# Patient Record
Sex: FEMALE | Race: WHITE | NOT HISPANIC OR LATINO | Employment: OTHER | URBAN - METROPOLITAN AREA
[De-identification: names, ages, dates, MRNs, and addresses within clinical notes are randomized per-mention and may not be internally consistent; named-entity substitution may affect disease eponyms.]

---

## 2022-04-13 ENCOUNTER — APPOINTMENT (OUTPATIENT)
Dept: MRI IMAGING | Facility: HOSPITAL | Age: 72
End: 2022-04-13

## 2022-04-13 ENCOUNTER — HOSPITAL ENCOUNTER (EMERGENCY)
Facility: HOSPITAL | Age: 72
Discharge: HOME | End: 2022-04-13
Attending: EMERGENCY MEDICINE | Admitting: EMERGENCY MEDICINE

## 2022-04-13 ENCOUNTER — APPOINTMENT (OUTPATIENT)
Dept: GENERAL RADIOLOGY | Facility: HOSPITAL | Age: 72
End: 2022-04-13

## 2022-04-13 ENCOUNTER — APPOINTMENT (OUTPATIENT)
Dept: CT IMAGING | Facility: HOSPITAL | Age: 72
End: 2022-04-13

## 2022-04-13 VITALS
DIASTOLIC BLOOD PRESSURE: 93 MMHG | HEART RATE: 101 BPM | SYSTOLIC BLOOD PRESSURE: 156 MMHG | TEMPERATURE: 98 F | RESPIRATION RATE: 18 BRPM | OXYGEN SATURATION: 96 %

## 2022-04-13 DIAGNOSIS — M51.9 LUMBAR DISC DISEASE: Primary | ICD-10-CM

## 2022-04-13 DIAGNOSIS — N39.0 ACUTE UTI: ICD-10-CM

## 2022-04-13 LAB
ALBUMIN SERPL-MCNC: 4 G/DL (ref 3.5–5.2)
ALBUMIN/GLOB SERPL: 1.1 G/DL
ALP SERPL-CCNC: 69 U/L (ref 39–117)
ALT SERPL W P-5'-P-CCNC: 16 U/L (ref 1–33)
ANION GAP SERPL CALCULATED.3IONS-SCNC: 13 MMOL/L (ref 5–15)
APTT PPP: 25.3 SECONDS (ref 24.3–38.1)
AST SERPL-CCNC: 11 U/L (ref 1–32)
BACTERIA UR QL AUTO: ABNORMAL /HPF
BASOPHILS # BLD AUTO: 0.03 10*3/MM3 (ref 0–0.2)
BASOPHILS NFR BLD AUTO: 0.4 % (ref 0–1.5)
BILIRUB SERPL-MCNC: 0.4 MG/DL (ref 0–1.2)
BILIRUB UR QL STRIP: NEGATIVE
BUN SERPL-MCNC: 18 MG/DL (ref 8–23)
BUN/CREAT SERPL: 19.1 (ref 7–25)
CALCIUM SPEC-SCNC: 9.4 MG/DL (ref 8.6–10.5)
CHLORIDE SERPL-SCNC: 95 MMOL/L (ref 98–107)
CLARITY UR: ABNORMAL
CO2 SERPL-SCNC: 24 MMOL/L (ref 22–29)
COLOR UR: YELLOW
CREAT SERPL-MCNC: 0.94 MG/DL (ref 0.57–1)
DEPRECATED RDW RBC AUTO: 35.5 FL (ref 37–54)
EGFRCR SERPLBLD CKD-EPI 2021: 64.6 ML/MIN/1.73
EOSINOPHIL # BLD AUTO: 0.14 10*3/MM3 (ref 0–0.4)
EOSINOPHIL NFR BLD AUTO: 1.8 % (ref 0.3–6.2)
ERYTHROCYTE [DISTWIDTH] IN BLOOD BY AUTOMATED COUNT: 11.2 % (ref 12.3–15.4)
GLOBULIN UR ELPH-MCNC: 3.5 GM/DL
GLUCOSE BLDC GLUCOMTR-MCNC: 459 MG/DL (ref 70–130)
GLUCOSE SERPL-MCNC: 473 MG/DL (ref 65–99)
GLUCOSE UR STRIP-MCNC: ABNORMAL MG/DL
HCT VFR BLD AUTO: 43 % (ref 34–46.6)
HGB BLD-MCNC: 15.2 G/DL (ref 12–15.9)
HGB UR QL STRIP.AUTO: ABNORMAL
HOLD SPECIMEN: NORMAL
HOLD SPECIMEN: NORMAL
HYALINE CASTS UR QL AUTO: ABNORMAL /LPF
IMM GRANULOCYTES # BLD AUTO: 0.02 10*3/MM3 (ref 0–0.05)
IMM GRANULOCYTES NFR BLD AUTO: 0.3 % (ref 0–0.5)
INR PPP: 0.93 (ref 0.9–1.1)
KETONES UR QL STRIP: NEGATIVE
LEUKOCYTE ESTERASE UR QL STRIP.AUTO: ABNORMAL
LYMPHOCYTES # BLD AUTO: 2.57 10*3/MM3 (ref 0.7–3.1)
LYMPHOCYTES NFR BLD AUTO: 33.4 % (ref 19.6–45.3)
MCH RBC QN AUTO: 30.8 PG (ref 26.6–33)
MCHC RBC AUTO-ENTMCNC: 35.3 G/DL (ref 31.5–35.7)
MCV RBC AUTO: 87.2 FL (ref 79–97)
MONOCYTES # BLD AUTO: 0.69 10*3/MM3 (ref 0.1–0.9)
MONOCYTES NFR BLD AUTO: 9 % (ref 5–12)
NEUTROPHILS NFR BLD AUTO: 4.25 10*3/MM3 (ref 1.7–7)
NEUTROPHILS NFR BLD AUTO: 55.1 % (ref 42.7–76)
NITRITE UR QL STRIP: POSITIVE
PH UR STRIP.AUTO: 7 [PH] (ref 4.5–8)
PLATELET # BLD AUTO: 312 10*3/MM3 (ref 140–450)
PMV BLD AUTO: 8.9 FL (ref 6–12)
POTASSIUM SERPL-SCNC: 4.2 MMOL/L (ref 3.5–5.2)
PROT SERPL-MCNC: 7.5 G/DL (ref 6–8.5)
PROT UR QL STRIP: NEGATIVE
PROTHROMBIN TIME: 12.7 SECONDS (ref 12.1–15)
QT INTERVAL: 321 MS
RBC # BLD AUTO: 4.93 10*6/MM3 (ref 3.77–5.28)
RBC # UR STRIP: ABNORMAL /HPF
REF LAB TEST METHOD: ABNORMAL
SODIUM SERPL-SCNC: 132 MMOL/L (ref 136–145)
SP GR UR STRIP: 1.01 (ref 1–1.03)
SQUAMOUS #/AREA URNS HPF: ABNORMAL /HPF
TROPONIN T SERPL-MCNC: <0.01 NG/ML (ref 0–0.03)
UROBILINOGEN UR QL STRIP: ABNORMAL
WBC # UR STRIP: ABNORMAL /HPF
WBC NRBC COR # BLD: 7.7 10*3/MM3 (ref 3.4–10.8)
WHOLE BLOOD HOLD SPECIMEN: NORMAL
WHOLE BLOOD HOLD SPECIMEN: NORMAL

## 2022-04-13 PROCEDURE — 87147 CULTURE TYPE IMMUNOLOGIC: CPT | Performed by: EMERGENCY MEDICINE

## 2022-04-13 PROCEDURE — 93005 ELECTROCARDIOGRAM TRACING: CPT | Performed by: EMERGENCY MEDICINE

## 2022-04-13 PROCEDURE — 85025 COMPLETE CBC W/AUTO DIFF WBC: CPT | Performed by: EMERGENCY MEDICINE

## 2022-04-13 PROCEDURE — 82962 GLUCOSE BLOOD TEST: CPT

## 2022-04-13 PROCEDURE — 80053 COMPREHEN METABOLIC PANEL: CPT | Performed by: EMERGENCY MEDICINE

## 2022-04-13 PROCEDURE — 71045 X-RAY EXAM CHEST 1 VIEW: CPT

## 2022-04-13 PROCEDURE — 85610 PROTHROMBIN TIME: CPT | Performed by: EMERGENCY MEDICINE

## 2022-04-13 PROCEDURE — 63710000001 PREDNISONE PER 1 MG: Performed by: EMERGENCY MEDICINE

## 2022-04-13 PROCEDURE — 70450 CT HEAD/BRAIN W/O DYE: CPT

## 2022-04-13 PROCEDURE — 93010 ELECTROCARDIOGRAM REPORT: CPT | Performed by: INTERNAL MEDICINE

## 2022-04-13 PROCEDURE — 72110 X-RAY EXAM L-2 SPINE 4/>VWS: CPT

## 2022-04-13 PROCEDURE — 99283 EMERGENCY DEPT VISIT LOW MDM: CPT | Performed by: EMERGENCY MEDICINE

## 2022-04-13 PROCEDURE — 81001 URINALYSIS AUTO W/SCOPE: CPT | Performed by: EMERGENCY MEDICINE

## 2022-04-13 PROCEDURE — 85730 THROMBOPLASTIN TIME PARTIAL: CPT | Performed by: EMERGENCY MEDICINE

## 2022-04-13 PROCEDURE — 84484 ASSAY OF TROPONIN QUANT: CPT | Performed by: EMERGENCY MEDICINE

## 2022-04-13 PROCEDURE — 87086 URINE CULTURE/COLONY COUNT: CPT | Performed by: EMERGENCY MEDICINE

## 2022-04-13 PROCEDURE — 70551 MRI BRAIN STEM W/O DYE: CPT

## 2022-04-13 PROCEDURE — 99284 EMERGENCY DEPT VISIT MOD MDM: CPT

## 2022-04-13 RX ORDER — METHYLPREDNISOLONE 4 MG/1
TABLET ORAL
Qty: 21 TABLET | Refills: 0 | Status: SHIPPED | OUTPATIENT
Start: 2022-04-13

## 2022-04-13 RX ORDER — CEFUROXIME AXETIL 500 MG/1
500 TABLET ORAL 2 TIMES DAILY
Qty: 10 TABLET | Refills: 0 | Status: SHIPPED | OUTPATIENT
Start: 2022-04-13

## 2022-04-13 RX ORDER — SODIUM CHLORIDE 0.9 % (FLUSH) 0.9 %
10 SYRINGE (ML) INJECTION AS NEEDED
Status: DISCONTINUED | OUTPATIENT
Start: 2022-04-13 | End: 2022-04-13 | Stop reason: HOSPADM

## 2022-04-13 RX ORDER — PREDNISONE 20 MG/1
60 TABLET ORAL ONCE
Status: COMPLETED | OUTPATIENT
Start: 2022-04-13 | End: 2022-04-13

## 2022-04-13 RX ADMIN — PREDNISONE 60 MG: 20 TABLET ORAL at 12:13

## 2022-04-13 NOTE — DISCHARGE INSTRUCTIONS
Call the patient connection line for primary care provider advised her to follow-up with within 1 week.  Take the Ceftin and Medrol Dosepak as prescribed.  Return to the emergency department if there is worsening pain, numbness, tingling, weakness, change in bladder or bowel function, worse in any way at all.

## 2022-04-13 NOTE — ED PROVIDER NOTES
Subjective   History of Present Illness  History of Present Illness    Chief complaint: Difficulty walking and numbness    Location: Lower leg    Quality/Severity: Patient having difficulty walking    Timing/Onset/Duration: Started last night around 8 PM    Modifying Factors: Better with time    Associated Symptoms: No headache.  No fever chills or cough.  No sore throat earache or nasal congestion.  No chest pain or shortness of breath.  No palpitations.  No abdominal pain.  No diarrhea or burning when she urinates.  No loss of control of her bladder or bowel function.    Narrative: This 71-year-old white female was walking down some steps and experienced numbness in her legs.  She complained of pain in her left butt cheek.  This happened at 8 PM last night.    PCP: No listed provider.      Review of Systems   Constitutional: Negative for chills and fever.   HENT: Negative for congestion, ear pain and sore throat.    Respiratory: Negative for shortness of breath.    Cardiovascular: Negative for chest pain and palpitations.   Gastrointestinal: Negative for abdominal pain, diarrhea, nausea and vomiting.   Genitourinary: Negative for difficulty urinating.   Musculoskeletal: Negative for back pain and neck pain.   Skin: Negative for rash.   Neurological: Positive for numbness (Bilateral leg). Negative for headaches.   Psychiatric/Behavioral: Negative for confusion.        Medication List      ASK your doctor about these medications    NON FORMULARY            Past Medical History:   Diagnosis Date   • Diabetes mellitus (HCC)        No Known Allergies    Past Surgical History:   Procedure Laterality Date   • BACK SURGERY     • CHOLECYSTECTOMY         History reviewed. No pertinent family history.    Social History     Tobacco Use   • Smoking status: Current Every Day Smoker     Packs/day: 1.00     Types: Cigarettes   • Smokeless tobacco: Never Used   Substance and Sexual Activity   • Alcohol use: Never            Objective   Physical Exam  Vitals (The temperature is 98 °F, pulse 72, respirations 18, /83, room air pulse ox 99%.) reviewed.   Constitutional:       Appearance: Normal appearance.   HENT:      Head: Normocephalic and atraumatic.      Right Ear: Tympanic membrane normal.      Left Ear: Tympanic membrane normal.      Nose: Nose normal.      Mouth/Throat:      Mouth: Mucous membranes are moist.      Pharynx: No oropharyngeal exudate or posterior oropharyngeal erythema.   Eyes:      Extraocular Movements: Extraocular movements intact.      Pupils: Pupils are equal, round, and reactive to light.   Cardiovascular:      Rate and Rhythm: Normal rate and regular rhythm.      Heart sounds: No murmur heard.    No friction rub. No gallop.   Pulmonary:      Effort: Pulmonary effort is normal.      Breath sounds: Normal breath sounds.   Abdominal:      General: Abdomen is flat. Bowel sounds are normal. There is no distension.      Palpations: Abdomen is soft. There is no mass.      Tenderness: There is no abdominal tenderness. There is no guarding or rebound.      Hernia: No hernia is present.   Musculoskeletal:         General: No swelling, tenderness or signs of injury. Normal range of motion.      Cervical back: Normal range of motion and neck supple. No rigidity.   Skin:     General: Skin is warm and dry.      Capillary Refill: Capillary refill takes less than 2 seconds.   Neurological:      General: No focal deficit present.      Mental Status: She is alert and oriented to person, place, and time.      Cranial Nerves: No cranial nerve deficit.      Sensory: No sensory deficit.      Motor: No weakness.      Coordination: Coordination normal.   Psychiatric:         Mood and Affect: Mood normal.         Procedures           ED Course  ED Course as of 04/13/22 1150   Wed Apr 13, 2022   1142 The urine microscopic shows 3-5 red blood cells, 6-12 white blood cells, 2+ bacteria, 7-12 squamous epithelial cells.  The  leukocytes are small, nitrite positive.  The serum glucose is 473.  The sodium is 132.  The chloride is 95 the laboratory values are otherwise unremarkable [RC]      ED Course User Index  [RC] Jared Fernandez MD      09:47 EDT, 04/13/22:  The CT of the head was normal according to Dr. Danielle.     10:16 EDT, 04/13/22:  The EKG was obtained at 1009 read by me at 1010.  The EKG shows a sinus tachycardia with a rate of 100.  There is a normal axis with no hypertrophy.  The CT is prolonged at 204 ms.  The QRS and QT intervals are unremarkable.  There is no ectopy.  There is no acute ST elevation or depression.  There is poor anterior R wave progression     11:50 EDT, 04/13/22:  Patient was reassessed.  She feels much better.  She only has mild numbness in her legs.  She states she feels much better.  Her vital signs were reviewed and are stable.  Neurological exam: Conscious alert oriented x4 with no focal deficits noted.  The patient ambulated without difficulty.    11:51 EDT, 04/13/22:  The patient's diagnosis of lumbar disc disease and urinary tract infection was discussed with her.  The patient will be placed on Ceftin.  The patient will be given a number to call for a primary care provider to follow-up with within 1 week with possible referral to spine specialist.  Patient will be placed on a Medrol Dosepak.  She should return to the emergency department if there is increased numbness, weakness, change in bladder or bowel function, worse in any way at all.  All the patient's question were answered she will be discharged in good condition.                                 MDM    Final diagnoses:   Lumbar disc disease   Acute UTI       ED Disposition  ED Disposition     None          No follow-up provider specified.       Medication List      No changes were made to your prescriptions during this visit.          Jared Fernandez MD  04/13/22 1149

## 2022-04-13 NOTE — ED NOTES
"Patient states that she did not go to the hospital when she had her \"stroke\" in September.  States that she just assumed that it was a stroke and got better.  States that she does not have a PMD  "

## 2022-04-14 LAB — BACTERIA SPEC AEROBE CULT: ABNORMAL

## 2022-04-26 ENCOUNTER — HOSPITAL ENCOUNTER (EMERGENCY)
Facility: HOSPITAL | Age: 72
Discharge: HOME | End: 2022-04-26
Attending: EMERGENCY MEDICINE | Admitting: EMERGENCY MEDICINE

## 2022-04-26 ENCOUNTER — APPOINTMENT (OUTPATIENT)
Dept: GENERAL RADIOLOGY | Facility: HOSPITAL | Age: 72
End: 2022-04-26

## 2022-04-26 ENCOUNTER — HOSPITAL ENCOUNTER (OUTPATIENT)
Dept: PHYSICAL THERAPY | Facility: HOSPITAL | Age: 72
Setting detail: THERAPIES SERIES
Discharge: HOME | End: 2022-04-26

## 2022-04-26 VITALS
DIASTOLIC BLOOD PRESSURE: 66 MMHG | HEART RATE: 68 BPM | OXYGEN SATURATION: 99 % | SYSTOLIC BLOOD PRESSURE: 100 MMHG | HEIGHT: 64 IN | BODY MASS INDEX: 24.41 KG/M2 | TEMPERATURE: 97.6 F | RESPIRATION RATE: 20 BRPM | WEIGHT: 143 LBS

## 2022-04-26 DIAGNOSIS — S92.415A CLOSED NONDISPLACED FRACTURE OF PROXIMAL PHALANX OF LEFT GREAT TOE, INITIAL ENCOUNTER: Primary | ICD-10-CM

## 2022-04-26 DIAGNOSIS — R26.9 ABNORMAL GAIT DUE TO MUSCLE WEAKNESS: Primary | ICD-10-CM

## 2022-04-26 DIAGNOSIS — M62.81 ABNORMAL GAIT DUE TO MUSCLE WEAKNESS: Primary | ICD-10-CM

## 2022-04-26 PROCEDURE — 73630 X-RAY EXAM OF FOOT: CPT

## 2022-04-26 PROCEDURE — 97162 PT EVAL MOD COMPLEX 30 MIN: CPT

## 2022-04-26 PROCEDURE — 99283 EMERGENCY DEPT VISIT LOW MDM: CPT

## 2022-04-26 PROCEDURE — 99282 EMERGENCY DEPT VISIT SF MDM: CPT

## 2022-04-26 RX ORDER — NAPROXEN 500 MG/1
500 TABLET ORAL 2 TIMES DAILY PRN
Qty: 10 TABLET | Refills: 0 | Status: SHIPPED | OUTPATIENT
Start: 2022-04-26 | End: 2022-05-01

## 2022-04-26 NOTE — ED PROVIDER NOTES
" EMERGENCY DEPARTMENT ENCOUNTER      Room Number: 13/13    History is provided by the patient, no translation services needed    HPI:    Chief complaint: Foot injury    Location: Left foot    Quality/Severity: Patient denies any pain.    Timing/Duration: 11-12 days ago    Modifying Factors: None    Associated Symptoms: Swelling and bruising, which have improved.    Narrative: Pt is a 71 y.o. female who presents complaining of a left foot injury that occurred 11 to 12 days ago.  Patient advises that she was walking with her walker when she got \"tangled up\" in her walker.  Patient advises that she fell into her  and then fell down.  She denies any other injuries from the fall.  Patient denies hitting her head or loss of consciousness during the fall.  She states that she has since seen her PCP for her foot injury.  Patient was sent to physical therapy and today the physical therapist sent her to the ER, prior to performing any manipulation on the foot in order to get an x-ray.  Patient advises that she had previously had a mini stroke and has numbness on her left side.  Patient states that her walking had declined after her mini stroke, likely causing her to fall.  She advises that she is able to walk on her foot at this time.  Patient states that she has also had bruising and swelling in her left foot, which have improved over the past week.      PMD: Lanie Gomez, BRET    REVIEW OF SYSTEMS  Review of Systems   Constitutional: Negative for chills and fever.   HENT: Negative for congestion and rhinorrhea.    Eyes: Negative for pain and visual disturbance.   Respiratory: Negative for cough, chest tightness and shortness of breath.    Cardiovascular: Negative for chest pain, palpitations and leg swelling.   Gastrointestinal: Negative for abdominal pain, constipation, diarrhea, nausea and vomiting.   Musculoskeletal: Positive for joint swelling (To left foot). Negative for arthralgias, back pain and myalgias. "   Skin: Positive for color change (To left foot). Negative for pallor, rash and wound.   Neurological: Negative for dizziness, syncope, weakness, light-headedness and headaches.   Psychiatric/Behavioral: Negative for suicidal ideas. The patient is not nervous/anxious.          PAST MEDICAL HISTORY  Active Ambulatory Problems     Diagnosis Date Noted   • No Active Ambulatory Problems     Resolved Ambulatory Problems     Diagnosis Date Noted   • No Resolved Ambulatory Problems     Past Medical History:   Diagnosis Date   • Diabetes mellitus (HCC)        PAST SURGICAL HISTORY  Past Surgical History:   Procedure Laterality Date   • BACK SURGERY     • CHOLECYSTECTOMY         FAMILY HISTORY  History reviewed. No pertinent family history.    SOCIAL HISTORY  Social History     Socioeconomic History   • Marital status: Single   Tobacco Use   • Smoking status: Current Every Day Smoker     Packs/day: 1.00     Types: Cigarettes   • Smokeless tobacco: Never Used   Substance and Sexual Activity   • Alcohol use: Never       ALLERGIES  Patient has no known allergies.    No current facility-administered medications for this encounter.    Current Outpatient Medications:   •  cefuroxime (CEFTIN) 500 MG tablet, Take 1 tablet by mouth 2 (Two) Times a Day., Disp: 10 tablet, Rfl: 0  •  methylPREDNISolone (Medrol) 4 MG dose pack, follow package directions, Disp: 21 tablet, Rfl: 0  •  naproxen (EC NAPROSYN) 500 MG EC tablet, Take 1 tablet by mouth 2 (Two) Times a Day As Needed for Mild Pain  for up to 5 days., Disp: 10 tablet, Rfl: 0  •  NON FORMULARY, DOES NOT TAKE ANY MEDICATIONS, Disp: , Rfl:     PHYSICAL EXAM  ED Triage Vitals [04/26/22 1529]   Temp Heart Rate Resp BP SpO2   97.6 °F (36.4 °C) 90 20 100/68 99 %      Temp src Heart Rate Source Patient Position BP Location FiO2 (%)   Oral Monitor Sitting Left arm --       Physical Exam  Vitals and nursing note reviewed.   Constitutional:       General: She is not in acute distress.      Appearance: Normal appearance. She is not ill-appearing, toxic-appearing or diaphoretic.   HENT:      Head: Normocephalic and atraumatic.   Eyes:      Conjunctiva/sclera: Conjunctivae normal.   Cardiovascular:      Rate and Rhythm: Normal rate and regular rhythm.      Pulses: Normal pulses.      Heart sounds: Normal heart sounds.   Pulmonary:      Effort: Pulmonary effort is normal. No respiratory distress.      Breath sounds: Normal breath sounds. No wheezing, rhonchi or rales.   Abdominal:      General: Bowel sounds are normal. There is no distension.      Palpations: Abdomen is soft.      Tenderness: There is no abdominal tenderness. There is no guarding or rebound.   Musculoskeletal:         General: Swelling (To left foot) and signs of injury (To left foot) present. No tenderness or deformity. Normal range of motion.      Cervical back: Normal range of motion and neck supple. No tenderness.      Right lower leg: No edema.      Left lower leg: No edema.   Skin:     General: Skin is warm and dry.      Coloration: Skin is not jaundiced or pale.      Findings: No bruising, erythema, lesion or rash.   Neurological:      Mental Status: She is alert and oriented to person, place, and time.   Psychiatric:         Mood and Affect: Mood and affect normal.           LAB RESULTS  Lab Results (last 24 hours)     ** No results found for the last 24 hours. **          RADIOLOGY  XR Foot 3+ View Left    Result Date: 4/26/2022  CR Foot Comp Min 3 Vws LT INDICATION: Pain and swelling of the left foot with numbness. Patient fell 11 days ago. COMPARISON: None available FINDINGS: AP, lateral, and oblique views of the left foot.  There is a fracture deformity of the distal 5th metatarsal which appears to be an oblique subacute to chronic appearing fracture which is mostly healed. This is older than 11 days. There is degenerative change at the 1st MTP joint with an oblique lucency through the proximal aspect of the proximal phalanx  of the great toe extending into the 1st MTP joint concerning for fracture. The hindfoot and Lisfranc joints appear normal. There is irregularity to the cortical margin of the tuft of the distal phalanx of the great toe which may be chronic, however correlation is needed for presence of trauma or skin ulcer in this area. No foreign body.     1. There is fracture deformity of the distal aspect of the 5th metatarsal with callus formation consistent with a subacute to chronic fracture which is older than 11 days. 2. There is oblique lucency through the proximal phalanx of the great toe extending into the 1st MTP joint which is likely an acute nondisplaced fracture. 3. The tuft of the distal phalanx of the great toe is irregular which may be chronic. Correlate with any presence of erosion, skin ulcer or trauma in this area. Signer Name: Cathi Goodwin MD  Signed: 4/26/2022 3:55 PM  Workstation Name: [id number]  [facility]      I ordered the above radiologic testing and reviewed the results    PROCEDURES  Procedures      PROGRESS AND CONSULTS  ED Course as of 04/26/22 1632   Tue Apr 26, 2022   1630 Displaced fx is an old fx according to the radiologist. Other fx is non-displaced. Will place the pt in a walking boot and have her follow up with ortho.  [AH]      ED Course User Index  [AH] Lorie Bazan PA-C           MEDICAL DECISION MAKING    MDM     My diagnosis for lower extremity pain and injury includes but is not limited to hip fracture, femur fracture, hip dislocation, hip contusion, hip sprain, hip strain, pelvic fracture, ischio-tibial band pain, ischio-tibial band bursitis, knee sprain, patella dislocation, knee dislocation, internal derangement of knee, fractures of the femur, tibia, fibula, ankle, foot and digits, ankle sprain, ankle dislocation, Lisfranc fracture, fracture dislocations of the digits, pulmonary embolism, claudication, peripheral vascular disease, gout,  osteoarthritis, rheumatoid arthritis, bursitis, septic joint, poly-rheumatica, polyarthralgia and other inflammatory or infectious disease processes.  DIAGNOSIS  Final diagnoses:   Closed nondisplaced fracture of proximal phalanx of left great toe, initial encounter       Latest Documented Vital Signs:  As of 16:32 EDT  BP- 100/68 HR- 90 Temp- 97.6 °F (36.4 °C) (Oral) O2 sat- 99%    DISPOSITION  Pt discharged    Smoking cessation discussed with patient.    Discussed pertinent findings with the patient/family.  Patient/Family voiced understanding of need to follow-up for recheck and further testing as needed.  Return to the Emergency Department warnings were given.         Medication List      New Prescriptions    naproxen 500 MG EC tablet  Commonly known as: EC NAPROSYN  Take 1 tablet by mouth 2 (Two) Times a Day As Needed for Mild Pain  for up to 5 days.           Where to Get Your Medications      These medications were sent to [facility] - [place], [place] - [address] - [phone]  - [phone] FX  [address] [facility] [place] [facility] [address]    Phone: [phone]   · naproxen 500 MG EC tablet              Follow-up Information     Lanie Gomez, BRET. Call in 1 day.    Specialty: Family Medicine  Why: to schedule follow up  Contact information:  [address]  [facility] [id number] [phone]             Bin Walker MD. Call in 1 day.    Specialties: Orthopedic Surgery, Sports Medicine  Why: to schedule follow up  Contact information:  [address] [facility] [address]  [place] [id number] [phone]             Go to  [facility] Emergency Department.    Specialty: Emergency Medicine  Why: If symptoms worsen  Contact information:  [address] [place] [address] [phone]           Wisam Baldwin DPM. Call in 1 day.    Specialty: Podiatry  Why: to schedule follow up  Contact information:  [address] [facility] [address] [facility] [address]  [facility]  [address]  [phone]                           Dictated utilizing Dragon dictation     Lorie Bazan PA-C  04/26/22 1689

## 2022-04-26 NOTE — DISCHARGE INSTRUCTIONS
You have fractured your foot.  Ice and elevate.  Wear splint or walking boot until seen by orthopedics or podiatry..  Crutches as discussed for ambulation.  Medication as directed.  Follow-up with orthopedics or podiatry as discussed.  Return to ED for medical emergencies.

## 2022-04-27 NOTE — THERAPY EVALUATION
.Outpatient Physical Therapy Neuro Initial Evaluation   [facility]     Patient Name: Arline Landaverde  : 1950  MRN: [id number]  Today's Date: 2022      Visit Date: 2022    There is no problem list on file for this patient.       Past Medical History:   Diagnosis Date   • Diabetes mellitus (HCC)         Past Surgical History:   Procedure Laterality Date   • BACK SURGERY     • CHOLECYSTECTOMY           Visit Dx:     ICD-10-CM ICD-9-CM   1. Abnormal gait due to muscle weakness  M62.81 781.2    R26.9 728.87        Patient History     Row Name 22 1500             History    Chief Complaint Difficulty Walking;Difficulty with daily activities;Falls/history of falls;Muscle weakness;Numbness  -JV      Date Current Problem(s) Began 22  -JV      Brief Description of Current Complaint Pt reports onset of weakness and numbness in the L UE with some drooling out the left side of mouth. Pt went to ER and was able to walk into the hospital per report. During time in ER, pt states that numbness and weakness progressed to involve the L LE. Pt's diagnostic tests were negative for acute stroke or cardiac event. Pt was diagnosed with UTI and started on steroid dose pack and oral anti-biotics. Pt reports she fell in the parking lot after D/C from ER. Pt was seen by PCP on 22 and referred to PT for evaluation of difficulty walking.  -JV      Patient/Caregiver Goals Return to prior level of function;Improve mobility;Improve strength  -JV      Current Tobacco Use yes  -JV      Smoking Status yes  -JV      Patient's Rating of General Health Good  -JV      Hand Dominance right-handed  -JV      What clinical tests have you had for this problem? X-ray;CT scan;MRI;Other 1 (comment)  EKG  -JV      Are you or can you be pregnant No  -JV              Fall Risk Assessment    Any falls in the past year: Yes  -JV      Number of falls reported in the last 12 months 3  -JV      Factors that contributed to the fall:  Lost balance;Didn't use assistive device;Uneven surface  -JV      Does patient have a fear of falling No  -JV              Services    Do you plan to receive Home Health services in the near future No  -JV              Daily Activities    Primary Language English  -JV      Are you able to read Yes  -JV      Are you able to write Yes  -JV      How does patient learn best? Listening  -JV      Teaching needs identified Home Exercise Program;Falls Prevention  -JV      Patient is concerned about/has problems with Bed Mobility;Climbing Stairs;Difficulty with self care (i.e. bathing, dressing, toileting:;Performing home management (household chores, shopping, care of dependents);Standing;Transfers (getting out of a chair, bed);Walking  -JV      Barriers to learning None  -JV      Recommended Referrals Other (comment)  Recommended X-ray of L foot/ankle  -JV      Pt Participated in POC and Goals Yes  -JV              Safety    Are you being hurt, hit, or frightened by anyone at home or in your life? No  -JV      Are you being neglected by a caregiver No  -JV      Have you had any of the following issues with N/A  -JV            User Key  (r) = Recorded By, (t) = Taken By, (c) = Cosigned By    Initials Name Provider Type    Jessica Littlejohn PT Physical Therapist                                Therapy Education  Education Details: Encouraged pt/caregiver to obtain x-ray of L foot/ankle due to concern for orthopedic injury.  Given: Other (comment) (Unable to begin treatment until L foot/ankle cleared.)  Program: New  How Provided: Verbal  Provided to: Patient, Caregiver  Level of Understanding: Teach back education performed, Verbalized         PT Assessment/Plan     Row Name 04/27/22 1455 04/27/22 1441       PT Assessment    Functional Limitations -- Decreased safety during functional activities;Impaired gait;Limitation in home management;Performance in self-care ADL  -JV    Impairments -- Balance;Coordination;Gait;Motor  function;Muscle strength;Sensation  -JV    Assessment Comments -- This pt is a 70 y/o F who began to experience L UE numbness and weakness along with drooling from the L side of mouth. Pt was taken to ER on 4/13/22 and diagnosed with UTI. Testing performed in the ER was negative for acute stroke or cardiac event including MRI, CT scan, EKG, and lab work. An old area of injury was observed in the L cerebellum. The pt reports that she fell in the parking lot leaving the ER. The pt was seen by PCP on 4/18/22 and referred to PT for evaluation and tx. Pt presents to therapy with her  in a travel w/c and reports that she still has numbness and weakness in the L U/LE, numbness in her face and difficulty swallowing. Pt states all symptoms are slowly improving. Pt does not c/o pain in the L foot/ankle, but states she cannot tell where it is in space. Shoes and socks were removed and L foot/ankle was observed to have significant bruising and swelling, pt and  report this occured leaving the ER. Pt is not maintaining trunk in midline in sitting with wt shifted toward the R and increased lateral trunk flexion toward the R. Caregiver reports that pt falls toward the L when not supported for example when sitting on the commode. Upper and LE coordination is significantly decreased in the L U/LE. Pt/caregiver were encouraged to obtain x-ray of the L foot/ankle to rule out orthopedic injury prior to beginning physical therapy for weakness and difficulty walking.  -JV    Please refer to paper survey for additional self-reported information Yes  -JV Yes  -JV    Patient/caregiver participated in establishment of treatment plan and goals Yes  -JV --    Patient would benefit from skilled therapy intervention Yes  -JV --       PT Plan    PT Plan Comments Plan to complete PT evaluation after pt is assessed for L foot/ankle injury.  -JV --          User Key  (r) = Recorded By, (t) = Taken By, (c) = Cosigned By    Initials Name  Provider Type    Jessica Littlejohn, PT Physical Therapist                                   Outcome Measure Options: Lower Extremity Functional Scale (LEFS)  Lower Extremity Functional Index  Any of your usual work, housework or school activities: Extreme difficulty or unable to perform activity  Your usual hobbies, recreational or sporting activities: Extreme difficulty or unable to perform activity  Getting into or out of the bath: Extreme difficulty or unable to perform activity  Walking between rooms: Extreme difficulty or unable to perform activity  Putting on your shoes or socks: Extreme difficulty or unable to perform activity  Squatting: Extreme difficulty or unable to perform activity  Lifting an object, like a bag of groceries from the floor: Extreme difficulty or unable to perform activity  Performing light activities around your home: Extreme difficulty or unable to perform activity  Performing heavy activities around your home: Extreme difficulty or unable to perform activity  Getting into or out of a car: Extreme difficulty or unable to perform activity  Walking 2 blocks: Extreme difficulty or unable to perform activity  Walking a mile: Extreme difficulty or unable to perform activity  Going up or down 10 stairs (about 1 flight of stairs): Extreme difficulty or unable to perform activity  Standing for 1 hour: Extreme difficulty or unable to perform activity  Sitting for 1 hour: Extreme difficulty or unable to perform activity  Running on even ground: Extreme difficulty or unable to perform activity  Running on uneven ground: Extreme difficulty or unable to perform activity  Making sharp turns while running fast: Extreme difficulty or unable to perform activity  Hopping: Extreme difficulty or unable to perform activity  Rolling over in bed: Extreme difficulty or unable to perform activity  Total: 0    Time Calculation:   Start Time: 1400  Stop Time: 1500  Time Calculation (min): 60 min   Therapy Charges  for Today     Code Description Service Date Service Provider Modifiers Qty    28547023806 HC PT EVAL MOD COMPLEXITY 4 4/26/2022 Jessica Farris, PT GP 1          PT G-Codes  Outcome Measure Options: Lower Extremity Functional Scale (LEFS)  Total: 0         Jessica Farris, PT  4/27/2022

## 2023-03-08 ENCOUNTER — APPOINTMENT (OUTPATIENT)
Dept: GENERAL RADIOLOGY | Facility: HOSPITAL | Age: 73
End: 2023-03-08
Payer: MEDICARE

## 2023-03-08 ENCOUNTER — HOSPITAL ENCOUNTER (EMERGENCY)
Facility: HOSPITAL | Age: 73
Discharge: LEFT BEFORE DISCHARGE | End: 2023-03-08
Attending: EMERGENCY MEDICINE | Admitting: EMERGENCY MEDICINE
Payer: MEDICARE

## 2023-03-08 VITALS
RESPIRATION RATE: 16 BRPM | TEMPERATURE: 97.4 F | SYSTOLIC BLOOD PRESSURE: 131 MMHG | OXYGEN SATURATION: 100 % | HEIGHT: 64 IN | HEART RATE: 92 BPM | DIASTOLIC BLOOD PRESSURE: 83 MMHG | WEIGHT: 143 LBS | BODY MASS INDEX: 24.41 KG/M2

## 2023-03-08 DIAGNOSIS — H34.12 CENTRAL RETINAL ARTERY OCCLUSION OF LEFT EYE: Primary | ICD-10-CM

## 2023-03-08 LAB
ABO GROUP BLD: NORMAL
ALBUMIN SERPL-MCNC: 3.6 G/DL (ref 3.5–5.2)
ALBUMIN/GLOB SERPL: 1.5 G/DL
ALP SERPL-CCNC: 42 U/L (ref 39–117)
ALT SERPL W P-5'-P-CCNC: 11 U/L (ref 1–33)
ANION GAP SERPL CALCULATED.3IONS-SCNC: 9.5 MMOL/L (ref 5–15)
AST SERPL-CCNC: 8 U/L (ref 1–32)
BASOPHILS # BLD AUTO: 0.06 10*3/MM3 (ref 0–0.2)
BASOPHILS NFR BLD AUTO: 0.9 % (ref 0–1.5)
BILIRUB SERPL-MCNC: 0.3 MG/DL (ref 0–1.2)
BLD GP AB SCN SERPL QL: NEGATIVE
BUN SERPL-MCNC: 16 MG/DL (ref 8–23)
BUN/CREAT SERPL: 24.6 (ref 7–25)
CALCIUM SPEC-SCNC: 9.3 MG/DL (ref 8.6–10.5)
CHLORIDE SERPL-SCNC: 100 MMOL/L (ref 98–107)
CO2 SERPL-SCNC: 21.5 MMOL/L (ref 22–29)
CREAT SERPL-MCNC: 0.65 MG/DL (ref 0.57–1)
DEPRECATED RDW RBC AUTO: 44.3 FL (ref 37–54)
EGFRCR SERPLBLD CKD-EPI 2021: 93.1 ML/MIN/1.73
EOSINOPHIL # BLD AUTO: 0.38 10*3/MM3 (ref 0–0.4)
EOSINOPHIL NFR BLD AUTO: 5.8 % (ref 0.3–6.2)
ERYTHROCYTE [DISTWIDTH] IN BLOOD BY AUTOMATED COUNT: 13.1 % (ref 12.3–15.4)
ERYTHROCYTE [SEDIMENTATION RATE] IN BLOOD: 6 MM/HR (ref 0–30)
GLOBULIN UR ELPH-MCNC: 2.4 GM/DL
GLUCOSE BLDC GLUCOMTR-MCNC: 107 MG/DL (ref 70–130)
GLUCOSE SERPL-MCNC: 101 MG/DL (ref 65–99)
HCT VFR BLD AUTO: 32.3 % (ref 34–46.6)
HGB BLD-MCNC: 11.3 G/DL (ref 12–15.9)
HOLD SPECIMEN: NORMAL
HOLD SPECIMEN: NORMAL
IMM GRANULOCYTES # BLD AUTO: 0.03 10*3/MM3 (ref 0–0.05)
IMM GRANULOCYTES NFR BLD AUTO: 0.5 % (ref 0–0.5)
INR PPP: 0.95 (ref 0.9–1.1)
LYMPHOCYTES # BLD AUTO: 2.08 10*3/MM3 (ref 0.7–3.1)
LYMPHOCYTES NFR BLD AUTO: 31.9 % (ref 19.6–45.3)
MCH RBC QN AUTO: 32.3 PG (ref 26.6–33)
MCHC RBC AUTO-ENTMCNC: 35 G/DL (ref 31.5–35.7)
MCV RBC AUTO: 92.3 FL (ref 79–97)
MONOCYTES # BLD AUTO: 0.46 10*3/MM3 (ref 0.1–0.9)
MONOCYTES NFR BLD AUTO: 7.1 % (ref 5–12)
NEUTROPHILS NFR BLD AUTO: 3.51 10*3/MM3 (ref 1.7–7)
NEUTROPHILS NFR BLD AUTO: 53.8 % (ref 42.7–76)
NRBC BLD AUTO-RTO: 0 /100 WBC (ref 0–0.2)
PLATELET # BLD AUTO: 325 10*3/MM3 (ref 140–450)
PMV BLD AUTO: 8.4 FL (ref 6–12)
POTASSIUM SERPL-SCNC: 4.4 MMOL/L (ref 3.5–5.2)
PROT SERPL-MCNC: 6 G/DL (ref 6–8.5)
PROTHROMBIN TIME: 12.8 SECONDS (ref 11.7–14.2)
QT INTERVAL: 373 MS
RBC # BLD AUTO: 3.5 10*6/MM3 (ref 3.77–5.28)
RH BLD: POSITIVE
SODIUM SERPL-SCNC: 131 MMOL/L (ref 136–145)
T&S EXPIRATION DATE: NORMAL
WBC NRBC COR # BLD: 6.52 10*3/MM3 (ref 3.4–10.8)
WHOLE BLOOD HOLD COAG: NORMAL
WHOLE BLOOD HOLD SPECIMEN: NORMAL

## 2023-03-08 PROCEDURE — 86850 RBC ANTIBODY SCREEN: CPT | Performed by: PHYSICIAN ASSISTANT

## 2023-03-08 PROCEDURE — 93010 ELECTROCARDIOGRAM REPORT: CPT | Performed by: INTERNAL MEDICINE

## 2023-03-08 PROCEDURE — 85025 COMPLETE CBC W/AUTO DIFF WBC: CPT | Performed by: PHYSICIAN ASSISTANT

## 2023-03-08 PROCEDURE — 80053 COMPREHEN METABOLIC PANEL: CPT | Performed by: PHYSICIAN ASSISTANT

## 2023-03-08 PROCEDURE — 99285 EMERGENCY DEPT VISIT HI MDM: CPT | Performed by: PSYCHIATRY & NEUROLOGY

## 2023-03-08 PROCEDURE — 86900 BLOOD TYPING SEROLOGIC ABO: CPT | Performed by: PHYSICIAN ASSISTANT

## 2023-03-08 PROCEDURE — 82962 GLUCOSE BLOOD TEST: CPT

## 2023-03-08 PROCEDURE — 71045 X-RAY EXAM CHEST 1 VIEW: CPT

## 2023-03-08 PROCEDURE — 85610 PROTHROMBIN TIME: CPT | Performed by: PHYSICIAN ASSISTANT

## 2023-03-08 PROCEDURE — 86901 BLOOD TYPING SEROLOGIC RH(D): CPT | Performed by: PHYSICIAN ASSISTANT

## 2023-03-08 PROCEDURE — 99283 EMERGENCY DEPT VISIT LOW MDM: CPT

## 2023-03-08 PROCEDURE — 93005 ELECTROCARDIOGRAM TRACING: CPT | Performed by: PHYSICIAN ASSISTANT

## 2023-03-08 PROCEDURE — 85652 RBC SED RATE AUTOMATED: CPT | Performed by: PHYSICIAN ASSISTANT

## 2023-03-08 RX ORDER — ATORVASTATIN CALCIUM 20 MG/1
20 TABLET, FILM COATED ORAL DAILY
Qty: 14 TABLET | Refills: 0 | Status: SHIPPED | OUTPATIENT
Start: 2023-03-08

## 2023-03-08 RX ORDER — SODIUM CHLORIDE 0.9 % (FLUSH) 0.9 %
10 SYRINGE (ML) INJECTION AS NEEDED
Status: DISCONTINUED | OUTPATIENT
Start: 2023-03-08 | End: 2023-03-08 | Stop reason: HOSPADM

## 2023-03-08 NOTE — CONSULTS
"Neurology Consult Note    Consult Date: 3/8/2023    Referring MD: Dr. King    Reason for Consult I have been asked to see the patient in neurological consultation to render advice and opinion regarding vision loss    Arline Landaverde is a 72 y.o. female with diabetes, prior stroke who presented to the hospital with vision loss.    She was seen in April of last year at [facility] for left-sided weakness.  MRI was done which showed no evidence of stroke.  She was discharged and reports persistent left hemiparesis and ataxia which have gradually improved since that time.  She does not take aspirin on a daily basis, only intermittently.  She denies a history of hypertension or A-fib.    On Friday she developed abrupt onset of near complete left eye vision loss.  This was painless.  There was no associated unilateral weakness or numbness.  She did not have any premonitory symptoms leading up to this or any headache before during or after.  She saw an ophthalmologist today who diagnosed her with a left central retinal artery occlusion and sent her to the emergency department for further testing.    Past Medical History:   Diagnosis Date   • Diabetes mellitus (HCC)    • Stroke (HCC)        Exam  /82   Pulse 103   Temp 97.4 °F (36.3 °C) (Tympanic)   Resp 16   Ht 162.6 cm (64\")   Wt 64.9 kg (143 lb)   SpO2 99%   BMI 24.55 kg/m²   Gen: NAD, vitals reviewed  MS: oriented x3, recent/remote memory intact, normal attention/concentration, language intact, no neglect.  CN: Right eye visual acuity grossly normal, some retained light perception and left-sided peripheral vision in the left eye, pupils 4 to 5 mm, fixed (had dilating drops with the ophthalmologist earlier), EOMI, no facial droop, no dysarthria  Motor: 5/5 throughout upper and lower extremities, normal tone  Coordination: No dysmetria or overshoot  Sensory: Diminished to cold temperature left upper extremity, otherwise intact to cold temperature " and vibration throughout    DATA:    Lab Results   Component Value Date    GLUCOSE 473 (C) 04/13/2022    CALCIUM 9.4 04/13/2022     (L) 04/13/2022    K 4.2 04/13/2022    CO2 24.0 04/13/2022    CL 95 (L) 04/13/2022    BUN 18 04/13/2022    CREATININE 0.94 04/13/2022    BCR 19.1 04/13/2022    ANIONGAP 13.0 04/13/2022     Lab Results   Component Value Date    WBC 6.52 03/08/2023    HGB 11.3 (L) 03/08/2023    HCT 32.3 (L) 03/08/2023    MCV 92.3 03/08/2023     03/08/2023       Lab review:  Sodium 132, hemoglobin 11.3, ESR pending    Imaging review: I personally reviewed her MRI of the brain performed in April of last year at [facility].  This shows a chronic left inferior cerebellar lacunar stroke.  No acute stroke was identified.  Radiology report reviewed.    CTA head and neck is pending    Diagnoses:  Left central retinal artery occlusion  Chronic stroke  Non-insulin-dependent diabetes without complication    Pre-stroke MRS: 1    Comment: Left CRAO diagnosed by ophthalmology today.  She also has a recent clinical stroke that occurred last year.  MRI at that time only showed evidence of chronic lacunar strokes.  For her CRAO I recommended a complete stroke work-up including MRI, CTA and echo as well as a sed rate.  Patient at this point is refusing further testing and wants to go home    PLAN:  Recommend aspirin, statin, ideally a short course of DAPT  Recommend CTA head and neck, MRI brain, 2D echocardiogram    Discussed with ROBERT Prince

## 2023-03-08 NOTE — ED PROVIDER NOTES
EMERGENCY DEPARTMENT ENCOUNTER    Room Number:  01/01  Date seen:  3/8/2023  PCP: Lanie Gomez APRN  Discussed/ obtained information from independent historians: patient      HPI:  Chief Complaint: vision loss  A complete HPI/ROS/PMH/PSH/SH/FH are unobtainable due to: none  Context: Arline Landaverde is a 72 y.o. female who presents to the ED c/o 5-day history of vision loss in the left eye.  She denies any other symptoms.  She went to [facility] today was evaluated and subsequently found to have a central retinal artery occlusion and was referred to the ER for stroke work-up.  She states she has a history of stroke, has some residual left-sided arm and leg weakness related to that.  She denies any numbness, new weakness, speech difficulty or walking difficulty greater than her baseline.  She is not anticoagulated.  She is a smoker.  She walks independently but due to persistent difficulty since prior stroke uses a wheelchair when outside the house.      External (non-ED) record review: Patient had an MRI of the brain on 4/13/2022 for bilateral leg weakness and numbness and was found to have no acute findings, did have mild cerebral atrophy and minimal might matter microvascular disease as well as remote lacunar infarct in the left cerebellar hemisphere.      PAST MEDICAL HISTORY  Active Ambulatory Problems     Diagnosis Date Noted   • No Active Ambulatory Problems     Resolved Ambulatory Problems     Diagnosis Date Noted   • No Resolved Ambulatory Problems     Past Medical History:   Diagnosis Date   • Diabetes mellitus (HCC)    • Stroke (HCC)          PAST SURGICAL HISTORY  Past Surgical History:   Procedure Laterality Date   • BACK SURGERY     • CHOLECYSTECTOMY           FAMILY HISTORY  History reviewed. No pertinent family history.      SOCIAL HISTORY  Social History     Socioeconomic History   • Marital status: Single   Tobacco Use   • Smoking status: Every Day     Packs/day: 1.00     Types:  Cigarettes   • Smokeless tobacco: Never   Substance and Sexual Activity   • Alcohol use: Never   • Drug use: Never         ALLERGIES  Penicillins        REVIEW OF SYSTEMS  Review of Systems         PHYSICAL EXAM  ED Triage Vitals   Temp Heart Rate Resp BP SpO2   03/08/23 1258 03/08/23 1257 03/08/23 1257 03/08/23 1304 03/08/23 1257   97.4 °F (36.3 °C) 103 16 117/82 99 %      Temp src Heart Rate Source Patient Position BP Location FiO2 (%)   03/08/23 1258 03/08/23 1257 -- -- --   Tympanic Monitor          Physical Exam      GENERAL: no acute distress  HENT: normocephalic, atraumatic  EYES: no scleral icterus  CV: regular rhythm, normal rate  RESPIRATORY: normal effort CTA B  ABDOMEN: nondistended, soft, nontender  MUSCULOSKELETAL: no deformity  NEURO: Neuro: Alert and oriented x3, face symmetric, no facial droop, eyebrows raise equally bilaterally, tongue midline, no dysarthria, no aphasia, extraocular motion intact, no nystagmus, visual acuity grossly normal in the right eye with severe decreased vision in the left eye and loss of vision in the inferior fields on theleft, cranial nerves II through XII intact, moves all extremities well, 5 out of 5 strength in all extremities, sensation intact light touch all extremities, mild ataxia in the left extremities, no tremor.  PSYCH:  calm, cooperative  SKIN: warm, dry    Vital signs and nursing notes reviewed.          LAB RESULTS  Recent Results (from the past 24 hour(s))   Comprehensive Metabolic Panel    Collection Time: 03/08/23  1:58 PM    Specimen: Blood   Result Value Ref Range    Glucose 101 (H) 65 - 99 mg/dL    BUN 16 8 - 23 mg/dL    Creatinine 0.65 0.57 - 1.00 mg/dL    Sodium 131 (L) 136 - 145 mmol/L    Potassium 4.4 3.5 - 5.2 mmol/L    Chloride 100 98 - 107 mmol/L    CO2 21.5 (L) 22.0 - 29.0 mmol/L    Calcium 9.3 8.6 - 10.5 mg/dL    Total Protein 6.0 6.0 - 8.5 g/dL    Albumin 3.6 3.5 - 5.2 g/dL    ALT (SGPT) 11 1 - 33 U/L    AST (SGOT) 8 1 - 32 U/L    Alkaline  Phosphatase 42 39 - 117 U/L    Total Bilirubin 0.3 0.0 - 1.2 mg/dL    Globulin 2.4 gm/dL    A/G Ratio 1.5 g/dL    BUN/Creatinine Ratio 24.6 7.0 - 25.0    Anion Gap 9.5 5.0 - 15.0 mmol/L    eGFR 93.1 >60.0 mL/min/1.73   Protime-INR    Collection Time: 03/08/23  1:58 PM    Specimen: Blood   Result Value Ref Range    Protime 12.8 11.7 - 14.2 Seconds    INR 0.95 0.90 - 1.10   Type & Screen    Collection Time: 03/08/23  1:58 PM    Specimen: Blood   Result Value Ref Range    ABO Type A     RH type Positive     Antibody Screen Negative     T&S Expiration Date 3/11/2023 11:59:59 PM    CBC Auto Differential    Collection Time: 03/08/23  1:58 PM    Specimen: Blood   Result Value Ref Range    WBC 6.52 3.40 - 10.80 10*3/mm3    RBC 3.50 (L) 3.77 - 5.28 10*6/mm3    Hemoglobin 11.3 (L) 12.0 - 15.9 g/dL    Hematocrit 32.3 (L) 34.0 - 46.6 %    MCV 92.3 79.0 - 97.0 fL    MCH 32.3 26.6 - 33.0 pg    MCHC 35.0 31.5 - 35.7 g/dL    RDW 13.1 12.3 - 15.4 %    RDW-SD 44.3 37.0 - 54.0 fl    MPV 8.4 6.0 - 12.0 fL    Platelets 325 140 - 450 10*3/mm3    Neutrophil % 53.8 42.7 - 76.0 %    Lymphocyte % 31.9 19.6 - 45.3 %    Monocyte % 7.1 5.0 - 12.0 %    Eosinophil % 5.8 0.3 - 6.2 %    Basophil % 0.9 0.0 - 1.5 %    Immature Grans % 0.5 0.0 - 0.5 %    Neutrophils, Absolute 3.51 1.70 - 7.00 10*3/mm3    Lymphocytes, Absolute 2.08 0.70 - 3.10 10*3/mm3    Monocytes, Absolute 0.46 0.10 - 0.90 10*3/mm3    Eosinophils, Absolute 0.38 0.00 - 0.40 10*3/mm3    Basophils, Absolute 0.06 0.00 - 0.20 10*3/mm3    Immature Grans, Absolute 0.03 0.00 - 0.05 10*3/mm3    nRBC 0.0 0.0 - 0.2 /100 WBC   Green Top (Gel)    Collection Time: 03/08/23  1:58 PM   Result Value Ref Range    Extra Tube Hold for add-ons.    Lavender Top    Collection Time: 03/08/23  1:58 PM   Result Value Ref Range    Extra Tube hold for add-on    Gold Top - SST    Collection Time: 03/08/23  1:58 PM   Result Value Ref Range    Extra Tube Hold for add-ons.    Light Blue Top    Collection Time:  03/08/23  1:58 PM   Result Value Ref Range    Extra Tube Hold for add-ons.    POC Glucose Once    Collection Time: 03/08/23  1:58 PM    Specimen: Blood   Result Value Ref Range    Glucose 107 70 - 130 mg/dL   ECG 12 Lead ED Triage Standing Order; Stroke (Onset >12 hrs)    Collection Time: 03/08/23  2:03 PM   Result Value Ref Range    QT Interval 373 ms       Ordered the above labs and reviewed the results.        RADIOLOGY  XR Chest 1 View    Result Date: 3/8/2023  XR CHEST 1 VW-  03/08/2023  HISTORY: Stroke protocol.  Heart size is within normal limits. Lungs appear clear. Skinfold overlies the left hemithorax. Bones and soft tissues are otherwise unremarkable.      1. No acute process.  This report was finalized on 3/8/2023 2:25 PM by Dr. Chato Chavez M.D.        Ordered the above noted radiological studies. Reviewed by me in PACS.            PROCEDURES  Procedures              MEDICATIONS GIVEN IN ER  Medications   sodium chloride 0.9 % flush 10 mL (has no administration in time range)                   MEDICAL DECISION MAKING, PROGRESS, and CONSULTS    All labs have been independently reviewed by me.  All radiology studies have been reviewed by me and I have also reviewed the radiology report.   EKG's independently viewed and interpreted by me.  Discussion below represents my analysis of pertinent findings related to patient's condition, differential diagnosis, treatment plan and final disposition.      Additional sources:       - Shared decision making: While in the ER the patient decided she did not want any further work-up for her vision loss today.  She refused CTAs, refused admission for further evaluation and treatment.      Orders placed during this visit:  Orders Placed This Encounter   Procedures   • XR Chest 1 View   • Comprehensive Metabolic Panel   • Protime-INR   • Garden Draw   • CBC Auto Differential   • Sedimentation Rate   • NPO Diet NPO Type: Strict NPO   • Undress and Gown   • Cardiac  Monitoring   • Continuous Pulse Oximetry   • Vital Signs   • Nursing Swallow Assessment   • Oxygen Therapy- Nasal Cannula; 2 LPM; Titrate for SPO2: 92%, Greater Than or Equal To   • POC Glucose Once   • POC Glucose Once   • ECG 12 Lead ED Triage Standing Order; Stroke (Onset >12 hrs)   • Type & Screen   • Insert Peripheral IV   • CBC & Differential   • Green Top (Gel)   • Lavender Top   • Gold Top - SST   • Light Blue Top           Differential diagnosis:  Stroke, carotid occlusion, giant cell arteritis      Independent interpretation of labs, radiology studies, and discussions with consultants:  ED Course as of 03/08/23 1553   Wed Mar 08, 2023   1351 I discussed patient with Dr. Estrada, stroke neurology.  He is agreeable with CTAs of the head neck for initial imaging, if no immediate intervention required based on her imaging she can be admitted to the observation unit for further stroke work-up. [KA]   1441 Dr. Bello has evaluated the patient at the bedside in the emergency department.  She refuses to stay for any further work-up in the ER or in the hospital.  She refuses CTAs or to wait for any further pending test results.  He is counseled her on the risk of leaving AGAINST MEDICAL ADVICE, the risk for another stroke and the need for further evaluation and restratification.  She is of sound mind and judgment, alert and oriented and decisional and chooses to be discharged.  He recommends she take a daily aspirin and be started on Lipitor and follow-up with her PCP [KA]   1450 Dr. King has evaluated the patient at the bedside as well, counseled her on the risk of leaving AGAINST MEDICAL ADVICE prior to completion of her evaluation and treatment including the risk of additional stroke which could cause significant disability or even death and she verbalizes understanding of this and chooses to leave AGAINST MEDICAL ADVICE. [KA]   145 I reassessed the patient, she is agreeable to a prescription for Lipitor.  I  have encouraged her to return to the ER if she changes her mind, otherwise she should call her PCP to schedule follow-up ASAP [KA]   1451 . [KA]   1552 Glucose(!): 101 [KA]   1552 Sodium(!): 131 [KA]   1552 WBC: 6.52 [KA]   1552 Hemoglobin(!): 11.3 [KA]   1553 My independent interpretation of the EKG performed at 1403 sinus rhythm rate 87 normal axis normal intervals no ST elevation.  In comparison with the EKG performed on April 13, 2022, tachycardia resolved, otherwise no significant change [KA]   1553 My independent interpretation of the chest x-ray is no cardiomegaly or acute infiltrate [KA]      ED Course User Index  [KA] Thelma Tubbs PA             Patient was wearing a face mask when I entered the room and they continued to wear a mask throughout their stay in the ED.  I wore PPE, including  gloves, face mask with shield or face mask with goggles whenever I was in the room with patient.     DIAGNOSIS  Final diagnoses:   Central retinal artery occlusion of left eye           Follow Up:  Lanie Gomez P, APRN  [address] [facility] [id number] [phone]    In 1 day        RX:     Medication List      New Prescriptions    atorvastatin 20 MG tablet  Commonly known as: LIPITOR  Take 1 tablet by mouth Daily.           Where to Get Your Medications      These medications were sent to [id number] - [place], [place] [address] [phone]  - [phone]   [address] [facility] [place] [address]    Phone: [phone]   · atorvastatin 20 MG tablet         Latest Documented Vital Signs:  As of 15:53 EST  BP- 131/83 HR- 92 Temp- 97.4 °F (36.3 °C) (Tympanic) O2 sat- 100%              --    Please note that portions of this were completed with a voice recognition program.       Note Disclaimer: At [facility], we believe that sharing information builds trust and better relationships. You are receiving this note because you are receiving care at [facility] or recently visited.  It is possible you will see health information before a provider has talked with you about it. This kind of information can be easy to misunderstand. To help you fully understand what it means for your health, we urge you to discuss this note with your provider.           Thelma Tubbs PA  03/08/23 1551       Thelma Tubbs PA  03/08/23 1552

## 2023-03-08 NOTE — ED PROVIDER NOTES
Pt presents to the ED c/o  cute onset of complete vision loss in the left eye that started Friday.  Was seen by ophthalmologist today and sent to the ER for stroke evaluation.  Diagnosed with a central retinal artery occlusion.  No other symptoms.  Patient denies any history of atrial fibrillation, not on any anticoagulation.     On exam,   General: No acute distress, nontoxic  HEENT: Mucous membranes moist, atraumatic, EOMI  Neck: Full ROM  Pulm: Symmetric chest rise, nonlabored, lungs CTAB  Cardiovascular: Regular rate and rhythm, intact distal pulses  GI: Soft, nontender, nondistended, no rebound, no guarding, bowel sounds present  MSK: Full ROM, no deformity  Skin: Warm, dry  Neuro: Awake, alert, oriented x 4, GCS 15, moving all extremities, no focal deficits  Psych: Calm, cooperative      N95, protective eye goggles, and gloves used during this encounter. Patient in surgical mask.      Plan:   ED Course as of 03/14/23 2322   Wed Mar 08, 2023   1351 I discussed patient with Dr. Estrada, stroke neurology.  He is agreeable with CTAs of the head neck for initial imaging, if no immediate intervention required based on her imaging she can be admitted to the observation unit for further stroke work-up. [KA]   6431 Dr. Bello has evaluated the patient at the bedside in the emergency department.  She refuses to stay for any further work-up in the ER or in the hospital.  She refuses CTAs or to wait for any further pending test results.  He is counseled her on the risk of leaving AGAINST MEDICAL ADVICE, the risk for another stroke and the need for further evaluation and restratification.  She is of sound mind and judgment, alert and oriented and decisional and chooses to be discharged.  He recommends she take a daily aspirin and be started on Lipitor and follow-up with her PCP [KA]   2812 Dr. King has evaluated the patient at the bedside as well, counseled her on the risk of leaving AGAINST MEDICAL ADVICE prior to  completion of her evaluation and treatment including the risk of additional stroke which could cause significant disability or even death and she verbalizes understanding of this and chooses to leave AGAINST MEDICAL ADVICE. [KA]   1451 I reassessed the patient, she is agreeable to a prescription for Lipitor.  I have encouraged her to return to the ER if she changes her mind, otherwise she should call her PCP to schedule follow-up ASAP [KA]   1451 . [KA]   1552 Glucose(!): 101 [KA]   1552 Sodium(!): 131 [KA]   1552 WBC: 6.52 [KA]   1552 Hemoglobin(!): 11.3 [KA]   1553 My independent interpretation of the EKG performed at 1403 sinus rhythm rate 87 normal axis normal intervals no ST elevation.  In comparison with the EKG performed on April 13, 2022, tachycardia resolved, otherwise no significant change [KA]   1553 My independent interpretation of the chest x-ray is no cardiomegaly or acute infiltrate [KA]      ED Course User Index  [KA] Thelma Tubbs PA     Patient at this time does not want to stay in the hospital, wants no further testing.  I discussed with her the risks of not undergoing full stroke work-up with risk moving forward potential recurrent or worsening stroke.  She understands the possibility of further debilitation or even death, is leaving AMA with medical decision making capacity intact.  I recommended that she start taking a daily aspirin and follow-up with her primary care provider, ED return if she changes her mind or symptoms worsen.      Attestation:  The ANGELO and I have discussed this patient's history, physical exam, and treatment plan.  I have reviewed the documentation and personally had a face to face interaction with the patient. I affirm the documentation and agree with the treatment and plan.  The attached note describes my personal findings.          Tramaine King MD  03/08/23 5404       Tramaine King MD  03/08/23 5735       Tramaine King MD  03/14/23 2038

## 2023-03-08 NOTE — ED TRIAGE NOTES
Patient to er from eye doctor with c/o she lost her vision in her left eye starting on Friday. Patient was directed to come to er today related to Central Retinal Artery Occlusion of left eye. Patient reported she has no vision in that eye. Patient has mask on in triage along with staff. Patient reported no blood thinners at this time.

## 2023-03-08 NOTE — DISCHARGE INSTRUCTIONS
You should call your primary doctor today to schedule follow-up for further outpatient evaluation and continued treatment    You can return to the ER for further evaluation if you change your mind

## 2023-03-08 NOTE — ED NOTES
Risks of leaving AMA addressed, risks of stroke addressed. New medications reviewed Patient still wanted to leave. IV line removed. VSS. /83, HR92, O2 98% on RA.

## 2023-04-19 ENCOUNTER — APPOINTMENT (OUTPATIENT)
Dept: GENERAL RADIOLOGY | Facility: HOSPITAL | Age: 73
End: 2023-04-19
Payer: MEDICARE

## 2023-04-19 ENCOUNTER — ANESTHESIA EVENT (OUTPATIENT)
Dept: PERIOP | Facility: HOSPITAL | Age: 73
End: 2023-04-19
Payer: MEDICARE

## 2023-04-19 ENCOUNTER — ANESTHESIA (OUTPATIENT)
Dept: PERIOP | Facility: HOSPITAL | Age: 73
End: 2023-04-19
Payer: MEDICARE

## 2023-04-19 ENCOUNTER — HOSPITAL ENCOUNTER (INPATIENT)
Facility: HOSPITAL | Age: 73
LOS: 1 days | Discharge: HOME HEALTH | End: 2023-04-20
Attending: EMERGENCY MEDICINE | Admitting: STUDENT IN AN ORGANIZED HEALTH CARE EDUCATION/TRAINING PROGRAM
Payer: MEDICARE

## 2023-04-19 DIAGNOSIS — E11.65 TYPE 2 DIABETES MELLITUS WITH HYPERGLYCEMIA, WITHOUT LONG-TERM CURRENT USE OF INSULIN: ICD-10-CM
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
## 2023-04-19 DIAGNOSIS — S72.001A CLOSED DISPLACED FRACTURE OF RIGHT FEMORAL NECK: ICD-10-CM

## 2023-04-19 LAB
ALBUMIN SERPL-MCNC: 3.8 G/DL (ref 3.5–5.2)
ALBUMIN/GLOB SERPL: 1.3 G/DL
ALP SERPL-CCNC: 49 U/L (ref 39–117)
ALT SERPL W P-5'-P-CCNC: 22 U/L (ref 1–33)
ANION GAP SERPL CALCULATED.3IONS-SCNC: 16 MMOL/L (ref 5–15)
ANION GAP SERPL CALCULATED.3IONS-SCNC: 9.1 MMOL/L (ref 5–15)
APTT PPP: 27.8 SECONDS (ref 24.3–38.1)
AST SERPL-CCNC: 20 U/L (ref 1–32)
BASOPHILS # BLD AUTO: 0.05 10*3/MM3 (ref 0–0.2)
BASOPHILS # BLD AUTO: 0.06 10*3/MM3 (ref 0–0.2)
BASOPHILS NFR BLD AUTO: 0.4 % (ref 0–1.5)
BASOPHILS NFR BLD AUTO: 0.5 % (ref 0–1.5)
BILIRUB SERPL-MCNC: 0.5 MG/DL (ref 0–1.2)
BUN SERPL-MCNC: 17 MG/DL (ref 8–23)
BUN SERPL-MCNC: 20 MG/DL (ref 8–23)
BUN/CREAT SERPL: 23.3 (ref 7–25)
BUN/CREAT SERPL: 31.7 (ref 7–25)
CALCIUM SPEC-SCNC: 8.9 MG/DL (ref 8.6–10.5)
CALCIUM SPEC-SCNC: 9.6 MG/DL (ref 8.6–10.5)
CHLORIDE SERPL-SCNC: 94 MMOL/L (ref 98–107)
CHLORIDE SERPL-SCNC: 97 MMOL/L (ref 98–107)
CO2 SERPL-SCNC: 19 MMOL/L (ref 22–29)
CO2 SERPL-SCNC: 21.9 MMOL/L (ref 22–29)
CREAT SERPL-MCNC: 0.63 MG/DL (ref 0.57–1)
CREAT SERPL-MCNC: 0.73 MG/DL (ref 0.57–1)
DEPRECATED RDW RBC AUTO: 40.3 FL (ref 37–54)
DEPRECATED RDW RBC AUTO: 40.7 FL (ref 37–54)
EGFRCR SERPLBLD CKD-EPI 2021: 87 ML/MIN/1.73
EGFRCR SERPLBLD CKD-EPI 2021: 93.8 ML/MIN/1.73
EOSINOPHIL # BLD AUTO: 0.05 10*3/MM3 (ref 0–0.4)
EOSINOPHIL # BLD AUTO: 0.21 10*3/MM3 (ref 0–0.4)
EOSINOPHIL NFR BLD AUTO: 0.4 % (ref 0.3–6.2)
EOSINOPHIL NFR BLD AUTO: 1.8 % (ref 0.3–6.2)
ERYTHROCYTE [DISTWIDTH] IN BLOOD BY AUTOMATED COUNT: 12.1 % (ref 12.3–15.4)
ERYTHROCYTE [DISTWIDTH] IN BLOOD BY AUTOMATED COUNT: 12.2 % (ref 12.3–15.4)
GLOBULIN UR ELPH-MCNC: 2.9 GM/DL
GLUCOSE BLDC GLUCOMTR-MCNC: 100 MG/DL (ref 70–130)
GLUCOSE BLDC GLUCOMTR-MCNC: 199 MG/DL (ref 70–130)
GLUCOSE SERPL-MCNC: 100 MG/DL (ref 65–99)
GLUCOSE SERPL-MCNC: 108 MG/DL (ref 65–99)
HCT VFR BLD AUTO: 29.6 % (ref 34–46.6)
HCT VFR BLD AUTO: 30.4 % (ref 34–46.6)
HCT VFR BLD AUTO: 33.9 % (ref 34–46.6)
HGB BLD-MCNC: 10.6 G/DL (ref 12–15.9)
HGB BLD-MCNC: 10.8 G/DL (ref 12–15.9)
HGB BLD-MCNC: 12.3 G/DL (ref 12–15.9)
IMM GRANULOCYTES # BLD AUTO: 0.03 10*3/MM3 (ref 0–0.05)
IMM GRANULOCYTES # BLD AUTO: 0.05 10*3/MM3 (ref 0–0.05)
IMM GRANULOCYTES NFR BLD AUTO: 0.3 % (ref 0–0.5)
IMM GRANULOCYTES NFR BLD AUTO: 0.4 % (ref 0–0.5)
INR PPP: 1.08 (ref 0.9–1.1)
LYMPHOCYTES # BLD AUTO: 1.12 10*3/MM3 (ref 0.7–3.1)
LYMPHOCYTES # BLD AUTO: 1.83 10*3/MM3 (ref 0.7–3.1)
LYMPHOCYTES NFR BLD AUTO: 15.5 % (ref 19.6–45.3)
LYMPHOCYTES NFR BLD AUTO: 9.4 % (ref 19.6–45.3)
MAGNESIUM SERPL-MCNC: 1.4 MG/DL (ref 1.6–2.4)
MCH RBC QN AUTO: 32.7 PG (ref 26.6–33)
MCH RBC QN AUTO: 33.1 PG (ref 26.6–33)
MCHC RBC AUTO-ENTMCNC: 35.8 G/DL (ref 31.5–35.7)
MCHC RBC AUTO-ENTMCNC: 36.3 G/DL (ref 31.5–35.7)
MCV RBC AUTO: 91.1 FL (ref 79–97)
MCV RBC AUTO: 91.4 FL (ref 79–97)
MONOCYTES # BLD AUTO: 0.47 10*3/MM3 (ref 0.1–0.9)
MONOCYTES # BLD AUTO: 0.7 10*3/MM3 (ref 0.1–0.9)
MONOCYTES NFR BLD AUTO: 3.9 % (ref 5–12)
MONOCYTES NFR BLD AUTO: 5.9 % (ref 5–12)
NEUTROPHILS NFR BLD AUTO: 10.2 10*3/MM3 (ref 1.7–7)
NEUTROPHILS NFR BLD AUTO: 75.9 % (ref 42.7–76)
NEUTROPHILS NFR BLD AUTO: 8.93 10*3/MM3 (ref 1.7–7)
NEUTROPHILS NFR BLD AUTO: 85.6 % (ref 42.7–76)
NRBC BLD AUTO-RTO: 0 /100 WBC (ref 0–0.2)
PHOSPHATE SERPL-MCNC: 4.2 MG/DL (ref 2.5–4.5)
PLATELET # BLD AUTO: 278 10*3/MM3 (ref 140–450)
PLATELET # BLD AUTO: 353 10*3/MM3 (ref 140–450)
PMV BLD AUTO: 8.6 FL (ref 6–12)
PMV BLD AUTO: 9.2 FL (ref 6–12)
POTASSIUM SERPL-SCNC: 4.5 MMOL/L (ref 3.5–5.2)
POTASSIUM SERPL-SCNC: 4.6 MMOL/L (ref 3.5–5.2)
PROT SERPL-MCNC: 6.7 G/DL (ref 6–8.5)
PROTHROMBIN TIME: 14.1 SECONDS (ref 12.1–15)
QT INTERVAL: 348 MS
RBC # BLD AUTO: 3.24 10*6/MM3 (ref 3.77–5.28)
RBC # BLD AUTO: 3.72 10*6/MM3 (ref 3.77–5.28)
SODIUM SERPL-SCNC: 128 MMOL/L (ref 136–145)
SODIUM SERPL-SCNC: 129 MMOL/L (ref 136–145)
WBC NRBC COR # BLD: 11.78 10*3/MM3 (ref 3.4–10.8)
WBC NRBC COR # BLD: 11.92 10*3/MM3 (ref 3.4–10.8)

## 2023-04-19 PROCEDURE — 85018 HEMOGLOBIN: CPT | Performed by: INTERNAL MEDICINE

## 2023-04-19 PROCEDURE — 99233 SBSQ HOSP IP/OBS HIGH 50: CPT | Performed by: INTERNAL MEDICINE

## 2023-04-19 PROCEDURE — 0 CEFAZOLIN SODIUM-DEXTROSE 2-3 GM-%(50ML) RECONSTITUTED SOLUTION: Performed by: INTERNAL MEDICINE

## 2023-04-19 PROCEDURE — 25010000002 VANCOMYCIN 1 G RECONSTITUTED SOLUTION: Performed by: INTERNAL MEDICINE

## 2023-04-19 PROCEDURE — C1713 ANCHOR/SCREW BN/BN,TIS/BN: HCPCS | Performed by: INTERNAL MEDICINE

## 2023-04-19 PROCEDURE — 83735 ASSAY OF MAGNESIUM: CPT | Performed by: STUDENT IN AN ORGANIZED HEALTH CARE EDUCATION/TRAINING PROGRAM

## 2023-04-19 PROCEDURE — 85730 THROMBOPLASTIN TIME PARTIAL: CPT | Performed by: EMERGENCY MEDICINE

## 2023-04-19 PROCEDURE — 25010000002 PROPOFOL 200 MG/20ML EMULSION: Performed by: REGISTERED NURSE

## 2023-04-19 PROCEDURE — 93010 ELECTROCARDIOGRAM REPORT: CPT | Performed by: INTERNAL MEDICINE

## 2023-04-19 PROCEDURE — 25010000002 VANCOMYCIN 750 MG RECONSTITUTED SOLUTION: Performed by: INTERNAL MEDICINE

## 2023-04-19 PROCEDURE — 84100 ASSAY OF PHOSPHORUS: CPT | Performed by: STUDENT IN AN ORGANIZED HEALTH CARE EDUCATION/TRAINING PROGRAM

## 2023-04-19 PROCEDURE — 25010000002 DEXAMETHASONE PER 1 MG: Performed by: NURSE ANESTHETIST, CERTIFIED REGISTERED

## 2023-04-19 PROCEDURE — 0SRR0J9 REPLACEMENT OF RIGHT HIP JOINT, FEMORAL SURFACE WITH SYNTHETIC SUBSTITUTE, CEMENTED, OPEN APPROACH: ICD-10-PCS | Performed by: INTERNAL MEDICINE

## 2023-04-19 PROCEDURE — 25010000002 ONDANSETRON PER 1 MG: Performed by: NURSE ANESTHETIST, CERTIFIED REGISTERED

## 2023-04-19 PROCEDURE — 73502 X-RAY EXAM HIP UNI 2-3 VIEWS: CPT

## 2023-04-19 PROCEDURE — 71046 X-RAY EXAM CHEST 2 VIEWS: CPT

## 2023-04-19 PROCEDURE — 25010000002 FENTANYL CITRATE (PF) 50 MCG/ML SOLUTION: Performed by: REGISTERED NURSE

## 2023-04-19 PROCEDURE — 93005 ELECTROCARDIOGRAM TRACING: CPT | Performed by: EMERGENCY MEDICINE

## 2023-04-19 PROCEDURE — 94799 UNLISTED PULMONARY SVC/PX: CPT

## 2023-04-19 PROCEDURE — 25010000002 MORPHINE PER 10 MG: Performed by: EMERGENCY MEDICINE

## 2023-04-19 PROCEDURE — C1776 JOINT DEVICE (IMPLANTABLE): HCPCS | Performed by: INTERNAL MEDICINE

## 2023-04-19 PROCEDURE — 76000 FLUOROSCOPY <1 HR PHYS/QHP: CPT

## 2023-04-19 PROCEDURE — 25010000002 PHENYLEPHRINE 10 MG/ML SOLUTION: Performed by: REGISTERED NURSE

## 2023-04-19 PROCEDURE — 25010000002 MIDAZOLAM PER 1MG: Performed by: NURSE ANESTHETIST, CERTIFIED REGISTERED

## 2023-04-19 PROCEDURE — 94761 N-INVAS EAR/PLS OXIMETRY MLT: CPT

## 2023-04-19 PROCEDURE — 72170 X-RAY EXAM OF PELVIS: CPT

## 2023-04-19 PROCEDURE — 73552 X-RAY EXAM OF FEMUR 2/>: CPT

## 2023-04-19 PROCEDURE — 80053 COMPREHEN METABOLIC PANEL: CPT | Performed by: EMERGENCY MEDICINE

## 2023-04-19 PROCEDURE — 85025 COMPLETE CBC W/AUTO DIFF WBC: CPT | Performed by: STUDENT IN AN ORGANIZED HEALTH CARE EDUCATION/TRAINING PROGRAM

## 2023-04-19 PROCEDURE — 99284 EMERGENCY DEPT VISIT MOD MDM: CPT

## 2023-04-19 PROCEDURE — 27236 TREAT THIGH FRACTURE: CPT | Performed by: INTERNAL MEDICINE

## 2023-04-19 PROCEDURE — 85610 PROTHROMBIN TIME: CPT | Performed by: EMERGENCY MEDICINE

## 2023-04-19 PROCEDURE — 82962 GLUCOSE BLOOD TEST: CPT

## 2023-04-19 PROCEDURE — 85025 COMPLETE CBC W/AUTO DIFF WBC: CPT | Performed by: EMERGENCY MEDICINE

## 2023-04-19 PROCEDURE — 25010000002 ROPIVACAINE PER 1 MG: Performed by: INTERNAL MEDICINE

## 2023-04-19 PROCEDURE — 85014 HEMATOCRIT: CPT | Performed by: INTERNAL MEDICINE

## 2023-04-19 PROCEDURE — 25010000002 EPINEPHRINE 1 MG/ML SOLUTION 1 ML VIAL: Performed by: INTERNAL MEDICINE

## 2023-04-19 PROCEDURE — 63710000001 INSULIN ASPART PER 5 UNITS: Performed by: INTERNAL MEDICINE

## 2023-04-19 PROCEDURE — 25010000002 KETOROLAC TROMETHAMINE PER 15 MG: Performed by: INTERNAL MEDICINE

## 2023-04-19 DEVICE — ARTICUL/EZE FEMORAL HEAD DIAMETER 28MM +1.5 12/14 TAPER
Type: IMPLANTABLE DEVICE | Site: HIP | Status: FUNCTIONAL
Brand: ARTICUL/EZE

## 2023-04-19 DEVICE — PALACOS® MV IS INTENDED FOR USE IN ARTHROPLASTIC PROCEDURES OF THE HIP, KNEE, AND OTHER JOINTS FOR THE FIXATION OF POLYMER OR METALLIC PROSTHETIC IMPLANTS TO LIVING BONE. PALACOS® MV CONTAINS THE X-RAY CONTRAST MEDIUM ZIRCONIUM DIOXIDE. TO IMPROVE VISIBILITY IN THE SURGICAL FIELD PALACOS® MV HAS BEEN COLOURED WITH CHLOROPHYLL (E141).THE BONE CEMENT IS PREPARED DIRECTLY BEFORE USE BY MIXING A POLYMER POWDER COMPONENT WITH A LIQUID MONOMER COMPONENT. A DUCTILE DOUGH FORMS WHICH CURES WITHIN A FEW MINUTES.
Type: IMPLANTABLE DEVICE | Site: HIP | Status: FUNCTIONAL
Brand: PALACOS®

## 2023-04-19 DEVICE — CEMENTRALIZER STEM CENTRALIZER 9.25MM CEMENTED
Type: IMPLANTABLE DEVICE | Site: HIP | Status: FUNCTIONAL
Brand: CEMENTRALIZER

## 2023-04-19 DEVICE — PRT HIP BIPOL PRIM DEPUY 9525109/9525107: Type: IMPLANTABLE DEVICE | Site: HIP | Status: FUNCTIONAL

## 2023-04-19 DEVICE — DEV WND/CLS CONTRL TISS STRATAFIX SYMM PDS PLS CTX 60CM VIL: Type: IMPLANTABLE DEVICE | Site: HIP | Status: FUNCTIONAL

## 2023-04-19 DEVICE — SUMMIT FEMORAL STEM 12/14 TAPER CEMENTED SIZE 4 STD 114MM
Type: IMPLANTABLE DEVICE | Site: HIP | Status: FUNCTIONAL
Brand: SUMMIT

## 2023-04-19 DEVICE — DEV CONTRL TISS STRATAFIX SPIRAL MNCRYL UD 3/0 PLS 45CM: Type: IMPLANTABLE DEVICE | Site: HIP | Status: FUNCTIONAL

## 2023-04-19 DEVICE — SELF CENTERING BI-POLAR HEAD 28MM ID 45MM OD
Type: IMPLANTABLE DEVICE | Site: HIP | Status: FUNCTIONAL
Brand: SELF CENTERING

## 2023-04-19 RX ORDER — PROPOFOL 10 MG/ML
INJECTION, EMULSION INTRAVENOUS AS NEEDED
Status: DISCONTINUED | OUTPATIENT
Start: 2023-04-19 | End: 2023-04-19 | Stop reason: SURG

## 2023-04-19 RX ORDER — DEXTROSE MONOHYDRATE 25 G/50ML
25 INJECTION, SOLUTION INTRAVENOUS
Status: DISCONTINUED | OUTPATIENT
Start: 2023-04-19 | End: 2023-04-20 | Stop reason: HOSPADM

## 2023-04-19 RX ORDER — FENTANYL CITRATE 50 UG/ML
INJECTION, SOLUTION INTRAMUSCULAR; INTRAVENOUS AS NEEDED
Status: DISCONTINUED | OUTPATIENT
Start: 2023-04-19 | End: 2023-04-19 | Stop reason: SURG

## 2023-04-19 RX ORDER — PHENYLEPHRINE HYDROCHLORIDE 10 MG/ML
INJECTION INTRAVENOUS AS NEEDED
Status: DISCONTINUED | OUTPATIENT
Start: 2023-04-19 | End: 2023-04-19 | Stop reason: SURG

## 2023-04-19 RX ORDER — INSULIN ASPART 100 [IU]/ML
2-7 INJECTION, SOLUTION INTRAVENOUS; SUBCUTANEOUS
Status: DISCONTINUED | OUTPATIENT
Start: 2023-04-19 | End: 2023-04-20 | Stop reason: HOSPADM

## 2023-04-19 RX ORDER — MORPHINE SULFATE 2 MG/ML
2 INJECTION, SOLUTION INTRAMUSCULAR; INTRAVENOUS
Status: DISCONTINUED | OUTPATIENT
Start: 2023-04-19 | End: 2023-04-20 | Stop reason: HOSPADM

## 2023-04-19 RX ORDER — TRANEXAMIC ACID 10 MG/ML
1000 INJECTION, SOLUTION INTRAVENOUS ONCE
Status: COMPLETED | OUTPATIENT
Start: 2023-04-19 | End: 2023-04-19

## 2023-04-19 RX ORDER — SODIUM CHLORIDE 9 MG/ML
40 INJECTION, SOLUTION INTRAVENOUS AS NEEDED
Status: DISCONTINUED | OUTPATIENT
Start: 2023-04-19 | End: 2023-04-19 | Stop reason: HOSPADM

## 2023-04-19 RX ORDER — SODIUM CHLORIDE 0.9 % (FLUSH) 0.9 %
10 SYRINGE (ML) INJECTION AS NEEDED
Status: DISCONTINUED | OUTPATIENT
Start: 2023-04-19 | End: 2023-04-19 | Stop reason: HOSPADM

## 2023-04-19 RX ORDER — SODIUM CHLORIDE 9 MG/ML
INJECTION, SOLUTION INTRAVENOUS AS NEEDED
Status: DISCONTINUED | OUTPATIENT
Start: 2023-04-19 | End: 2023-04-19 | Stop reason: HOSPADM

## 2023-04-19 RX ORDER — MAGNESIUM HYDROXIDE 1200 MG/15ML
LIQUID ORAL AS NEEDED
Status: DISCONTINUED | OUTPATIENT
Start: 2023-04-19 | End: 2023-04-19 | Stop reason: HOSPADM

## 2023-04-19 RX ORDER — BUPIVACAINE HYDROCHLORIDE 5 MG/ML
INJECTION, SOLUTION PERINEURAL
Status: COMPLETED | OUTPATIENT
Start: 2023-04-19 | End: 2023-04-19

## 2023-04-19 RX ORDER — BUPIVACAINE HYDROCHLORIDE 2.5 MG/ML
INJECTION, SOLUTION EPIDURAL; INFILTRATION; INTRACAUDAL
Status: COMPLETED | OUTPATIENT
Start: 2023-04-19 | End: 2023-04-19

## 2023-04-19 RX ORDER — FENTANYL CITRATE 50 UG/ML
25 INJECTION, SOLUTION INTRAMUSCULAR; INTRAVENOUS
Status: DISCONTINUED | OUTPATIENT
Start: 2023-04-19 | End: 2023-04-19 | Stop reason: HOSPADM

## 2023-04-19 RX ORDER — ONDANSETRON 2 MG/ML
4 INJECTION INTRAMUSCULAR; INTRAVENOUS ONCE AS NEEDED
Status: DISCONTINUED | OUTPATIENT
Start: 2023-04-19 | End: 2023-04-19 | Stop reason: HOSPADM

## 2023-04-19 RX ORDER — FAMOTIDINE 10 MG/ML
20 INJECTION, SOLUTION INTRAVENOUS
Status: COMPLETED | OUTPATIENT
Start: 2023-04-19 | End: 2023-04-19

## 2023-04-19 RX ORDER — NICOTINE POLACRILEX 4 MG
15 LOZENGE BUCCAL
Status: DISCONTINUED | OUTPATIENT
Start: 2023-04-19 | End: 2023-04-20 | Stop reason: HOSPADM

## 2023-04-19 RX ORDER — NALOXONE HCL 0.4 MG/ML
0.4 VIAL (ML) INJECTION
Status: DISCONTINUED | OUTPATIENT
Start: 2023-04-19 | End: 2023-04-20 | Stop reason: HOSPADM

## 2023-04-19 RX ORDER — MIDAZOLAM HYDROCHLORIDE 2 MG/2ML
0.5 INJECTION, SOLUTION INTRAMUSCULAR; INTRAVENOUS
Status: DISCONTINUED | OUTPATIENT
Start: 2023-04-19 | End: 2023-04-19 | Stop reason: HOSPADM

## 2023-04-19 RX ORDER — KETAMINE HYDROCHLORIDE 10 MG/ML
INJECTION INTRAMUSCULAR; INTRAVENOUS AS NEEDED
Status: DISCONTINUED | OUTPATIENT
Start: 2023-04-19 | End: 2023-04-19 | Stop reason: SURG

## 2023-04-19 RX ORDER — DEXMEDETOMIDINE HYDROCHLORIDE 100 UG/ML
INJECTION, SOLUTION INTRAVENOUS AS NEEDED
Status: DISCONTINUED | OUTPATIENT
Start: 2023-04-19 | End: 2023-04-19 | Stop reason: SURG

## 2023-04-19 RX ORDER — OXYCODONE HYDROCHLORIDE 5 MG/1
5 TABLET ORAL EVERY 4 HOURS PRN
Status: DISCONTINUED | OUTPATIENT
Start: 2023-04-19 | End: 2023-04-20 | Stop reason: HOSPADM

## 2023-04-19 RX ORDER — ATORVASTATIN CALCIUM 20 MG/1
20 TABLET, FILM COATED ORAL DAILY
Status: DISCONTINUED | OUTPATIENT
Start: 2023-04-19 | End: 2023-04-20 | Stop reason: HOSPADM

## 2023-04-19 RX ORDER — DEXAMETHASONE SODIUM PHOSPHATE 4 MG/ML
8 INJECTION, SOLUTION INTRA-ARTICULAR; INTRALESIONAL; INTRAMUSCULAR; INTRAVENOUS; SOFT TISSUE ONCE AS NEEDED
Status: COMPLETED | OUTPATIENT
Start: 2023-04-19 | End: 2023-04-19

## 2023-04-19 RX ORDER — SODIUM CHLORIDE 0.9 % (FLUSH) 0.9 %
10 SYRINGE (ML) INJECTION EVERY 12 HOURS SCHEDULED
Status: DISCONTINUED | OUTPATIENT
Start: 2023-04-19 | End: 2023-04-19 | Stop reason: HOSPADM

## 2023-04-19 RX ORDER — OXYCODONE AND ACETAMINOPHEN 7.5; 325 MG/1; MG/1
1 TABLET ORAL ONCE AS NEEDED
Status: DISCONTINUED | OUTPATIENT
Start: 2023-04-19 | End: 2023-04-19 | Stop reason: HOSPADM

## 2023-04-19 RX ORDER — ASPIRIN 81 MG/1
81 TABLET ORAL EVERY 12 HOURS SCHEDULED
Status: DISCONTINUED | OUTPATIENT
Start: 2023-04-20 | End: 2023-04-20 | Stop reason: HOSPADM

## 2023-04-19 RX ORDER — ONDANSETRON 2 MG/ML
4 INJECTION INTRAMUSCULAR; INTRAVENOUS ONCE AS NEEDED
Status: COMPLETED | OUTPATIENT
Start: 2023-04-19 | End: 2023-04-19

## 2023-04-19 RX ORDER — SODIUM CHLORIDE, SODIUM LACTATE, POTASSIUM CHLORIDE, CALCIUM CHLORIDE 600; 310; 30; 20 MG/100ML; MG/100ML; MG/100ML; MG/100ML
9 INJECTION, SOLUTION INTRAVENOUS CONTINUOUS PRN
Status: DISCONTINUED | OUTPATIENT
Start: 2023-04-19 | End: 2023-04-20 | Stop reason: HOSPADM

## 2023-04-19 RX ORDER — CEFAZOLIN SODIUM 2 G/50ML
2 SOLUTION INTRAVENOUS EVERY 8 HOURS
Status: COMPLETED | OUTPATIENT
Start: 2023-04-19 | End: 2023-04-20

## 2023-04-19 RX ORDER — SODIUM CHLORIDE, SODIUM LACTATE, POTASSIUM CHLORIDE, CALCIUM CHLORIDE 600; 310; 30; 20 MG/100ML; MG/100ML; MG/100ML; MG/100ML
100 INJECTION, SOLUTION INTRAVENOUS CONTINUOUS
Status: DISCONTINUED | OUTPATIENT
Start: 2023-04-19 | End: 2023-04-20

## 2023-04-19 RX ORDER — CEFAZOLIN SODIUM 2 G/50ML
2 SOLUTION INTRAVENOUS ONCE
Status: COMPLETED | OUTPATIENT
Start: 2023-04-19 | End: 2023-04-19

## 2023-04-19 RX ORDER — LIDOCAINE HYDROCHLORIDE 10 MG/ML
0.5 INJECTION, SOLUTION EPIDURAL; INFILTRATION; INTRACAUDAL; PERINEURAL ONCE AS NEEDED
Status: DISCONTINUED | OUTPATIENT
Start: 2023-04-19 | End: 2023-04-19 | Stop reason: HOSPADM

## 2023-04-19 RX ORDER — ACETAMINOPHEN 325 MG/1
650 TABLET ORAL EVERY 6 HOURS PRN
COMMUNITY

## 2023-04-19 RX ADMIN — SODIUM CHLORIDE 750 MG: 900 INJECTION, SOLUTION INTRAVENOUS at 22:22

## 2023-04-19 RX ADMIN — TRANEXAMIC ACID 1000 MG: 10 INJECTION, SOLUTION INTRAVENOUS at 10:55

## 2023-04-19 RX ADMIN — FAMOTIDINE 20 MG: 10 INJECTION, SOLUTION INTRAVENOUS at 09:26

## 2023-04-19 RX ADMIN — DEXMEDETOMIDINE 20 MCG: 100 INJECTION, SOLUTION, CONCENTRATE INTRAVENOUS at 09:45

## 2023-04-19 RX ADMIN — SODIUM CHLORIDE 1000 MG: 900 INJECTION, SOLUTION INTRAVENOUS at 09:40

## 2023-04-19 RX ADMIN — BUPIVACAINE HYDROCHLORIDE 20 ML: 2.5 INJECTION, SOLUTION EPIDURAL; INFILTRATION; INTRACAUDAL; PERINEURAL at 09:45

## 2023-04-19 RX ADMIN — SODIUM CHLORIDE, POTASSIUM CHLORIDE, SODIUM LACTATE AND CALCIUM CHLORIDE 9 ML/HR: 600; 310; 30; 20 INJECTION, SOLUTION INTRAVENOUS at 09:25

## 2023-04-19 RX ADMIN — PROPOFOL 20 MG: 10 INJECTION, EMULSION INTRAVENOUS at 11:20

## 2023-04-19 RX ADMIN — PROPOFOL 50 MCG/KG/MIN: 10 INJECTION, EMULSION INTRAVENOUS at 10:51

## 2023-04-19 RX ADMIN — PROPOFOL 20 MG: 10 INJECTION, EMULSION INTRAVENOUS at 11:10

## 2023-04-19 RX ADMIN — ONDANSETRON 4 MG: 2 INJECTION INTRAMUSCULAR; INTRAVENOUS at 09:25

## 2023-04-19 RX ADMIN — MIDAZOLAM HYDROCHLORIDE 0.5 MG: 1 INJECTION, SOLUTION INTRAMUSCULAR; INTRAVENOUS at 09:41

## 2023-04-19 RX ADMIN — CEFAZOLIN SODIUM 2 G: 2 SOLUTION INTRAVENOUS at 10:55

## 2023-04-19 RX ADMIN — SODIUM CHLORIDE, POTASSIUM CHLORIDE, SODIUM LACTATE AND CALCIUM CHLORIDE 100 ML/HR: 600; 310; 30; 20 INJECTION, SOLUTION INTRAVENOUS at 14:14

## 2023-04-19 RX ADMIN — INSULIN ASPART 2 UNITS: 100 INJECTION, SOLUTION INTRAVENOUS; SUBCUTANEOUS at 17:21

## 2023-04-19 RX ADMIN — DEXAMETHASONE SODIUM PHOSPHATE 8 MG: 4 INJECTION, SOLUTION INTRAMUSCULAR; INTRAVENOUS at 09:25

## 2023-04-19 RX ADMIN — KETAMINE HYDROCHLORIDE 10 MG: 10 INJECTION INTRAMUSCULAR; INTRAVENOUS at 10:50

## 2023-04-19 RX ADMIN — ATORVASTATIN CALCIUM 20 MG: 20 TABLET, FILM COATED ORAL at 17:21

## 2023-04-19 RX ADMIN — MORPHINE SULFATE 4 MG: 4 INJECTION, SOLUTION INTRAMUSCULAR; INTRAVENOUS at 03:41

## 2023-04-19 RX ADMIN — PROPOFOL 20 MG: 10 INJECTION, EMULSION INTRAVENOUS at 10:55

## 2023-04-19 RX ADMIN — BUPIVACAINE HYDROCHLORIDE 1.6 ML: 5 INJECTION, SOLUTION PERINEURAL at 10:18

## 2023-04-19 RX ADMIN — PROPOFOL 20 MG: 10 INJECTION, EMULSION INTRAVENOUS at 10:50

## 2023-04-19 RX ADMIN — FENTANYL CITRATE 50 MCG: 50 INJECTION, SOLUTION INTRAMUSCULAR; INTRAVENOUS at 12:21

## 2023-04-19 RX ADMIN — PHENYLEPHRINE HYDROCHLORIDE 100 MCG: 10 INJECTION INTRAVENOUS at 12:58

## 2023-04-19 RX ADMIN — KETAMINE HYDROCHLORIDE 10 MG: 10 INJECTION INTRAMUSCULAR; INTRAVENOUS at 11:00

## 2023-04-19 RX ADMIN — CEFAZOLIN SODIUM 2 G: 2 SOLUTION INTRAVENOUS at 20:47

## 2023-04-19 RX ADMIN — OXYCODONE HYDROCHLORIDE 5 MG: 5 TABLET ORAL at 20:47

## 2023-04-19 RX ADMIN — PROPOFOL 20 MG: 10 INJECTION, EMULSION INTRAVENOUS at 11:00

## 2023-04-19 NOTE — SIGNIFICANT NOTE
04/19/23 0904   OTHER   Discipline occupational therapist   Rehab Time/Intention   Session Not Performed other (see comments)  (pt with acute femur fx with sugery scheduled today. will await ortho recommendations post surgery)

## 2023-04-19 NOTE — CONSULTS
Orthopedic Consult      Patient: Arline Landaverde    Date of Admission: 4/19/2023  2:40 AM    YOB: 1950    Medical Record Number: [id number]    Attending Physician: Kurt Kee DO  Consulting Physician:       Chief Complaints: Accidental fall, initial encounter [W19.XXXA]  Closed displaced fracture of right femoral neck [S72.001A]      History of Present Illness: 72 y.o. female admitted to [facility] to services of Kurt Kee DO with Accidental fall, initial encounter [W19.XXXA]  Closed displaced fracture of right femoral neck [S72.001A]. I was consulted for further evaluation and treatment.  Patient states that she was at home yesterday when she tripped falling onto her right side.  She had immediate pain and decreased ability to bear weight.  Her  helped get her up off the floor but as the day went on the pain became more and more severe.  She was never able to ambulate following the fall with her right leg.  She was brought to the emergency room overnight and diagnosed with a right femoral neck fracture.  She was admitted to the hospitalist and I was consulted.  The patient denies head trauma or pain elsewhere.  She states all of her pain is located anteriorly in her groin.  She denies proceeding hip pain prior to this injury.  She denies any numbness or tingling in her right lower extremity.  The patient is here with her daughter and daughter states the patient is incredibly low demand and does not walk much.  The patient has had multiple strokes which resulted in residual left-sided minimal weakness.  The patient has a history of diabetes type 2 that is well controlled.    Allergies   Allergen Reactions   • Penicillins Hives       Medications:   Home Medications:  Medications Prior to Admission   Medication Sig Dispense Refill Last Dose   • atorvastatin (LIPITOR) 20 MG tablet Take 1 tablet by mouth Daily. 14 tablet 0 4/18/2023 at 1800   • metFORMIN (GLUCOPHAGE) 500 MG  tablet Take 1 tablet by mouth 2 (Two) Times a Day With Meals.   4/18/2023 at 1800   • cefuroxime (CEFTIN) 500 MG tablet Take 1 tablet by mouth 2 (Two) Times a Day. 10 tablet 0    • methylPREDNISolone (Medrol) 4 MG dose pack follow package directions 21 tablet 0    • NON FORMULARY DOES NOT TAKE ANY MEDICATIONS          Current Medications:  Scheduled Meds:Insulin Aspart, 2-7 Units, Subcutaneous, TID With Meals      Continuous Infusions:   PRN Meds:.•  dextrose  •  dextrose  •  glucagon (human recombinant)  •  HYDROmorphone  •  oxyCODONE       Past Medical History:   Diagnosis Date   • Diabetes mellitus    • Stroke         Past Surgical History:   Procedure Laterality Date   • BACK SURGERY     • CHOLECYSTECTOMY          Social History     Occupational History   • Not on file   Tobacco Use   • Smoking status: Every Day     Packs/day: 1.00     Types: Cigarettes   • Smokeless tobacco: Never   Vaping Use   • Vaping Use: Never used   Substance and Sexual Activity   • Alcohol use: Never   • Drug use: Never   • Sexual activity: Defer      Social History     Social History Narrative   • Not on file        Family History   Problem Relation Age of Onset   • Cancer Mother          Review of Systems:   HEENT: Patient denies any headaches, vision changes, change in hearing, or tinnitus, Patient denies any rhinorrhea, epistaxis, sinus pain, mouth or dental problems, sore throat or hoarseness, or dysphagia  Pulmonary: Patient denies any cough, congestion, SOA, or wheezing  Cardiovascular: Patient denies any chest pain, dyspnea, palpitations, weakness, intolerance of exercise, varicosities, swelling of extremities, known murmur  Gastrointestinal:  Patient denies nausea, vomiting, diarrhea, constipation, loss of appetite, change in appetite, dysphagia, gas, heartburn, melena, change in bowel habits, use of laxatives or other drugs to alter the function of the gastrointestinal tract.  Genital/Urinary: Patient denies dysuria, change in  "color of urine, change in frequency of urination, pain with urgency, incontinence, retention, or nocturia.  Musculoskeletal: Patient denies increased warmth; redness; or swelling of joints; limitation of function; deformity; crepitation: notes pain in right hip as documented above in HPI.  Denies pain in low back or neck.    Neurological: Patient denies dizziness, tremor, ataxia, difficulty in speaking, change in speech, paresthesia, loss of sensation, seizures, syncope, changes in memory.  Endocrine system: Patient denies tremors, palpitations, intolerance of heat or cold, polyuria, polydipsia, polyphagia, diaphoresis, exophthalmos, or goiter.  Psychological: Patient denies thoughts/plans of harming self or other; depression, insomnia, night terrors, sterling, memory loss, disorientation.  Skin: Patient denies any bruising, rashes, discoloration, pruritus, wounds, ulcers, decubiti, changes in the hair or nails  Hematopoietic: Patient denies history of spontaneous or excessive bleeding, epistaxis, hematuria, melena, fatigue, enlarged or tender lymph nodes, pallor, history of anemia.    Physical Exam: 72 y.o. female  Vitals:    04/19/23 0240 04/19/23 0443   BP: 142/82 156/88   BP Location:  Left arm   Patient Position:  Lying   Pulse: 88 92   Resp: 18 18   Temp: 98.2 °F (36.8 °C) 97.5 °F (36.4 °C)   TempSrc:  Oral   SpO2: 100% 98%   Weight: 64.9 kg (143 lb) 47.3 kg (104 lb 3.2 oz)   Height: 162.6 cm (64\") 162.6 cm (64.02\")     General: No acute distress  Pulmonary: Unlabored breathing  Cardiovascular: regular rate and rhythm  Right lower extremity  2+ DP and PT pulse  Sensation normal in right foot  5 out of 5 ankle plantarflexion dorsiflexion inversion and eversion  Right leg shortened externally rotated  No significant bruising or swelling noted over right hip  No signs of open fracture  Lower extremity compartments are soft and compressible no signs of DVT or compartment syndrome    Diagnostic Tests:  Admission on " 04/19/2023   Component Date Value Ref Range Status   • Glucose 04/19/2023 108 (H)  65 - 99 mg/dL Final   • BUN 04/19/2023 17  8 - 23 mg/dL Final   • Creatinine 04/19/2023 0.73  0.57 - 1.00 mg/dL Final   • Sodium 04/19/2023 129 (L)  136 - 145 mmol/L Final   • Potassium 04/19/2023 4.5  3.5 - 5.2 mmol/L Final   • Chloride 04/19/2023 94 (L)  98 - 107 mmol/L Final   • CO2 04/19/2023 19.0 (L)  22.0 - 29.0 mmol/L Final   • Calcium 04/19/2023 9.6  8.6 - 10.5 mg/dL Final   • Total Protein 04/19/2023 6.7  6.0 - 8.5 g/dL Final   • Albumin 04/19/2023 3.8  3.5 - 5.2 g/dL Final   • ALT (SGPT) 04/19/2023 22  1 - 33 U/L Final   • AST (SGOT) 04/19/2023 20  1 - 32 U/L Final   • Alkaline Phosphatase 04/19/2023 49  39 - 117 U/L Final   • Total Bilirubin 04/19/2023 0.5  0.0 - 1.2 mg/dL Final   • Globulin 04/19/2023 2.9  gm/dL Final   • A/G Ratio 04/19/2023 1.3  g/dL Final   • BUN/Creatinine Ratio 04/19/2023 23.3  7.0 - 25.0 Final   • Anion Gap 04/19/2023 16.0 (H)  5.0 - 15.0 mmol/L Final   • eGFR 04/19/2023 87.0  >60.0 mL/min/1.73 Final   • QT Interval 04/19/2023 348  ms Preliminary   • PTT 04/19/2023 27.8  24.3 - 38.1 seconds Final   • Protime 04/19/2023 14.1  12.1 - 15.0 Seconds Final   • INR 04/19/2023 1.08  0.90 - 1.10 Final   • WBC 04/19/2023 11.78 (H)  3.40 - 10.80 10*3/mm3 Final   • RBC 04/19/2023 3.72 (L)  3.77 - 5.28 10*6/mm3 Final   • Hemoglobin 04/19/2023 12.3  12.0 - 15.9 g/dL Final   • Hematocrit 04/19/2023 33.9 (L)  34.0 - 46.6 % Final   • MCV 04/19/2023 91.1  79.0 - 97.0 fL Final   • MCH 04/19/2023 33.1 (H)  26.6 - 33.0 pg Final   • MCHC 04/19/2023 36.3 (H)  31.5 - 35.7 g/dL Final   • RDW 04/19/2023 12.2 (L)  12.3 - 15.4 % Final   • RDW-SD 04/19/2023 40.3  37.0 - 54.0 fl Final   • MPV 04/19/2023 9.2  6.0 - 12.0 fL Final   • Platelets 04/19/2023 353  140 - 450 10*3/mm3 Final   • Neutrophil % 04/19/2023 75.9  42.7 - 76.0 % Final   • Lymphocyte % 04/19/2023 15.5 (L)  19.6 - 45.3 % Final   • Monocyte % 04/19/2023 5.9  5.0 -  12.0 % Final   • Eosinophil % 04/19/2023 1.8  0.3 - 6.2 % Final   • Basophil % 04/19/2023 0.5  0.0 - 1.5 % Final   • Immature Grans % 04/19/2023 0.4  0.0 - 0.5 % Final   • Neutrophils, Absolute 04/19/2023 8.93 (H)  1.70 - 7.00 10*3/mm3 Final   • Lymphocytes, Absolute 04/19/2023 1.83  0.70 - 3.10 10*3/mm3 Final   • Monocytes, Absolute 04/19/2023 0.70  0.10 - 0.90 10*3/mm3 Final   • Eosinophils, Absolute 04/19/2023 0.21  0.00 - 0.40 10*3/mm3 Final   • Basophils, Absolute 04/19/2023 0.06  0.00 - 0.20 10*3/mm3 Final   • Immature Grans, Absolute 04/19/2023 0.05  0.00 - 0.05 10*3/mm3 Final   • nRBC 04/19/2023 0.0  0.0 - 0.2 /100 WBC Final   • Glucose 04/19/2023 100 (H)  65 - 99 mg/dL Final   • BUN 04/19/2023 20  8 - 23 mg/dL Final   • Creatinine 04/19/2023 0.63  0.57 - 1.00 mg/dL Final   • Sodium 04/19/2023 128 (L)  136 - 145 mmol/L Final   • Potassium 04/19/2023 4.6  3.5 - 5.2 mmol/L Final   • Chloride 04/19/2023 97 (L)  98 - 107 mmol/L Final   • CO2 04/19/2023 21.9 (L)  22.0 - 29.0 mmol/L Final   • Calcium 04/19/2023 8.9  8.6 - 10.5 mg/dL Final   • BUN/Creatinine Ratio 04/19/2023 31.7 (H)  7.0 - 25.0 Final   • Anion Gap 04/19/2023 9.1  5.0 - 15.0 mmol/L Final   • eGFR 04/19/2023 93.8  >60.0 mL/min/1.73 Final   • Magnesium 04/19/2023 1.4 (L)  1.6 - 2.4 mg/dL Final   • Phosphorus 04/19/2023 4.2  2.5 - 4.5 mg/dL Final   • WBC 04/19/2023 11.92 (H)  3.40 - 10.80 10*3/mm3 Final   • RBC 04/19/2023 3.24 (L)  3.77 - 5.28 10*6/mm3 Final   • Hemoglobin 04/19/2023 10.6 (L)  12.0 - 15.9 g/dL Final   • Hematocrit 04/19/2023 29.6 (L)  34.0 - 46.6 % Final   • MCV 04/19/2023 91.4  79.0 - 97.0 fL Final   • MCH 04/19/2023 32.7  26.6 - 33.0 pg Final   • MCHC 04/19/2023 35.8 (H)  31.5 - 35.7 g/dL Final   • RDW 04/19/2023 12.1 (L)  12.3 - 15.4 % Final   • RDW-SD 04/19/2023 40.7  37.0 - 54.0 fl Final   • MPV 04/19/2023 8.6  6.0 - 12.0 fL Final   • Platelets 04/19/2023 278  140 - 450 10*3/mm3 Final   • Neutrophil % 04/19/2023 85.6 (H)  42.7 -  76.0 % Final   • Lymphocyte % 04/19/2023 9.4 (L)  19.6 - 45.3 % Final   • Monocyte % 04/19/2023 3.9 (L)  5.0 - 12.0 % Final   • Eosinophil % 04/19/2023 0.4  0.3 - 6.2 % Final   • Basophil % 04/19/2023 0.4  0.0 - 1.5 % Final   • Immature Grans % 04/19/2023 0.3  0.0 - 0.5 % Final   • Neutrophils, Absolute 04/19/2023 10.20 (H)  1.70 - 7.00 10*3/mm3 Final   • Lymphocytes, Absolute 04/19/2023 1.12  0.70 - 3.10 10*3/mm3 Final   • Monocytes, Absolute 04/19/2023 0.47  0.10 - 0.90 10*3/mm3 Final   • Eosinophils, Absolute 04/19/2023 0.05  0.00 - 0.40 10*3/mm3 Final   • Basophils, Absolute 04/19/2023 0.05  0.00 - 0.20 10*3/mm3 Final   • Immature Grans, Absolute 04/19/2023 0.03  0.00 - 0.05 10*3/mm3 Final     XR Chest 2 View    Result Date: 4/19/2023  Impression: No acute cardiopulmonary findings. Signer Name: Robert Hunt MD  Signed: 4/19/2023 3:29 AM  Workstation Name: [id number]  [facility]    XR Femur 2 View Right    Result Date: 4/19/2023  Impression: Right femoral neck fracture, otherwise negative femur. Signer Name: Robert Hunt MD  Signed: 4/19/2023 4:36 AM  Workstation Name: [id number]  [facility]iology [facility]    XR Hip With or Without Pelvis 2 - 3 View Right    Result Date: 4/19/2023  Impression: Acute right femoral neck fracture. Signer Name: Robert Hunt MD  Signed: 4/19/2023 3:18 AM  Workstation Name: [id number]  [facility]      Assessment:    Accidental fall, initial encounter           Plan:     The patient sustained a displaced right femoral neck fracture.  Had an extensive discussion with the patient and her daughter this morning regarding treatment options.  I discussed nonoperative management versus operative management.  I discussed with nonoperative the patient would be bedbound and wheelchair-bound and for elderly patients this is not a great option as they will likely succumb to early death due to complications related to immobility  such as pneumonia bedsores UTIs and DVTs.  I discussed why operative fixation would require either a partial or total hip replacement and that open reduction and internal fixation or closed reduction and screw placement are not great options due to the disruption of the blood supply to the femoral head from the displaced nature of the fracture.  I discussed with them that based off of her history of multiple strokes and medical comorbidities as well as low demand physical status and no preceding hip pain or arthritis I would recommend a hemiarthroplasty for her right femoral neck fracture.  I discussed with them that this would allow for early mobilization and increased debility due to a larger ball size of the prosthesis.  I discussed that I would make the decision definitively intraoperatively once I am able to evaluate the cartilage surface of the femoral head and the acetabulum.    The goals, indications, risks, and complications of the procedure were discussed with the patient. Specifically, I discussed the risks of the procedure, including dislocation, fracture, implant loosening, venous thromboembolism, limb length discrepancy, infection, nerve palsy, vascular injury, need for more procedures and the risks of anesthesia. I also explained that she would meet with Anesthesiology preoperatively to discuss anesthetic risk.  All questions were answered.     Plan for right anterior hip hemiarthroplasty vs total hip arthroplasty, anterior approach.    Implants: Depuy Carbon Cemented   Anticoagulation: Asprin  Antibiotics: Cefazolin and vanc  Admission Type: Inpatient  TXA: Yes      Kumar Bonilla MD      Dictated utilizing Dragon dictation

## 2023-04-19 NOTE — DISCHARGE INSTRUCTIONS
Total Hip Replacement Discharge Instructions:    I. ACTIVITIES:  1. Exercises:  Complete exercise program as taught by the hospital physical therapist 2 times per day  Exercise program will be advanced by the physical therapist  During the day be up ambulating every 2 hours (while awake) for short distances  Complete the ankle pump exercises at least 10 times per hour (while awake)  Elevate legs when in bed and for at least 30 minutes during the day.Use cold packs 20-30 minutes approximately 5 times per day. This should be done before and after completing your exercises and at any time you are experiencing pain/ stiffness in your operative extremity.      2. Activities of Daily Living:  No tub baths, hot tubs, or swimming pools for 4 weeks  May shower and let water run over the incision on post-operative day #5 if no drainage. Do not scrub or rub the incision. Simply let the water run over the incision and pat dry.    II. Restrictions  Continue hip precautions as taught at the hospital  Your surgeon will discuss with you when you will be able to drive again.  Weight bearing is as tolerated  First week stay inside on even terrain. May go up and down stairs one stair at a time utilizing the hand rail once cleared by physical therapy to do so.  After one week, you may venture outside (if cleared to do so by physical therapist).    III. Precautions:  Everyone that comes near you should wash their hands  No elective dental, genital-urinary, or colon procedures or surgical procedures for 12 weeks after surgery unless absolutely necessary.   If dental work or surgical procedure is deemed absolutely necessary, you will need to contact your surgeon as you will need to take antibiotics 1 hour prior to any dental work (including teeth cleanings).  Please discuss with your surgeon prophylactic antibiotics as the length of time this intervention will be necessary for you varies with each patient’s health history and  situation.  Avoid sick people. If you must be around someone who is ill, they should wear a mask.  Avoid visits to the Emergency Room or Urgent Care unless you are having a life threatening event.   If ordered stockings are to be placed on in the morning and removed at night. Monitor the stockings to ensure that any swelling is not causing the stockings to become too tight. In this case, remove stockings immediately.    IV. INCISION CARE:  The dressing placed during surgery is waterproof and should remain in place for 5 to 7 days.  May shower with waterproof dressing on and allowed water to run over the dressing  Remove the dressing around day 5-7  There is Steri-Strips underneath your dressing which may stay on and will eventually fall off on their own  After dressing removal on day 7 May shower and allow water to run over the incision  No soaking or submersion of the incision in tubs pools hot tubs etc.  No creams or ointments to the incision  Do not touch or pick at the incision  Check incision every day and notify surgeon immediately if any of the following signs or symptoms are noted:  Increase in redness  Increase in swelling around the incision and of the entire extremity  Increase in pain  Drainage oozing from the incision  Pulling apart of the edges of the incision  Increase in overall body temperature (greater than 100.5 degrees)  Your surgeon will instruct you regarding suture or staple removal    V. Medications:   1. Anticoagulants: You will be discharged on an anticoagulant. This is a prophylactic medication that helps prevent blood clots during your post-operative period. The type and length of dosage varies based on your individual needs, procedure performed, and surgeon’s preference.  While taking the anticoagulant, you should avoid taking any additional aspirin, ibuprofen (Advil or Motrin), Aleve (Naprosyn) or other non-steroidal anti-inflammatory medications.   Notify surgeon immediately if any  angel bleeding is noted in the urine, stool, emesis, or from the nose or the incision. Blood in the stool will often appear as black rather than red. Blood in urine may appear as pink. Blood in emesis may appear as brown/black like coffee grounds.  You will need to apply pressure for longer periods of time to any cuts or abrasions to stop bleeding  Avoid alcohol while taking anticoagulants    2. Stool Softeners: You will be at greater risk of constipation after surgery due to being less mobile and the pain medications.   Take stool softeners as instructed by your surgeon while on pain medications. Over the counter Colace 100 mg 1-2 capsules twice daily.   If stools become too loose or too frequent, please decreases the dosage or stop the stool softener.  If constipation occurs despite use of stool softeners, you are to continue the stool softeners and add a laxative (Milk of Magnesia 1 ounce daily as needed)  Drink plenty of fluids, and eat fruits and vegetables during your recovery time    3. Pain Medications utilized after surgery are narcotics and the law requires that the following information be given to all patients that are prescribed narcotics:  CLASSIFICATION: Pain medications are called Opioids and are narcotics  LEGALITIES: It is illegal to share narcotics with others and to drive within 24 hours of taking narcotics  POTENTIAL SIDE EFFECTS: Potential side effects of opioids include: nausea, vomiting, itching, dizziness, drowsiness, dry mouth, constipation, and difficulty urinating.  POTENTIAL ADVERSE EFFECTS:   Opioid tolerance can develop with use of pain medications and this simply means that it requires more and more of the medication to control pain; however, this is seen more in patients that use opioids for longer periods of time.  Opioid dependence can develop with use of Opioids and this simply means that to stop the medication can cause withdrawal symptoms; however, this is seen with patients  that use Opioids for longer periods of time.  Opioid addiction can develop with use of Opioids and the incidence of this is very unlikely in patients who take the medications as ordered and stop the medications as instructed.  Opioid overdose can be dangerous, but is unlikely when the medication is taken as ordered and stopped when ordered. It is important not to mix opioids with alcohol or with and type of sedative such as Benadryl as this can lead to over sedation and respiratory difficulty.  DOSAGE:   Pain medications will need to be taken consistently for the first week to decrease pain and promote adequate pain relief and participation in physical therapy.  After the initial surgical pain begins to resolve, you may begin to decrease the pain medication. By the end of 6 weeks, you should be off of pain medications.  Refills will not be given by the office during evening hours, on weekends, or after 6 weeks post-op.  To seek refills on pain medications during the initial 6 week post-operative period, you must call the office 48 hours in advance to request the refill. The office will then notify you when to  the prescription. DO NOT wait until you are out of the medication to request a refill.    V. FOLLOW-UP VISITS:  You will need to follow up in the office with your surgeon in  2 weeks. Please call this number [phone]  to schedule this appointment.  If you have any concerns or suspected complications prior to your follow up visit, please call your surgeons office. Do not wait until your appointment time if you suspect complications. These will need to be addressed in the office promptly.

## 2023-04-19 NOTE — ED PROVIDER NOTES
Subjective   History of Present Illness  72-year-old female presents via EMS with complaint of hip pain.  Patient had a fall at 530 yesterday morning.  She states that she was just trying to get into her chair and the next thing she knew she was on the ground.  She denies any prodrome or loss of consciousness.  Did not hit head.  Had some mild pain to her hip at that point but was able to get up with some assistance.  She has been able to ambulate with assistance throughout the day but does complain of worsening pain.  She tried to go to sleep tonight but the pain became too intense so she called EMS.  No numbness or tingling.  No blood thinners.  Patient is otherwise been at her baseline today.  No medications prior to arrival.        Review of Systems   All other systems reviewed and are negative.      Past Medical History:   Diagnosis Date   • Diabetes mellitus    • Stroke        Allergies   Allergen Reactions   • Penicillins Hives       Past Surgical History:   Procedure Laterality Date   • BACK SURGERY     • CHOLECYSTECTOMY         History reviewed. No pertinent family history.    Social History     Socioeconomic History   • Marital status: Single   Tobacco Use   • Smoking status: Every Day     Packs/day: 1.00     Types: Cigarettes   • Smokeless tobacco: Never   Substance and Sexual Activity   • Alcohol use: Never   • Drug use: Never           Objective   Physical Exam  Constitutional:       General: She is not in acute distress.     Appearance: She is not toxic-appearing.   HENT:      Head: Normocephalic and atraumatic.      Nose: Nose normal.      Mouth/Throat:      Mouth: Mucous membranes are moist.      Pharynx: Oropharynx is clear.   Eyes:      Extraocular Movements: Extraocular movements intact.      Pupils: Pupils are equal, round, and reactive to light.   Cardiovascular:      Rate and Rhythm: Normal rate and regular rhythm.      Pulses: Normal pulses.      Heart sounds: Normal heart sounds.   Pulmonary:       Effort: Pulmonary effort is normal. No respiratory distress.      Breath sounds: Normal breath sounds.   Abdominal:      General: There is no distension.      Palpations: Abdomen is soft.      Tenderness: There is no abdominal tenderness.   Musculoskeletal:      Cervical back: Normal range of motion and neck supple. No tenderness.      Comments: No midline neck or back pain.  No chest wall pain.  Upper extremities within normal limits.  Right lower extremity shortened, diffuse tenderness to lateral aspect of hip.  No tenderness distally.  Extremities neurovascular intact.  Left lower extremity within normal limits.   Skin:     General: Skin is warm and dry.      Comments: Intact   Neurological:      General: No focal deficit present.      Mental Status: She is alert and oriented to person, place, and time. Mental status is at baseline.      Cranial Nerves: No cranial nerve deficit.      Sensory: No sensory deficit.      Motor: No weakness.   Psychiatric:         Mood and Affect: Mood normal.         Behavior: Behavior normal.         Thought Content: Thought content normal.         Judgment: Judgment normal.         Procedures           ED Course  ED Course as of 04/19/23 0357   Wed Apr 19, 2023   0315 Patient with right femoral neck fracture.  No other injuries identified on exam.  Pain is well controlled as long as patient is not moving around.  Will obtain baseline labs, EKG, chest x-ray, consult orthopedic surgery and plan for admission. [TD]   0326 Discussed with Dr. Bonilla on call for ortho surgery who requests patient be admitted to hospitalist service.  He will consult and plan for the OR later today. [TD]   0356 EKG obtained at 0331 shows sinus rhythm, rate 98, borderline prolonged MN at 206, normal QTc, no ST segment changes. [TD]      ED Course User Index  [TD] Rick Conroy MD                                           [facility]    Final diagnoses:   Accidental fall, initial encounter   Closed  displaced fracture of right femoral neck       ED Disposition  ED Disposition     ED Disposition   Decision to Admit    Condition   --    Comment   Level of Care: Telemetry [5]   Diagnosis: Accidental fall, initial encounter [[id number]]   Admitting Physician: HILARIO THORNTON [[id number]]   Attending Physician: HILARIO THORNTON [[id number]]               No follow-up provider specified.       Medication List      No changes were made to your prescriptions during this visit.          Rick Conroy MD  04/19/23 0357

## 2023-04-19 NOTE — PROGRESS NOTES
"Adult Nutrition  Assessment/PES    Patient Name:  Arline Landaverde  YOB: 1950  MRN: [id number]  Admit Date:  4/19/2023    Assessment Date:  4/19/2023    Comments:  Adv diet as clinically indicated post op  Will cont to follow and further assess nutrition status as able     Reason for Assessment     Row Name 04/19/23 1127          Reason for Assessment    Reason For Assessment identified at risk by screening criteria  BMI     Diagnosis trauma  s/p fall, hip fx hx DM                Nutrition/Diet History     Row Name 04/19/23 1127          Nutrition/Diet History    Typical Intake (Food/Fluid/EN/PN) Pt in OR                Labs/Tests/Procedures/Meds     Row Name 04/19/23 1128          Labs/Procedures/Meds    Lab Results Reviewed reviewed     Lab Results Comments phos 4.2 wnl, na 128 L, Mg 1.4 L        Diagnostic Tests/Procedures    Diagnostic Test/Procedure Reviewed reviewed        Medications    Pertinent Medications Reviewed reviewed     Pertinent Medications Comments novolog                Physical Findings     Row Name 04/19/23 1128          Physical Findings    Overall Physical Appearance unable to assess underweight                Estimated/Assessed Needs - Anthropometrics     Row Name 04/19/23 1128 04/19/23 0443       Anthropometrics    Height -- 162.6 cm (64.02\")    Weight -- 47.3 kg (104 lb 3.2 oz)    Weight for Calculation 47.3 kg (104 lb 3.2 oz) --       Estimated/Assessed Needs    Additional Documentation Estimated Calorie Needs (Group);Fluid Requirements (Group);Protein Requirements (Group) --       Estimated Calorie Needs    Estimated Calorie Requirement (kcal/day) 9705-7904 kcal ( 30-35 kcal/kg) --    Estimated Calorie Need Method [facility] --       Protein Requirements    Est Protein Requirement Amount (gms/kg) 1.2 gm protein  56 gm pro --       Fluid Requirements    Estimated Fluid Requirement Method RDA Method  6286-4853 ml --               Nutrition Prescription Ordered     Row " Name 04/19/23 1129          Nutrition Prescription PO    Current PO Diet NPO                Evaluation of Received Nutrient/Fluid Intake     Row Name 04/19/23 1129          Fluid Intake Evaluation    Oral Fluid (mL) --  insufficient data        PO Evaluation    Number of Days PO Intake Evaluated Insufficient Data                   Problem/Interventions:   Problem 1     Row Name 04/19/23 1129          Nutrition Diagnoses Problem 1    Problem 1 Underweight     Etiology (related to) Medical Diagnosis     Signs/Symptoms (evidenced by) Report/Observation;BMI     BMI 17 - 17.9                      Intervention Goal     Row Name 04/19/23 1130          Intervention Goal    General Meet nutritional needs for age/condition     PO Advance diet  As tolerated post op                Nutrition Intervention     Row Name 04/19/23 1130          Nutrition Intervention    RD/Tech Action Follow Tx progress                  Education/Evaluation     Row Name 04/19/23 1130          Education    Education Education not appropriate at this time        Monitor/Evaluation    Monitor Per protocol;I&O;PO intake;Pertinent labs;Weight;Symptoms                 Electronically signed by:  Anel Mejia RD  04/19/23 11:30 EDT

## 2023-04-19 NOTE — CASE MANAGEMENT/SOCIAL WORK
Continued Stay Note  RADHA Caopne     Patient Name: Arline Landaverde  MRN: [id number]  Today's Date: 4/19/2023    Admit Date: 4/19/2023    Plan: from home with s/o - plan to be determined   Discharge Plan     Row Name 04/19/23 1500       Plan    Plan from home with s/o - plan to be determined    Patient/Family in Agreement with Plan yes    Plan Comments Spoke with patient at bedside, her daughter is present, other family has just left to get something to eat. PT has been to evaluate patient and will follow up in the morning. Face sheet verifed.Street address is [address] [place] [address]. IMM explained, signed and copy declined, Patient lives in a home with her s/o Sachin. She is fairly independent of ADLs but does not drive. Family provides transportation as necessary. She denies use of DME/HH/Rehab services previously. She sees Lanie QUEEN as PCP. She uses [facility] [place] and denies issues obtaining medications. She is unsure if she has a living will and does not want information about creating one. There are no issues r/t housing, food or utilties. CM # placed on white board, will follow to assist with dc planning needs.               Discharge Codes    No documentation.                     John Conner RN

## 2023-04-19 NOTE — NURSING NOTE
Pod#0 r anterior hip arthroplasty, todd drsg c/d/i, pt arrived to unit awake/alert on ra, denies pain/discomfort at this time, iv fluids at 100ml/hr, post op vitals as per protocol, scd's applied, iv antibiotics  Per e-mar.  Family at bedside.

## 2023-04-19 NOTE — ANESTHESIA PROCEDURE NOTES
Spinal Block    Pre-sedation assessment completed: 4/19/2023 10:18 AM    Patient reassessed immediately prior to procedure    Start Time: 4/19/2023 10:18 AM  Stop Time: 4/19/2023 10:27 AM  Indication:at surgeon's request and post-op pain management  Performed By  CRNA/GISELL: Devonte Martin CRNASRNA: Vanda Olguin SRNA  Preanesthetic Checklist  Completed: patient identified, IV checked, site marked, risks and benefits discussed, surgical consent, monitors and equipment checked, pre-op evaluation and timeout performed  Spinal Block Prep:  Patient Position:sitting  Sterile Tech:cap, gloves, mask and sterile barriers  Prep:Chloraprep  Patient Monitoring:blood pressure monitoring, continuous pulse oximetry and EKG    Spinal Block Procedure  Approach:midline  Guidance:landmark technique and palpation technique  Location:L3-L4  Needle Type:Sprotte  Needle Gauge:25 G  Placement of Spinal needle event:cerebrospinal fluid aspirated  Paresthesia: no  Fluid Appearance:clear  Medications: bupivacaine (MARCAINE) injection 0.5% - Injection   1.6 mL - 4/19/2023 10:18:00 AM   Post Assessment  Patient Tolerance:patient tolerated the procedure well with no apparent complications  Complications no

## 2023-04-19 NOTE — SIGNIFICANT NOTE
04/19/23 1447   OTHER   Discipline physical therapist   Rehab Time/Intention   Session Not Performed other (see comments)  (PT evaluation Attempted. Patient with bilateral numbness and lack of movement. Patient able to detect deep pressure to bilateral feet-unable to detect light touch. Noted only minimal toe flexion bilaterally. Due to lack of motor control and lack of sensation, will hold PT evaluation today and check back tomorrow.)

## 2023-04-19 NOTE — ANESTHESIA POSTPROCEDURE EVALUATION
Patient: Arline Landaverde    Procedure Summary     Date: 04/19/23 Room / Location:  [facility] OR 3 /  [facility]    Anesthesia Start: 1043 Anesthesia Stop: 1243    Procedure: HIP HEMIARTHROPLASTY ANTERIOR (Right: Hip) Diagnosis:       Closed displaced fracture of right femoral neck      (Closed displaced fracture of right femoral neck [S72.001A])    Surgeons: Kumar Bonilla MD Provider: Avelino Bejarano CRNA    Anesthesia Type: spinal, regional ASA Status: 3          Anesthesia Type: spinal, regional    Vitals  Vitals Value Taken Time   /51 04/19/23 1340   Temp 97.4 °F (36.3 °C) 04/19/23 1232   Pulse 107 04/19/23 1345   Resp 19 04/19/23 1340   SpO2 99 % 04/19/23 1345   Vitals shown include unvalidated device data.        Post Anesthesia Care and Evaluation    Patient location during evaluation: bedside  Patient participation: complete - patient participated  Level of consciousness: awake and alert  Pain score: 0  Pain management: adequate    Airway patency: patent  Anesthetic complications: No anesthetic complications  PONV Status: none  Cardiovascular status: acceptable  Respiratory status: acceptable  Hydration status: acceptable  Post Neuraxial Block status: No signs or symptoms of PDPH

## 2023-04-19 NOTE — SIGNIFICANT NOTE
04/19/23 1446   OTHER   Discipline occupational therapist   Rehab Time/Intention   Session Not Performed other (see comments)  (pts B LEs with continued numbness, unable to actively move B LES. will attempt evaluation in am, pt and family agreeable. notified RN)

## 2023-04-19 NOTE — SIGNIFICANT NOTE
04/19/23 0903   OTHER   Discipline physical therapist   Rehab Time/Intention   Session Not Performed other (see comments)  (PT: Patient with acute femur fracture, to OR today. Will await ortho recommendations post operatively.)

## 2023-04-19 NOTE — ANESTHESIA PROCEDURE NOTES
Peripheral Block    Pre-sedation assessment completed: 4/19/2023 9:42 AM    Patient reassessed immediately prior to procedure    Patient location during procedure: pre-op  Start time: 4/19/2023 9:45 AM  Stop time: 4/19/2023 10:00 AM  Reason for block: at surgeon's request and post-op pain management  Performed by  CRNA/CAA: Devonte Martin, DIANENA: Vanda Olguin SRNA  Preanesthetic Checklist  Completed: patient identified, IV checked, site marked, risks and benefits discussed, surgical consent, monitors and equipment checked, pre-op evaluation and timeout performed  Prep:  Pt Position: supine  Sterile barriers:cap, gloves, mask and washed/disinfected hands  Prep: ChloraPrep  Patient monitoring: blood pressure monitoring, continuous pulse oximetry and EKG  Procedure    Sedation: yes  Performed under: local infiltration  Guidance:ultrasound guided    ULTRASOUND INTERPRETATION.  Using ultrasound guidance a 21 G gauge needle was placed in close proximity to the nerve, at which point, under ultrasound guidance anesthetic was injected in the area of the nerve and spread of the anesthesia was seen on ultrasound in close proximity thereto.  There were no abnormalities seen on ultrasound; a digital image was taken; and the patient tolerated the procedure with no complications. Images:still images obtained, printed/placed on chart    Laterality:right  Block Type:PENG  Injection Technique:single-shot  Needle Type:echogenic  Needle Gauge:21 G  Resistance on Injection: none    Medications Used: bupivacaine PF (MARCAINE) injection 0.25% - Injection   20 mL - 4/19/2023 9:45:00 AM      Medications  Comment:20 mcg precedex added    Post Assessment  Injection Assessment: negative aspiration for heme, no paresthesia on injection and incremental injection  Patient Tolerance:comfortable throughout block  Complications:no

## 2023-04-19 NOTE — H&P
"[facility] HOSPITALIST     Lanie Gomez, APRN    CHIEF COMPLAINT: R Hip Pain    HISTORY OF PRESENT ILLNESS:      72 y.o F w/ PMH: DM2, Stroke (w/ residual LE weakness, visual deficit); presents with Hip Pain. States that yesterday morning was sitting in at her kitchen table when stoop up tripping over her feet resulting in a fall with R  nonradiating hip pain afterwards. As the day went out her hip pain got worse with ultimately eventually not being able to get out of bed thus caused her to come to the ED. In ED R Hip xray showing acute fracture of femoral neck with ED speaking with orthopedics noting plan for surgery and recommending admission.       Past Medical History:   Diagnosis Date   • Diabetes mellitus    • Stroke      Past Surgical History:   Procedure Laterality Date   • BACK SURGERY     • CHOLECYSTECTOMY       History reviewed. No pertinent family history.  Social History     Tobacco Use   • Smoking status: Every Day     Packs/day: 1.00     Types: Cigarettes   • Smokeless tobacco: Never   Substance Use Topics   • Alcohol use: Never   • Drug use: Never     (Not in a hospital admission)    Allergies:  Penicillins    REVIEW OF SYSTEMS:  Please see the above history of present illness for pertinent positives and negatives.  The remainder of the patient's systems have been reviewed and are negative.     Vital Signs  Temp:  [98.2 °F (36.8 °C)] 98.2 °F (36.8 °C)  Heart Rate:  [88] 88  Resp:  [18] 18  BP: (142)/(82) 142/82  Oxygen Therapy  SpO2: 100 %}  Body mass index is 24.55 kg/m².  Flowsheet Rows    Flowsheet Row First Filed Value   Admission Height 162.6 cm (64\") Documented at 04/19/2023 0240   Admission Weight 64.9 kg (143 lb) Documented at 04/19/2023 0240             Physical Exam:  Physical Exam   Constitutional: Patient appears well developed and well nourished and in no acute distress   HEENT:   Head: Normocephalic and atraumatic.   Eyes:  Pupils are equal, round, and reactive to " light. EOM are intact. Sclerae are anicteric and noninjected.  Mouth and Throat: Patient has moist mucous membranes. Oropharynx is clear of any erythema or exudate.     Neck: Neck supple. No JVD present. No thyromegaly present. No lymphadenopathy present.  Cardiovascular: Regular rate, regular rhythm, S1 normal and S2 normal.  Exam reveals no gallop and no friction rub.  No murmur heard.  Pulmonary/Chest: Lungs are clear to auscultation bilaterally. No respiratory distress. No wheezes. No rhonchi. No rales.   Abdominal: Soft. Bowel sounds are normal. No distension and no mass. There is no hepatosplenomegaly. There is no tenderness.   Musculoskeletal: Normal muscle tone  Extremities: No edema. Pulses are palpable in all 4 extremities.  Neurological: Patient is alert and oriented to person, place, and time. Cranial nerves II-XII are grossly intact with no focal deficits.  Skin: Skin is warm. No rash noted. Nails show no clubbing.  No cyanosis or erythema.       Results Review:    I reviewed the patient's new clinical results.  Lab Results (most recent)     Procedure Component Value Units Date/Time    CBC & Differential [[id number]]  (Abnormal) Collected: 04/19/23 0328    Specimen: Blood Updated: 04/19/23 0339    Narrative:      The following orders were created for panel order CBC & Differential.  Procedure                               Abnormality         Status                     ---------                               -----------         ------                     CBC Auto Differential[[id number]]        Abnormal            Final result                 Please view results for these tests on the individual orders.    CBC Auto Differential [[id number]]  (Abnormal) Collected: 04/19/23 0328    Specimen: Blood Updated: 04/19/23 0339     WBC 11.78 10*3/mm3      RBC 3.72 10*6/mm3      Hemoglobin 12.3 g/dL      Hematocrit 33.9 %      MCV 91.1 fL      MCH 33.1 pg      MCHC 36.3 g/dL      RDW 12.2 %      RDW-SD 40.3 fl       MPV 9.2 fL      Platelets 353 10*3/mm3      Neutrophil % 75.9 %      Lymphocyte % 15.5 %      Monocyte % 5.9 %      Eosinophil % 1.8 %      Basophil % 0.5 %      Immature Grans % 0.4 %      Neutrophils, Absolute 8.93 10*3/mm3      Lymphocytes, Absolute 1.83 10*3/mm3      Monocytes, Absolute 0.70 10*3/mm3      Eosinophils, Absolute 0.21 10*3/mm3      Basophils, Absolute 0.06 10*3/mm3      Immature Grans, Absolute 0.05 10*3/mm3      nRBC 0.0 /100 WBC     aPTT [[id number]] Collected: 04/19/23 0328    Specimen: Blood Updated: 04/19/23 0336    Protime-INR [[id number]] Collected: 04/19/23 0328    Specimen: Blood Updated: 04/19/23 0336    Comprehensive Metabolic Panel [[id number]] Collected: 04/19/23 0328    Specimen: Blood Updated: 04/19/23 0336          Imaging Results (Most Recent)     Procedure Component Value Units Date/Time    XR Chest 2 View [[id number]] Collected: 04/19/23 0329     Updated: 04/19/23 0331    Narrative:      CR Chest 2 Vws    INDICATION:    Preoperative exam for hip fracture.    COMPARISON:    3/8/2023    FINDINGS:   PA and lateral views of the chest.  Heart and mediastinal contours are normal. The lungs are clear. No pneumothorax or pleural effusion.        Impression:      No acute cardiopulmonary findings.    Signer Name: Robert Hunt MD   Signed: 4/19/2023 3:29 AM   Workstation Name: [id number]    [facility]    XR Hip With or Without Pelvis 2 - 3 View Right [[id number]] Collected: 04/19/23 0318     Updated: 04/19/23 0320    Narrative:      CR Hip Uni Comp Min 2 Vws RT    INDICATION:   Hip pain after recent fall.    COMPARISON:   None.    FINDINGS:  AP pelvis with AP and crosstable lateral views of the right hip. There is an acute right femoral neck fracture that is mildly displaced. No hip dislocation on either side. There is joint space narrowing in both hips. No other fractures are seen. There is  no sacroiliac joint diastasis.      Impression:      Acute right femoral  neck fracture.    Signer Name: Robert Hunt MD   Signed: 4/19/2023 3:18 AM   Workstation Name: [id number]    [facility] [place]        reviewed    ECG/EMG Results (most recent)     Procedure Component Value Units Date/Time    ECG 12 Lead Pre-Op / Pre-Procedure [[id number]] Collected: 04/19/23 0331     Updated: 04/19/23 0334     QT Interval 348 ms     Narrative:      HEART RATE= 98  bpm  RR Interval= 612  ms  MO Interval= 206  ms  P Horizontal Axis= 12  deg  P Front Axis= 64  deg  QRSD Interval= 76  ms  QT Interval= 348  ms  QRS Axis= 18  deg  T Wave Axis= 59  deg  - ABNORMAL ECG -  Sinus rhythm  Anterior infarct, old  Electronically Signed By:   Date and Time of Study: 2023-04-19 03:31:24        reviewed    Assessment & Plan     #R Femoral Neck Fracture  -Post mechanical fall resulting in injury  -OA Xray = Noted R fem neck fracture  -OA EKG w/ nsr w/p CHANDA/STD/TWI; normal intervals  PLAN  *Ortho onboard (ED spoke plan for OR Today)  *Starting oxycodone 5mg q4hrs prn (Severe) & IV Dilaudid 0.5 prn (Breakthrough)   *PT/OT Consulted    DM2 Hx  -holding home metformin  -NPO currently; SSI onboard    Stroke Hx  -notes w/ residual LE weakness, visual deficit  -Continue home atorvastatin; not on aspirin thus start post surgery      Kurt Kee DO  04/19/23  03:48 EDT

## 2023-04-19 NOTE — PLAN OF CARE
Goal Outcome Evaluation:  Plan of Care Reviewed With: patient        Progress: no change  Outcome Evaluation: Vs stable, to go to surgery this AM for R hip arthroplasty, NPO   Lab Facility: [id number]

## 2023-04-19 NOTE — OP NOTE
OPERATIVE REPORT    Date of Surgery:   04/19/23    Attending Surgeon:   Kumar Bonilla MD, MSE    ASSISTANTS:   Jason Cabezas was responsible for performing the following activities: Retraction, Suction, Closing and Placing Dressing and their skilled assistance was necessary for the success of this case.     ANESTHESIA:   Spinal    PREOPERATIVE DIAGNOSIS:   Right femoral neck fracture    POSTOPERATIVE DIAGNOSIS:   Same as above     PROCEDURE:   1. Right hip francine-arthroplasty    SURGICAL APPROACH:        IMPLANTS:   1. Depuy summit cemented standard offset, size 4  2. Biolox Delta Femoral Head, Size 28, Neck length 1.5  3.  DePuy bipolar self-centering femoral head size 45 mm OD, 28 mm ID    CLOSURE:  #5 Ethibond  #1 PDS STRATAFIX  2-0 Monocryl  3-0 Monocryl STRATAFIX  Steri Strips  VLADIMIR Dressing    INDICATIONS:   This patient has a history of Right hip femoral neck fracture.  She is therefore indicated for hip francine-arthroplasty.  Risks of surgery, including pain, bleeding, infection, scar, injury to nearby neurovascular structures, fracture, venous thromboembolism, limb length discrepancy, dislocation, need for more procedures, implant failure, implant loosening and implant subsidence were discussed preoperatively.  The risks of anesthesia including heart attack, stroke, blood clots and even death were discussed. The benefits of surgery including pain relief and restoration of function were discussed. Alternatives to surgery, including nonoperative care were also discussed and the patient elects to proceed with GERMANIA.    OPERATIVE FINDINGS:  Right femoral neck fracture    DESCRIPTION OF PROCEDURE:   The patient was brought to the operating room and placed on the operating room table in the supine position.  After anesthesia was established, the patient was prepped and draped in the usual sterile fashion. All bony prominences and peripheral nerves were padded.  A surgical time out was taken to confirm the operative  side and details of the operative procedure.  The patient was given prophylactic antibiotics prior to skin incision.      We utilized an anterior oblique incision and the Moser-Ramirez interval to access the hip. An oblique anterior incision was made starting 2 centimeters lateral and distal to the anterior-superior iliac spine and extending distal and slight lateral in line with the tensor fascia ana.  The position of the incision was confirmed with flouroscopy prior to incision. This incision was carried through subcutaneous tissue to the underlying fascia of the tensor fascia ana muscle.  The fascia was incised.  The tensor fascia muscle belly was reflected superolaterally with a curved cobra retractor over the femoral neck.  The rectus was reflected medially with a curved Cobra retractor, inferior to the femoral neck.  A third retractor was carefully placed under the rectus onto the anterior hip capsule directly on bone.    The anterior hip capsule was exposed.  The reflected head of the rectus tendon was exposed.  The capsulotomy was performed along the axis of the superior femoral neck.  A superior flap was raised off the greater trochanter and off the acetabular rim, and reflected superiorly underneath our curved Cobra retractor.  An inferior flap was created by peeling the capsule off the intertrochanteric ridge inferomedially to the top of the lesser trochanter, and again reflected anteriorly with our curved Cobra retractor.  The capsular flaps were tagged. The femoral neck cut was then made.  This was based off our preoperative plan using anatomic landmarks and confirmed with flouroscopic guidance.  The femoral neck cut was freshened. The femoral head was removed.      We then turned to the proximal femur.  The retractors were removed, and a new set of retractors was placed.  The OmniTract femoral elevator hook was placed around the vastus lateralis and proximal femur between the vastus tubercle and  gluteus patricia tendon. The hip was hyperextended by putting the bed in Trendelenberg and lowering the foot of the bed. The hook was assembled to its mounting frame and a gentle amount of tension was placed on the hook.    The double pronged retractor was placed on the posteromedial proximal femur and the double toothed retractor was placed in the interval between the capsule and gluteus minimus. Femoral releases of the capsule, piriformis and conjoint tendon were sequentially performed as needed until adequate exposure was obtained.  The femur was then extended, adducted, and externally rotated.  The proximal femur was mobilized into the wound to create adequate exposure of the proximal femur.  After exposure was obtained, the femur was prepared with a box osteotome, curved awl, and then broached up to the final broach size. The hook was de-tensioned and the hook and retractors were then removed.    AP and lateral flouroscopy of the femur were obtained to confirm good fit and alignment of the stem, as well as no fractures or cortical perforations were present. The trial neck and head were then placed, and the hip was reduced. Flouroscopy was used to confirm length and offset were correct. Leg lengths were assessed clinically on the table.  The hip was assessed in extension, external rotation to check anterior stability.  It was stable.  Posterior stability was assessed with flexion, adduction, internal rotation. The hip was stable.  We flexed to 90 with internal rotation, stable.  Full flexion stable.  The hip was then dislocated.  The femur was exposed with the hook and retractors. The trial implants were removed.  The femoral canal was then irrigated and a cement restrictor was placed.  Femoral canal was then dried using the cement prep kit suction.  The final stem was then inserted and cemented in place using third-generation cement techniques.  Following cement curing the stem had a good fit and fixation.  The  femoral bipolar head was placed.  The hip was reduced.  With the final implants in position, we trialed the hip again.  Flouroscopic images were again obtained, including AP and lateral views, which demonstrated excellent component position and no intraoperative fractures. The leg lengths, anterior stability, and posterior stability were all excellent. The hip capsular flaps were closed with a #5 Ethibond. The tensor fascia was closed with #1 PDS STRATAFIX.  Subcutaneous tissue closed with 2-0 Monocryl.  The skin was closed with 3-0 Monocryl, STRATAFIX, and Steri-Strips and a VLADIMIR dressing was placed.    POSTOPERATIVE PLAN:   1. Weightbearing as tolerated on operative extremity  2. Anterior hip precautions x 4 weeks  3. DVT prophylaxis    ESTIMATED BLOOD LOSS:   150    INTRAOPERATIVE FLUIDS:   Pre anesthesia record    SPONGE/INSTRUMENT/NEEDLE COUNTS:   Correct x 2    CONDITION ON DISCHARGE:   Stable    COMPLICATIONS:   none    Kumar Bonilla MD, MSE

## 2023-04-19 NOTE — PLAN OF CARE
Goal Outcome Evaluation:  Plan of Care Reviewed With: patient        Progress: improving  Outcome Evaluation: Pod#0 r anterior hip arthroplasty, todd drsg c/d/i, arrived to unit awake/alert, denies pain/discomfort, ivf at 100 ml/hr, regular diet, but pt only wanted apple sauce at this time, VSS, ra, ETCO in place.

## 2023-04-19 NOTE — ANESTHESIA PREPROCEDURE EVALUATION
Anesthesia Evaluation     Patient summary reviewed and Nursing notes reviewed   no history of anesthetic complications:  NPO Solid Status: > 8 hours  NPO Liquid Status: > 8 hours           Airway   Mallampati: II  TM distance: <3 FB  Neck ROM: full  No difficulty expected  Dental    (+) lower dentures and upper dentures    Pulmonary - normal exam   (-) shortness of breath  Cardiovascular - normal exam  Exercise tolerance: good (4-7 METS)    ECG reviewed    (-) angina      Neuro/Psych  (+) CVA (TIAs with some left sided weakness and balance with ambulation ), numbness (Left hand, Left foot),    GI/Hepatic/Renal/Endo    (+)  GERD (infrequent ),  diabetes mellitus type 2,     Musculoskeletal (-) negative ROS    Abdominal  - normal exam   Substance History   (-) alcohol use     OB/GYN negative ob/gyn ROS         Other                        Anesthesia Plan    ASA 3     spinal and regional     intravenous induction     Anesthetic plan, risks, benefits, and alternatives have been provided, discussed and informed consent has been obtained with: patient.    Use of blood products discussed with patient  Consented to blood products.       CODE STATUS:    Code Status (Patient has no pulse and is not breathing): CPR (Attempt to Resuscitate)  Medical Interventions (Patient has pulse or is breathing): Full Support

## 2023-04-20 VITALS
WEIGHT: 104.2 LBS | DIASTOLIC BLOOD PRESSURE: 64 MMHG | RESPIRATION RATE: 18 BRPM | HEART RATE: 108 BPM | HEIGHT: 64 IN | TEMPERATURE: 97.9 F | SYSTOLIC BLOOD PRESSURE: 108 MMHG | OXYGEN SATURATION: 100 % | BODY MASS INDEX: 17.79 KG/M2

## 2023-04-20 PROBLEM — E44.0 MODERATE MALNUTRITION: Status: ACTIVE | Noted: 2023-04-20

## 2023-04-20 LAB
ANION GAP SERPL CALCULATED.3IONS-SCNC: 6.5 MMOL/L (ref 5–15)
BASOPHILS # BLD AUTO: 0.02 10*3/MM3 (ref 0–0.2)
BASOPHILS NFR BLD AUTO: 0.1 % (ref 0–1.5)
BUN SERPL-MCNC: 25 MG/DL (ref 8–23)
BUN/CREAT SERPL: 35.7 (ref 7–25)
CALCIUM SPEC-SCNC: 9 MG/DL (ref 8.6–10.5)
CHLORIDE SERPL-SCNC: 99 MMOL/L (ref 98–107)
CO2 SERPL-SCNC: 23.5 MMOL/L (ref 22–29)
CREAT SERPL-MCNC: 0.7 MG/DL (ref 0.57–1)
DEPRECATED RDW RBC AUTO: 43.3 FL (ref 37–54)
EGFRCR SERPLBLD CKD-EPI 2021: 91.5 ML/MIN/1.73
EOSINOPHIL # BLD AUTO: 0.03 10*3/MM3 (ref 0–0.4)
EOSINOPHIL NFR BLD AUTO: 0.2 % (ref 0.3–6.2)
ERYTHROCYTE [DISTWIDTH] IN BLOOD BY AUTOMATED COUNT: 12.6 % (ref 12.3–15.4)
FERRITIN SERPL-MCNC: 733 NG/ML (ref 13–150)
FOLATE SERPL-MCNC: >20 NG/ML (ref 4.78–24.2)
GLUCOSE BLDC GLUCOMTR-MCNC: 124 MG/DL (ref 70–130)
GLUCOSE BLDC GLUCOMTR-MCNC: 271 MG/DL (ref 70–130)
GLUCOSE SERPL-MCNC: 125 MG/DL (ref 65–99)
HBA1C MFR BLD: 5.2 % (ref 4.8–5.6)
HCT VFR BLD AUTO: 24.7 % (ref 34–46.6)
HCT VFR BLD AUTO: 25.5 % (ref 34–46.6)
HGB BLD-MCNC: 9 G/DL (ref 12–15.9)
HGB BLD-MCNC: 9 G/DL (ref 12–15.9)
IMM GRANULOCYTES # BLD AUTO: 0.03 10*3/MM3 (ref 0–0.05)
IMM GRANULOCYTES NFR BLD AUTO: 0.2 % (ref 0–0.5)
IRON 24H UR-MRATE: 33 MCG/DL (ref 37–145)
IRON SATN MFR SERPL: 16 % (ref 20–50)
LYMPHOCYTES # BLD AUTO: 1.63 10*3/MM3 (ref 0.7–3.1)
LYMPHOCYTES NFR BLD AUTO: 9.7 % (ref 19.6–45.3)
MCH RBC QN AUTO: 33 PG (ref 26.6–33)
MCHC RBC AUTO-ENTMCNC: 35.3 G/DL (ref 31.5–35.7)
MCV RBC AUTO: 93.4 FL (ref 79–97)
MONOCYTES # BLD AUTO: 1.06 10*3/MM3 (ref 0.1–0.9)
MONOCYTES NFR BLD AUTO: 6.3 % (ref 5–12)
NEUTROPHILS NFR BLD AUTO: 14.07 10*3/MM3 (ref 1.7–7)
NEUTROPHILS NFR BLD AUTO: 83.5 % (ref 42.7–76)
PLATELET # BLD AUTO: 304 10*3/MM3 (ref 140–450)
PMV BLD AUTO: 9.3 FL (ref 6–12)
POTASSIUM SERPL-SCNC: 4.4 MMOL/L (ref 3.5–5.2)
RBC # BLD AUTO: 2.73 10*6/MM3 (ref 3.77–5.28)
SODIUM SERPL-SCNC: 129 MMOL/L (ref 136–145)
TIBC SERPL-MCNC: 212 MCG/DL (ref 298–536)
TSH SERPL DL<=0.05 MIU/L-ACNC: 2.15 UIU/ML (ref 0.27–4.2)
UIBC SERPL-MCNC: 179 MCG/DL (ref 112–346)
VIT B12 BLD-MCNC: 774 PG/ML (ref 211–946)
WBC NRBC COR # BLD: 16.84 10*3/MM3 (ref 3.4–10.8)

## 2023-04-20 PROCEDURE — 84443 ASSAY THYROID STIM HORMONE: CPT | Performed by: NURSE PRACTITIONER

## 2023-04-20 PROCEDURE — 85014 HEMATOCRIT: CPT | Performed by: INTERNAL MEDICINE

## 2023-04-20 PROCEDURE — 82607 VITAMIN B-12: CPT | Performed by: NURSE PRACTITIONER

## 2023-04-20 PROCEDURE — 83036 HEMOGLOBIN GLYCOSYLATED A1C: CPT | Performed by: NURSE PRACTITIONER

## 2023-04-20 PROCEDURE — 82746 ASSAY OF FOLIC ACID SERUM: CPT | Performed by: NURSE PRACTITIONER

## 2023-04-20 PROCEDURE — 97161 PT EVAL LOW COMPLEX 20 MIN: CPT

## 2023-04-20 PROCEDURE — 85025 COMPLETE CBC W/AUTO DIFF WBC: CPT | Performed by: NURSE PRACTITIONER

## 2023-04-20 PROCEDURE — 83540 ASSAY OF IRON: CPT | Performed by: NURSE PRACTITIONER

## 2023-04-20 PROCEDURE — 80048 BASIC METABOLIC PNL TOTAL CA: CPT | Performed by: INTERNAL MEDICINE

## 2023-04-20 PROCEDURE — 83550 IRON BINDING TEST: CPT | Performed by: NURSE PRACTITIONER

## 2023-04-20 PROCEDURE — 82728 ASSAY OF FERRITIN: CPT | Performed by: NURSE PRACTITIONER

## 2023-04-20 PROCEDURE — 97165 OT EVAL LOW COMPLEX 30 MIN: CPT

## 2023-04-20 PROCEDURE — 85018 HEMOGLOBIN: CPT | Performed by: INTERNAL MEDICINE

## 2023-04-20 PROCEDURE — 82962 GLUCOSE BLOOD TEST: CPT

## 2023-04-20 PROCEDURE — 0 CEFAZOLIN SODIUM-DEXTROSE 2-3 GM-%(50ML) RECONSTITUTED SOLUTION: Performed by: INTERNAL MEDICINE

## 2023-04-20 PROCEDURE — 63710000001 INSULIN ASPART PER 5 UNITS: Performed by: INTERNAL MEDICINE

## 2023-04-20 RX ORDER — MAGNESIUM SULFATE HEPTAHYDRATE 40 MG/ML
4 INJECTION, SOLUTION INTRAVENOUS AS NEEDED
Status: DISCONTINUED | OUTPATIENT
Start: 2023-04-20 | End: 2023-04-20 | Stop reason: HOSPADM

## 2023-04-20 RX ORDER — DOXYCYCLINE 100 MG/1
100 CAPSULE ORAL EVERY 12 HOURS SCHEDULED
Qty: 28 CAPSULE | Refills: 0 | Status: SHIPPED | OUTPATIENT
Start: 2023-04-20 | End: 2023-04-20 | Stop reason: SDUPTHER

## 2023-04-20 RX ORDER — MAGNESIUM SULFATE 1 G/100ML
1 INJECTION INTRAVENOUS AS NEEDED
Status: DISCONTINUED | OUTPATIENT
Start: 2023-04-20 | End: 2023-04-20 | Stop reason: HOSPADM

## 2023-04-20 RX ORDER — DOXYCYCLINE 100 MG/1
100 CAPSULE ORAL EVERY 12 HOURS SCHEDULED
Qty: 28 CAPSULE | Refills: 0 | Status: SHIPPED | OUTPATIENT
Start: 2023-04-20 | End: 2023-05-04

## 2023-04-20 RX ORDER — POTASSIUM CHLORIDE 20 MEQ/1
40 TABLET, EXTENDED RELEASE ORAL AS NEEDED
Status: DISCONTINUED | OUTPATIENT
Start: 2023-04-20 | End: 2023-04-20 | Stop reason: HOSPADM

## 2023-04-20 RX ORDER — NICOTINE 21 MG/24HR
1 PATCH, TRANSDERMAL 24 HOURS TRANSDERMAL
Status: DISCONTINUED | OUTPATIENT
Start: 2023-04-20 | End: 2023-04-20 | Stop reason: HOSPADM

## 2023-04-20 RX ORDER — MAGNESIUM SULFATE HEPTAHYDRATE 40 MG/ML
2 INJECTION, SOLUTION INTRAVENOUS AS NEEDED
Status: DISCONTINUED | OUTPATIENT
Start: 2023-04-20 | End: 2023-04-20 | Stop reason: HOSPADM

## 2023-04-20 RX ORDER — ASPIRIN 81 MG/1
81 TABLET ORAL EVERY 12 HOURS SCHEDULED
Qty: 60 TABLET | Refills: 1 | Status: SHIPPED | OUTPATIENT
Start: 2023-04-20

## 2023-04-20 RX ORDER — DOXYCYCLINE 100 MG/1
100 CAPSULE ORAL EVERY 12 HOURS SCHEDULED
Status: DISCONTINUED | OUTPATIENT
Start: 2023-04-20 | End: 2023-04-20 | Stop reason: HOSPADM

## 2023-04-20 RX ORDER — POTASSIUM CHLORIDE 1.5 G/1.77G
40 POWDER, FOR SOLUTION ORAL AS NEEDED
Status: DISCONTINUED | OUTPATIENT
Start: 2023-04-20 | End: 2023-04-20 | Stop reason: HOSPADM

## 2023-04-20 RX ORDER — DOXYCYCLINE HYCLATE 50 MG/1
324 CAPSULE, GELATIN COATED ORAL
Qty: 30 TABLET | Refills: 1 | Status: SHIPPED | OUTPATIENT
Start: 2023-04-20

## 2023-04-20 RX ORDER — OXYCODONE HYDROCHLORIDE AND ACETAMINOPHEN 5; 325 MG/1; MG/1
1 TABLET ORAL EVERY 6 HOURS PRN
Qty: 40 TABLET | Refills: 0 | Status: SHIPPED | OUTPATIENT
Start: 2023-04-20

## 2023-04-20 RX ORDER — DOXYCYCLINE HYCLATE 50 MG/1
324 CAPSULE, GELATIN COATED ORAL
Qty: 30 TABLET | Refills: 1 | Status: SHIPPED | OUTPATIENT
Start: 2023-04-20 | End: 2023-04-20 | Stop reason: SDUPTHER

## 2023-04-20 RX ORDER — ASPIRIN 81 MG/1
81 TABLET ORAL EVERY 12 HOURS SCHEDULED
Qty: 60 TABLET | Refills: 0 | Status: SHIPPED | OUTPATIENT
Start: 2023-04-20 | End: 2023-04-20 | Stop reason: SDUPTHER

## 2023-04-20 RX ORDER — NICOTINE 21 MG/24HR
1 PATCH, TRANSDERMAL 24 HOURS TRANSDERMAL
Qty: 21 EACH | Refills: 0 | Status: CANCELLED | OUTPATIENT
Start: 2023-04-20

## 2023-04-20 RX ORDER — NICOTINE 21 MG/24HR
1 PATCH, TRANSDERMAL 24 HOURS TRANSDERMAL
Qty: 21 EACH | Refills: 0 | Status: SHIPPED | OUTPATIENT
Start: 2023-04-20

## 2023-04-20 RX ORDER — NICOTINE 21 MG/24HR
1 PATCH, TRANSDERMAL 24 HOURS TRANSDERMAL
Qty: 21 EACH | Refills: 0 | Status: SHIPPED | OUTPATIENT
Start: 2023-04-20 | End: 2023-04-20 | Stop reason: SDUPTHER

## 2023-04-20 RX ORDER — POTASSIUM CHLORIDE 7.45 MG/ML
10 INJECTION INTRAVENOUS
Status: DISCONTINUED | OUTPATIENT
Start: 2023-04-20 | End: 2023-04-20 | Stop reason: HOSPADM

## 2023-04-20 RX ADMIN — ASPIRIN 81 MG: 81 TABLET, COATED ORAL at 08:07

## 2023-04-20 RX ADMIN — INSULIN ASPART 4 UNITS: 100 INJECTION, SOLUTION INTRAVENOUS; SUBCUTANEOUS at 12:43

## 2023-04-20 RX ADMIN — DOXYCYCLINE 100 MG: 100 CAPSULE ORAL at 09:10

## 2023-04-20 RX ADMIN — OXYCODONE HYDROCHLORIDE 5 MG: 5 TABLET ORAL at 12:50

## 2023-04-20 RX ADMIN — ATORVASTATIN CALCIUM 20 MG: 20 TABLET, FILM COATED ORAL at 08:07

## 2023-04-20 RX ADMIN — OXYCODONE HYDROCHLORIDE 5 MG: 5 TABLET ORAL at 00:17

## 2023-04-20 RX ADMIN — CEFAZOLIN SODIUM 2 G: 2 SOLUTION INTRAVENOUS at 03:55

## 2023-04-20 RX ADMIN — OXYCODONE HYDROCHLORIDE 5 MG: 5 TABLET ORAL at 08:07

## 2023-04-20 NOTE — PROGRESS NOTES
Patient: Arline Landaverde  Procedure(s):  HIP HEMIARTHROPLASTY ANTERIOR  Anesthesia type: spinal, regional    Patient location: [facility] Surgical Floor  Last vitals:   Vitals:    04/20/23 0603   BP: 128/72   Pulse: 99   Resp: 14   Temp: 98.2 °F (36.8 °C)   SpO2: 99%     Level of consciousness: awake, alert and oriented    Post-anesthesia pain: adequate analgesia  Airway patency: patent  Respiratory: unassisted  Cardiovascular: stable and blood pressure at baseline  Hydration: euvolemic    Anesthetic complications: no

## 2023-04-20 NOTE — THERAPY DISCHARGE NOTE
Acute Care - Occupational Therapy Initial Evaluation/Discharge  [facility]     Patient Name: Arline Landaverde  : 1950  MRN: [id number]  Today's Date: 2023  Onset of Illness/Injury or Date of Surgery: 23  Date of Referral to OT: 23  Referring Physician: Dr Bonilla      Admit Date: 2023       ICD-10-CM ICD-9-CM   1. Accidental fall, initial encounter  W19.XXXA E888.9   2. Closed displaced fracture of right femoral neck  S72.001A 820.8     Patient Active Problem List   Diagnosis   • Accidental fall, initial encounter   • Closed displaced fracture of right femoral neck     Past Medical History:   Diagnosis Date   • Diabetes mellitus    • Stroke      Past Surgical History:   Procedure Laterality Date   • BACK SURGERY     • CHOLECYSTECTOMY         OT ASSESSMENT FLOWSHEET (last 12 hours)     OT Evaluation and Treatment     Row Name 23 0910                   OT Time and Intention    Subjective Information complains of;weakness;pain  -JJ        Document Type evaluation  -JJ        Mode of Treatment occupational therapy  -JJ        Patient Effort good  -JJ        Symptoms Noted During/After Treatment fatigue  -JJ           General Information    Patient Profile Reviewed yes  -JJ        Onset of Illness/Injury or Date of Surgery 23  -JJ        Referring Physician Dr Bonilla  -JJ        General Observations of Patient pt supine in bed, significant other in room, agreed to evaluation  -JJ        Prior Level of Function --  see pertinent hx of current problem  -JJ        Equipment Currently Used at Home wheelchair  has RW doesnt use  -JJ        Pertinent History of Current Functional Problem pt admitted to hospital sp R femur fx secondary to fall. sp R [place]. pt with chronic balance deficits 2 previous CVAs. pt state she holds onto signficant other for ambulation and he assists with bed mobility, transfers and adls and meal prep.  -JJ        Existing Precautions/Restrictions fall  -JJ      "   Limitations/Impairments --  pt states \"tingling\" in L LE, chronic  -JJ        Risks Reviewed patient and family:;LOB  -JJ        Benefits Reviewed patient and family:;improve function;increase independence  -J        Barriers to Rehab previous functional deficit;visual deficit  -JJ           Living Environment    Current Living Arrangements home  -JJ        Home Accessibility stairs to enter home  -           Home Main Entrance    Number of Stairs, Main Entrance two  -J        Stair Railings, Main Entrance --  1 HR did not specify side  -JJ           Pain Assessment    Pre/Posttreatment Pain Comment pt co pain in R LE with mobility did not rate  -JJ           Cognition    Orientation Status (Cognition) oriented x 3  -JJ        Follows Commands (Cognition) WFL  -        Personal Safety Interventions gait belt;nonskid shoes/slippers when out of bed  -           Range of Motion Comprehensive    General Range of Motion bilateral upper extremity ROM WFL  -           Strength (Manual Muscle Testing)    Strength (Manual Muscle Testing) bilateral upper extremities;strength is [facility]  -           Activities of Daily Living    BADL Assessment/Intervention lower body dressing  -           Lower Body Dressing Assessment/Training    Comment, (Lower Body Dressing) signficant other assists pt with adls at baseline. he is able to assist pt with all adls at home when discarged from facility  -           Bed Mobility    Bed Mobility supine-sit;sit-supine  -        Supine-Sit [place] (Bed Mobility) standby assist  -        Assistive Device (Bed Mobility) bed rails;head of bed elevated  -           Functional Mobility    Functional Mobility- Ind. Level contact guard assist;verbal cues required  -        Functional Mobility- Device walker, front-wheeled  -        Functional Mobility-Distance (Feet) 110  -        Functional Mobility- Comment required assist to move around obstacles 2 visual deficits  -J  "          Transfer Assessment/Treatment    Transfers sit-stand transfer;stand-sit transfer  -JJ        Comment, (Transfers) required cues for hand placement  -JJ           Sit-Stand Transfer    Sit-Stand [place] (Transfers) standby assist;verbal cues  -JJ        Assistive Device (Sit-Stand Transfers) walker, front-wheeled  -JJ           Stand-Sit Transfer    Stand-Sit [place] (Transfers) standby assist;verbal cues  -JJ        Assistive Device (Stand-Sit Transfers) walker, front-wheeled  -JJ           Balance    Comment, Balance supervision for static sitting balance and CGA for static standing balance with RW  -JJ           Wound 04/19/23 1111 Right anterior hip Incision    Wound - Properties Group Placement Date: 04/19/23  -GP Placement Time: 1111  -GP Side: Right  -GP Orientation: anterior  -GP Location: hip  -GP Primary Wound Type: Incision  -GP Additional Comments: VLADIMIR ruffin dressing  -GP    Retired Wound - Properties Group Placement Date: 04/19/23  -GP Placement Time: 1111  -GP Side: Right  -GP Orientation: anterior  -GP Location: hip  -GP Primary Wound Type: Incision  -GP Additional Comments: VLADIMIR ruffin dressing  -GP    Retired Wound - Properties Group Date first assessed: 04/19/23  -GP Time first assessed: 1111  -GP Side: Right  -GP Location: hip  -GP Primary Wound Type: Incision  -GP Additional Comments: VLADIMIR ruffin dressing  -GP       Plan of Care Review    Plan of Care Reviewed With patient  -JJ        Outcome Evaluation OT evaluation completed. pt required supervison for supine to sit transfer to EOB. pt required CGA for functional transfers from EOB with RW. pt required CGA for functional mobility with RW for 110 feet. pt required assist to avoid obstacles throughout mobility 2 visual deficits. pt with co pain with mobility did not rate, improved at rest. pt receives assist with adls at baseline is available 24 hrs to assist pt as needed. pt states plan is to return home with assist from  significant other. rec home health therapy upon discharge from facility. no furhter inpatient OT recommendations at this time  -JJ           Positioning and Restraints    Pre-Treatment Position in bed  -JJ        Post Treatment Position chair  -JJ        In Chair reclined;call light within reach;encouraged to call for assist;with family/caregiver  -           Therapy Assessment/Plan (OT)    Date of Referral to OT 04/19/23  -        Patient/Family Therapy Goal Statement (OT) pt states plan is to return home with assist from signficant other  -        OT Diagnosis decreased adl sp hip sx  -JJ        Criteria for Skilled Therapeutic Interventions Met (OT) no;no problems identified which require skilled intervention  -        Therapy Frequency (OT) evaluation only  -           Evaluation Complexity (OT)    Overall Complexity of Evaluation (OT) low complexity  -           Therapy Plan Review/Discharge Plan (OT)    Anticipated Discharge Disposition (OT) home with 24/7 care;home with home health  -              User Key  (r) = Recorded By, (t) = Taken By, (c) = Cosigned By    Initials Name Effective Dates    Vanda Cervantes OTR 06/16/21 -     GP Christi Mclalister RN 09/22/22 -                 Occupational Therapy Education     Title: PT OT SLP Therapies (Resolved)     Topic: Occupational Therapy (Resolved)     Point: ADL training (Resolved)     Description:   Instruct learner(s) on proper safety adaptation and remediation techniques during self care or transfers.   Instruct in proper use of assistive devices.              Learning Progress Summary           Patient Acceptance, E,TB, VU by  at 4/20/2023 1112    Comment: pt educated on adls, and safety with functional transfers and mobility                               User Key     Initials Effective Dates Name Provider Type Discipline     06/16/21 -  Vanda Mendez, OTISABELLE Occupational Therapist OT                OT Recommendation and  Plan  Therapy Frequency (OT): evaluation only  Plan of Care Review  Plan of Care Reviewed With: patient  Outcome Evaluation: OT evaluation completed. pt required supervison for supine to sit transfer to EOB. pt required CGA for functional transfers from EOB with RW. pt required CGA for functional mobility with RW for 110 feet. pt required assist to avoid obstacles throughout mobility 2 visual deficits. pt with co pain with mobility did not rate, improved at rest. pt receives assist with adls at baseline is available 24 hrs to assist pt as needed. pt states plan is to return home with assist from significant other. rec home health therapy upon discharge from facility. no furhter inpatient OT recommendations at this time  Plan of Care Reviewed With: patient  Outcome Evaluation: OT evaluation completed. pt required supervison for supine to sit transfer to EOB. pt required CGA for functional transfers from EOB with RW. pt required CGA for functional mobility with RW for 110 feet. pt required assist to avoid obstacles throughout mobility 2 visual deficits. pt with co pain with mobility did not rate, improved at rest. pt receives assist with adls at baseline is available 24 hrs to assist pt as needed. pt states plan is to return home with assist from significant other. rec home health therapy upon discharge from facility. no furhter inpatient OT recommendations at this time          Outcome Measures     Row Name 04/20/23 0915             How much help from another is currently needed...    Putting on and taking off regular lower body clothing? 3  -JJ      Bathing (including washing, rinsing, and drying) 3  -JJ      Toileting (which includes using toilet bed pan or urinal) 3  -JJ      Putting on and taking off regular upper body clothing 4  -JJ      Taking care of personal grooming (such as brushing teeth) 4  -JJ      Eating meals 4  -JJ      AM-PAC 6 Clicks Score (OT) 21  -JJ         Functional Assessment    Outcome  Measure Options AM-PAC 6 Clicks Daily Activity (OT)  -JONNATHAN            User Key  (r) = Recorded By, (t) = Taken By, (c) = Cosigned By    Initials Name Provider Type    Vanda Cervantes OTR Occupational Therapist                Time Calculation:    Time Calculation- OT     Row Name 04/20/23 1113             Time Calculation- OT    OT Start Time 0915  -JONNATHAN            User Key  (r) = Recorded By, (t) = Taken By, (c) = Cosigned By    Initials Name Provider Type    Vanda Cervantes OTR Occupational Therapist                Therapy Charges for Today     Code Description Service Date Service Provider Modifiers Qty    09963206794 HC OT EVAL LOW COMPLEXITY 2 4/20/2023 Vanda Mendez OTR GO 1               OT Discharge Summary  Anticipated Discharge Disposition (OT): home with 24/7 care, home with home health    KATHLEEN Orona  4/20/2023

## 2023-04-20 NOTE — THERAPY EVALUATION
"Patient Name: Arline Landaverde  : 1950    MRN: [id number]                              Today's Date: 2023       Admit Date: 2023    Visit Dx:     ICD-10-CM ICD-9-CM   1. Accidental fall, initial encounter  W19.XXXA E888.9   2. Closed displaced fracture of right femoral neck  S72.001A 820.8     Patient Active Problem List   Diagnosis   • Accidental fall, initial encounter   • Closed displaced fracture of right femoral neck     Past Medical History:   Diagnosis Date   • Diabetes mellitus    • Stroke      Past Surgical History:   Procedure Laterality Date   • BACK SURGERY     • CHOLECYSTECTOMY     • HIP HEMIARTHROPLASTY Right 2023    Procedure: HIP HEMIARTHROPLASTY ANTERIOR;  Surgeon: Kumar Bonilla MD;  Location: [facility];  Service: Orthopedics;  Laterality: Right;      General Information     Row Name 23 0915          Physical Therapy Time and Intention    Document Type evaluation  -     Mode of Treatment physical therapy  -     Row Name 23 0915          General Information    Patient Profile Reviewed yes  pt with right hip fracture, s/p right [place]ER[place].  pt with residual left sided deficits from prior CVA  -     Prior Level of Function --  pt reports independence with mobility, states she \"holds onto\" significant other for balance  -     Existing Precautions/Restrictions fall  -     Barriers to Rehab previous functional deficit;visual deficit  -     Row Name 23 0915          Living Environment    People in Home significant other  sign. other reports he is available 24 hours per day  -     Row Name 23 0915          Home Main Entrance    Number of Stairs, Main Entrance two  -JW     Stair Railings, Main Entrance other (see comments)  1 handrail  -     Row Name 23 0915          Cognition    Orientation Status (Cognition) oriented x 3  -           User Key  (r) = Recorded By, (t) = Taken By, (c) = Cosigned By    Initials Name Provider Type    JW " Janeth Major, PT Physical Therapist               Mobility     Row Name 04/20/23 0915          Bed Mobility    Bed Mobility supine-sit  -     Supine-Sit [place] (Bed Mobility) standby assist  -     Assistive Device (Bed Mobility) bed rails;head of bed elevated  -[facility] Name 04/20/23 0915          Transfers    Comment, (Transfers) requires cues for hand placement  -[facility] Name 04/20/23 0915          Sit-Stand Transfer    Sit-Stand [place] (Transfers) standby assist;verbal cues  -     Assistive Device (Sit-Stand Transfers) walker, front-wheeled  -[facility] Name 04/20/23 0915          Gait/Stairs (Locomotion)    [place] Level (Gait) contact guard;verbal cues  -     Assistive Device (Gait) walker, front-wheeled  -     Distance in Feet (Gait) 110  -     Deviations/Abnormal Patterns (Gait) base of support, wide;ataxic  mild defcits noted from previous CVA  -     Bilateral Gait Deviations forward flexed posture  -     Comment, (Gait/Stairs) pt requires intermittent cues for awareness of external obstacles.  pt with no balance loss noted during mobility  -[facility] Name 04/20/23 0915          Mobility    Extremity Weight-bearing Status right lower extremity  -     Right Lower Extremity (Weight-bearing Status) weight-bearing as tolerated (WBAT)  -           User Key  (r) = Recorded By, (t) = Taken By, (c) = Cosigned By    Initials Name Provider Type    JW Janeth Major, PT Physical Therapist               Obj/Interventions     [facility] Name 04/20/23 0915          Range of Motion Comprehensive    Comment, General Range of Motion left LE WFL, right LE not formally tested  -JW     Row Name 04/20/23 0915          Strength Comprehensive (MMT)    Comment, General Manual Muscle Testing (MMT) Assessment left LE WFL, right LE not formally tested  -[facility] Name 04/20/23 0915          Balance    Comment, Balance supervision for sitting balance, CGA for standing balance with walker  -   "   Row Name 04/20/23 0915          Sensory Assessment (Somatosensory)    Sensory Assessment (Somatosensory) other (see comments)  pt reports \"some tingling\" in LEs-does not rate  -           User Key  (r) = Recorded By, (t) = Taken By, (c) = Cosigned By    Initials Name Provider Type    Janeth Nassar, PT Physical Therapist               Goals/Plan     Row Name 04/20/23 0915          Bed Mobility Goal 1 (PT)    Activity/Assistive Device (Bed Mobility Goal 1, PT) bed mobility activities, all  -JW     [place] Level/Cues Needed (Bed Mobility Goal 1, PT) modified independence  -JW     Time Frame (Bed Mobility Goal 1, PT) 2 days  -JW     Progress/Outcomes (Bed Mobility Goal 1, PT) new goal  -     Row Name 04/20/23 0915          Transfer Goal 1 (PT)    Activity/Assistive Device (Transfer Goal 1, PT) transfers, all  -JW     [place] Level/Cues Needed (Transfer Goal 1, PT) supervision required  -JW     Time Frame (Transfer Goal 1, PT) 2 days  -JW     Progress/Outcome (Transfer Goal 1, PT) new goal  -     Row Name 04/20/23 0915          Gait Training Goal 1 (PT)    Activity/Assistive Device (Gait Training Goal 1, PT) gait (walking locomotion);assistive device use  -JW     [place] Level (Gait Training Goal 1, PT) supervision required  -JW     Distance (Gait Training Goal 1, PT) 150  -JW     Time Frame (Gait Training Goal 1, PT) 2 days  -JW     Progress/Outcome (Gait Training Goal 1, PT) new goal  -     Row Name 04/20/23 0915          Therapy Assessment/Plan (PT)    Planned Therapy Interventions (PT) balance training;bed mobility training;home exercise program;gait training;patient/family education;strengthening;transfer training  -           User Key  (r) = Recorded By, (t) = Taken By, (c) = Cosigned By    Initials Name Provider Type    Janeth Nassar PT Physical Therapist               Clinical Impression     Row Name 04/20/23 0915          Pain    Pre/Posttreatment Pain Comment pt c/o pain " "in right LE with mobility-does not rate  -     Row Name 04/20/23 0915          Plan of Care Review    Plan of Care Reviewed With patient;significant other  -     Outcome Evaluation PT Evaluation complete.  Patient performs supine to sit with supervision and sit to stand with SBA, requiring verbal cues for hand placement.  Patient performs gait with rolling walker x110 feet with CGA.  Patient requires occasional verbal cues for avoidance of obstacles due to previous deficits from CVA.  Patient reports she plans to return home today with significant other assistance/support.  Significant other reports he is present 24 hours per day to assist as needed.  Recommend use of rolling walker for mobility and home health PT at discharge.  -     Row Name 04/20/23 0915          Therapy Assessment/Plan (PT)    Patient/Family Therapy Goals Statement (PT) \"go home today\"  -     Rehab Potential (PT) good, to achieve stated therapy goals  -     Criteria for Skilled Interventions Met (PT) yes;skilled treatment is necessary  -     Therapy Frequency (PT) daily  -     Predicted Duration of Therapy Intervention (PT) 2 days  -     Row Name 04/20/23 0915          Positioning and Restraints    Pre-Treatment Position in bed  -     Post Treatment Position chair  -JW     In Chair reclined;call light within reach;encouraged to call for assist;exit alarm on;with family/caregiver  -           User Key  (r) = Recorded By, (t) = Taken By, (c) = Cosigned By    Initials Name Provider Type    Janeth Nassar, PT Physical Therapist               Outcome Measures     Row Name 04/20/23 0914          How much help from another person do you currently need...    Turning from your back to your side while in flat bed without using bedrails? 4  -JW     Moving from lying on back to sitting on the side of a flat bed without bedrails? 3  -JW     Moving to and from a bed to a chair (including a wheelchair)? 3  -JW     Standing up from a " chair using your arms (e.g., wheelchair, bedside chair)? 3  -JW     Climbing 3-5 steps with a railing? 3  -JW     To walk in hospital room? 3  -JW     AM-PAC 6 Clicks Score (PT) 19  -     Highest level of mobility 6 --> Walked 10 steps or more  -     Row Name 04/20/23 0915 04/20/23 0914       Functional Assessment    Outcome Measure Options AM-PAC 6 Clicks Daily Activity (OT)  -JONNATHAN AM-PAC 6 Clicks Basic Mobility (PT)  -          User Key  (r) = Recorded By, (t) = Taken By, (c) = Cosigned By    Initials Name Provider Type    Vanda Cervantes, OTR Occupational Therapist    Janeth Nassar, PT Physical Therapist                             Physical Therapy Education     Title: PT OT SLP Therapies (In Progress)     Topic: Physical Therapy (In Progress)     Point: Mobility training (Done)     Learning Progress Summary           Patient Acceptance, E,TB, VU by  at 4/20/2023 1129                   Point: Home exercise program (Not Started)     Learner Progress:  Not documented in this visit.                      User Key     Initials Effective Dates Name Provider Type Discipline     06/16/21 -  Janeth Major, PT Physical Therapist PT              PT Recommendation and Plan  Planned Therapy Interventions (PT): balance training, bed mobility training, home exercise program, gait training, patient/family education, strengthening, transfer training  Plan of Care Reviewed With: patient, significant other  Outcome Evaluation: PT Evaluation complete.  Patient performs supine to sit with supervision and sit to stand with SBA, requiring verbal cues for hand placement.  Patient performs gait with rolling walker x110 feet with CGA.  Patient requires occasional verbal cues for avoidance of obstacles due to previous deficits from CVA.  Patient reports she plans to return home today with significant other assistance/support.  Significant other reports he is present 24 hours per day to assist as needed.  Recommend  use of rolling walker for mobility and home health PT at discharge.     Time Calculation:    PT Charges     Row Name 04/20/23 1129             Time Calculation    Start Time 0915  -GRZEGORZ      Stop Time 0931  -GRZEGORZ      Time Calculation (min) 16 min  -GRZEGORZ      PT Received On 04/20/23  -GRZEGORZ      PT - Next Appointment 04/21/23  -GRZEGORZ            User Key  (r) = Recorded By, (t) = Taken By, (c) = Cosigned By    Initials Name Provider Type    Janeth Nassar, PT Physical Therapist              Therapy Charges for Today     Code Description Service Date Service Provider Modifiers Qty    50739887106 HC PT EVAL LOW COMPLEXITY 1 4/20/2023 Janeth Major, PT GP 1          PT G-Codes  Outcome Measure Options: AM-PAC 6 Clicks Daily Activity (OT)  AM-PAC 6 Clicks Score (PT): 19  AM-PAC 6 Clicks Score (OT): 21  PT Discharge Summary  Anticipated Discharge Disposition (PT): home with home health    Janeth Major, EDIS  4/20/2023

## 2023-04-20 NOTE — PLAN OF CARE
Goal Outcome Evaluation:  Plan of Care Reviewed With: patient        Progress: improving  Outcome Evaluation: Pt VSS, am labs pending. Pt s/p R hip surgery 4/19/23, PO day #1, surgical dressing Samuel dsg c/d/i. Pt reports pain controlled with current PO regimen, see emar, flowsheets. Pt denies n/v/d overnight, reports tolerating diet well. Pt up with walker and standby assist in room. Pt reports strong desire to d/c home soon. Pt resting at this time.

## 2023-04-20 NOTE — PLAN OF CARE
Goal Outcome Evaluation:  Plan of Care Reviewed With: patient           Outcome Evaluation: OT evaluation completed. pt required supervison for supine to sit transfer to EOB. pt required CGA for functional transfers from EOB with RW. pt required CGA for functional mobility with RW for 110 feet. pt required assist to avoid obstacles throughout mobility 2 visual deficits. pt with co pain with mobility did not rate, improved at rest. pt receives assist with adls at baseline is available 24 hrs to assist pt as needed. pt states plan is to return home with assist from significant other. rec home health therapy upon discharge from facility. no furhter inpatient OT recommendations at this time

## 2023-04-20 NOTE — DISCHARGE SUMMARY
"Arline Landaverde  1950  [id number]    Hospitalists Discharge Summary    Date of Admission: 4/19/2023  Date of Discharge:  4/20/2023    History of Present Illness from [facility] on admit:   \"72 y.o F w/ PMH: DM2, Stroke (w/ residual LE weakness, visual deficit); presents with Hip Pain. States that yesterday morning was sitting in at her kitchen table when stoop up tripping over her feet resulting in a fall with R  nonradiating hip pain afterwards. As the day went out her hip pain got worse with ultimately eventually not being able to get out of bed thus caused her to come to the ED. In ED R Hip xray showing acute fracture of femoral neck with ED speaking with orthopedics noting plan for surgery and recommending admission.\"     Primary Discharge diagnoses:  Acute right femoral neck fracture secondary to fall, s/p right hip hemiarthroplasty, POD 1  Leukocytosis, suspected reactive  Acute post operative blood loss anemia    Secondary Discharge Diagnoses:    DM2   Moderate chronic malnutrition, underweight, cachexia  Electrolyte imbalance  Chronic hyponatremia   Tobacco abuse  H/O strokes    Hospital Course Summary:   Patient was followed in consultation by orthopedic surgery and underwent right hip hemiarthroplasty on 4/19/2023.  She participated well with PT/OT despite residual deficits from previous strokes and requested to go home with home health which PT/OT felt was warranted. Aspirin twice daily, doxycycline 100 mg twice daily x 14 days per ortho.  -narcotic prescription send separately by Dr. Bonilla  -Leukocytosis was noted with suspicion reactive rather than infection but no urine was collected for UA. Recommend f/u Lanie Gomez P, APRN to determine need for further testing and repeat CBC  -Hemoglobin 11.3 on 3/8/2023, prior to this normal. On admit hgb 12.3, now 9.0.   Anemia panels showed ANAM with iron saturation 16%, started on oral supplement and recommend f/u pcp as B12/folate were not resulted at time of " discharge.   Home today with home health  Follow-up PCP 1 week  Dr. Bonilla 1 week    PCP  Patient Care Team:  Lanie Gomez APRN as PCP - General (Family Medicine)    Consults:   Consults     No orders found for last 30 day(s).        Operations and Procedures Performed:  Procedure(s):  HIP HEMIARTHROPLASTY ANTERIOR     XR Chest 2 View    Result Date: 4/19/2023  Narrative: CR Chest 2 Vws INDICATION:  Preoperative exam for hip fracture. COMPARISON:  3/8/2023 FINDINGS: PA and lateral views of the chest.  Heart and mediastinal contours are normal. The lungs are clear. No pneumothorax or pleural effusion.      Impression: No acute cardiopulmonary findings. Signer Name: Robert Hunt MD  Signed: 4/19/2023 3:29 AM  Workstation Name: [id number]  [facility]    XR Femur 2 View Right    Result Date: 4/19/2023  Narrative: CR Femur 2 Vws RT INDICATION: Right hip pain. Fracture. COMPARISON: Hip series same day FINDINGS: AP and lateral views of the right femur.  Again seen is the known acute right femoral neck fracture. The remainder of the femur is intact with no additional fractures identified.     Impression: Right femoral neck fracture, otherwise negative femur. Signer Name: Robert Hunt MD  Signed: 4/19/2023 4:36 AM  Workstation Name: [id number]  [facility]    XR Pelvis 1 or 2 View    Result Date: 4/19/2023  Narrative: POSTOP PELVIS, 04/19/2023     HISTORY: Right femoral neck fracture, postop arthroplasty.  TECHNIQUE: Single AP radiograph of the lower pelvis and hips was obtained portably following surgery.  FINDINGS: Right femoral endoprosthesis appears well aligned and well seated. No visible periprosthetic fracture or joint dislocation.      Impression: Satisfactory postoperative appearance following right hip arthroplasty.  This report was finalized on 4/19/2023 12:57 PM by Dr. Kali Wong MD.      Peripheral Block    Result Date: 4/19/2023  Narrative:  Vanda Olguin SRNA     4/19/2023 10:06 AM Peripheral Block Pre-sedation assessment completed: 4/19/2023 9:42 AM Patient reassessed immediately prior to procedure Patient location during procedure: pre-op Start time: 4/19/2023 9:45 AM Stop time: 4/19/2023 10:00 AM Reason for block: at surgeon's request and post-op pain management Performed by CRNA/CAA: Devonte Martin, CRNASRNA: Vanda Olguin SRNA Preanesthetic Checklist Completed: patient identified, IV checked, site marked, risks and benefits discussed, surgical consent, monitors and equipment checked, pre-op evaluation and timeout performed Prep: Pt Position: supine Sterile barriers:cap, gloves, mask and washed/disinfected hands Prep: ChloraPrep Patient monitoring: blood pressure monitoring, continuous pulse oximetry and EKG Procedure Sedation: yes Performed under: local infiltration Guidance:ultrasound guided ULTRASOUND INTERPRETATION.  Using ultrasound guidance a 21 G gauge needle was placed in close proximity to the nerve, at which point, under ultrasound guidance anesthetic was injected in the area of the nerve and spread of the anesthesia was seen on ultrasound in close proximity thereto.  There were no abnormalities seen on ultrasound; a digital image was taken; and the patient tolerated the procedure with no complications. Images:still images obtained, printed/placed on chart Laterality:right Block Type:PENG Injection Technique:single-shot Needle Type:echogenic Needle Gauge:21 G Resistance on Injection: none Medications Used: bupivacaine PF (MARCAINE) injection 0.25% - Injection  20 mL - 4/19/2023 9:45:00 AM Medications Comment:20 mcg precedex added Post Assessment Injection Assessment: negative aspiration for heme, no paresthesia on injection and incremental injection Patient Tolerance:comfortable throughout block Complications:no     US Sonosite Portable    Result Date: 4/19/2023  Narrative: This procedure was auto-finalized with no dictation  required.    XR Hip With or Without Pelvis 2 - 3 View Right    Result Date: 4/19/2023  Narrative: FLUOROSCOPY DURING RIGHT HIP SURGERY, 04/19/2023  HISTORY: Fluoroscopy during right hip arthroplasty.  REPORT: C-arm fluoroscopy was provided by radiology personnel in the OR during right hip arthroplasty.  Fluoroscopic images 8. Fluoroscopy time, 18.6 sec.  Cumulative radiation dose, reference air kerma, 1.6 by mGy.  Please see operative note for details.  This report was finalized on 4/19/2023 12:33 PM by Dr. Kali Wong MD.      XR Hip With or Without Pelvis 2 - 3 View Right    Result Date: 4/19/2023  Narrative: CR Hip Uni Comp Min 2 Vws RT INDICATION: Hip pain after recent fall. COMPARISON: None. FINDINGS: AP pelvis with AP and crosstable lateral views of the right hip. There is an acute right femoral neck fracture that is mildly displaced. No hip dislocation on either side. There is joint space narrowing in both hips. No other fractures are seen. There is no sacroiliac joint diastasis.     Impression: Acute right femoral neck fracture. Signer Name: Robert Hunt MD  Signed: 4/19/2023 3:18 AM  Workstation Name: [id number]  [facility]    Allergies:  is allergic to penicillins.    Jona  No medications noted per report, reviewed by me    Discharge Medications:     Discharge Medications      New Medications      Instructions Start Date   aspirin 81 MG EC tablet   81 mg, Oral, Every 12 Hours Scheduled      doxycycline 100 MG capsule  Commonly known as: MONODOX   100 mg, Oral, Every 12 Hours Scheduled      ferrous gluconate 324 MG tablet  Commonly known as: FERGON   324 mg, Oral, Daily With Breakfast      nicotine 21 MG/24HR patch  Commonly known as: NICODERM CQ   1 patch, Transdermal, Every 24 Hours Scheduled      oxyCODONE-acetaminophen 5-325 MG per tablet  Commonly known as: PERCOCET   1 tablet, Oral, Every 6 Hours PRN         Continue These Medications      Instructions Start Date    acetaminophen 325 MG tablet  Commonly known as: TYLENOL   650 mg, Oral, Every 6 Hours PRN      atorvastatin 20 MG tablet  Commonly known as: LIPITOR   20 mg, Oral, Daily      metFORMIN 500 MG tablet  Commonly known as: GLUCOPHAGE   500 mg, Oral, 2 Times Daily With Meals             Last Lab Results:   Lab Results (most recent)     Procedure Component Value Units Date/Time    TSH [[id number]] Collected: 04/20/23 0426    Specimen: Blood Updated: 04/20/23 1007    POC Glucose Once [[id number]]  (Normal) Collected: 04/20/23 0754    Specimen: Blood Updated: 04/20/23 0800     Glucose 124 mg/dL      Comment: Meter: [id number] : [id number] Zenon Renee CNA       Basic Metabolic Panel [[id number]]  (Abnormal) Collected: 04/20/23 0426    Specimen: Blood Updated: 04/20/23 0515     Glucose 125 mg/dL      BUN 25 mg/dL      Creatinine 0.70 mg/dL      Sodium 129 mmol/L      Potassium 4.4 mmol/L      Chloride 99 mmol/L      CO2 23.5 mmol/L      Calcium 9.0 mg/dL      BUN/Creatinine Ratio 35.7     Anion Gap 6.5 mmol/L      eGFR 91.5 mL/min/1.73     Narrative:      GFR Normal >60  Chronic Kidney Disease <60  Kidney Failure <15    The GFR formula is only valid for adults with stable renal function between ages 18 and 70.    Hemoglobin & Hematocrit, Blood [[id number]]  (Abnormal) Collected: 04/20/23 0426    Specimen: Blood Updated: 04/20/23 0440     Hemoglobin 9.0 g/dL      Hematocrit 24.7 %     POC Glucose Once [[id number]]  (Abnormal) Collected: 04/19/23 1638    Specimen: Blood Updated: 04/19/23 1644     Glucose 199 mg/dL      Comment: Meter: [id number] : [id number] Jahaira CARPENTER       Hemoglobin & Hematocrit, Blood [[id number]]  (Abnormal) Collected: 04/19/23 1440    Specimen: Blood Updated: 04/19/23 1447     Hemoglobin 10.8 g/dL      Hematocrit 30.4 %     Basic Metabolic Panel [[id number]]  (Abnormal) Collected: 04/19/23 0608    Specimen: Blood Updated: 04/19/23 0641     Glucose 100 mg/dL      BUN 20 mg/dL      Creatinine  0.63 mg/dL      Sodium 128 mmol/L      Potassium 4.6 mmol/L      Chloride 97 mmol/L      CO2 21.9 mmol/L      Calcium 8.9 mg/dL      BUN/Creatinine Ratio 31.7     Anion Gap 9.1 mmol/L      eGFR 93.8 mL/min/1.73     Narrative:      GFR Normal >60  Chronic Kidney Disease <60  Kidney Failure <15    The GFR formula is only valid for adults with stable renal function between ages 18 and 70.    Magnesium [[id number]]  (Abnormal) Collected: 04/19/23 0608    Specimen: Blood Updated: 04/19/23 0641     Magnesium 1.4 mg/dL     Phosphorus [[id number]]  (Normal) Collected: 04/19/23 0608    Specimen: Blood Updated: 04/19/23 0641     Phosphorus 4.2 mg/dL     CBC & Differential [[id number]]  (Abnormal) Collected: 04/19/23 0617    Specimen: Blood Updated: 04/19/23 0631    Narrative:      The following orders were created for panel order CBC & Differential.  Procedure                               Abnormality         Status                     ---------                               -----------         ------                     CBC Auto Differential[[id number]]        Abnormal            Final result                 Please view results for these tests on the individual orders.    CBC Auto Differential [[id number]]  (Abnormal) Collected: 04/19/23 0617    Specimen: Blood Updated: 04/19/23 0631     WBC 11.92 10*3/mm3      RBC 3.24 10*6/mm3      Hemoglobin 10.6 g/dL      Hematocrit 29.6 %      MCV 91.4 fL      MCH 32.7 pg      MCHC 35.8 g/dL      RDW 12.1 %      RDW-SD 40.7 fl      MPV 8.6 fL      Platelets 278 10*3/mm3      Neutrophil % 85.6 %      Lymphocyte % 9.4 %      Monocyte % 3.9 %      Eosinophil % 0.4 %      Basophil % 0.4 %      Immature Grans % 0.3 %      Neutrophils, Absolute 10.20 10*3/mm3      Lymphocytes, Absolute 1.12 10*3/mm3      Monocytes, Absolute 0.47 10*3/mm3      Eosinophils, Absolute 0.05 10*3/mm3      Basophils, Absolute 0.05 10*3/mm3      Immature Grans, Absolute 0.03 10*3/mm3     Comprehensive Metabolic Panel  [[id number]]  (Abnormal) Collected: 04/19/23 0328    Specimen: Blood Updated: 04/19/23 0400     Glucose 108 mg/dL      BUN 17 mg/dL      Creatinine 0.73 mg/dL      Sodium 129 mmol/L      Potassium 4.5 mmol/L      Chloride 94 mmol/L      CO2 19.0 mmol/L      Calcium 9.6 mg/dL      Total Protein 6.7 g/dL      Albumin 3.8 g/dL      ALT (SGPT) 22 U/L      AST (SGOT) 20 U/L      Alkaline Phosphatase 49 U/L      Total Bilirubin 0.5 mg/dL      Globulin 2.9 gm/dL      A/G Ratio 1.3 g/dL      BUN/Creatinine Ratio 23.3     Anion Gap 16.0 mmol/L      eGFR 87.0 mL/min/1.73     Narrative:      GFR Normal >60  Chronic Kidney Disease <60  Kidney Failure <15    The GFR formula is only valid for adults with stable renal function between ages 18 and 70.    aPTT [[id number]]  (Normal) Collected: 04/19/23 0328    Specimen: Blood Updated: 04/19/23 0355     PTT 27.8 seconds     Narrative:      PTT = The equivalent PTT values for the therapeutic range of heparin levels at 0.1 to 0.7 U/ml are 53 to 110 seconds.      Protime-INR [[id number]]  (Normal) Collected: 04/19/23 0328    Specimen: Blood Updated: 04/19/23 0355     Protime 14.1 Seconds      INR 1.08    Narrative:      Therapeutic Ranges for INR: 2.0-3.0 (PT 20-30)                              2.5-3.5 (PT 25-34)    CBC & Differential [[id number]]  (Abnormal) Collected: 04/19/23 0328    Specimen: Blood Updated: 04/19/23 0339    Narrative:      The following orders were created for panel order CBC & Differential.  Procedure                               Abnormality         Status                     ---------                               -----------         ------                     CBC Auto Differential[[id number]]        Abnormal            Final result                 Please view results for these tests on the individual orders.    CBC Auto Differential [[id number]]  (Abnormal) Collected: 04/19/23 0328    Specimen: Blood Updated: 04/19/23 0339     WBC 11.78 10*3/mm3      RBC 3.72 10*6/mm3       Hemoglobin 12.3 g/dL      Hematocrit 33.9 %      MCV 91.1 fL      MCH 33.1 pg      MCHC 36.3 g/dL      RDW 12.2 %      RDW-SD 40.3 fl      MPV 9.2 fL      Platelets 353 10*3/mm3      Neutrophil % 75.9 %      Lymphocyte % 15.5 %      Monocyte % 5.9 %      Eosinophil % 1.8 %      Basophil % 0.5 %      Immature Grans % 0.4 %      Neutrophils, Absolute 8.93 10*3/mm3      Lymphocytes, Absolute 1.83 10*3/mm3      Monocytes, Absolute 0.70 10*3/mm3      Eosinophils, Absolute 0.21 10*3/mm3      Basophils, Absolute 0.06 10*3/mm3      Immature Grans, Absolute 0.05 10*3/mm3      nRBC 0.0 /100 WBC         Imaging Results (Most Recent)     Procedure Component Value Units Date/Time    FL C Arm During Surgery [[id number]] Resulted: 04/20/23 1246     Updated: 04/20/23 1246    XR Pelvis 1 or 2 View [[id number]] Collected: 04/19/23 1255     Updated: 04/19/23 1259    Narrative:      POSTOP PELVIS, 04/19/2023         HISTORY:  Right femoral neck fracture, postop arthroplasty.     TECHNIQUE:  Single AP radiograph of the lower pelvis and hips was obtained portably  following surgery.     FINDINGS:  Right femoral endoprosthesis appears well aligned and well seated. No  visible periprosthetic fracture or joint dislocation.       Impression:      Satisfactory postoperative appearance following right hip arthroplasty.     This report was finalized on 4/19/2023 12:57 PM by Dr. Kali Wong MD.       XR Hip With or Without Pelvis 2 - 3 View Right [[id number]] Collected: 04/19/23 1231     Updated: 04/19/23 1235    Narrative:      FLUOROSCOPY DURING RIGHT HIP SURGERY, 04/19/2023     HISTORY:  Fluoroscopy during right hip arthroplasty.     REPORT:  C-arm fluoroscopy was provided by radiology personnel in the OR during  right hip arthroplasty.     Fluoroscopic images 8. Fluoroscopy time, 18.6 sec.     Cumulative radiation dose, reference air kerma, 1.6 by mGy.      Please see operative note for details.     This report was finalized on  4/19/2023 12:33 PM by Dr. Kali Wong MD.       US Sonosite Portable [[id number]] Resulted: 04/19/23 0935     Updated: 04/19/23 0935    Narrative:      This procedure was auto-finalized with no dictation required.    XR Femur 2 View Right [[id number]] Collected: 04/19/23 0436     Updated: 04/19/23 0438    Narrative:      CR Femur 2 Vws RT    INDICATION:   Right hip pain. Fracture.    COMPARISON:   Hip series same day    FINDINGS:   AP and lateral views of the right femur.  Again seen is the known acute right femoral neck fracture. The remainder of the femur is intact with no additional fractures identified.       Impression:      Right femoral neck fracture, otherwise negative femur.    Signer Name: Robert Hunt MD   Signed: 4/19/2023 4:36 AM   Workstation Name: [id number]    [facility]    XR Chest 2 View [[id number]] Collected: 04/19/23 0329     Updated: 04/19/23 0331    Narrative:      CR Chest 2 Vws    INDICATION:    Preoperative exam for hip fracture.    COMPARISON:    3/8/2023    FINDINGS:   PA and lateral views of the chest.  Heart and mediastinal contours are normal. The lungs are clear. No pneumothorax or pleural effusion.        Impression:      No acute cardiopulmonary findings.    Signer Name: Robert Hunt MD   Signed: 4/19/2023 3:29 AM   Workstation Name: [id number]    [facility]    XR Hip With or Without Pelvis 2 - 3 View Right [[id number]] Collected: 04/19/23 0318     Updated: 04/19/23 0320    Narrative:      CR Hip Uni Comp Min 2 Vws RT    INDICATION:   Hip pain after recent fall.    COMPARISON:   None.    FINDINGS:  AP pelvis with AP and crosstable lateral views of the right hip. There is an acute right femoral neck fracture that is mildly displaced. No hip dislocation on either side. There is joint space narrowing in both hips. No other fractures are seen. There is  no sacroiliac joint diastasis.      Impression:      Acute right femoral  neck fracture.    Signer Name: Robert Hunt MD   Signed: 4/19/2023 3:18 AM   Workstation Name: [id number]    [facility]        PROCEDURES  Procedure(s):  HIP HEMIARTHROPLASTY ANTERIOR    Condition on Discharge:  stable    Physical Exam at Discharge  Vital Signs  Temp:  [97.9 °F (36.6 °C)-98.7 °F (37.1 °C)] 97.9 °F (36.6 °C)  Heart Rate:  [] 108  Resp:  [10-19] 18  BP: (103-138)/(59-74) 108/64   Body mass index is 17.88 kg/m².    Physical Exam  Vitals reviewed.   Constitutional:       General: She is not in acute distress.     Comments: Cachectic, appears older than stated age   HENT:      Head: Normocephalic and atraumatic.      Mouth/Throat:      Mouth: Mucous membranes are moist.      Comments: Right facial droop noted, edentulous  Eyes:      Extraocular Movements: Extraocular movements intact.      Pupils: Pupils are equal, round, and reactive to light.   Cardiovascular:      Rate and Rhythm: Normal rate and regular rhythm.   Pulmonary:      Effort: Pulmonary effort is normal. No respiratory distress.      Breath sounds: Normal breath sounds. No wheezing or rales.   Abdominal:      General: Abdomen is flat. Bowel sounds are normal. There is no distension.      Palpations: Abdomen is soft.      Tenderness: There is no abdominal tenderness. There is no guarding.   Musculoskeletal:         General: No swelling.   Skin:     General: Skin is warm and dry.      Capillary Refill: Capillary refill takes less than 2 seconds.      Findings: No erythema.      Comments: Right anterior hip dressing clean and dry   Neurological:      General: No focal deficit present.      Mental Status: She is alert and oriented to person, place, and time.      Comments: Right facial droop   Psychiatric:         Mood and Affect: Mood normal.         Behavior: Behavior normal.       Discharge Disposition  Home    Visiting Nurse:    Yes     Home PT/OT:  Yes     Home Safety Evaluation:  Yes     DME  Rolling  "walker, bedside commode ordered    Discharge Diet:      Dietary Orders (From admission, onward)     Start     Ordered    04/19/23 1416  Diet: Regular/House Diet; Texture: Regular Texture (IDDSI 7); Fluid Consistency: Thin (IDDSI 0)  Diet Effective Now        References:    Diet Order Crosswalk   Question Answer Comment   Diets: Regular/House Diet    Texture: Regular Texture (IDDSI 7)    Fluid Consistency: Thin (IDDSI 0)        04/19/23 1415                Activity at Discharge:  WBAT RLE, no driving    Pre-discharge education  Diabetic, Wound Care, smoking, medications, follow-up    Follow-up Appointments  Future Appointments   Date Time Provider Department Center   4/28/2023  8:15 AM Kumar Bonilla MD [facility]     Additional Instructions for the Follow-ups that You Need to Schedule     Discharge Follow-up with PCP   As directed       Currently Documented PCP:    Lanie Gomez APRN    PCP Phone Number:    [phone]     Follow Up Details: 1 week         Discharge Follow-up with PCP   As directed       Currently Documented PCP:    Lanie Gomez APRN    PCP Phone Number:    [phone]     Follow Up Details: 1 week         Discharge Follow-up with Specified Provider: Dr. Bonilla; 1 Week   As directed      To: Dr. Bonilla    Follow Up: 1 Week         Discharge Follow-up with Specified Provider: Dr. Bonilla; 1 Week   As directed      To: Dr. Bonilla    Follow Up: 1 Week               Test Results Pending at Discharge: To be followed up by PCP  Pending Labs     Order Current Status    Folate In process    Hemoglobin A1c In process    Vitamin B12 In process           BRET Mejia  04/20/23  14:07 EDT    Time: Discharge 30 min (if over 30 minutes give explanation as to why it took greater than 30 minutes)    \"Dictated utilizing Dragon dictation\"      "

## 2023-04-20 NOTE — PROGRESS NOTES
Adult Nutrition  Assessment/PES    Patient Name:  Arline Landaverde  YOB: 1950  MRN: [id number]  Admit Date:  4/19/2023    Assessment Date:  4/20/2023    Comments:  Pt meets criteria for moderate chronic malnutrition with poor appetite, wt loss, muscle wasting and fat loss one exam.  Will remain available. Noted plan for home today. Edu as below.      Reason for Assessment     Row Name 04/20/23 1155          Reason for Assessment    Reason For Assessment follow-up protocol  NFPE     Diagnosis trauma  s/p hip repair from fall                Nutrition/Diet History     Row Name 04/20/23 1155          Nutrition/Diet History    Typical Intake (Food/Fluid/EN/PN) Pt with family at bedside, up in chair. Pt & family reports nothing taste good since had mutliple mini strokes. Used to weigh >200#, son thinks weighed 120# 6 months ago. Pt drinkd Roque Equate daily at home. Can eat tuna, eggs for pro.                  Physical Findings     Row Name 04/20/23 1156          Physical Findings    Overall Physical Appearance muscle wasting and fat loss on exam     Exam Agreement consented                  Nutrition Prescription Ordered     Row Name 04/20/23 1156          Nutrition Prescription PO    Current PO Diet Regular                  Malnutrition Severity Assessment     Row Name 04/20/23 1156          Malnutrition Severity Assessment    Malnutrition Type Chronic Disease - Related Malnutrition        Insufficient Energy Intake     Insufficient Energy Intake  <75% of est. energy requirement for > or equal to 3 months        Unintentional Weight Loss     Unintentional Weight Loss  --  son reports 16# wt loss in 6 months        Muscle Loss    Temple Region Moderate - slight depression     Clavicle Bone Region Moderate - some protrusion in females, visible in males     Acromion Bone Region Moderate - acromion may slightly protrude     Patellar Region Moderate - patella more prominent, less muscle definition around patella      Posterior Calf Region Severe - thin with very little definition/firmness        Fat Loss    Orbital Region  Moderate -  somewhat hollowness, slightly dark circles     Upper Arm Region Severe - mostly skin, very little space between folds, fingers touch        Criteria Met (Must meet criteria for severity in at least 2 of these categories: M Wasting, Fat Loss, Fluid, Secondary Signs, Wt. Status, Intake)    Patient meets criteria for  Moderate (non-severe) Malnutrition                 Problem/Interventions:     Problem 2     Row Name 04/20/23 1158          Nutrition Diagnoses Problem 2    Problem 2 Malnutrition     Etiology (related to) Factors Affecting Nutrition     Signs/Symptoms (evidenced by) Report/Observation  MSA                    Intervention Goal     Row Name 04/20/23 1158          Intervention Goal    General Meet nutritional needs for age/condition     PO Increase intake;PO intake (%)     PO Intake % 50 %  or greater                Nutrition Intervention     Row Name 04/20/23 1158          Nutrition Intervention    RD/Tech Action Interview for preference;Encourage intake;Recommend/ordered;Supplement offered/refused;Follow Tx progress     Recommended/Ordered Supplement                  Education/Evaluation     Row Name 04/20/23 1153          Education    Education Provided education regarding;Education topics  Edu pt & family on need to get pro each time pt eats. Edu on cont use of pro shake at home daily help to prevent further loss. Edu easy sources of protein, if pt can tolerate with taste changes. Edu on nutrition exam for muscle & fat loss     Education Topics Protein        Monitor/Evaluation    Monitor Per protocol;I&O;PO intake;Supplement intake;Pertinent labs;Weight;Symptoms     Education Follow-up Other (comment)  pt & family verbalize understanding                 Electronically signed by:  Anel Mejia RD  04/20/23 11:59 EDT

## 2023-04-20 NOTE — CASE MANAGEMENT/SOCIAL WORK
Continued Stay Note  RADHA Capone     Patient Name: Arline Landaverde  MRN: [id number]  Today's Date: 4/20/2023    Admit Date: 4/19/2023    Plan: plan home with [facility]   Discharge Plan     Row Name 04/20/23 1252       Plan    Plan plan home with [facility]    Patient/Family in Agreement with Plan yes    Plan Comments Follow up visit, patient is sitting up in recliner with family present. She is agreeable to returning home with HH. No preference of  agencies as long as they accept her insurance. Spoke with [facility]  - referral given and informed patient to dc home today. RW/BSC delivered to patient room, [facility] paperwork completed. Kay APRTANIA updated, anticipate dc home with family this afternoon. CM will continue to follow.               Discharge Codes    No documentation.                     John Conner RN

## 2023-04-20 NOTE — PLAN OF CARE
Goal Outcome Evaluation:  Plan of Care Reviewed With: patient, significant other           Outcome Evaluation: PT Evaluation complete.  Patient performs supine to sit with supervision and sit to stand with SBA, requiring verbal cues for hand placement.  Patient performs gait with rolling walker x110 feet with CGA.  Patient requires occasional verbal cues for avoidance of obstacles due to previous deficits from CVA.  Patient reports she plans to return home today with significant other assistance/support.  Significant other reports he is present 24 hours per day to assist as needed.  Recommend use of rolling walker for mobility and home health PT at discharge.

## 2023-04-20 NOTE — CASE MANAGEMENT/SOCIAL WORK
Case Management Discharge Note      Final Note: dc home with [facility]         Selected Continued Care - Discharged on 4/20/2023 Admission date: 4/19/2023 - Discharge disposition: Home-Health Care Svc    Destination    No services have been selected for the patient.              Durable Medical Equipment     Service Provider Selected Services Address Phone Fax Patient Preferred    [facility] [address] [place] [place] [address] [phone] --          Dialysis/Infusion    No services have been selected for the patient.              Home Medical Care     Service Provider Selected Services Address Phone Fax Patient Preferred    [facility] [address] [address] [phone] --          Therapy    No services have been selected for the patient.              Community Resources    No services have been selected for the patient.              Community & DME    No services have been selected for the patient.                       Final Discharge Disposition Code: 06 - home with home health care

## 2023-04-20 NOTE — PROGRESS NOTES
"DAILY PROGRESS NOTE  [facility]  1  ORTHOPEDIC SURGERY DAILY PROGRESS NOTE  [facility]  LENGTH OF STAY 1 DAYS      Patient Identification:  Name: Arline Landaverde  Age: 72 y.o.  Sex: female  :  1950  MRN: [id number]         Primary Care Physician: Lanie Gomez APRN      Subjective:  Interval History: No issues overnight, pain well controlled.  Patient has ambulated multiple times to the restroom so far.  States that she has some thigh stiffness and achiness but overall is doing much better    Objective:    Scheduled Meds:aspirin, 81 mg, Oral, Q12H  atorvastatin, 20 mg, Oral, Daily  Insulin Aspart, 2-7 Units, Subcutaneous, TID With Meals      Continuous Infusions:lactated ringers, 9 mL/hr, Last Rate: Stopped (23 1232)        Vital signs in last 24 hours:  Temp:  [97.4 °F (36.3 °C)-98.7 °F (37.1 °C)] 98.2 °F (36.8 °C)  Heart Rate:  [] 99  Resp:  [10-22] 14  BP: ()/(40-74) 128/72    Intake/Output:    Intake/Output Summary (Last 24 hours) at 2023 0809  Last data filed at 2023 0455  Gross per 24 hour   Intake 3446.66 ml   Output 1800 ml   Net 1646.66 ml       Exam:  /72 (BP Location: Right arm, Patient Position: Lying)   Pulse 99   Temp 98.2 °F (36.8 °C) (Oral)   Resp 14   Ht 162.6 cm (64.02\")   Wt 47.3 kg (104 lb 3.2 oz)   SpO2 99%   BMI 17.88 kg/m²   General: No acute distress, Aox3  Pulmonary: Unlabored breathing  Cardiovascular: Regular rate and rhythm   Right lower extremity  2+ DP and PT pulses  Bilateral 5 ankle plantarflexion dorsiflexion inversion and eversion  Sensation normal in left foot  Dressing clean dry and intact todd functioning   Lower leg and thigh compartment soft and compressible no signs of DVT or compartment syndrome              Data Review:  Lab Results   Component Value Date    CALCIUM 9.0 2023    PHOS 4.2 2023     Results from last 7 days   Lab Units 23  0426 23  1440 23  0617 " 04/19/23  0608 04/19/23  0328   AST (SGOT) U/L  --   --   --   --  20   MAGNESIUM mg/dL  --   --   --  1.4*  --    SODIUM mmol/L 129*  --   --  128* 129*   POTASSIUM mmol/L 4.4  --   --  4.6 4.5   CHLORIDE mmol/L 99  --   --  97* 94*   CO2 mmol/L 23.5  --   --  21.9* 19.0*   BUN mg/dL 25*  --   --  20 17   CREATININE mg/dL 0.70  --   --  0.63 0.73   GLUCOSE mg/dL 125*  --   --  100* 108*   CALCIUM mg/dL 9.0  --   --  8.9 9.6   WBC 10*3/mm3  --   --  11.92*  --  11.78*   HEMOGLOBIN g/dL 9.0* 10.8* 10.6*  --  12.3   PLATELETS 10*3/mm3  --   --  278  --  353   ALT (SGPT) U/L  --   --   --   --  22     Lab Results   Component Value Date    TROPONINT <0.010 04/13/2022     Estimated Creatinine Clearance: 53.4 mL/min (by C-G formula based on SCr of 0.7 mg/dL).  WEIGHTS:     Wt Readings from Last 1 Encounters:   04/19/23 0443 47.3 kg (104 lb 3.2 oz)   04/19/23 0240 64.9 kg (143 lb)         Assessment:    Accidental fall, initial encounter    Closed displaced fracture of right femoral neck      Plan:  72-year-old female postop day 1 right hip hemiarthroplasty for femoral neck fracture  Pain control as needed  DVT prophylaxis recommend aspirin 81 mg twice daily  Weightbearing as tolerated right lower extremity  PT/OT/out of bed  Recommend doxycycline 100 mg twice daily for 7 to 14 days for infection prophylaxis due to poor nutritional status and DM  We will continue to follow    Plan for disposition:Where: inpatient      Kumar Bonilla MD  4/20/2023  08:09 EDT

## 2023-04-21 ENCOUNTER — READMISSION MANAGEMENT (OUTPATIENT)
Dept: CALL CENTER | Facility: HOSPITAL | Age: 73
End: 2023-04-21
Payer: MEDICARE

## 2023-04-21 NOTE — OUTREACH NOTE
Prep Survey    Flowsheet Row Responses   [facility] facility patient discharged from? [place]   Is LACE score < 7 ? Yes   Eligibility Readm Mgmt   Discharge diagnosis  Right hip francine-arthroplasty   Does the patient have one of the following disease processes/diagnoses(primary or secondary)? Total Joint Replacement   Does the patient have Home health ordered? Yes   What is the Home health agency?  [facility]    Is there a DME ordered? Yes   What DME was ordered? [facility] - RW/BSC    Prep survey completed? Yes          Melissa SWEENEY - Registered Nurse

## 2023-04-25 ENCOUNTER — TELEPHONE (OUTPATIENT)
Dept: ORTHOPEDIC SURGERY | Facility: CLINIC | Age: 73
End: 2023-04-25
Payer: MEDICARE

## 2023-04-25 NOTE — TELEPHONE ENCOUNTER
Blanche with  [phone] calling Vladimir dressing is still flashing green and working great- she will be out to see the patient at day 8 and wanted to the ok to remove the VLADIMIR then.     Per Dr. Bonilla that is ok.    Thanks.

## 2023-04-28 ENCOUNTER — OFFICE VISIT (OUTPATIENT)
Dept: ORTHOPEDIC SURGERY | Facility: CLINIC | Age: 73
End: 2023-04-28
Payer: MEDICARE

## 2023-04-28 VITALS — WEIGHT: 104 LBS | BODY MASS INDEX: 17.75 KG/M2 | HEIGHT: 64 IN

## 2023-04-28 DIAGNOSIS — S72.001A CLOSED DISPLACED FRACTURE OF RIGHT FEMORAL NECK: Primary | ICD-10-CM

## 2023-04-28 DIAGNOSIS — Z96.649 S/P HIP HEMIARTHROPLASTY: ICD-10-CM

## 2023-04-28 NOTE — PROGRESS NOTES
"Subjective:     Patient ID: Arline Landaverde is a 72 y.o. female.    Chief Complaint:    History of Present Illness  Arline Landaverde returns to clinic today for evaluation of right hip femoral neck fracture status post hip hemiarthroplasty 9 days ago.  The patient is now home and has been undergoing home health physical therapy.  She is doing quite well and overall is very happy with the result.  She states she has been walking quite a bit and has been doing well with physical therapy and ambulating with a walker.  She states she feels like she is building her strength back up but she is not quite there yet.  She is still somewhat unsteady on her feet.  She denies any numbness or tingling in her lower extremities and states that her legs feel even when she walks.     Social History     Occupational History   • Not on file   Tobacco Use   • Smoking status: Every Day     Packs/day: 1.00     Types: Cigarettes   • Smokeless tobacco: Never   Vaping Use   • Vaping Use: Never used   Substance and Sexual Activity   • Alcohol use: Never   • Drug use: Never   • Sexual activity: Defer      Past Medical History:   Diagnosis Date   • Diabetes mellitus    • Stroke      Past Surgical History:   Procedure Laterality Date   • BACK SURGERY     • CHOLECYSTECTOMY     • HIP HEMIARTHROPLASTY Right 4/19/2023    Procedure: HIP HEMIARTHROPLASTY ANTERIOR;  Surgeon: Kumar Bonilla MD;  Location: [facility];  Service: Orthopedics;  Laterality: Right;       Family History   Problem Relation Age of Onset   • Cancer Mother                  Objective:  Vitals:    04/28/23 0829   Weight: 47.2 kg (104 lb)   Height: 162.6 cm (64.02\")         04/28/23 0829   Weight: 47.2 kg (104 lb)     Body mass index is 17.84 kg/m².        Right Hip Exam     Tenderness   The patient is experiencing no tenderness.     Range of Motion   Flexion: normal   External rotation: normal   Internal rotation: normal     Muscle Strength   Flexion: 5/5     Tests   SALAZAR: " negative  Fadir:  Negative FADIR test    Other   Erythema: absent  Scars: present  Sensation: normal  Pulse: present    Comments:  Incision healing with no signs of infection.  No erythema drainage fluctuance or induration.  Leg lengths clinically equal               Imaging: Standing AP pelvis was ordered and reviewed by myself in the office today  Indication: Right hip Kalpesh  Findings: X-rays demonstrate a right hip hemiarthroplasty with implants in expected position.  No signs of subsidence fracture dislocation or loosening.  Leg lengths and offset appear clinically similar based off radiographic landmarks.  No new acute fracture  Comparative studies: Immediate postoperative films    Assessment:        1. Closed displaced fracture of right femoral neck    2. S/P hip hemiarthroplasty           Plan:          1. Discussed treatment options at length with patient at today's visit.  I discussed with the patient that she is doing quite well.  She is working well with physical therapy home health.  I encouraged her to continue to try to ambulate is much as possible but to be careful and use a walker until she feels more steady on her feet.  The patient should continue to take the aspirin for at least a few more weeks.  She does not need a refill on her narcotic pain medication.  I will plan to see the patient back for 6-week checkup  2. Follow up: 4 weeks without x-rays      Arline Landaverde was in agreement with plan and had all questions answered.     Medications:  No orders of the defined types were placed in this encounter.      Followup:  No follow-ups on file.    Diagnoses and all orders for this visit:    1. Closed displaced fracture of right femoral neck (Primary)  -     XR Pelvis 1 or 2 View    2. S/P hip hemiarthroplasty          Dictated utilizing Dragon dictation

## 2023-05-08 ENCOUNTER — TELEPHONE (OUTPATIENT)
Dept: ORTHOPEDIC SURGERY | Facility: CLINIC | Age: 73
End: 2023-05-08
Payer: MEDICARE

## 2023-05-08 NOTE — TELEPHONE ENCOUNTER
Blanche with  [phone] calling to verify patients post op antibiotics- per the chart Dr. Bonilla   Dose: 100 mg Route: Oral Frequency: Every 12 Hours Scheduled   Dispense Quantity: 28 capsule Refills: 0    Indications of Use: Surgical Prophylaxis         Sig: Take 1 capsule by mouth Every 12 (Twelve) Hours for 28 doses. Indications: Preventative Medication Therapy Used Around Surgery         Start Date: 04/20/23 End Date: (ordered for 28 doses)

## 2023-05-26 ENCOUNTER — OFFICE VISIT (OUTPATIENT)
Dept: ORTHOPEDIC SURGERY | Facility: CLINIC | Age: 73
End: 2023-05-26
Payer: MEDICARE

## 2023-05-26 VITALS — HEIGHT: 64 IN | BODY MASS INDEX: 17.75 KG/M2 | WEIGHT: 104 LBS

## 2023-05-26 DIAGNOSIS — Z96.649 S/P HIP HEMIARTHROPLASTY: ICD-10-CM

## 2023-05-26 DIAGNOSIS — S72.001A CLOSED DISPLACED FRACTURE OF RIGHT FEMORAL NECK: Primary | ICD-10-CM

## 2023-05-26 NOTE — PROGRESS NOTES
"Subjective:     Patient ID: Arline Landaverde is a 72 y.o. female.    Chief Complaint:    History of Present Illness  Arline Landaverde returns to clinic today for evaluation of right hip femoral neck fracture status post hemiarthroplasty 5 weeks ago on 4/19/2023.  The patient is doing quite well and is ambulatory today with her  a walker and a physical therapy belt.  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  She denies any pain numbness weakness or any issues whatsoever with her right side.  She is very afraid of falling again and reinjuring her right or her left side.  She states that she has been using a walker in the PT belt when she is out in public just as a precaution not because she feels like she actually needs it.  She does not use a walker or PT belt when at home.     Social History     Occupational History   • Not on file   Tobacco Use   • Smoking status: Every Day     Packs/day: 1.00     Types: Cigarettes   • Smokeless tobacco: Never   Vaping Use   • Vaping Use: Never used   Substance and Sexual Activity   • Alcohol use: Never   • Drug use: Never   • Sexual activity: Defer      Past Medical History:   Diagnosis Date   • Diabetes mellitus    • Stroke      Past Surgical History:   Procedure Laterality Date   • BACK SURGERY     • CHOLECYSTECTOMY     • HIP HEMIARTHROPLASTY Right 4/19/2023    Procedure: HIP HEMIARTHROPLASTY ANTERIOR;  Surgeon: Kumar Bonilla MD;  Location: [facility];  Service: Orthopedics;  Laterality: Right;       Family History   Problem Relation Age of Onset   • Cancer Mother                  Objective:  Vitals:    05/26/23 0831   Weight: 47.2 kg (104 lb)   Height: 162.6 cm (64\")         05/26/23 0831   Weight: 47.2 kg (104 lb)     Body mass index is 17.85 kg/m².        Right Hip Exam     Tenderness   The patient is experiencing " no tenderness.     Range of Motion   Flexion: 110   External rotation: 40   Internal rotation: 10     Muscle Strength   Flexion: 5/5     Tests   SALAZAR: negative  Fadir:  Negative FADIR test    Other   Erythema: absent  Scars: present  Sensation: normal  Pulse: present    Comments:  Incision healed with no signs of infection no erythema induration fluctuance or drainage               Imaging: none    Assessment:        1. Closed displaced fracture of right femoral neck    2. S/P hip hemiarthroplasty           Plan:          1. Discussed treatment options at length with patient at today's visit.  I discussed with the patient that I am happy with her progress and that her incision looks great.  I am okay with her progressing slowly with discontinuing the walker.  Physical therapy belt.  I discussed with her that it is okay from my standpoint for her to go slow if it means that it prevents another fall.  The patient's recovery has been delayed due to her residual left-sided (contralateral) weakness from a stroke.  I discussed with her that she should continue to walk is much as possible and should continue home health physical therapy to work on strengthening mobility and gait training.  I will plan to see the patient back in 6 more weeks for a 3-month checkup.  2. Follow up: 6 weeks with a standing AP pelvis x-ray      Arline Landaverde was in agreement with plan and had all questions answered.     Medications:  No orders of the defined types were placed in this encounter.      Followup:  No follow-ups on file.    Diagnoses and all orders for this visit:    1. Closed displaced fracture of right femoral neck (Primary)    2. S/P hip hemiarthroplasty          Dictated utilizing Dragon dictation

## 2023-07-18 ENCOUNTER — APPOINTMENT (OUTPATIENT)
Dept: GENERAL RADIOLOGY | Facility: HOSPITAL | Age: 73
DRG: 102 | End: 2023-07-18
Payer: MEDICARE

## 2023-07-18 ENCOUNTER — APPOINTMENT (OUTPATIENT)
Dept: CT IMAGING | Facility: HOSPITAL | Age: 73
DRG: 102 | End: 2023-07-18
Payer: MEDICARE

## 2023-07-18 ENCOUNTER — HOSPITAL ENCOUNTER (INPATIENT)
Facility: HOSPITAL | Age: 73
LOS: 3 days | Discharge: HOME HEALTH | DRG: 102 | End: 2023-07-21
Attending: EMERGENCY MEDICINE | Admitting: INTERNAL MEDICINE
Payer: MEDICARE

## 2023-07-18 DIAGNOSIS — R13.10 DYSPHAGIA, UNSPECIFIED TYPE: ICD-10-CM

## 2023-07-18 DIAGNOSIS — R13.12 OROPHARYNGEAL DYSPHAGIA: ICD-10-CM

## 2023-07-18 DIAGNOSIS — R53.1 WEAKNESS: ICD-10-CM

## 2023-07-18 DIAGNOSIS — R41.82 ALTERED MENTAL STATUS, UNSPECIFIED ALTERED MENTAL STATUS TYPE: Primary | ICD-10-CM
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
## 2023-07-18 LAB
ALBUMIN SERPL-MCNC: 3.9 G/DL (ref 3.5–5.2)
ALBUMIN/GLOB SERPL: 1.4 G/DL
ALP SERPL-CCNC: 55 U/L (ref 39–117)
ALT SERPL W P-5'-P-CCNC: 15 U/L (ref 1–33)
ANION GAP SERPL CALCULATED.3IONS-SCNC: 9.2 MMOL/L (ref 5–15)
APTT PPP: 26.6 SECONDS (ref 24.3–38.1)
AST SERPL-CCNC: 17 U/L (ref 1–32)
BACTERIA UR QL AUTO: ABNORMAL /HPF
BASOPHILS # BLD AUTO: 0.05 10*3/MM3 (ref 0–0.2)
BASOPHILS NFR BLD AUTO: 0.8 % (ref 0–1.5)
BILIRUB SERPL-MCNC: 0.3 MG/DL (ref 0–1.2)
BILIRUB UR QL STRIP: NEGATIVE
BUN SERPL-MCNC: 15 MG/DL (ref 8–23)
BUN/CREAT SERPL: 23.4 (ref 7–25)
CALCIUM SPEC-SCNC: 9.4 MG/DL (ref 8.6–10.5)
CHLORIDE SERPL-SCNC: 95 MMOL/L (ref 98–107)
CLARITY UR: CLEAR
CO2 SERPL-SCNC: 23.8 MMOL/L (ref 22–29)
COLOR UR: YELLOW
CREAT SERPL-MCNC: 0.64 MG/DL (ref 0.57–1)
DEPRECATED RDW RBC AUTO: 37.2 FL (ref 37–54)
EGFRCR SERPLBLD CKD-EPI 2021: 93.4 ML/MIN/1.73
EOSINOPHIL # BLD AUTO: 0.23 10*3/MM3 (ref 0–0.4)
EOSINOPHIL NFR BLD AUTO: 3.7 % (ref 0.3–6.2)
ERYTHROCYTE [DISTWIDTH] IN BLOOD BY AUTOMATED COUNT: 11.6 % (ref 12.3–15.4)
GLOBULIN UR ELPH-MCNC: 2.8 GM/DL
GLUCOSE BLDC GLUCOMTR-MCNC: 120 MG/DL (ref 70–130)
GLUCOSE BLDC GLUCOMTR-MCNC: 141 MG/DL (ref 70–130)
GLUCOSE SERPL-MCNC: 110 MG/DL (ref 65–99)
GLUCOSE UR STRIP-MCNC: NEGATIVE MG/DL
HCT VFR BLD AUTO: 30.2 % (ref 34–46.6)
HGB BLD-MCNC: 11.1 G/DL (ref 12–15.9)
HGB UR QL STRIP.AUTO: ABNORMAL
HYALINE CASTS UR QL AUTO: ABNORMAL /LPF
IMM GRANULOCYTES # BLD AUTO: 0.02 10*3/MM3 (ref 0–0.05)
IMM GRANULOCYTES NFR BLD AUTO: 0.3 % (ref 0–0.5)
INR PPP: 0.96 (ref 0.9–1.1)
KETONES UR QL STRIP: NEGATIVE
LEUKOCYTE ESTERASE UR QL STRIP.AUTO: NEGATIVE
LYMPHOCYTES # BLD AUTO: 1.79 10*3/MM3 (ref 0.7–3.1)
LYMPHOCYTES NFR BLD AUTO: 28.9 % (ref 19.6–45.3)
MCH RBC QN AUTO: 32.6 PG (ref 26.6–33)
MCHC RBC AUTO-ENTMCNC: 36.8 G/DL (ref 31.5–35.7)
MCV RBC AUTO: 88.8 FL (ref 79–97)
MONOCYTES # BLD AUTO: 0.68 10*3/MM3 (ref 0.1–0.9)
MONOCYTES NFR BLD AUTO: 11 % (ref 5–12)
NEUTROPHILS NFR BLD AUTO: 3.42 10*3/MM3 (ref 1.7–7)
NEUTROPHILS NFR BLD AUTO: 55.3 % (ref 42.7–76)
NITRITE UR QL STRIP: NEGATIVE
NRBC BLD AUTO-RTO: 0 /100 WBC (ref 0–0.2)
PH UR STRIP.AUTO: 7.5 [PH] (ref 4.5–8)
PLATELET # BLD AUTO: 270 10*3/MM3 (ref 140–450)
PMV BLD AUTO: 8.7 FL (ref 6–12)
POTASSIUM SERPL-SCNC: 4.6 MMOL/L (ref 3.5–5.2)
PROT SERPL-MCNC: 6.7 G/DL (ref 6–8.5)
PROT UR QL STRIP: ABNORMAL
PROTHROMBIN TIME: 12.8 SECONDS (ref 12.1–15)
RBC # BLD AUTO: 3.4 10*6/MM3 (ref 3.77–5.28)
RBC # UR STRIP: ABNORMAL /HPF
REF LAB TEST METHOD: ABNORMAL
SODIUM SERPL-SCNC: 128 MMOL/L (ref 136–145)
SP GR UR STRIP: 1.01 (ref 1–1.03)
SQUAMOUS #/AREA URNS HPF: ABNORMAL /HPF
TROPONIN T SERPL HS-MCNC: 26 NG/L
UROBILINOGEN UR QL STRIP: ABNORMAL
WBC # UR STRIP: ABNORMAL /HPF
WBC NRBC COR # BLD: 6.19 10*3/MM3 (ref 3.4–10.8)

## 2023-07-18 PROCEDURE — 63710000001 INSULIN DETEMIR PER 5 UNITS: Performed by: INTERNAL MEDICINE

## 2023-07-18 PROCEDURE — 70450 CT HEAD/BRAIN W/O DYE: CPT

## 2023-07-18 PROCEDURE — 0042T HC CT CEREBRAL PERFUSION W/WO CONTRAST: CPT

## 2023-07-18 PROCEDURE — 84484 ASSAY OF TROPONIN QUANT: CPT | Performed by: EMERGENCY MEDICINE

## 2023-07-18 PROCEDURE — G0378 HOSPITAL OBSERVATION PER HR: HCPCS

## 2023-07-18 PROCEDURE — 25510000001 IOPAMIDOL PER 1 ML: Performed by: EMERGENCY MEDICINE

## 2023-07-18 PROCEDURE — 99222 1ST HOSP IP/OBS MODERATE 55: CPT | Performed by: PSYCHIATRY & NEUROLOGY

## 2023-07-18 PROCEDURE — 93005 ELECTROCARDIOGRAM TRACING: CPT | Performed by: EMERGENCY MEDICINE

## 2023-07-18 PROCEDURE — 70498 CT ANGIOGRAPHY NECK: CPT

## 2023-07-18 PROCEDURE — 85730 THROMBOPLASTIN TIME PARTIAL: CPT | Performed by: EMERGENCY MEDICINE

## 2023-07-18 PROCEDURE — 99285 EMERGENCY DEPT VISIT HI MDM: CPT

## 2023-07-18 PROCEDURE — 85610 PROTHROMBIN TIME: CPT | Performed by: EMERGENCY MEDICINE

## 2023-07-18 PROCEDURE — 70496 CT ANGIOGRAPHY HEAD: CPT

## 2023-07-18 PROCEDURE — 81001 URINALYSIS AUTO W/SCOPE: CPT | Performed by: INTERNAL MEDICINE

## 2023-07-18 PROCEDURE — 25010000002 HYDRALAZINE PER 20 MG: Performed by: EMERGENCY MEDICINE

## 2023-07-18 PROCEDURE — 25510000001 IOPAMIDOL PER 1 ML: Performed by: INTERNAL MEDICINE

## 2023-07-18 PROCEDURE — 82948 REAGENT STRIP/BLOOD GLUCOSE: CPT

## 2023-07-18 PROCEDURE — 80053 COMPREHEN METABOLIC PANEL: CPT | Performed by: EMERGENCY MEDICINE

## 2023-07-18 PROCEDURE — 85025 COMPLETE CBC W/AUTO DIFF WBC: CPT | Performed by: EMERGENCY MEDICINE

## 2023-07-18 PROCEDURE — 71045 X-RAY EXAM CHEST 1 VIEW: CPT

## 2023-07-18 PROCEDURE — 36415 COLL VENOUS BLD VENIPUNCTURE: CPT

## 2023-07-18 PROCEDURE — 99223 1ST HOSP IP/OBS HIGH 75: CPT | Performed by: INTERNAL MEDICINE

## 2023-07-18 PROCEDURE — 93010 ELECTROCARDIOGRAM REPORT: CPT | Performed by: INTERNAL MEDICINE

## 2023-07-18 RX ORDER — HYDRALAZINE HYDROCHLORIDE 20 MG/ML
10 INJECTION INTRAMUSCULAR; INTRAVENOUS ONCE
Status: COMPLETED | OUTPATIENT
Start: 2023-07-18 | End: 2023-07-18

## 2023-07-18 RX ORDER — NICOTINE POLACRILEX 4 MG
15 LOZENGE BUCCAL
Status: DISCONTINUED | OUTPATIENT
Start: 2023-07-18 | End: 2023-07-19

## 2023-07-18 RX ORDER — INSULIN ASPART 100 [IU]/ML
1-200 INJECTION, SOLUTION INTRAVENOUS; SUBCUTANEOUS AS NEEDED
Status: DISCONTINUED | OUTPATIENT
Start: 2023-07-18 | End: 2023-07-19

## 2023-07-18 RX ORDER — ASPIRIN 300 MG/1
300 SUPPOSITORY RECTAL ONCE
Status: COMPLETED | OUTPATIENT
Start: 2023-07-19 | End: 2023-07-19

## 2023-07-18 RX ORDER — HYDRALAZINE HYDROCHLORIDE 20 MG/ML
10 INJECTION INTRAMUSCULAR; INTRAVENOUS ONCE
Status: DISCONTINUED | OUTPATIENT
Start: 2023-07-18 | End: 2023-07-18

## 2023-07-18 RX ORDER — SODIUM CHLORIDE 0.9 % (FLUSH) 0.9 %
10 SYRINGE (ML) INJECTION AS NEEDED
Status: DISCONTINUED | OUTPATIENT
Start: 2023-07-18 | End: 2023-07-21 | Stop reason: HOSPADM

## 2023-07-18 RX ORDER — ASPIRIN 81 MG/1
81 TABLET ORAL EVERY 12 HOURS SCHEDULED
Status: DISCONTINUED | OUTPATIENT
Start: 2023-07-18 | End: 2023-07-18

## 2023-07-18 RX ORDER — FERROUS GLUCONATE 324(37.5)
324 TABLET ORAL
Status: DISCONTINUED | OUTPATIENT
Start: 2023-07-19 | End: 2023-07-21 | Stop reason: HOSPADM

## 2023-07-18 RX ORDER — SODIUM CHLORIDE 0.9 % (FLUSH) 0.9 %
10 SYRINGE (ML) INJECTION EVERY 12 HOURS SCHEDULED
Status: DISCONTINUED | OUTPATIENT
Start: 2023-07-18 | End: 2023-07-21 | Stop reason: HOSPADM

## 2023-07-18 RX ORDER — DEXTROSE MONOHYDRATE 25 G/50ML
10-50 INJECTION, SOLUTION INTRAVENOUS
Status: DISCONTINUED | OUTPATIENT
Start: 2023-07-18 | End: 2023-07-21 | Stop reason: HOSPADM

## 2023-07-18 RX ORDER — INSULIN ASPART 100 [IU]/ML
1-200 INJECTION, SOLUTION INTRAVENOUS; SUBCUTANEOUS EVERY 6 HOURS SCHEDULED
Status: DISCONTINUED | OUTPATIENT
Start: 2023-07-19 | End: 2023-07-19

## 2023-07-18 RX ORDER — ATORVASTATIN CALCIUM 40 MG/1
80 TABLET, FILM COATED ORAL NIGHTLY
Status: DISCONTINUED | OUTPATIENT
Start: 2023-07-18 | End: 2023-07-21 | Stop reason: HOSPADM

## 2023-07-18 RX ORDER — INSULIN ASPART 100 [IU]/ML
1-200 INJECTION, SOLUTION INTRAVENOUS; SUBCUTANEOUS
Status: DISCONTINUED | OUTPATIENT
Start: 2023-07-18 | End: 2023-07-18

## 2023-07-18 RX ORDER — SODIUM CHLORIDE 9 MG/ML
40 INJECTION, SOLUTION INTRAVENOUS AS NEEDED
Status: DISCONTINUED | OUTPATIENT
Start: 2023-07-18 | End: 2023-07-21 | Stop reason: HOSPADM

## 2023-07-18 RX ADMIN — HYDRALAZINE HYDROCHLORIDE 10 MG: 20 INJECTION INTRAMUSCULAR; INTRAVENOUS at 18:52

## 2023-07-18 RX ADMIN — INSULIN DETEMIR 7 UNITS: 100 INJECTION, SOLUTION SUBCUTANEOUS at 22:41

## 2023-07-18 RX ADMIN — Medication 10 ML: at 22:18

## 2023-07-18 RX ADMIN — IOPAMIDOL 100 ML: 755 INJECTION, SOLUTION INTRAVENOUS at 23:21

## 2023-07-18 RX ADMIN — METOPROLOL TARTRATE 5 MG: 5 INJECTION INTRAVENOUS at 22:16

## 2023-07-18 RX ADMIN — IOPAMIDOL 150 ML: 755 INJECTION, SOLUTION INTRAVENOUS at 18:37

## 2023-07-18 RX ADMIN — IOPAMIDOL 100 ML: 755 INJECTION, SOLUTION INTRAVENOUS at 23:19

## 2023-07-18 NOTE — ED PROVIDER NOTES
Subjective   History of Present Illness  72-year-old female past medical history significant for diabetes and stroke presents to the emergency room via son who drove her here in private vehicle for concern for possible stroke.  Son states that yesterday around 11 AM patient was in [employer] and had difficulty ambulating and speaking that continued on for about 3 hours before resolving.  Today was normal except for at 5 PM while at home son states patient had difficulty getting out of a chair and then could not ambulate to the bathroom.  She was also very confused he said not been able to answer questions that he would propose to her.  He states he brought her straight to the emergency room.  Patient has had a prior stroke greater than a year ago with left-sided deficit and walks with a roller walker normally.  Son states that she is much worse with ambulation over the last hour.  He also states she was unable to answer basic questions to him.  Patient and son deny headache visual changes shortness of breath chest pain anorexia nausea and vomiting or diarrhea.  Patient was able to eat this afternoon for lunch per son.  Patient is able to answer some basic questions but other times is confused and unable to answer appropriately.    Review of Systems   Unable to perform ROS: Mental status change     Past Medical History:   Diagnosis Date    Diabetes mellitus     Stroke        Allergies   Allergen Reactions    Penicillins Hives       Past Surgical History:   Procedure Laterality Date    BACK SURGERY      CHOLECYSTECTOMY      HIP HEMIARTHROPLASTY Right 4/19/2023    Procedure: HIP HEMIARTHROPLASTY ANTERIOR;  Surgeon: Kumar Bonilla MD;  Location: [facility];  Service: Orthopedics;  Laterality: Right;       Family History   Problem Relation Age of Onset    Cancer Mother        Social History     Socioeconomic History    Marital status: Single   Tobacco Use    Smoking status: Former     Packs/day: 1.00     Types:  Cigarettes    Smokeless tobacco: Never   Vaping Use    Vaping Use: Never used   Substance and Sexual Activity    Alcohol use: Never    Drug use: Never    Sexual activity: Defer           Objective   Physical Exam  Vitals and nursing note reviewed.   Constitutional:       General: She is not in acute distress.     Appearance: Normal appearance. She is not ill-appearing, toxic-appearing or diaphoretic.      Comments: 72-year-old pleasant female in no apparent distress   HENT:      Head: Normocephalic and atraumatic.      Nose: Nose normal.      Mouth/Throat:      Mouth: Mucous membranes are moist.   Eyes:      Conjunctiva/sclera: Conjunctivae normal.   Cardiovascular:      Rate and Rhythm: Normal rate and regular rhythm.      Pulses: Normal pulses.   Pulmonary:      Effort: Pulmonary effort is normal.      Breath sounds: Normal breath sounds.   Abdominal:      General: Abdomen is flat. There is no distension.      Tenderness: There is no abdominal tenderness.   Musculoskeletal:         General: No swelling.      Cervical back: Normal range of motion and neck supple.      Comments: Patient has left-sided upper and lower extremity deficit.  She is able to move her upper and lower extremities but fine motor skills are limited and she is significantly weaker on that side than the right.  Per son this is baseline.   Skin:     General: Skin is warm and dry.   Neurological:      General: No focal deficit present.      Mental Status: She is alert and oriented to person, place, and time.      GCS: GCS eye subscore is 4. GCS verbal subscore is 4. GCS motor subscore is 6.      Cranial Nerves: Cranial nerve deficit present.      Comments: Cranial nerve exam shows no obvious facial asymmetry however tongue deviation to the right is limited when compared to left side.  Remainder of cranial nerves appear intact.    Her motor function appears to be intact and at baseline per son as she has some left-sided deficit but right side  appears normal and has good  strength and is able to lift her right lower extremities out difficulty.    Patient does know where she is but does not know the date of the date.  She is also somewhat slow with answering questions.   Psychiatric:         Mood and Affect: Mood normal.       Procedures           ED Course  ED Course as of 07/18/23 2053 Tue Jul 18, 2023 1814 Patient's blood pressure is elevated I have ordered 2 mg of hydralazine []   1933 Spoke with Dr. Cook stroke neurologist who will see patient to VS robot and will also review radiologic films []   1948 Both telemedicine cameras are down at the present time.  Dr. Cook called back stated he has reviewed the films he does not see anything abnormal at this time however he would asked that the patient be admitted to the hospitalist service for PT and MRIs in the morning. []   1955 Spoke with Dr. Kennedy hospitalist who agreed to admission. []   2053 EKG time 1841  Sinus tachycardia  Heart rate 100  Axis normal  Intervals normal  No acute ST abnormalities []      ED Course User Index  [] Iraj Fontanez MD                                           Medical Decision Making  My differential diagnosis for stroke includes but is not limited to hemorrhagic stroke, embolic stroke, ischemic stroke, toxins, neurologic disorders, intracerebral infections, Bell's palsy and seizure disorder     Problems Addressed:  Altered mental status, unspecified altered mental status type: complicated acute illness or injury  Weakness: complicated acute illness or injury    Amount and/or Complexity of Data Reviewed  Labs: ordered. Decision-making details documented in ED Course.  Radiology: ordered. Decision-making details documented in ED Course.  ECG/medicine tests: ordered. Decision-making details documented in ED Course.    Risk  Prescription drug management.  Decision regarding hospitalization.        Final diagnoses:   Altered mental status,  unspecified altered mental status type   Weakness       ED Disposition  ED Disposition       ED Disposition   Decision to Admit    Condition   --    Comment   Level of Care: Telemetry [5]   Diagnosis: Altered mental status [780.97.ICD-9-CM]   Admitting Physician: NATALIE CAMPOS [[id number]]   Attending Physician: [place] FONTANEZ [[id number]]                 No follow-up provider specified.       Medication List      No changes were made to your prescriptions during this visit.            [place] Fontanez MD  07/18/23 1957       [place] Fontanez MD  07/18/23 2053

## 2023-07-18 NOTE — CONSULTS
Teleneurology Consultation Note - Phone    Date of Consultation: 07/18/2023 19:30 EDT  Reason for consult: Altered mental status, Unsteady gait  Location: ED  Last known well Date/Time: 07/18/2023 17:00 EDT  ED arrival Date/Time: 07/18/2023 17:52 EDT  Date/Time Telestroke doctor on phone: 07/18/2023 19:30 EDT  Reason video not used: Tech not working  Date/time telestroke doctor assessment: 07/18/2023 19:45 EDT  Diagnosis: R/o stroke    Patient Demographics:  Age: 72  Gender: Female    Assessment:  Gait unsteady, confusion, second episode  Not able to get on camera due to malfunction in boith cameras  Symptoms likely not stroke   CTA negative for occlusions  tPA Given: No  tPA exclusion reason: Not a stroke    Recommendation:  Imaging recommendations: MRI brain w/o contrast    Follow-up recommendations: In this patient with acute neurologic deficits, we would recommend that the inpatient neurology team be consulted for further ongoing recommendations. Please be sure to place the consult through EMR. Please call us at [facility] # [phone] and press 1 for emergent and press 2 for non-emergent if you have any questions.    Updated ED provider with recommendations at: 07/18/2023 19:46 EDT      History of Presenting Illness:  71 yo female prior stroke with residual left sided weakness yesterday had episode of confusion, worsening gait resolved in 3 hours. Today had same symptoms at 5 pm. I am not able to get on camera due to both cameras not working.   Allergies: Unknown    Pertinent medication prior to arrival:   Antiplatelet prior to arrival? None  Anticoagulation prior to arrival? None    Personal review of CNS Imaging:  CTH performed? Yes  CT Interpretation date and time: 07/18/2023 19:44 EDT  Hemorrhage vs non- hemorrhage? No acute intracranial hemorrhage  Type of No acute intracranial hemorrhage:  Does not apply  ASPECT Score does not apply here because: Not a stroke  CTA head and neck performed? Yes  CTA review  date and time: 07/18/2023 19:45 EDT  Results of CTA: No intracranial or extracranial large vessel occlusion or significant stenosis    Large Vessel Occlusion:  LVO present? No  LVO exclusion reason: No LVO present      Teleneurology patient verification & consent    I, Nino Cook MD, was consulted about this patient on 07/18/2023, 19:30 EDT for discussion over the phone regarding management of the patient's care via a provider to provider discussion for 20 minutes which includes reviewing medical records, reviewing laboratory data, radiology, personal review of CNS images and other diagnostic tests as well as clinical documentation. My location was [place]. The recommendations are based on information I received from the consulting provider.

## 2023-07-18 NOTE — ED NOTES
Per pts  while in [facility] yesterday at 11AM the pt experienced a 3 hour episode of confusion, worsening gait problems and trouble speaking. Per  the pt has a hx of a stroke over 1 year ago and multiple mini strokes. The pts symptoms resolved and then he reported that around 1700 today she started to experience the same symptoms. Pt was able to say her name and birthday and where she was at, pt disoriented to time. Pt stated that it was Monday. Pt has L sided weakness residual from a previous stroke in her LUE and LLE. Pt typically walks with a rolator because of gait trouble.

## 2023-07-19 ENCOUNTER — APPOINTMENT (OUTPATIENT)
Dept: CARDIOLOGY | Facility: HOSPITAL | Age: 73
DRG: 102 | End: 2023-07-19
Payer: MEDICARE

## 2023-07-19 ENCOUNTER — APPOINTMENT (OUTPATIENT)
Dept: MRI IMAGING | Facility: HOSPITAL | Age: 73
DRG: 102 | End: 2023-07-19
Payer: MEDICARE

## 2023-07-19 LAB
AMPHET+METHAMPHET UR QL: NEGATIVE
AMPHETAMINES UR QL: NEGATIVE
B PARAPERT DNA SPEC QL NAA+PROBE: NOT DETECTED
B PERT DNA SPEC QL NAA+PROBE: NOT DETECTED
BARBITURATES UR QL SCN: NEGATIVE
BENZODIAZ UR QL SCN: POSITIVE
BUPRENORPHINE SERPL-MCNC: NEGATIVE NG/ML
C PNEUM DNA NPH QL NAA+NON-PROBE: NOT DETECTED
CANNABINOIDS SERPL QL: NEGATIVE
CHOLEST SERPL-MCNC: 144 MG/DL (ref 0–200)
COCAINE UR QL: NEGATIVE
FERRITIN SERPL-MCNC: 367 NG/ML (ref 13–150)
FLUAV SUBTYP SPEC NAA+PROBE: NOT DETECTED
FLUBV RNA ISLT QL NAA+PROBE: NOT DETECTED
FOLATE SERPL-MCNC: >20 NG/ML (ref 4.78–24.2)
GLUCOSE BLDC GLUCOMTR-MCNC: 83 MG/DL (ref 70–130)
GLUCOSE BLDC GLUCOMTR-MCNC: 91 MG/DL (ref 70–130)
HADV DNA SPEC NAA+PROBE: NOT DETECTED
HBA1C MFR BLD: 5.3 % (ref 4.8–5.6)
HCOV 229E RNA SPEC QL NAA+PROBE: NOT DETECTED
HCOV HKU1 RNA SPEC QL NAA+PROBE: NOT DETECTED
HCOV NL63 RNA SPEC QL NAA+PROBE: NOT DETECTED
HCOV OC43 RNA SPEC QL NAA+PROBE: NOT DETECTED
HDLC SERPL-MCNC: 66 MG/DL (ref 40–60)
HMPV RNA NPH QL NAA+NON-PROBE: NOT DETECTED
HPIV1 RNA ISLT QL NAA+PROBE: NOT DETECTED
HPIV2 RNA SPEC QL NAA+PROBE: NOT DETECTED
HPIV3 RNA NPH QL NAA+PROBE: NOT DETECTED
HPIV4 P GENE NPH QL NAA+PROBE: NOT DETECTED
IRON 24H UR-MRATE: 87 MCG/DL (ref 37–145)
IRON SATN MFR SERPL: 33 % (ref 20–50)
LDLC SERPL CALC-MCNC: 66 MG/DL (ref 0–100)
LDLC/HDLC SERPL: 1.01 {RATIO}
M PNEUMO IGG SER IA-ACNC: NOT DETECTED
METHADONE UR QL SCN: NEGATIVE
OPIATES UR QL: NEGATIVE
OXYCODONE UR QL SCN: NEGATIVE
PCP UR QL SCN: NEGATIVE
PROPOXYPH UR QL: NEGATIVE
QT INTERVAL: 364 MS
RHINOVIRUS RNA SPEC NAA+PROBE: NOT DETECTED
RSV RNA NPH QL NAA+NON-PROBE: NOT DETECTED
SARS-COV-2 RNA NPH QL NAA+NON-PROBE: NOT DETECTED
TIBC SERPL-MCNC: 265 MCG/DL (ref 298–536)
TRICYCLICS UR QL SCN: NEGATIVE
TRIGL SERPL-MCNC: 58 MG/DL (ref 0–150)
UIBC SERPL-MCNC: 178 MCG/DL (ref 112–346)
VIT B12 BLD-MCNC: 656 PG/ML (ref 211–946)
VLDLC SERPL-MCNC: 12 MG/DL (ref 5–40)

## 2023-07-19 PROCEDURE — 92610 EVALUATE SWALLOWING FUNCTION: CPT

## 2023-07-19 PROCEDURE — 97166 OT EVAL MOD COMPLEX 45 MIN: CPT

## 2023-07-19 PROCEDURE — 80306 DRUG TEST PRSMV INSTRMNT: CPT | Performed by: NURSE PRACTITIONER

## 2023-07-19 PROCEDURE — 80061 LIPID PANEL: CPT | Performed by: INTERNAL MEDICINE

## 2023-07-19 PROCEDURE — 83540 ASSAY OF IRON: CPT | Performed by: NURSE PRACTITIONER

## 2023-07-19 PROCEDURE — 97161 PT EVAL LOW COMPLEX 20 MIN: CPT

## 2023-07-19 PROCEDURE — 82746 ASSAY OF FOLIC ACID SERUM: CPT | Performed by: NURSE PRACTITIONER

## 2023-07-19 PROCEDURE — G0378 HOSPITAL OBSERVATION PER HR: HCPCS

## 2023-07-19 PROCEDURE — 82948 REAGENT STRIP/BLOOD GLUCOSE: CPT

## 2023-07-19 PROCEDURE — 25010000002 LORAZEPAM PER 2 MG: Performed by: INTERNAL MEDICINE

## 2023-07-19 PROCEDURE — 82728 ASSAY OF FERRITIN: CPT | Performed by: NURSE PRACTITIONER

## 2023-07-19 PROCEDURE — 0202U NFCT DS 22 TRGT SARS-COV-2: CPT | Performed by: NURSE PRACTITIONER

## 2023-07-19 PROCEDURE — 70551 MRI BRAIN STEM W/O DYE: CPT

## 2023-07-19 PROCEDURE — 99232 SBSQ HOSP IP/OBS MODERATE 35: CPT | Performed by: NURSE PRACTITIONER

## 2023-07-19 PROCEDURE — 94799 UNLISTED PULMONARY SVC/PX: CPT

## 2023-07-19 PROCEDURE — 83036 HEMOGLOBIN GLYCOSYLATED A1C: CPT | Performed by: INTERNAL MEDICINE

## 2023-07-19 PROCEDURE — 82607 VITAMIN B-12: CPT | Performed by: NURSE PRACTITIONER

## 2023-07-19 PROCEDURE — 83550 IRON BINDING TEST: CPT | Performed by: NURSE PRACTITIONER

## 2023-07-19 PROCEDURE — 99232 SBSQ HOSP IP/OBS MODERATE 35: CPT | Performed by: PSYCHIATRY & NEUROLOGY

## 2023-07-19 PROCEDURE — 94761 N-INVAS EAR/PLS OXIMETRY MLT: CPT

## 2023-07-19 RX ORDER — LORAZEPAM 2 MG/ML
0.25 INJECTION INTRAMUSCULAR
Status: COMPLETED | OUTPATIENT
Start: 2023-07-19 | End: 2023-07-19

## 2023-07-19 RX ORDER — SODIUM CHLORIDE 9 MG/ML
75 INJECTION, SOLUTION INTRAVENOUS CONTINUOUS
Status: DISCONTINUED | OUTPATIENT
Start: 2023-07-19 | End: 2023-07-21 | Stop reason: HOSPADM

## 2023-07-19 RX ADMIN — ASPIRIN 300 MG: 300 SUPPOSITORY RECTAL at 00:41

## 2023-07-19 RX ADMIN — SODIUM CHLORIDE 75 ML/HR: 9 INJECTION, SOLUTION INTRAVENOUS at 19:47

## 2023-07-19 RX ADMIN — LORAZEPAM 0.25 MG: 2 INJECTION INTRAMUSCULAR; INTRAVENOUS at 07:50

## 2023-07-19 RX ADMIN — Medication 10 ML: at 19:47

## 2023-07-19 RX ADMIN — METOPROLOL TARTRATE 5 MG: 5 INJECTION INTRAVENOUS at 00:41

## 2023-07-19 RX ADMIN — LORAZEPAM 0.25 MG: 2 INJECTION INTRAMUSCULAR; INTRAVENOUS at 07:19

## 2023-07-19 NOTE — H&P
[facility] HOSPITALIST     PCP: Lanie Gomez APRN    CODE status: Full    CHIEF COMPLAINT: Difficulty speaking and walking    HISTORY OF PRESENT ILLNESS:    Patient is a 72-year-old female with past medical history significant for previous stroke, diabetes mellitus type 2 non-insulin-requiring.  Patient is already taking aspirin and Lipitor due to previous stroke.  She was brought into the ED today at [place] via private vehicle.  Her son who accompanied her to the ED stated that yesterday around 11 AM patient was having difficulty walking in [facility], also at this time had trouble speaking, this lasted 3 hours, during which no attempt was made to have her evaluated, her symptoms subsequently resolved.  Then again today around 5 PM she was home with her son and had sudden onset of difficulty getting out of her chair, could not reach the bathroom.  She was profoundly confused from her baseline and unable to answer questions so he decided at this point it was time to go to the ED. patient has persistent deficits on the left side from her previous CVA, but she had obvious trouble with ambulation that is atypical for her even with this residual weakness.     Work-up in the ED revealed normal CT head without contrast other than remote lacunar infarct, CTA head and neck were reviewed by stroke neurologist, CT cerebral perfusion was read as normal by radiology.  Stroke neurologist did not perform telehealth evaluation as camera was malfunctioning, but did recommend admission to hospitalist group with MRI brain.       Past Medical History:   Diagnosis Date    Diabetes mellitus     Stroke      Past Surgical History:   Procedure Laterality Date    BACK SURGERY      CHOLECYSTECTOMY      HIP HEMIARTHROPLASTY Right 4/19/2023    Procedure: HIP HEMIARTHROPLASTY ANTERIOR;  Surgeon: Kumar Bonilla MD;  Location: [facility];  Service: Orthopedics;  Laterality: Right;     Family History   Problem Relation Age  "of Onset    Cancer Mother      Social History     Tobacco Use    Smoking status: Former     Packs/day: 1.00     Types: Cigarettes    Smokeless tobacco: Never   Vaping Use    Vaping Use: Never used   Substance Use Topics    Alcohol use: Never    Drug use: Never     (Not in a hospital admission)    Allergies:  Penicillins    Immunization History   Administered Date(s) Administered    COVID-19 (MODERNA) Monovalent Original Booster 12/29/2021    COVID-19 (PFIZER) Purple Cap Monovalent 06/07/2021, 06/28/2021       REVIEW OF SYSTEMS:  Please see the above history of present illness for pertinent positives and negatives.  The remainder of the patient's systems have been reviewed and are negative.     Vital Signs  Temp:  [97.9 °F (36.6 °C)] 97.9 °F (36.6 °C)  Heart Rate:  [] 120  Resp:  [18] 18  BP: (135-195)/() 135/93  Flowsheet Rows      Flowsheet Row First Filed Value   Admission Height 162.6 cm (64\") Documented at 07/18/2023 1800   Admission Weight 55.6 kg (122 lb 9.6 oz) Documented at 07/18/2023 1800               Physical Exam:  General: Patient is awake and alert, notably aphasic.  Elderly female. No acute distress noted.   HENT: Head is atraumatic, normocephalic. Hearing is grossly intact. Nose is without obvious congestion and appears patent. Neck is supple and trachea is midline.   Eyes: Vision is grossly intact. Pupils appear equal and round.   Cardiovascular: Heart has regular rate and rhythm with no murmurs, rubs or gallops noted.   Respiratory: Lungs are clear to ausculation without wheezes, rhonchi or rales.   Abdominal/GI: Soft, nontender, bowel sounds present. No rebound or guarding present.   Extremities: No peripheral edema noted.   Musculoskeletal: Spontaneous movement of bilateral upper and lower extremities against gravity noted. No signs of injury or deformity noted.   Skin: Warm and dry.   Psych: Mood and affect are appropriate. Cooperative with exam.   Neuro: Very difficult assessment as " patient is semi-cooperative, confused at present, and has had previous stroke with left sided deficits. Left arm appears contracted and patient does not move when asked to do so. She can move her left leg  but cannot lift off the bed. She cannot tell me if she can feel light touch sensation to her legs bilaterally. She is able to lift her right arm above her head (3+/5 strength) and lift her right left off the bed with 3+/5 strength. She has left francine-neglect. Speech is clear but she is not oriented to place or time.     Emotional Behavior: Difficult to assess due to aphasia, no gross abnormalities   Judgment and Insight   Mental Status   Memory   Mood and Affect: neither of the following found acutely        Depression                 Anxiety     Debilities: Difficult to fully assess these as patient is having possible acute stroke, with aphasia as per above   Physical Weakness -nothing obvious acutely   Handicaps  -as above   Disabilities  -as above   Agitation  -none seen       Results Review:    I reviewed the patient's new clinical results.  Lab Results (most recent)       Procedure Component Value Units Date/Time    Single High Sensitivity Troponin T [[id number]]  (Abnormal) Collected: 07/18/23 1807    Specimen: Blood Updated: 07/18/23 1832     HS Troponin T 26 ng/L     Narrative:      High Sensitive Troponin T Reference Range:  <10.0 ng/L- Negative Female for AMI  <15.0 ng/L- Negative Male for AMI  >=10 - Abnormal Female indicating possible myocardial injury.  >=15 - Abnormal Male indicating possible myocardial injury.   Clinicians would have to utilize clinical acumen, EKG, Troponin, and serial changes to determine if it is an Acute Myocardial Infarction or myocardial injury due to an underlying chronic condition.         Comprehensive Metabolic Panel [[id number]]  (Abnormal) Collected: 07/18/23 1807    Specimen: Blood Updated: 07/18/23 1830     Glucose 110 mg/dL      BUN 15 mg/dL      Creatinine 0.64 mg/dL       Sodium 128 mmol/L      Potassium 4.6 mmol/L      Chloride 95 mmol/L      CO2 23.8 mmol/L      Calcium 9.4 mg/dL      Total Protein 6.7 g/dL      Albumin 3.9 g/dL      ALT (SGPT) 15 U/L      AST (SGOT) 17 U/L      Alkaline Phosphatase 55 U/L      Total Bilirubin 0.3 mg/dL      Globulin 2.8 gm/dL      A/G Ratio 1.4 g/dL      BUN/Creatinine Ratio 23.4     Anion Gap 9.2 mmol/L      eGFR 93.4 mL/min/1.73     Narrative:      GFR Normal >60  Chronic Kidney Disease <60  Kidney Failure <15    The GFR formula is only valid for adults with stable renal function between ages 18 and 70.    Protime-INR [[id number]]  (Normal) Collected: 07/18/23 1807    Specimen: Blood Updated: 07/18/23 1821     Protime 12.8 Seconds      INR 0.96    Narrative:      Therapeutic Ranges for INR: 2.0-3.0 (PT 20-30)                              2.5-3.5 (PT 25-34)    aPTT [[id number]]  (Normal) Collected: 07/18/23 1807    Specimen: Blood Updated: 07/18/23 1821     PTT 26.6 seconds     Narrative:      PTT = The equivalent PTT values for the therapeutic range of heparin levels at 0.1 to 0.7 U/ml are 53 to 110 seconds.      CBC & Differential [[id number]]  (Abnormal) Collected: 07/18/23 1807    Specimen: Blood Updated: 07/18/23 1810    Narrative:      The following orders were created for panel order CBC & Differential.  Procedure                               Abnormality         Status                     ---------                               -----------         ------                     CBC Auto Differential[[id number]]        Abnormal            Final result                 Please view results for these tests on the individual orders.    CBC Auto Differential [[id number]]  (Abnormal) Collected: 07/18/23 1807    Specimen: Blood Updated: 07/18/23 1810     WBC 6.19 10*3/mm3      RBC 3.40 10*6/mm3      Hemoglobin 11.1 g/dL      Hematocrit 30.2 %      MCV 88.8 fL      MCH 32.6 pg      MCHC 36.8 g/dL      RDW 11.6 %      RDW-SD 37.2 fl      MPV 8.7 fL       Platelets 270 10*3/mm3      Neutrophil % 55.3 %      Lymphocyte % 28.9 %      Monocyte % 11.0 %      Eosinophil % 3.7 %      Basophil % 0.8 %      Immature Grans % 0.3 %      Neutrophils, Absolute 3.42 10*3/mm3      Lymphocytes, Absolute 1.79 10*3/mm3      Monocytes, Absolute 0.68 10*3/mm3      Eosinophils, Absolute 0.23 10*3/mm3      Basophils, Absolute 0.05 10*3/mm3      Immature Grans, Absolute 0.02 10*3/mm3      nRBC 0.0 /100 WBC     POC Glucose Once [[id number]]  (Normal) Collected: 07/18/23 1803    Specimen: Blood Updated: 07/18/23 1809     Glucose 120 mg/dL      Comment: Meter: [id number] : [id number] Brian Womack [facility]               Imaging Results (Most Recent)       Procedure Component Value Units Date/Time    CT CEREBRAL PERFUSION WITH & WITHOUT CONTRAST [[id number]] Collected: 07/18/23 1804     Updated: 07/18/23 1910    Narrative:      CT CEREBRAL PERFUSION WITH AND WITHOUT CONTRAST     HISTORY:  Abnormal gait and unable to speak     COMPARISON: No pertinent prior study     TECHNIQUE:  CT cerebral perfusion with and without contrast. Radiation dose  reduction techniques included automated exposure control or exposure  modulation based on body size. Radiation audit for CT and nuclear  cardiology exams in the last 12 months: 0.     FINDINGS:  CBF greater than 30%: 0 mL     Tmax greater than 6.0 seconds: 0 mL     Mismatch volume: 0     Mismatch ratio: 0     Hypoperfusion index: Cannot calculate  CBV index: Cannot calculate       Impression:      Normal CT cerebral perfusion study.        This report was finalized on 7/18/2023 7:08 PM by Dr. Bernardo Zaldivar MD.       CT Angiogram Neck [[id number]] Resulted: 07/18/23 1900     Updated: 07/18/23 1900    CT Angiogram Head w AI Analysis of LVO [[id number]] Resulted: 07/18/23 1857     Updated: 07/18/23 1857    XR Chest 1 View [[id number]] Collected: 07/18/23 1742     Updated: 07/18/23 1845    Narrative:      CHEST X-RAY, 1 VIEW     HISTORY:   Gait problem and  unable to speak     Comparison:  4/19/2023     FINDINGS:  The heart size is normal. Mediastinal structures are midline. Pulmonary  vascularity is normal.     No acute infiltrates. No pleural fluid. No pneumothorax.       Impression:      No clearly acute cardiopulmonary pathology demonstrated.        This report was finalized on 7/18/2023 6:43 PM by Dr. Bernardo Zaldivar MD.       CT Head Without Contrast Stroke Protocol [[id number]] Collected: 07/18/23 1725     Updated: 07/18/23 1831    Narrative:      CT HEAD WITHOUT     HISTORY:  Abnormal gait and unable to speak.     COMPARISON: 4/13/2022     TECHNIQUE:  CT head without. Radiation dose reduction techniques included automated  exposure control or exposure modulation based on body size. Radiation  audit for CT and nuclear cardiology exams in the last 12 months: 0.     FINDINGS:  The ventricles are generous in size. Cortical sulci are correspondingly  prominent. No extra-axial fluid collections. No significant shift of  midline structures. There are areas of low-attenuation in the  periventricular white matter likely representing white matter  microvascular disease.     No parenchymal or subarachnoid hemorrhage. Focal lacunar infarct in the  right basal ganglia. Focal lacunar infarct in the left thalamus. There  is a focal lacunar infarct in the left cerebellar hemisphere.     Mastoid air cells are clear. Visualized paranasal sinuses are clear.       Impression:         1. No clearly acute intracranial findings.     2. Remote lacunar infarcts in the right basal ganglia, the left thalamus  and the left cerebellar hemisphere.     3. Generalized cerebral atrophy and white matter microvascular disease.        This report was finalized on 7/18/2023 6:29 PM by Dr. Bernardo Zaldivar MD.             Reviewed    ECG/EMG Results (most recent)       Procedure Component Value Units Date/Time    ECG 12 Lead Stroke Evaluation [[id number]] Collected: 07/18/23 1841     Updated: 07/18/23 1842      QT Interval 364 ms     Narrative:      HEART RATE= 100  bpm  RR Interval= 596  ms  CO Interval= 175  ms  P Horizontal Axis=   deg  P Front Axis= 95  deg  QRSD Interval= 85  ms  QT Interval= 364  ms  QRS Axis= 3  deg  T Wave Axis= 95  deg  - ABNORMAL ECG -  Sinus tachycardia  Nonspecific repol abnormality, lateral leads  Electronically Signed By:   Date and Time of Study: 2023-07-18 18:41:09          Reviewed    Assessment & Plan     Aphasia and gait instability, rule out acute CVA/TIA in patient with previous CVA and TIAs  -Unfortunately although family noticed concerning symptoms they did not bring patient to hospital until symptoms recurred again today, counseled family on need to bring to ED immediately for all neurological changes in patient with known stroke history.   -CT head and CT cerebral perfusion unremarkable, Stroke neuro has reviewed CTA head and neck as well. They could not conduct a physical exam as camera malfunctioned. Did recommend MRI brain with admission to our group. MRI ordered.   -monitor tele, neuro checks, check A1c and lipid panel. Replace home atorvastatin with 80mg dose tonight, continue her home aspirin.   -no home BP medications, perhaps hypertensive urgency contributing to symptoms, given hydralazine x 2 doses in ED, I'll defer to neuro stroke on this, as we may need to allow permissive HTN at this time, but blood pressure now improved nonetheless.   -PT/OT assessment in AM     NSTEMI type II  -likely due to severe hypertension  -repeat trop pending, EKG shows no acute ischemic changes.       Chronic stable conditions to be managed with home medications include the following conditions listed below. Also please note: Every effort was made to accurately obtain patient's home medications. This was done utilizing extensive chart review, ED documentation, recent pharmacy records, and where possible thorough discussion with the patient if medications were known by the patient. I have  reviewed and edited the patient's home medications as per my efforts above and current med list can be found within home medications portion of this electronic medical record and is accurate as possible as of 07/18/23     Diabetes mellitus type 2 non-insulin-requiring  -Hold home metformin due to CTs obtained with contrast  -We will place on sliding scale insulin via Glucomander with Accu-Cheks      DVT PPX: SCDs        I discussed the patient's findings and my recommendations with patient     Avelino WALTON DO [place]  07/18/23  20:03 EDT    Time: 38 mins     At [facility], we believe that sharing information builds trust and better relationships. You are receiving this note because you recently visited [facility]. It is possible you will see health information before a provider has talked with you about it. This kind of information can be easy to misunderstand. To help you fully understand what it means for your health, we urge you to discuss this note with your provider.

## 2023-07-19 NOTE — PLAN OF CARE
Goal Outcome Evaluation:  Plan of Care Reviewed With: patient, significant other           Outcome Evaluation: PT Evaluation Complete: patient performs supine to sit transfer with mind A x 2, sit to supine transfer with mod A x 2, sit to/from stand transfers with mod A x 2, and stand pivot transfer with mod A x 2. Patient performed transfers with HHA and with device. Patient yasmin to take 3 steps forward with FWW with min/mod x 2 with use of FWW. Per significant other, patient is ambulatory at baseline without assist with use of rollator. Patient is oriented x 2 and lethargic throughout. Sitting balance varies from CGA to mod A. Patient would benefit from physical therapy to further assess mobility as lethargy improves as well as to maximize functional independence. Plan is for patient to return home with significant other with continued 24/7 care.      Anticipated Discharge Disposition (PT): home with 24/7 care

## 2023-07-19 NOTE — PLAN OF CARE
Goal Outcome Evaluation:  Plan of Care Reviewed With: patient, significant other        Progress: no change  Outcome Evaluation: Pt's mental status changed throughout the shift. She would be alert and able to carry on a conversation and other times be asleep and did not want to be disturbed. EEG attempted; could not complete due to movement and not tolerating the leads.

## 2023-07-19 NOTE — CONSULTS
Teleneurology Consultation Note - Video    Date of Consultation: 07/18/2023 23:30 EDT  Reason for consult: Left sided weakness  Location: FLOOR  Last known well Date/Time: 07/18/2023 00:00 EDT  Date/Time Telestroke doctor on phone: 07/18/2023 23:22 EDT  Date/Time Telestroke doctor on video: 07/18/2023 23:30 EDT  Date/time telestroke doctor assessment: 07/18/2023 23:36 EDT  Diagnosis: Recrudescence of old stroke symptoms    Patient Demographics:  Age: 72  Gender: Female    Assessment:  MRI r/o new strokes  Toxic metabolic workup  tPA Given: No  tPA exclusion reason: Out of 4.5 hr window for     Recommendation:  Imaging recommendations: MRI brain w/o contrast    Follow-up recommendations: In this patient with acute neurologic deficits, we would recommend that the inpatient neurology team be consulted for further ongoing recommendations. Please be sure to place the consult through EMR. Please call us at [employer] # [phone] and press 1 for emergent and press 2 for non-emergent if you have any questions.    Updated ED provider with recommendations at: 07/18/2023 23:37 EDT      History of Presenting Illness:  71 yo female was here today, code stroke in ED. then code stroke called on arrival to floor, as nurse noticed left side is contracted and weaker. This was documented in ED from prior stroke, but code was called for change in NIHSS documentation    Review of system: The patient's pertinent positive and negatives for the problem focused assessment is documented in HPI.    I have reviewed the chart for pertinent medical, surgical and social history.  Allergies: Reviewed in EMR    Pertinent medication prior to arrival:   Antiplatelet prior to arrival? None  Anticoagulation prior to arrival? None    NIHSS:  NIHSS time: 07/18/2023 23:36 EDT  1a Level of consciousness: 1  1b LOC questions: 1  1c LOC commands: 1  2 Best Gaze: 0  3 Visual: 0  4 Facial Palsy: 0  5a Motor left arm: 2  5b Motor right arm: 0  6a Motor left leg:  2  6b Motor right le  7 Limb ataxia: 0  8 Sensory: 0  9 Best language: 1  10 Dysarthria: 1  11 Extinction and inattention: 0  Total: 9      Pre-stroke mRS: 3    Personal review of CNS Imaging:  CTH performed? Yes  CT Interpretation date and time: 2023 23:36 EDT  Hemorrhage vs non- hemorrhage? No acute intracranial hemorrhage  Type of No acute intracranial hemorrhage:  Does not apply  ASPECT Score does not apply here because: Not a stroke  CTA head and neck performed? Yes  CTA review date and time: 2023 23:36 EDT  Results of CTA: No intracranial or extracranial large vessel occlusion or significant stenosis      Teleneurology patient verification & consent    I have proceeded with this evaluation at the direct request of the referring practitioner as this is felt to be an emergency setting and no appropriate specialist is available at bedside to perform these evaluations. The [facility] practitioner has reported to me this is the correct patient and has obtained verbal consent from the patient/surrogate to perform this voluntary telemedicine evaluation (including obtaining history, performing examination and reviewing data provided by the patient and/or originating [facility] care provider). I attest that I introduced myself to the patient, provided my credentials, disclosed my location, and determined that, based on review of the patient's chart and discussion with the patient's primary team, telemedicine via a HIPAA-compliant, real-time, face-to-face, two-way, interactive audio and video platform is an appropriate and effective means of providing this service. The patient/surrogate has the right to refuse this evaluation as they have been explained risks (including potential loss of confidentiality), benefits, alternatives, and the potential need for subsequent face to face care. Patient/surrogate understands that there is a risk of medical inaccuracies given that our  recommendations will be made based on reported data (and we must therefore assume this information is accurate). Knowing that there is a risk that this information is not reported accurately, and that the telemedicine video, audio, or data feed may be incomplete, the patient agrees to proceed with evaluation and holds us harmless knowing these risks. In this evaluation, we will be providing recommendations only. The ultimate decision to follow, or not follow, these recommendations will be left to the bedside treating/requesting practitioner. The patient/surrogate has been notified that other healthcare professionals (including technical personnel) may be involved in this audio-video evaluation. All laws concerning confidentiality and patient access to medical records and copies of medical records apply to telemedicine. The patient/surrogate has received the originating site's Health Notice of Privacy Practices.    Medical Data Reviewed:  Data reviewed include clinical labs, radiology, medical test;  Obtaining/reviewing old medical records;  Obtaining case history from another source;  Independent review of CNS images    Nino GAVIN MD, saw patient on 07/18/2023, 23:30 EDT for an initial in-patient or emergency room telemedicine face-to-face consult using interactive technology and have spent  greater than 20 minutes of time on the care of the patient today including time spent reviewing medical records, reviewing laboratory data, radiology, and other diagnostic tests, coordinating care with ancillary staff in addition to examining the patient and formulating a plan of care, discussing and counseling the patient and family. Greater than 50 percent of this time was spent in face to face and coordination of care. The location of the patient was at [facility]. My location was [place]. I was assisted with the exam by Nurse

## 2023-07-19 NOTE — SIGNIFICANT NOTE
07/19/23 0830   Rehab Evaluation   Document Type other (see comments)  (Missed visit)   Patient Observations lethargic;unable to respond  (limited movement with tactile to feet and legs; does not respond to verbal at this time and only limited response to tactile as above)   Patient/Family/Caregiver Comments/Observations Family reports pt is not very alert at this time and will most likely not be able to participate. They report no issues swallowing following previous strokes and mini-strokes. Spouse reports she ate some toast and drank liquids yesterday morning w/o any issue or concern.   Session Not Performed unable to evaluate, medical status change   Evaluation Not Performed, Comment Pt just returned from MRI and had been given 2 doses of IV Ativan for the test. Pt not able to arouse at this time and safely participate in clinical swallow eval at this time. Will check back later.   General Information   Patient Profile Reviewed yes   Pertinent History Of Current Problem Pt is a 71 y/o female admitted for possible stroke, NSTEMI type II and chronic diabetes mellitus. Noted on 7/17 to have a 3 hour period of difficuty walking and talking that resolved and did not seek medical care at that time. Symptoms returned on 7/18/23 around 5 pm with increasing confusion and family brought pt to the ER for treatment. Pt with hx of prior stroke about 14-15 months ago w/residual left sided weakness. They also report mini strokes and CT of head indicated no acute process, remote lacunar infarcts in right basal ganglia, left cerebellar hemisphere and generalized cerebral atrophy and white matter vascular disease.   Current Method of Nutrition NPO   Precautions/Limitations, Vision difficult to assess   Precautions/Limitations, Hearing difficult to assess   Prior Level of Function-Communication unknown;other (see comments)  (family reports no issues or residual from stroke)   Prior Level of Function-Swallowing no diet  consistency restrictions;safe, efficient swallowing in all situations;regular textures;thin liquids  (per family)   Plans/Goals Discussed with family;agreed upon  (Agree with deferring eval at this time and attempted when fully awake.)   Patient's Goals for Discharge patient could not state   Family Goals for Discharge patient able to eat/drink without coughing/choking;patient able to return to regular diet   Pain   Additional Documentation   (no current pain indicated)

## 2023-07-19 NOTE — THERAPY EVALUATION
Acute Care - Speech Language Pathology   Swallow Initial Evaluation  [place]     Patient Name: Arline Landaverde  : 1950  MRN: [id number]  Today's Date: 2023               Admit Date: 2023    Visit Dx:     ICD-10-CM ICD-9-CM   1. Altered mental status, unspecified altered mental status type  R41.82 780.97   2. Weakness  R53.1 780.79   3. Dysphagia, unspecified type [R13.10 (ICD-10-CM)]  R13.10 787.20     Patient Active Problem List   Diagnosis    Accidental fall, initial encounter    Closed displaced fracture of right femoral neck    Moderate malnutrition    Altered mental status     Past Medical History:   Diagnosis Date    Diabetes mellitus     Stroke      Past Surgical History:   Procedure Laterality Date    BACK SURGERY      CHOLECYSTECTOMY      HIP HEMIARTHROPLASTY Right 2023    Procedure: HIP HEMIARTHROPLASTY ANTERIOR;  Surgeon: Kumar Bonilla MD;  Location: [facility];  Service: Orthopedics;  Laterality: Right;       SLP Recommendation and Plan  SLP Swallowing Diagnosis: suspect acute-on-chronic, suspected pharyngeal dysphagia (23)  SLP Diet Recommendation: NPO, other (see comments) (only necessary oral meds whole in applesauce; provide medications via alternate route if able to provide in IV form.) (23)  Recommended Precautions and Strategies: general aspiration precautions (23)  SLP Rec. for Method of Medication Administration: meds via alternate route (only necessary oral meds whole in applesauce; provide medications via alternate route if able to provide in IV form.) (23)     Monitor for Signs of Aspiration: yes, cough, throat clearing (23)  Recommended Diagnostics: reassess via clinical swallow evaluation, VFSS (MBS) (23)  Swallow Criteria for Skilled Therapeutic Interventions Met: demonstrates skilled criteria (23)  Anticipated Discharge Disposition (SLP): unknown (23)  Rehab  Potential/Prognosis, Swallowing: adequate, monitor progress closely (07/19/23 1615)  Therapy Frequency (Swallow): 3 days per week, 5 days per week (07/19/23 1615)  Predicted Duration Therapy Intervention (Days): 5 days (07/19/23 1615)  Oral Care Recommendations: Oral Care BID/PRN, Toothbrush (07/19/23 1615)                  Patient/Family Concerns, Anticipated Discharge Disposition (SLP): No concerns reported per pt or s/o (07/19/23 1615)                   Oral Care Recommendations: Oral Care BID/PRN, Toothbrush (07/19/23 1615)    Plan of Care Reviewed With: patient, significant other  Outcome Evaluation: SLP: Pt demonstrates a suspected pharyngeal dysphagia with consistent coughing/strangling on thins from pt controlled straw drinks on 2 separate consecutive drink trials. Pt with inconsistent throat clearing on single drinks when SLP removed straw after one draw. No issues noted with trial of applesauce, but pt only allowed 2 trials. No difficulty noted with mastication of solid, onesimo cracker, with only one trial due to further refusal by this pt. Cough/strangle noted after drinking was characterized by a weak cough/respiratory effort. No further trials or po given at this time. Impression: Suspected pharyngeal dysphagia. Recommendation: NPO w/only necessary meds whole in puree/pudding consistency, and meds via alternate route if able to give in IV form; Reassess in am via clinical swallow eval and may warrant further instrumental via MBS.      SWALLOW EVALUATION (last 72 hours)       SLP Adult Swallow Evaluation       Row Name 07/19/23 1615 07/19/23 1058 07/19/23 0830             Rehab Evaluation    Document Type evaluation  -AD -- other (see comments)  Missed visit  -AD      Subjective Information no complaints  -AD -- --      Patient Observations alert;cooperative  -AD -- lethargic;unable to respond  limited movement with tactile to feet and legs; does not respond to verbal at this time and only limited  response to tactile as above  -AD      Patient/Family/Caregiver Comments/Observations Pt seen upright in bed, RT present and prepping for EEG as well. Pt generally cooperative but not wanting to try much po for exam. Caregiver/significant other present.  -AD -- Family reports pt is not very alert at this time and will most likely not be able to participate. They report no issues swallowing following previous strokes and mini-strokes. Spouse reports she ate some toast and drank liquids yesterday morning w/o any issue or concern.  -AD      Session Not Performed -- unable to evaluate, medical status change  -AD unable to evaluate, medical status change  -AD      Evaluation Not Performed, Comment -- Pt continues with decreased alertness and inability to safely participate in an evaluation. Pt will move to tactile stimulation but does not fully arouse, open eyes or respond. Will f/u.  -AD Pt just returned from MRI and had been given 2 doses of IV Ativan for the test. Pt not able to arouse at this time and safely participate in clinical swallow eval at this time. Will check back later.  -AD      Patient Effort other (see comments)  -AD -- --      Comment some limitations in responses.  -AD -- --      Symptoms Noted During/After Treatment other (see comments)  -AD -- --      Symptoms Noted, Comment coughing with liquids  -AD -- --         General Information    Patient Profile Reviewed yes  -AD -- yes  -AD      Pertinent History Of Current Problem No changes in history from earlier today. MRI limited due to motion and no large vessel occlusion noted. See report. S/O also reports a congested cough in the last 3 weeks. Not reported earlier.  -AD -- Pt is a 73 y/o female admitted for possible stroke, NSTEMI type II and chronic diabetes mellitus. Noted on 7/17 to have a 3 hour period of difficuty walking and talking that resolved and did not seek medical care at that time. Symptoms returned on 7/18/23 around 5 pm with  increasing confusion and family brought pt to the ER for treatment. Pt with hx of prior stroke about 14-15 months ago w/residual left sided weakness. They also report mini strokes and CT of head indicated no acute process, remote lacunar infarcts in right basal ganglia, left cerebellar hemisphere and generalized cerebral atrophy and white matter vascular disease.  -AD      Current Method of Nutrition NPO  -AD -- NPO  -AD      Precautions/Limitations, Vision WFL;for purposes of eval  -AD -- difficult to assess  -AD      Precautions/Limitations, Hearing WFL;for purposes of eval  -AD -- difficult to assess  -AD      Prior Level of Function-Communication other (see comments)  difficult to assess as pt would not directly answer questions for this therapist  -AD -- unknown;other (see comments)  family reports no issues or residual from stroke  -AD      Prior Level of Function-Swallowing no diet consistency restrictions;safe, efficient swallowing in all situations;regular textures;thin liquids  -AD -- no diet consistency restrictions;safe, efficient swallowing in all situations;regular textures;thin liquids  per family  -AD      Plans/Goals Discussed with patient;spouse/S.O.;agreed upon  s/o agrees  -AD -- family;agreed upon  Agree with deferring eval at this time and attempted when fully awake.  -AD      Barriers to Rehab medically complex  -AD -- --      Patient's Goals for Discharge patient did not state  -AD -- patient could not state  -AD      Family Goals for Discharge family did not state  -AD -- patient able to eat/drink without coughing/choking;patient able to return to regular diet  -AD         Pain    Additional Documentation --  no current pain reported  -AD -- --  no current pain indicated  -AD         Oral Motor Structure and Function    Oral Lesions or Structural Abnormalities and/or variants no gross deficits; exam limited by pt cooperation  -AD -- --      Secretion Management other (see comments)  no  anterior loss  -AD -- --      Mucosal Quality moist, healthy  -AD -- --      Volitional Swallow unable to elicit  -AD -- --      Volitional Cough unable to elicit  -AD -- --         Oral Musculature and Cranial Nerve Assessment    Oral Motor General Assessment unable to assess  -AD -- --      Oral Motor, Comment No gross deficits of asymmetry noted. Suspected generalized weakness, but not participating in exam.  -AD -- --         General Eating/Swallowing Observations    Respiratory Support Currently in Use room air  -AD -- --      Eating/Swallowing Skills fed by SLP;self-fed;other (see comments)  Pt controlled drinks of thins by straw.  -AD -- --      Positioning During Eating upright 90 degree;upright in chair;other (see comments)  legs drawn up to torso; arms kept crossed in front of her chest/neck  -AD -- --      Utensils Used spoon;straw  -AD -- --      Consistencies Trialed regular textures;pureed;thin liquids  -AD -- --         Respiratory    Respiratory Status WFL;room air;other (see comments)  coughing with thins  -AD -- --         Clinical Swallow Eval    Oral Prep Phase WFL  -AD -- --      Oral Transit impaired  -AD -- --      Oral Residue --  Unable to fully assess as pt did not allow SLP to look in mouth following po trials. No noted residue when pt talking afterwards.  -AD -- --      Pharyngeal Phase suspected pharyngeal impairment  -AD -- --      Esophageal Phase unremarkable  -AD -- --      Clinical Swallow Evaluation Summary Pt demonstrates a suspected pharyngeal dysphagia with consistent coughing/strangling on thins from pt controlled straw drinks on 2 separate consecutive drink trials. Pt with inconsistent throat clearing on single drinks when SLP removed straw after one draw. No issues noted with trial of applesauce, but pt only allowed 2 trials. No difficulty noted with mastication of solid, onesimo cracker, with only one trial due to further refusal by this pt. Cough/strangle noted after  drinking was characterized by a weak cough/respiratory effort. No further trials or po given at this time.  -AD -- --         Oral Transit Concerns    Oral Transit Concerns other (see comments)  -AD -- --      Oral Transit Concerns, Comment suspected decreased back of tongue control with pooling and possible early spill into the pharynx.  -AD -- --         Pharyngeal Phase Concerns    Pharyngeal Phase Concerns cough;throat clear;other (see comments)  strangling  -AD -- --      Cough thin  consecutive straw drinks  -AD -- --      Throat Clear thin;other (see comments)  single straw drinks  -AD -- --      Pharyngeal Phase Concerns, Comment Coughing, strangling on consecutive straw drinks. Throat clearing on single straw drinks. Pt with weak, ineffective cough and throat clear.  -AD -- --         SLP Evaluation Clinical Impression    SLP Swallowing Diagnosis suspect acute-on-chronic;suspected pharyngeal dysphagia  -AD -- --      Functional Impact risk of aspiration/pneumonia;risk of malnutrition  -AD -- --      Rehab Potential/Prognosis, Swallowing adequate, monitor progress closely  -AD -- --      Swallow Criteria for Skilled Therapeutic Interventions Met demonstrates skilled criteria  -AD -- --         Recommendations    Therapy Frequency (Swallow) 3 days per week;5 days per week  -AD -- --      Predicted Duration Therapy Intervention (Days) 5 days  -AD -- --      SLP Diet Recommendation NPO;other (see comments)  only necessary oral meds whole in applesauce; provide medications via alternate route if able to provide in IV form.  -AD -- --      Recommended Diagnostics reassess via clinical swallow evaluation;VFSS (MBS)  -AD -- --      Recommended Precautions and Strategies general aspiration precautions  -AD -- --      Oral Care Recommendations Oral Care BID/PRN;Toothbrush  -AD -- --      SLP Rec. for Method of Medication Administration meds via alternate route  only necessary oral meds whole in applesauce; provide  medications via alternate route if able to provide in IV form.  -AD -- --      Monitor for Signs of Aspiration yes;cough;throat clearing  -AD -- --      Anticipated Discharge Disposition (SLP) unknown  -AD -- --      Patient/Family Concerns, Anticipated Discharge Disposition (SLP) No concerns reported per pt or s/o  -AD -- --         Swallow Goals (SLP)    Swallow LTGs Patient will demonstrate progress toward functional swallow for;Swallow Long Term Goal (free text)  -AD -- --         (LTG) Patient will demonstrate progress toward functional swallow for    Diet Texture (Demonstrate progress toward functional swallow) soft to chew (whole) textures  -AD -- --      Liquid viscosity (Demonstrate progress toward functional swallow) nectar/ mildly thick liquids  -AD -- --      [place] (Demonstrate progress towards functional swallow) with minimal cues (75-90% accuracy)  -AD -- --      Time Frame (Demonstrate progress toward functional swallow) 1 week  -AD -- --      Barriers (Demonstrate progress toward functional swallow) medically complex; suspected acute on chronic deficit  -AD -- --      Progress/Outcomes (Demonstrate progress toward functional swallow) new goal  -AD -- --         (LTG) Swallow    (LTG) Swallow Pt will participate in further evaluation of swallowing, both clinically and via instrumental assessment, in 2 days to determine least restrictive diet tolerance.  -AD -- --                User Key  (r) = Recorded By, (t) = Taken By, (c) = Cosigned By      Initials Name Effective Dates    AD Lorie Garces, MS CCC-SLP 06/16/21 -                     EDUCATION  The patient has been educated in the following areas:   Dysphagia (Swallowing Impairment) NPO rationale.  Pt and s/o verbalize understanding. Pt requires reinforcement.       SLP GOALS       Row Name 07/19/23 9792             (LTG) Patient will demonstrate progress toward functional swallow for    Diet Texture (Demonstrate progress toward  functional swallow) soft to chew (whole) textures  -AD      Liquid viscosity (Demonstrate progress toward functional swallow) nectar/ mildly thick liquids  -AD      [place] (Demonstrate progress towards functional swallow) with minimal cues (75-90% accuracy)  -AD      Time Frame (Demonstrate progress toward functional swallow) 1 week  -AD      Barriers (Demonstrate progress toward functional swallow) medically complex; suspected acute on chronic deficit  -AD      Progress/Outcomes (Demonstrate progress toward functional swallow) new goal  -AD         (LTG) Swallow    (LTG) Swallow Pt will participate in further evaluation of swallowing, both clinically and via instrumental assessment, in 2 days to determine least restrictive diet tolerance.  -AD                User Key  (r) = Recorded By, (t) = Taken By, (c) = Cosigned By      Initials Name Provider Type    Lorie Abbasi MS CCC-SLP Speech and Language Pathologist                       Time Calculation:    Time Calculation- SLP       Row Name 07/19/23 1743             Time Calculation- SLP    SLP Start Time 1615  -AD      SLP Stop Time 1645  -AD      SLP Time Calculation (min) 30 min  -AD      Total Timed Code Minutes- SLP 0 minute(s)  -AD      SLP Non-Billable Time (min) 0 min  -AD      SLP Received On 07/19/23  -AD      SLP - Next Appointment 07/20/23  -AD         Untimed Charges    SLP Eval/Re-eval  ST Eval Oral Pharyng Swallow - 65041  -AD      [id number] Eval Oral Pharyng Swallow Minutes 30  -AD         Total Minutes    Untimed Charges Total Minutes 30  -AD       Total Minutes 30  -AD                User Key  (r) = Recorded By, (t) = Taken By, (c) = Cosigned By      Initials Name Provider Type    Lorie Abbasi MS CCC-SLP Speech and Language Pathologist                    Therapy Charges for Today       Code Description Service Date Service Provider Modifiers Qty    [id number]  ST EVAL ORAL PHARYNG SWALLOW 2 7/19/2023 Lorie Garces,  MS CCC-SLP GN 1                 Lorie Garces, MS CCC-SLP  7/19/2023

## 2023-07-19 NOTE — SIGNIFICANT NOTE
07/19/23 1137   OTHER   Discipline physical therapist   Rehab Time/Intention   Session Not Performed unable to evaluate, medical status change  (PT: Patient sleeping, unable to awaken. Patient does not open eyes or respond. Too lethargic for PT evaluation at this time. Will check back.)

## 2023-07-19 NOTE — PLAN OF CARE
Goal Outcome Evaluation:  Plan of Care Reviewed With: daughter        Progress: no change  Outcome Evaluation: VS stable, NIH 13, repeat CT  head done as well and repeat CTA head and neck this shift. UA done. Fall precautions. Bed alarm. Speech eval today, failed bedside dysphagia screen. MRI today.

## 2023-07-19 NOTE — CONSULTS
Patient Identification:  NAME:  Arline Landaverde  Age:  72 y.o.   Sex:  female   :  1950   MRN:  [id number]       Chief complaint: She does not have one, reason for consult change in mental status, difficulty speaking and ambulating    History of present illness:        Past medical history:  Past Medical History:   Diagnosis Date    Diabetes mellitus     Stroke        Past surgical history:  Past Surgical History:   Procedure Laterality Date    BACK SURGERY      CHOLECYSTECTOMY      HIP HEMIARTHROPLASTY Right 2023    Procedure: HIP HEMIARTHROPLASTY ANTERIOR;  Surgeon: Kumar Bonilla MD;  Location: [facility];  Service: Orthopedics;  Laterality: Right;       Allergies:  Penicillins    Home medications:  Medications Prior to Admission   Medication Sig Dispense Refill Last Dose    aspirin 81 MG EC tablet Take 1 tablet by mouth Every 12 (Twelve) Hours. Indications: VTE Prophylaxis 60 tablet 1 2023    atorvastatin (LIPITOR) 20 MG tablet Take 1 tablet by mouth Daily. (Patient taking differently: Take 1 tablet by mouth 2 (Two) Times a Day.) 14 tablet 0 Patient Taking Differently    ferrous gluconate (FERGON) 324 MG tablet Take 1 tablet by mouth Daily With Breakfast. (Patient taking differently: Take 1 tablet by mouth 2 (Two) Times a Day.) 30 tablet 1 Patient Taking Differently    metFORMIN (GLUCOPHAGE) 500 MG tablet Take 1 tablet by mouth 2 (Two) Times a Day With Meals.   2023    acetaminophen (TYLENOL) 325 MG tablet Take 2 tablets by mouth Every 6 (Six) Hours As Needed for Mild Pain.   Unknown    nicotine (NICODERM CQ) 21 MG/24HR patch Place 1 patch on the skin as directed by provider Daily. (Patient not taking: Reported on 2023) 21 each 0     oxyCODONE-acetaminophen (PERCOCET) 5-325 MG per tablet Take 1 tablet by mouth Every 6 (Six) Hours As Needed for Severe Pain. (Patient not taking: Reported on 2023) 40 tablet 0         Hospital medications:  atorvastatin, 80 mg, Oral,  "Nightly  ferrous gluconate, 324 mg, Oral, Daily With Breakfast  sodium chloride, 10 mL, Intravenous, Q12H      sodium chloride, 75 mL/hr        dextrose    metoprolol tartrate    sodium chloride    sodium chloride    sodium chloride    Family history:  Family History   Problem Relation Age of Onset    Cancer Mother        Social history:  Social History     Tobacco Use    Smoking status: Former     Packs/day: 1.00     Types: Cigarettes    Smokeless tobacco: Never   Vaping Use    Vaping Use: Never used   Substance Use Topics    Alcohol use: Never    Drug use: Never       Review of systems:    She denies just about everything but does appear to be confused really cannot give an accurate review of systems no family is present I reviewed the chart fully for the review of system    Objective:  Vitals Ranges:   Temp:  [97.1 °F (36.2 °C)-99.1 °F (37.3 °C)] 97.8 °F (36.6 °C)  Heart Rate:  [] 94  Resp:  [18-22] 18  BP: (135-204)/() 142/74      Physical Exam:  Patient is initially asleep but awakens is oriented times the hospital in [place] but said it was \"72\".  So really oriented x2 fund of knowledge poor attention span concentration fair recent remote memory fair language function dysarthria without aphasia.  Pupils 3 constricting slightly bilaterally.  Decreased central vision in the left.  I do not think she has a field cut but just the left.  Decreased eye vision nasolabial folds palate tongue symmetrical normal facial sensation head turning shoulder shrug and palate elevation.  Motor, I think there is some spasticity in the left arm but she is able to  on both sides lift both arms both legs move well.  She is a bit apraxic.  There is spasticity in the left upper extremity.  No atrophy.  Reflexes trace throughout.  Toes downgoing bilaterally sensation normal light touch face arms legs coordination within normal limits right greater than left and accuracy Station and gait not tested I was afraid this " confused encephalopathic patient would fall or pass out heart is regular without murmur neck supple without bruits extremities no clubbing cyanosis or edema visual acuity normal at 3 feet in the right eye    Results review:   I reviewed the patient's new clinical results.    Data review:  Lab Results (last 24 hours)       Procedure Component Value Units Date/Time    Iron Profile [[id number]]  (Abnormal) Collected: 07/19/23 0446    Specimen: Blood Updated: 07/19/23 1342     Iron 87 mcg/dL      Iron Saturation (TSAT) 33 %      TIBC 265 mcg/dL      UIBC 178 mcg/dL     Ferritin [[id number]]  (Abnormal) Collected: 07/19/23 0446    Specimen: Blood Updated: 07/19/23 1342     Ferritin 367.00 ng/mL     Narrative:      Results may be falsely decreased if patient taking Biotin.      Vitamin B12 [[id number]] Collected: 07/19/23 0446    Specimen: Blood Updated: 07/19/23 1335    Folate [[id number]] Collected: 07/19/23 0446    Specimen: Blood Updated: 07/19/23 1335    POC Glucose Once [[id number]]  (Normal) Collected: 07/19/23 0848    Specimen: Blood Updated: 07/19/23 0856     Glucose 91 mg/dL      Comment: Meter: [id number] : [id number] Milton CARPENTER       POC Glucose Once [[id number]]  (Normal) Collected: 07/19/23 0638    Specimen: Blood Updated: 07/19/23 0644     Glucose 83 mg/dL      Comment: Meter: [id number] : [id number] Zohreh Rajan RN       Lipid Panel [[id number]]  (Abnormal) Collected: 07/19/23 0446    Specimen: Blood Updated: 07/19/23 0536     Total Cholesterol 144 mg/dL      Triglycerides 58 mg/dL      HDL Cholesterol 66 mg/dL      LDL Cholesterol  66 mg/dL      VLDL Cholesterol 12 mg/dL      LDL/HDL Ratio 1.01    Narrative:      Cholesterol Reference Ranges  (U.S. Department of Health and Human Services ATP III Classifications)    Desirable          <200 mg/dL  Borderline High    200-239 mg/dL  High Risk          >240 mg/dL      Triglyceride Reference Ranges  (U.S. Department of Health and Human Services ATP  III Classifications)    Normal           <150 mg/dL  Borderline High  150-199 mg/dL  High             200-499 mg/dL  Very High        >500 mg/dL    HDL Reference Ranges  (U.S. Department of Health and Human Services ATP III Classifications)    Low     <40 mg/dl (major risk factor for CHD)  High    >60 mg/dl ('negative' risk factor for CHD)        LDL Reference Ranges  (U.S. Department of Health and Human Services ATP III Classifications)    Optimal          <100 mg/dL  Near Optimal     100-129 mg/dL  Borderline High  130-159 mg/dL  High             160-189 mg/dL  Very High        >189 mg/dL    Hemoglobin A1c [[id number]]  (Normal) Collected: 07/19/23 0446    Specimen: Blood Updated: 07/19/23 0535     Hemoglobin A1C 5.30 %     Narrative:      Hemoglobin A1C Ranges:    Increased Risk for Diabetes  5.7% to 6.4%  Diabetes                     >= 6.5%  Diabetic Goal                < 7.0%    POC Glucose Once [[id number]]  (Abnormal) Collected: 07/18/23 2219    Specimen: Blood Updated: 07/18/23 2225     Glucose 141 mg/dL      Comment: Meter: [id number] : [id number] Cushing Elizabeth NA       Urinalysis, Microscopic Only - Urine, Clean Catch [[id number]]  (Abnormal) Collected: 07/18/23 2145    Specimen: Urine, Clean Catch Updated: 07/18/23 2211     RBC, UA 0-2 /HPF      WBC, UA None Seen /HPF      Bacteria, UA None Seen /HPF      Squamous Epithelial Cells, UA 3-6 /HPF      Hyaline Casts, UA None Seen /LPF      Methodology Manual Light Microscopy    Urinalysis With Microscopic If Indicated (No Culture) - Urine, Clean Catch [[id number]]  (Abnormal) Collected: 07/18/23 2145    Specimen: Urine, Clean Catch Updated: 07/18/23 2154     Color, UA Yellow     Appearance, UA Clear     pH, UA 7.5     Specific Gravity, UA 1.015     Glucose, UA Negative     Ketones, UA Negative     Bilirubin, UA Negative     Blood, UA Trace     Protein, UA 30 mg/dL (1+)     Leuk Esterase, UA Negative     Nitrite, UA Negative     Urobilinogen, UA 0.2  E.U./dL    Single High Sensitivity Troponin T [[id number]]  (Abnormal) Collected: 07/18/23 1807    Specimen: Blood Updated: 07/18/23 1832     HS Troponin T 26 ng/L     Narrative:      High Sensitive Troponin T Reference Range:  <10.0 ng/L- Negative Female for AMI  <15.0 ng/L- Negative Male for AMI  >=10 - Abnormal Female indicating possible myocardial injury.  >=15 - Abnormal Male indicating possible myocardial injury.   Clinicians would have to utilize clinical acumen, EKG, Troponin, and serial changes to determine if it is an Acute Myocardial Infarction or myocardial injury due to an underlying chronic condition.         Comprehensive Metabolic Panel [[id number]]  (Abnormal) Collected: 07/18/23 1807    Specimen: Blood Updated: 07/18/23 1830     Glucose 110 mg/dL      BUN 15 mg/dL      Creatinine 0.64 mg/dL      Sodium 128 mmol/L      Potassium 4.6 mmol/L      Chloride 95 mmol/L      CO2 23.8 mmol/L      Calcium 9.4 mg/dL      Total Protein 6.7 g/dL      Albumin 3.9 g/dL      ALT (SGPT) 15 U/L      AST (SGOT) 17 U/L      Alkaline Phosphatase 55 U/L      Total Bilirubin 0.3 mg/dL      Globulin 2.8 gm/dL      A/G Ratio 1.4 g/dL      BUN/Creatinine Ratio 23.4     Anion Gap 9.2 mmol/L      eGFR 93.4 mL/min/1.73     Narrative:      GFR Normal >60  Chronic Kidney Disease <60  Kidney Failure <15    The GFR formula is only valid for adults with stable renal function between ages 18 and 70.    Protime-INR [[id number]]  (Normal) Collected: 07/18/23 1807    Specimen: Blood Updated: 07/18/23 1821     Protime 12.8 Seconds      INR 0.96    Narrative:      Therapeutic Ranges for INR: 2.0-3.0 (PT 20-30)                              2.5-3.5 (PT 25-34)    aPTT [[id number]]  (Normal) Collected: 07/18/23 1807    Specimen: Blood Updated: 07/18/23 1821     PTT 26.6 seconds     Narrative:      PTT = The equivalent PTT values for the therapeutic range of heparin levels at 0.1 to 0.7 U/ml are 53 to 110 seconds.      CBC & Differential  [[id number]]  (Abnormal) Collected: 07/18/23 1807    Specimen: Blood Updated: 07/18/23 1810    Narrative:      The following orders were created for panel order CBC & Differential.  Procedure                               Abnormality         Status                     ---------                               -----------         ------                     CBC Auto Differential[[id number]]        Abnormal            Final result                 Please view results for these tests on the individual orders.    CBC Auto Differential [[id number]]  (Abnormal) Collected: 07/18/23 1807    Specimen: Blood Updated: 07/18/23 1810     WBC 6.19 10*3/mm3      RBC 3.40 10*6/mm3      Hemoglobin 11.1 g/dL      Hematocrit 30.2 %      MCV 88.8 fL      MCH 32.6 pg      MCHC 36.8 g/dL      RDW 11.6 %      RDW-SD 37.2 fl      MPV 8.7 fL      Platelets 270 10*3/mm3      Neutrophil % 55.3 %      Lymphocyte % 28.9 %      Monocyte % 11.0 %      Eosinophil % 3.7 %      Basophil % 0.8 %      Immature Grans % 0.3 %      Neutrophils, Absolute 3.42 10*3/mm3      Lymphocytes, Absolute 1.79 10*3/mm3      Monocytes, Absolute 0.68 10*3/mm3      Eosinophils, Absolute 0.23 10*3/mm3      Basophils, Absolute 0.05 10*3/mm3      Immature Grans, Absolute 0.02 10*3/mm3      nRBC 0.0 /100 WBC     POC Glucose Once [[id number]]  (Normal) Collected: 07/18/23 1803    Specimen: Blood Updated: 07/18/23 1809     Glucose 120 mg/dL      Comment: Meter: [id number] : [id number] [facility]                Imaging:  Imaging Results (Last 24 Hours)       Procedure Component Value Units Date/Time    MRI Brain Without Contrast [[id number]] Collected: 07/19/23 0915     Updated: 07/19/23 1023    Narrative:      MRI BRAIN, NONCONTRAST, 7/19/2023         HISTORY:  72-year-old female admitted to the hospital on 7/18/2023 for acute  stroke evaluation. Past history of stroke. Speech difficulty and  abnormal gait. At the time of admission, CT head, CTA head and neck and  CT  perfusion studies revealed no acute insult.      TECHNIQUE:  MR imaging of the brain without IV contrast.     COMPARISON:  *  MRI brain, 4/13/2022.     FINDINGS:  Today, the examination is largely nondiagnostic. The patient was  agitated and uncooperative during the examination and moved throughout  the study despite premedication.     There is no gross evidence of a large acute intracranial abnormality.  Only an ischemic lesion involving an entire cerebral artery territory  could be confidently excluded on this examination. No visible large  intracerebral mass or large territory cerebral edema is visible. On the  FLAIR sequence, old lacunar infarcts are visible in the left cerebellar  hemisphere.       Impression:      1.  Nondiagnostic examination due to continuous patient motion  throughout the exam.  2.  There is no gross evidence of acute ischemic insult involving a  large cerebral artery territory. Smaller restricted diffusion lesions  would not be detectable on this study.     This report was finalized on 7/19/2023 10:21 AM by Dr. Kali Wong MD.       CT Angiogram Head [[id number]] Collected: 07/19/23 0207     Updated: 07/19/23 0315    Narrative:      CTA HEAD AND NECK   7/18/2023     HISTORY:  Follow-up stroke, unable to speak, abnormal gait     COMPARISON:  9 hours earlier     TECHNIQUE:    Spiral imaging was performed through the head and neck with angiographic  reconstructions. IV contrast was utilized. Sagittal: Reconstructions  generated. Radiation dose reduction techniques included automated  exposure control or exposure modulation based on body size. Radiation  audit for CT and nuclear cardiology exams in the last 12 months: 3.      FINDINGS:    The aortic arch is normal in size. The great vessels are all patent  without stenosis. The vertebral arteries both arise from the subclavian  arteries. The right 1 is dominant. These vessels unite to form the  basilar artery. Both common carotid  arteries are widely patent. There  are small calcified plaque formations of each bifurcation region without  significant stenosis. The internal carotid arteries are patent up to the  skull base.     The basilar artery and posterior cerebral arteries are normal in  appearance. The distal internal carotid arteries, middle cerebral  arteries and anterior cerebral arteries are normal in appearance. No  aneurysm is visible.       Impression:      No change from 9 hours ago. No significant stenosis. No  arterial occlusion is visible     This report was finalized on 7/19/2023 3:12 AM by Dr. Chidi Johnson MD.       CT Angiogram Neck [[id number]] Collected: 07/19/23 0207     Updated: 07/19/23 0315    Narrative:      CTA HEAD AND NECK   7/18/2023     HISTORY:  Follow-up stroke, unable to speak, abnormal gait     COMPARISON:  9 hours earlier     TECHNIQUE:    Spiral imaging was performed through the head and neck with angiographic  reconstructions. IV contrast was utilized. Sagittal: Reconstructions  generated. Radiation dose reduction techniques included automated  exposure control or exposure modulation based on body size. Radiation  audit for CT and nuclear cardiology exams in the last 12 months: 3.      FINDINGS:    The aortic arch is normal in size. The great vessels are all patent  without stenosis. The vertebral arteries both arise from the subclavian  arteries. The right 1 is dominant. These vessels unite to form the  basilar artery. Both common carotid arteries are widely patent. There  are small calcified plaque formations of each bifurcation region without  significant stenosis. The internal carotid arteries are patent up to the  skull base.     The basilar artery and posterior cerebral arteries are normal in  appearance. The distal internal carotid arteries, middle cerebral  arteries and anterior cerebral arteries are normal in appearance. No  aneurysm is visible.       Impression:      No change from 9 hours ago. No  significant stenosis. No  arterial occlusion is visible     This report was finalized on 7/19/2023 3:12 AM by Dr. Chidi Johnson MD.       CT Head Without Contrast [[id number]] Collected: 07/18/23 2152     Updated: 07/18/23 2257    Narrative:      CT HEAD, NONCONTRAST, 7/18/2023     HISTORY:  Follow-up stroke, unable to speak, abnormal gait     COMPARISON:  Earlier today        TECHNIQUE:  CT examination of the head was performed without IV contrast. Radiation  dose reduction techniques included automated exposure control or  exposure modulation based on body size. Radiation audit for CT and  nuclear cardiology exams in the last12 months: 3.           FINDINGS:  The ventricles and subarachnoid spaces are normal. There are no masses  or extra-axial fluid collections or hemorrhage. There is a prominent CSF  space in the left thalamus. Skull is normal.       Impression:      No change. No acute findings     This report was finalized on 7/18/2023 10:55 PM by Dr. Chidi Johnson MD.       CT Angiogram Head w AI Analysis of LVO [[id number]] Collected: 07/18/23 1942     Updated: 07/18/23 2047    Narrative:      CT ANGIOGRAM HEAD W AI ANALYSIS OF LVO-     INDICATION:   Unable to talk     TECHNIQUE:   CTA of the head with contrast. Coronal and sagittal reconstructions were  obtained.  Radiation dose reduction techniques included automated  exposure control or exposure modulation based on body size. Radiation  audit for number of CT and nuclear cardiology exams performed in the  last year: 0.       COMPARISON:   None available.     FINDINGS:  The intracranial portions of the internal carotid arteries demonstrate  no significant stenosis. No aneurysm is seen. The anterior and middle  cerebral arteries are of normal course and caliber. There is symmetric  bilateral runoff.     The posterior circulation shows the basilar artery to be of normal  course and caliber. The posterior cerebral arteries are of normal course  and caliber. No  aneurysm is seen. No flow-limiting stenosis. There is  symmetric distal runoff of the posterior circulation.       Impression:      CTA of the head demonstrating no significant abnormalities.              This report was finalized on 7/18/2023 8:45 PM by Dr. Bernardo Zaldivar MD.       CT Angiogram Neck [[id number]] Collected: 07/18/23 1937     Updated: 07/18/23 2043    Narrative:      CT ANGIOGRAM OF THE NECK WITH CONTRAST     HISTORY:  Unable to speak     COMPARISON: No pertinent prior study     TECHNIQUE:  CTA of the neck with contrast. Radiation dose reduction techniques  included automated exposure control or exposure modulation based on body  size. Radiation audit for CT and nuclear cardiology exams in the last 12  months: 0.     FINDINGS:  The origin of the great vessels shows no significant stenosis. The right  carotid bifurcation shows mild atherosclerotic plaque. No focal  stenosis.     The left carotid bifurcation demonstrates mild atherosclerotic plaque.  The degree of stenosis is approximately 30% diameter stenosis. This is  not hemodynamically or clinically significant.     The vertebral arteries are of normal course and caliber. The patient  demonstrates a dominant right vertebral. Both vertebral arteries  contribute to the basilar artery.       Impression:      Mild plaque formation at the left carotid bifurcation with a focal  stenosis of the left internal carotid artery of approximately 30%  diameter stenosis.        This report was finalized on 7/18/2023 8:40 PM by Dr. Bernardo Zaldivar MD.       CT CEREBRAL PERFUSION WITH & WITHOUT CONTRAST [[id number]] Collected: 07/18/23 1804     Updated: 07/18/23 1910    Narrative:      CT CEREBRAL PERFUSION WITH AND WITHOUT CONTRAST     HISTORY:  Abnormal gait and unable to speak     COMPARISON: No pertinent prior study     TECHNIQUE:  CT cerebral perfusion with and without contrast. Radiation dose  reduction techniques included automated exposure control or  exposure  modulation based on body size. Radiation audit for CT and nuclear  cardiology exams in the last 12 months: 0.     FINDINGS:  CBF greater than 30%: 0 mL     Tmax greater than 6.0 seconds: 0 mL     Mismatch volume: 0     Mismatch ratio: 0     Hypoperfusion index: Cannot calculate  CBV index: Cannot calculate       Impression:      Normal CT cerebral perfusion study.        This report was finalized on 7/18/2023 7:08 PM by Dr. Bernardo Zaldivar MD.       XR Chest 1 View [[id number]] Collected: 07/18/23 1742     Updated: 07/18/23 1845    Narrative:      CHEST X-RAY, 1 VIEW     HISTORY:   Gait problem and unable to speak     Comparison:  4/19/2023     FINDINGS:  The heart size is normal. Mediastinal structures are midline. Pulmonary  vascularity is normal.     No acute infiltrates. No pleural fluid. No pneumothorax.       Impression:      No clearly acute cardiopulmonary pathology demonstrated.        This report was finalized on 7/18/2023 6:43 PM by Dr. Bernardo Zaldivar MD.       CT Head Without Contrast Stroke Protocol [[id number]] Collected: 07/18/23 1725     Updated: 07/18/23 1831    Narrative:      CT HEAD WITHOUT     HISTORY:  Abnormal gait and unable to speak.     COMPARISON: 4/13/2022     TECHNIQUE:  CT head without. Radiation dose reduction techniques included automated  exposure control or exposure modulation based on body size. Radiation  audit for CT and nuclear cardiology exams in the last 12 months: 0.     FINDINGS:  The ventricles are generous in size. Cortical sulci are correspondingly  prominent. No extra-axial fluid collections. No significant shift of  midline structures. There are areas of low-attenuation in the  periventricular white matter likely representing white matter  microvascular disease.     No parenchymal or subarachnoid hemorrhage. Focal lacunar infarct in the  right basal ganglia. Focal lacunar infarct in the left thalamus. There  is a focal lacunar infarct in the left cerebellar  hemisphere.     Mastoid air cells are clear. Visualized paranasal sinuses are clear.       Impression:         1. No clearly acute intracranial findings.     2. Remote lacunar infarcts in the right basal ganglia, the left thalamus  and the left cerebellar hemisphere.     3. Generalized cerebral atrophy and white matter microvascular disease.        This report was finalized on 7/18/2023 6:29 PM by Dr. Bernardo Zaldivar MD.                  Assessment and Plan:     This patient presented with some change in her mental status that I believe is going to be metabolic encephalopathy related to hyponatremia.  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  Her MRI scan by my independent eyeball review is a blurry mess but I do not think it shows any acute stroke or diffusion-weighted abnormality.  I do not think we need to repeat it.  At this point I would recommend an EEG just to make sure that she has not been having some type of partial complex seizure but I think what we are seeing has been a combination of dementia and low natremia with change in mental status.  The fact that the first episode seem to come on while she was walking around [facility] could also indicate some orthostatic hypotension.  However it is noted this patient overnight did have a pressure as elevated as 204/104, however her blood pressures have run on the high side at times.  A diagnosis of posterior reversible encephalopathic syndrome should be in the differential diagnosis as well (but again the MRI scan is poor quality but does not seem to show any acute abnormality)         James Moise MD  07/19/23  14:52 EDT

## 2023-07-19 NOTE — SIGNIFICANT NOTE
07/19/23 1058   Rehab Evaluation   Session Not Performed unable to evaluate, medical status change   Evaluation Not Performed, Comment Pt continues with decreased alertness and inability to safely participate in an evaluation. Pt will move to tactile stimulation but does not fully arouse, open eyes or respond. Will f/u.

## 2023-07-19 NOTE — PROGRESS NOTES
Attempt to perform EEG , pt was unable to keep her head still during application of leads.    Pt was rubbing her head back in forth on her pillow.     EEG was unable to be completed .    Dr Moise notified

## 2023-07-19 NOTE — THERAPY EVALUATION
Patient Name: Arline Landaverde  : 1950    MRN: [id number]                              Today's Date: 2023       Admit Date: 2023    Visit Dx:     ICD-10-CM ICD-9-CM   1. Altered mental status, unspecified altered mental status type  R41.82 780.97   2. Weakness  R53.1 780.79     Patient Active Problem List   Diagnosis    Accidental fall, initial encounter    Closed displaced fracture of right femoral neck    Moderate malnutrition    Altered mental status     Past Medical History:   Diagnosis Date    Diabetes mellitus     Stroke      Past Surgical History:   Procedure Laterality Date    BACK SURGERY      CHOLECYSTECTOMY      HIP HEMIARTHROPLASTY Right 2023    Procedure: HIP HEMIARTHROPLASTY ANTERIOR;  Surgeon: Kumar Bonilla MD;  Location: [facility];  Service: Orthopedics;  Laterality: Right;      General Information       Row Name 23 1449          OT Time and Intention    Document Type evaluation  -EN     Mode of Treatment occupational therapy  -EN       Row Name 23 1449          General Information    Patient Profile Reviewed yes  Presented to ED after difficulty walking and slurred speech. Stroke protocol initiated.  -EN     Prior Level of Function independent:;all household mobility;ADL's  -EN     Existing Precautions/Restrictions fall  -EN     Barriers to Rehab previous functional deficit  -EN       Row Name 23 1449          Living Environment    People in Home significant other  significant other reports he's with pt   -EN       Row Name 23 1449          Home Main Entrance    Number of Stairs, Main Entrance two  -EN     Stair Railings, Main Entrance railings on both sides of stairs  -EN       Row Name 23 1449          Cognition    Orientation Status (Cognition) oriented to;person;place  stated year as   -EN       Row Name 23 1449          Safety Issues, Functional Mobility    Safety Issues Affecting Function (Mobility) other (see  comments);awareness of need for assistance;impulsivity;insight into deficits/self-awareness;problem-solving;safety precaution awareness  required cues to open eyes, cues to follow one step commands  -EN     Impairments Affecting Function (Mobility) cognition  -EN               User Key  (r) = Recorded By, (t) = Taken By, (c) = Cosigned By      Initials Name Provider Type    Marielena Field OTR Occupational Therapist                     Mobility/ADL's       Row Name 07/19/23 1454          Bed Mobility    Bed Mobility supine-sit;sit-supine  -EN     Supine-Sit [place] (Bed Mobility) minimum assist (75% patient effort);2 person assist;verbal cues  -EN     Sit-Supine [place] (Bed Mobility) maximum assist (25% patient effort);2 person assist;verbal cues  -EN     Bed Mobility, Safety Issues cognitive deficits limit understanding  -EN     Assistive Device (Bed Mobility) bed rails;draw sheet;head of bed elevated  -EN       Row Name 07/19/23 1459          Transfers    Transfers sit-stand transfer;stand-sit transfer;toilet transfer  -EN       Row Name 07/19/23 1454          Sit-Stand Transfer    Sit-Stand [place] (Transfers) moderate assist (50% patient effort);2 person assist;verbal cues  -EN     Assistive Device (Sit-Stand Transfers) walker, front-wheeled  -EN       Row Name 07/19/23 6724          Stand-Sit Transfer    Stand-Sit [place] (Transfers) moderate assist (50% patient effort);2 person assist  -EN     Assistive Device (Stand-Sit Transfers) walker, front-wheeled  -EN       Row Name 07/19/23 1458          Toilet Transfer    Type (Toilet Transfer) stand pivot/stand step  -EN     [place] Level (Toilet Transfer) moderate assist (50% patient effort);2 person assist  initially performed SPS with HHA x 2, improved with use of walker.  -EN     Assistive Device (Toilet Transfer) commode, 3-in-1;walker, front-wheeled  -EN       Row Name 07/19/23 0812          Functional Mobility    Functional  Mobility- Ind. Level minimum assist (75% patient effort);moderate assist (50% patient effort);2 person assist required  -EN     Functional Mobility- Device walker, front-wheeled  -EN     Functional Mobility-Distance (Feet) --  3-4 steps  -EN       Row Name 07/19/23 1450          Activities of Daily Living    BADL Assessment/Intervention bathing;upper body dressing;lower body dressing  -EN       Row Name 07/19/23 1454          Bathing Assessment/Intervention    Comment, (Bathing) anticipate max assist for bathing and dressing due to current cognitive status and assist required for static sitting and standing  -EN               User Key  (r) = Recorded By, (t) = Taken By, (c) = Cosigned By      Initials Name Provider Type    Marielena Field OTR Occupational Therapist                   Obj/Interventions       Row Name 07/19/23 1456          Sensory Assessment (Somatosensory)    Sensory Assessment (Somatosensory) other (see comments)  reports no UE numbness or tingling  -EN       Row Name 07/19/23 1457          Range of Motion Comprehensive    General Range of Motion bilateral upper extremity ROM WFL  -EN       Row Name 07/19/23 1452          Strength Comprehensive (MMT)    Comment, General Manual Muscle Testing (MMT) Assessment Not formally assessed due to cognition. Patient was able to squeeze therapist hands, L  slightly less (previous CVA with L Kalpesh). R  4/5, L  4-/5  -EN       Row Name 07/19/23 1357          Balance    Comment, Balance Patient sat EOB with CGA to mod assist (forward lean). Patient required min/mod A X 2 for static standing with FWW  -EN               User Key  (r) = Recorded By, (t) = Taken By, (c) = Cosigned By      Initials Name Provider Type    Marielena Field OTR Occupational Therapist                   Goals/Plan       Row Name 07/19/23 0997          Transfer Goal 1 (OT)    Activity/Assistive Device (Transfer Goal 1, OT) sit-to-stand/stand-to-sit;toilet;commode,  "3-in-1;walker, rolling  -EN     [place] Level/Cues Needed (Transfer Goal 1, OT) minimum assist (75% or more patient effort)  -EN     Time Frame (Transfer Goal 1, OT) 5 days  -EN     Progress/Outcome (Transfer Goal 1, OT) new goal  -EN       Row Name 07/19/23 1507          Dressing Goal 1 (OT)    Activity/Device (Dressing Goal 1, OT) lower body dressing  -EN     [place]/Cues Needed (Dressing Goal 1, OT) minimum assist (75% or more patient effort)  -EN     Time Frame (Dressing Goal 1, OT) 5 days  -EN     Progress/Outcome (Dressing Goal 1, OT) new goal  -EN       Row Name 07/19/23 1507          Problem Specific Goal 1 (OT)    Problem Specific Goal 1 (OT) Patient will perform dynamic sitting balance activity EOB with CGA for 3-4 minutes for improved safety during ADL routine.  -EN     Time Frame (Problem Specific Goal 1, OT) 1 week  -EN     Progress/Outcome (Problem Specific Goal 1, OT) new goal  -EN       Row Name 07/19/23 1507          Therapy Assessment/Plan (OT)    Planned Therapy Interventions (OT) adaptive equipment training;BADL retraining;functional balance retraining;patient/caregiver education/training;ROM/therapeutic exercise;strengthening exercise;transfer/mobility retraining  -EN               User Key  (r) = Recorded By, (t) = Taken By, (c) = Cosigned By      Initials Name Provider Type    Marielena Field OTR Occupational Therapist                   Clinical Impression       Row Name 07/19/23 1500          Pain Assessment    Pretreatment Pain Rating 0/10 - no pain  -EN     Posttreatment Pain Rating 0/10 - no pain  -EN       Row Name 07/19/23 1500          Plan of Care Review    Plan of Care Reviewed With significant other  -EN     Outcome Evaluation OT evaluation completed. Patient oriented to name and place, stated year as \"73\". Pt lethargic and required cues to keep eyes open during assessment. Patient performed supine to sit with min assist X 2 and sat EOB with CGA to mod assist to " maintain static sitting balance. Patient stood from EOB with mod assist X 2 and performed SPS transfer to BSC with mod assist X 2. Patient was able to take 3-4 steps back to bed with FWW and min/mod assist X 2. B UE AROM was WFL. OT will follow while in the hospital and continue to assess  discharge needs. Significant other in room and reports he's with pt 24/7 and able to assist as needed.  -EN       Row Name 07/19/23 1500          Therapy Assessment/Plan (OT)    Rehab Potential (OT) good, to achieve stated therapy goals  -EN     Criteria for Skilled Therapeutic Interventions Met (OT) yes;skilled treatment is necessary  -EN     Therapy Frequency (OT) 5 times/wk  -EN     Predicted Duration of Therapy Intervention (OT) one week  -EN       Row Name 07/19/23 1500          Therapy Plan Review/Discharge Plan (OT)    Equipment Needs Upon Discharge (OT) --  Pt has a BSC, shower chair, w/c, rollator, and FWW  -EN     Anticipated Discharge Disposition (OT) other (see comments)  will continue to assess  -EN       Row Name 07/19/23 1500          Positioning and Restraints    Pre-Treatment Position in bed  -EN     Post Treatment Position bed  -EN     In Bed supine;call light within reach;encouraged to call for assist;exit alarm on;with family/caregiver  -EN               User Key  (r) = Recorded By, (t) = Taken By, (c) = Cosigned By      Initials Name Provider Type    EN Marielena Francis, OTR Occupational Therapist                   Outcome Measures       Row Name 07/19/23 1500          How much help from another is currently needed...    Putting on and taking off regular lower body clothing? 2  -EN     Bathing (including washing, rinsing, and drying) 2  -EN     Toileting (which includes using toilet bed pan or urinal) 2  -EN     Putting on and taking off regular upper body clothing 2  -EN     Taking care of personal grooming (such as brushing teeth) 3  -EN     Eating meals 3  -EN     AM-PAC 6 Clicks Score (OT) 14  -EN        Row Name 07/19/23 0800          How much help from another person do you currently need...    Turning from your back to your side while in flat bed without using bedrails? 2  -AJ     Moving from lying on back to sitting on the side of a flat bed without bedrails? 2  -AJ     Moving to and from a bed to a chair (including a wheelchair)? 2  -AJ     Standing up from a chair using your arms (e.g., wheelchair, bedside chair)? 2  -AJ     Climbing 3-5 steps with a railing? 1  -AJ     To walk in hospital room? 1  -AJ     AM-PAC 6 Clicks Score (PT) 10  -AJ     Highest level of mobility 4 --> Transferred to chair/commode  -AJ       Row Name 07/19/23 1509          Modified [place] Scale    Pre-Stroke Modified [place] Scale 2 - Slight disability.  Unable to carry out all previous activities but able to look after own affairs without assistance.  -EN     Modified [place] Scale 4 - Moderately severe disability.  Unable to walk without assistance, and unable to attend to own bodily needs without assistance.  -EN       Row Name 07/19/23 1509          Functional Assessment    Outcome Measure Options AM-PAC 6 Clicks Daily Activity (OT);Modified [place]  -EN               User Key  (r) = Recorded By, (t) = Taken By, (c) = Cosigned By      Initials Name Provider Type    Marielena Field OTR Occupational Therapist    Cinda Reagan, RN Registered Nurse                    Occupational Therapy Education       Title: PT OT SLP Therapies (In Progress)       Topic: Occupational Therapy (In Progress)       Point: ADL training (Done)       Description:   Instruct learner(s) on proper safety adaptation and remediation techniques during self care or transfers.   Instruct in proper use of assistive devices.                  Learning Progress Summary             Patient Acceptance, E, VU by AMADOR at 7/19/2023 1510    Comment: Patient educated on safety during functional transfers.   Significant Other Acceptance, E, VU by EN at 7/19/2023  "1510    Comment: Patient educated on safety during functional transfers.                         Point: Home exercise program (Not Started)       Description:   Instruct learner(s) on appropriate technique for monitoring, assisting and/or progressing therapeutic exercises/activities.                  Learner Progress:  Not documented in this visit.                              User Key       Initials Effective Dates Name Provider Type Discipline    EN 06/16/21 -  Marielena Francis OTR Occupational Therapist OT                  OT Recommendation and Plan  Planned Therapy Interventions (OT): adaptive equipment training, BADL retraining, functional balance retraining, patient/caregiver education/training, ROM/therapeutic exercise, strengthening exercise, transfer/mobility retraining  Therapy Frequency (OT): 5 times/wk  Plan of Care Review  Plan of Care Reviewed With: significant other  Outcome Evaluation: OT evaluation completed. Patient oriented to name and place, stated year as \"73\". Pt lethargic and required cues to keep eyes open during assessment. Patient performed supine to sit with min assist X 2 and sat EOB with CGA to mod assist to maintain static sitting balance. Patient stood from EOB with mod assist X 2 and performed SPS transfer to [facility] with mod assist X 2. Patient was able to take 3-4 steps back to bed with FWW and min/mod assist X 2. B UE AROM was WFL. OT will follow while in the hospital and continue to assess  discharge needs. Significant other in room and reports he's with pt 24/7 and able to assist as needed.     Time Calculation:   Evaluation Complexity (OT)  Review Occupational Profile/Medical/Therapy History Complexity: expanded/moderate complexity  Assessment, Occupational Performance/Identification of Deficit Complexity: 3-5 performance deficits  Clinical Decision Making Complexity (OT): detailed assessment/moderate complexity  Overall Complexity of Evaluation (OT): moderate complexity     " Time Calculation- OT       Row Name 07/19/23 1512             Time Calculation- OT    OT Start Time 1400  -EN      OT Stop Time 1424  -EN      OT Time Calculation (min) 24 min  -EN                User Key  (r) = Recorded By, (t) = Taken By, (c) = Cosigned By      Initials Name Provider Type    Marielena Field OTR Occupational Therapist                  Therapy Charges for Today       Code Description Service Date Service Provider Modifiers Qty    [id number] HC OT EVAL MOD COMPLEXITY 2 7/19/2023 Marielena Francis OTR GO 1                 KATHLEEN Monteiro  7/19/2023

## 2023-07-19 NOTE — PROGRESS NOTES
"SERVICE: [facility] HOSPITALIST    CONSULTANTS: neuro stroke, neuro    CHIEF COMPLAINT: Follow-up metabolic encephalopathy    SUBJECTIVE: At the time of my exam, patient has just awakened after being given Ativan to do MRI this morning.  Significant other is at the bedside and feels that her movement is at her baseline however she remains confused.  She is asking for a \"johnathon dog\" but notes that she does not feel hungry.  She notes no headache or other pain concerns however significant other notes she did have a headache yesterday.  He also notes that she had a cough for the past 2 or 3 weeks but no other sick symptoms.  She and significant other note no other new concerns.  Patient continues to yawn and try to stay awake.    OBJECTIVE:    /74 (BP Location: Right arm, Patient Position: Lying)   Pulse 94   Temp 97.8 °F (36.6 °C) (Axillary)   Resp 18   Ht 162.6 cm (64\")   Wt 55.6 kg (122 lb 9.6 oz)   SpO2 98%   BMI 21.04 kg/m²     MEDS/LABS REVIEWED AND ORDERED    atorvastatin, 80 mg, Oral, Nightly  ferrous gluconate, 324 mg, Oral, Daily With Breakfast  sodium chloride, 10 mL, Intravenous, Q12H      Physical Exam  Vitals reviewed.   Constitutional:       General: She is not in acute distress.     Comments: Appears older than stated age, sleepy but awake   HENT:      Head: Normocephalic and atraumatic.      Mouth/Throat:      Mouth: Mucous membranes are moist.      Comments: Edentulous  Eyes:      Extraocular Movements: Extraocular movements intact.      Pupils: Pupils are equal, round, and reactive to light.   Cardiovascular:      Rate and Rhythm: Normal rate and regular rhythm.   Pulmonary:      Effort: Pulmonary effort is normal. No respiratory distress.      Breath sounds: Normal breath sounds. No wheezing or rales.   Abdominal:      General: Abdomen is flat. Bowel sounds are normal. There is no distension.      Palpations: Abdomen is soft.      Tenderness: There is no abdominal " tenderness. There is no guarding.   Musculoskeletal:         General: No swelling.   Skin:     General: Skin is warm and dry.      Capillary Refill: Capillary refill takes less than 2 seconds.      Findings: No erythema.   Neurological:      Comments: Chronic left-sided facial droop   Psychiatric:      Comments: Calm     LAB/DIAGNOSTICS:    Lab Results (last 24 hours)       Procedure Component Value Units Date/Time    POC Glucose Once [[id number]]  (Normal) Collected: 07/19/23 0848    Specimen: Blood Updated: 07/19/23 0856     Glucose 91 mg/dL      Comment: Meter: [id number] : [id number] Milton CARPENTER       POC Glucose Once [[id number]]  (Normal) Collected: 07/19/23 0638    Specimen: Blood Updated: 07/19/23 0644     Glucose 83 mg/dL      Comment: Meter: [id number] : [id number] Zohreh Rajan RN       Lipid Panel [[id number]]  (Abnormal) Collected: 07/19/23 0446    Specimen: Blood Updated: 07/19/23 0536     Total Cholesterol 144 mg/dL      Triglycerides 58 mg/dL      HDL Cholesterol 66 mg/dL      LDL Cholesterol  66 mg/dL      VLDL Cholesterol 12 mg/dL      LDL/HDL Ratio 1.01    Narrative:      Cholesterol Reference Ranges  (U.S. Department of Health and Human Services ATP III Classifications)    Desirable          <200 mg/dL  Borderline High    200-239 mg/dL  High Risk          >240 mg/dL      Triglyceride Reference Ranges  (U.S. Department of Health and Human Services ATP III Classifications)    Normal           <150 mg/dL  Borderline High  150-199 mg/dL  High             200-499 mg/dL  Very High        >500 mg/dL    HDL Reference Ranges  (U.S. Department of Health and Human Services ATP III Classifications)    Low     <40 mg/dl (major risk factor for CHD)  High    >60 mg/dl ('negative' risk factor for CHD)        LDL Reference Ranges  (U.S. Department of Health and Human Services ATP III Classifications)    Optimal          <100 mg/dL  Near Optimal     100-129 mg/dL  Borderline High  130-159 mg/dL  High              160-189 mg/dL  Very High        >189 mg/dL    Hemoglobin A1c [[id number]]  (Normal) Collected: 07/19/23 0446    Specimen: Blood Updated: 07/19/23 0535     Hemoglobin A1C 5.30 %     Narrative:      Hemoglobin A1C Ranges:    Increased Risk for Diabetes  5.7% to 6.4%  Diabetes                     >= 6.5%  Diabetic Goal                < 7.0%    POC Glucose Once [[id number]]  (Abnormal) Collected: 07/18/23 2219    Specimen: Blood Updated: 07/18/23 2225     Glucose 141 mg/dL      Comment: Meter: [id number] : [id number] [place] Elizabeth JAYDEN       Urinalysis, Microscopic Only - Urine, Clean Catch [[id number]]  (Abnormal) Collected: 07/18/23 2145    Specimen: Urine, Clean Catch Updated: 07/18/23 2211     RBC, UA 0-2 /HPF      WBC, UA None Seen /HPF      Bacteria, UA None Seen /HPF      Squamous Epithelial Cells, UA 3-6 /HPF      Hyaline Casts, UA None Seen /LPF      Methodology Manual Light Microscopy    Urinalysis With Microscopic If Indicated (No Culture) - Urine, Clean Catch [[id number]]  (Abnormal) Collected: 07/18/23 2145    Specimen: Urine, Clean Catch Updated: 07/18/23 2154     Color, UA Yellow     Appearance, UA Clear     pH, UA 7.5     Specific Gravity, UA 1.015     Glucose, UA Negative     Ketones, UA Negative     Bilirubin, UA Negative     Blood, UA Trace     Protein, UA 30 mg/dL (1+)     Leuk Esterase, UA Negative     Nitrite, UA Negative     Urobilinogen, UA 0.2 E.U./dL    Single High Sensitivity Troponin T [[id number]]  (Abnormal) Collected: 07/18/23 1807    Specimen: Blood Updated: 07/18/23 1832     HS Troponin T 26 ng/L     Narrative:      High Sensitive Troponin T Reference Range:  <10.0 ng/L- Negative Female for AMI  <15.0 ng/L- Negative Male for AMI  >=10 - Abnormal Female indicating possible myocardial injury.  >=15 - Abnormal Male indicating possible myocardial injury.   Clinicians would have to utilize clinical acumen, EKG, Troponin, and serial changes to determine if it is an Acute Myocardial  Infarction or myocardial injury due to an underlying chronic condition.         Comprehensive Metabolic Panel [[id number]]  (Abnormal) Collected: 07/18/23 1807    Specimen: Blood Updated: 07/18/23 1830     Glucose 110 mg/dL      BUN 15 mg/dL      Creatinine 0.64 mg/dL      Sodium 128 mmol/L      Potassium 4.6 mmol/L      Chloride 95 mmol/L      CO2 23.8 mmol/L      Calcium 9.4 mg/dL      Total Protein 6.7 g/dL      Albumin 3.9 g/dL      ALT (SGPT) 15 U/L      AST (SGOT) 17 U/L      Alkaline Phosphatase 55 U/L      Total Bilirubin 0.3 mg/dL      Globulin 2.8 gm/dL      A/G Ratio 1.4 g/dL      BUN/Creatinine Ratio 23.4     Anion Gap 9.2 mmol/L      eGFR 93.4 mL/min/1.73     Narrative:      GFR Normal >60  Chronic Kidney Disease <60  Kidney Failure <15    The GFR formula is only valid for adults with stable renal function between ages 18 and 70.    Protime-INR [[id number]]  (Normal) Collected: 07/18/23 1807    Specimen: Blood Updated: 07/18/23 1821     Protime 12.8 Seconds      INR 0.96    Narrative:      Therapeutic Ranges for INR: 2.0-3.0 (PT 20-30)                              2.5-3.5 (PT 25-34)    aPTT [[id number]]  (Normal) Collected: 07/18/23 1807    Specimen: Blood Updated: 07/18/23 1821     PTT 26.6 seconds     Narrative:      PTT = The equivalent PTT values for the therapeutic range of heparin levels at 0.1 to 0.7 U/ml are 53 to 110 seconds.      CBC & Differential [[id number]]  (Abnormal) Collected: 07/18/23 1807    Specimen: Blood Updated: 07/18/23 1810    Narrative:      The following orders were created for panel order CBC & Differential.  Procedure                               Abnormality         Status                     ---------                               -----------         ------                     CBC Auto Differential[[id number]]        Abnormal            Final result                 Please view results for these tests on the individual orders.    CBC Auto Differential [[id number]]  (Abnormal)  Collected: 07/18/23 1807    Specimen: Blood Updated: 07/18/23 1810     WBC 6.19 10*3/mm3      RBC 3.40 10*6/mm3      Hemoglobin 11.1 g/dL      Hematocrit 30.2 %      MCV 88.8 fL      MCH 32.6 pg      MCHC 36.8 g/dL      RDW 11.6 %      RDW-SD 37.2 fl      MPV 8.7 fL      Platelets 270 10*3/mm3      Neutrophil % 55.3 %      Lymphocyte % 28.9 %      Monocyte % 11.0 %      Eosinophil % 3.7 %      Basophil % 0.8 %      Immature Grans % 0.3 %      Neutrophils, Absolute 3.42 10*3/mm3      Lymphocytes, Absolute 1.79 10*3/mm3      Monocytes, Absolute 0.68 10*3/mm3      Eosinophils, Absolute 0.23 10*3/mm3      Basophils, Absolute 0.05 10*3/mm3      Immature Grans, Absolute 0.02 10*3/mm3      nRBC 0.0 /100 WBC     POC Glucose Once [[id number]]  (Normal) Collected: 07/18/23 1803    Specimen: Blood Updated: 07/18/23 1809     Glucose 120 mg/dL      Comment: Meter: [id number] : [id number] [facility]             ECG 12 Lead Stroke Evaluation   Final Result   HEART RATE= 100  bpm   RR Interval= 596  ms   WA Interval= 175  ms   P Horizontal Axis=   deg   P Front Axis= 95  deg   QRSD Interval= 85  ms   QT Interval= 364  ms   QRS Axis= 3  deg   T Wave Axis= 95  deg   - ABNORMAL ECG -   Sinus tachycardia   Nonspecific repol abnormality, lateral leads   No change from prior tracing   Electronically Signed By: Dmitry Ruiz ([facility]) 19-Jul-2023 08:56:53   Date and Time of Study: 2023-07-18 18:41:09          CT Head Without Contrast    Result Date: 7/18/2023  No change. No acute findings  This report was finalized on 7/18/2023 10:55 PM by Dr. Chidi Johnson MD.      CT Angiogram Neck    Result Date: 7/19/2023  No change from 9 hours ago. No significant stenosis. No arterial occlusion is visible  This report was finalized on 7/19/2023 3:12 AM by Dr. Chidi Johnson MD.      CT Angiogram Neck    Result Date: 7/18/2023  Mild plaque formation at the left carotid bifurcation with a focal stenosis of the left internal carotid artery of  approximately 30% diameter stenosis.   This report was finalized on 7/18/2023 8:40 PM by Dr. Bernardo Zaldivar MD.      MRI Brain Without Contrast    Result Date: 7/19/2023  1.  Nondiagnostic examination due to continuous patient motion throughout the exam. 2.  There is no gross evidence of acute ischemic insult involving a large cerebral artery territory. Smaller restricted diffusion lesions would not be detectable on this study.  This report was finalized on 7/19/2023 10:21 AM by Dr. Kali Wong MD.      XR Chest 1 View    Result Date: 7/18/2023  No clearly acute cardiopulmonary pathology demonstrated.   This report was finalized on 7/18/2023 6:43 PM by Dr. Bernardo Zaldivar MD.      CT Angiogram Head    Result Date: 7/19/2023  No change from 9 hours ago. No significant stenosis. No arterial occlusion is visible  This report was finalized on 7/19/2023 3:12 AM by Dr. Chidi Johnson MD.      CT Head Without Contrast Stroke Protocol    Result Date: 7/18/2023   1. No clearly acute intracranial findings.  2. Remote lacunar infarcts in the right basal ganglia, the left thalamus and the left cerebellar hemisphere.  3. Generalized cerebral atrophy and white matter microvascular disease.   This report was finalized on 7/18/2023 6:29 PM by Dr. Bernardo Zaldivar MD.      CT Angiogram Head w AI Analysis of LVO    Result Date: 7/18/2023  CTA of the head demonstrating no significant abnormalities.     This report was finalized on 7/18/2023 8:45 PM by Dr. Bernardo Zaldivar MD.      CT CEREBRAL PERFUSION WITH & WITHOUT CONTRAST    Result Date: 7/18/2023  Normal CT cerebral perfusion study.   This report was finalized on 7/18/2023 7:08 PM by Dr. Bernardo Zaldivar MD.       ASSESSMENT/PLAN:  Acute metabolic encephalopathy/Apraxia/ataxia, ruled out new stroke:  H/O strokes: neuro stroke followed, neuro consulted  Still NPO and in bed pending PT/OT/SLP evaluations  Await further neurology input  Check urine drug screen  Check RVP, echo  Monitor    Chronic  "hyponatremia: Mild at 128, no acute issues    Chronic normocytic anemia:  No active blood loss noted, hemoglobin stable at 11.1  Check anemia panels    Chronic moderate malnutrition, cachexia  Check TSH  Body mass index is 21.04 kg/m².  Allow regular diet when able    History of DM2: No longer an issue A1c is 5.3%, allow regular diet when able    PLAN FOR DISPOSITION: home when able    BRET Pettit  Hospitalist, [facility]  07/19/23  11:25 EDT    Note Disclaimer: At [facility], we believe that sharing information builds trust and better relationships. You are receiving this note because you recently visited [facility]. It is possible you will see health information before a provider has talked with you about it. This kind of information can be easy to misunderstand. To help you fully understand what it means for your health, we urge you to discuss this note with your provider.     \"Dictated utilizing Dragon dictation\"      "

## 2023-07-19 NOTE — PLAN OF CARE
"Goal Outcome Evaluation:  Plan of Care Reviewed With: significant other           Outcome Evaluation: OT evaluation completed. Patient oriented to name and place, stated year as \"[birth year]\". Pt lethargic and required cues to keep eyes open during assessment. Patient performed supine to sit with min assist X 2 and sat EOB with CGA to mod assist to maintain static sitting balance. Patient stood from EOB with mod assist X 2 and performed SPS transfer to BSC with mod assist X 2. Patient was able to take 3-4 steps back to bed with FWW and min/mod assist X 2. B UE AROM was WFL. OT will follow while in the hospital and continue to assess  discharge needs. Significant other in room and reports he's with pt 24/7 and able to assist as needed.      Anticipated Discharge Disposition (OT): other (see comments) (will continue to assess)  "

## 2023-07-19 NOTE — THERAPY EVALUATION
Patient Name: Arline Landaverde  : 1950    MRN: [id number]                              Today's Date: 2023       Admit Date: 2023    Visit Dx:     ICD-10-CM ICD-9-CM   1. Altered mental status, unspecified altered mental status type  R41.82 780.97   2. Weakness  R53.1 780.79     Patient Active Problem List   Diagnosis    Accidental fall, initial encounter    Closed displaced fracture of right femoral neck    Moderate malnutrition    Altered mental status     Past Medical History:   Diagnosis Date    Diabetes mellitus     Stroke      Past Surgical History:   Procedure Laterality Date    BACK SURGERY      CHOLECYSTECTOMY      HIP HEMIARTHROPLASTY Right 2023    Procedure: HIP HEMIARTHROPLASTY ANTERIOR;  Surgeon: Kumar Bonilla MD;  Location: [facility];  Service: Orthopedics;  Laterality: Right;      General Information       Row Name 23 1500          Physical Therapy Time and Intention    Document Type evaluation  -BP     Mode of Treatment physical therapy  -BP       Row Name 23 1500          General Information    Patient Profile Reviewed yes  Pt presents to ED after onset of difficulty ambualating and slurred speech on the  that resolved and another episode of gait instability and confusion on . Pt admitted under CVA protocol. Pt not being treated for acute metabolic encepthalotopy  -BP     Prior Level of Function --  Pt is a poor historian. Significant other, Sachin, in room and reports she was independent with bed mobility, transfers, and a gait with use of a rollator at baseline. Pt with chronic L sided weakness secondary to a CVA x approximately one year ago.  -BP     Existing Precautions/Restrictions fall  -BP     Barriers to Rehab previous functional deficit;cognitive status  -BP       Row Name 23 1500          Living Environment    People in Home significant other  significant other reports she provides 24/7 care at baseline.  -BP       Row Name 23 1500     "      Home Main Entrance    Number of Stairs, Main Entrance two  -BP     Stair Railings, Main Entrance railings on both sides of stairs  -BP       Row Name 07/19/23 1500          Stairs Within Home, Primary    Stairs, Within Home, Primary one story home  -BP       Row Name 07/19/23 1500          Cognition    Orientation Status (Cognition) oriented to;person;place  reports year as \"1973\"  -BP       Row Name 07/19/23 1500          Safety Issues, Functional Mobility    Safety Issues Affecting Function (Mobility) awareness of need for assistance;at risk behavior observed;impulsivity;insight into deficits/self-awareness;safety precaution awareness  -BP     Impairments Affecting Function (Mobility) cognition  -BP     Cognitive Impairments, Mobility Safety/Performance insight into deficits/self-awareness  -BP     Comment, Safety Issues/Impairments (Mobility) patient impulsively attempts to stand while sitting at EOB despite repeatedly being asked to wait for safety measures to be provided first i.e. BSC, gait belt and walker.  -BP               User Key  (r) = Recorded By, (t) = Taken By, (c) = Cosigned By      Initials Name Provider Type    BP Jelena Noland, PT Physical Therapist                   Mobility       Row Name 07/19/23 1505          Bed Mobility    Bed Mobility supine-sit;sit-supine  -BP     Supine-Sit [place] (Bed Mobility) minimum assist (75% patient effort);2 person assist;verbal cues  -BP     Sit-Supine [place] (Bed Mobility) maximum assist (25% patient effort);2 person assist;verbal cues  -BP     Assistive Device (Bed Mobility) bed rails;draw sheet;head of bed elevated  -BP     Comment, (Bed Mobility) extended time to complete supine to sit transfer. Patient continues to be lethargic but much more alert  -BP       Row Name 07/19/23 1505          Bed-Chair Transfer    Bed-Chair [place] (Transfers) moderate assist (50% patient effort);2 person assist  -BP     Comment, (Bed-Chair Transfer) " bed to/from BSC transfer. HHA from bed to BSC and mod A x 2 with walker BSC to bed.  -BP       Row Name 07/19/23 1505          Sit-Stand Transfer    Sit-Stand [place] (Transfers) moderate assist (50% patient effort);2 person assist;verbal cues  -BP     Assistive Device (Sit-Stand Transfers) walker, front-wheeled  -BP       Row Name 07/19/23 1505          Gait/Stairs (Locomotion)    Comment, (Gait/Stairs) Following using the BSC, patient able to take 3 steps forward with FWW with min/mod A x 2  -BP               User Key  (r) = Recorded By, (t) = Taken By, (c) = Cosigned By      Initials Name Provider Type    Jelena Elias, PT Physical Therapist                   Obj/Interventions       Row Name 07/19/23 1508          Range of Motion Comprehensive    Comment, General Range of Motion B LE AROM WFL.  -BP       Row Name 07/19/23 1508          Strength Comprehensive (MMT)    Comment, General Manual Muscle Testing (MMT) Assessment R LE gross MMT 4/5, L LE gross MMT 4-/5.  -BP       Row Name 07/19/23 1508          Balance    Comment, Balance sitting balance varies from CGA to mod A with forward lean and intermittent lateral lean. Patient requiers min/mod A x 2 for static standing with device  -BP       Row Name 07/19/23 1508          Sensory Assessment (Somatosensory)    Sensory Assessment (Somatosensory) --  Unable to accurately assessed due to cognitive status  -BP               User Key  (r) = Recorded By, (t) = Taken By, (c) = Cosigned By      Initials Name Provider Type    Jelena Elias, PT Physical Therapist                   Goals/Plan       Row Name 07/19/23 1513          Bed Mobility Goal 1 (PT)    Activity/Assistive Device (Bed Mobility Goal 1, PT) bed mobility activities, all  -BP     [place] Level/Cues Needed (Bed Mobility Goal 1, PT) standby assist  -BP     Time Frame (Bed Mobility Goal 1, PT) 5 days  -BP     Progress/Outcomes (Bed Mobility Goal 1, PT) new goal  -BP       Row Name  07/19/23 1513          Transfer Goal 1 (PT)    Activity/Assistive Device (Transfer Goal 1, PT) sit-to-stand/stand-to-sit;bed-to-chair/chair-to-bed;walker, rolling  -BP     [place] Level/Cues Needed (Transfer Goal 1, PT) standby assist  -BP     Time Frame (Transfer Goal 1, PT) 5 days  -BP     Progress/Outcome (Transfer Goal 1, PT) new goal  -BP       Row Name 07/19/23 1513          Gait Training Goal 1 (PT)    Activity/Assistive Device (Gait Training Goal 1, PT) gait (walking locomotion);walker, rolling  -BP     [place] Level (Gait Training Goal 1, PT) standby assist  -BP     Distance (Gait Training Goal 1, PT) 25  -BP     Time Frame (Gait Training Goal 1, PT) 5 days  -BP     Progress/Outcome (Gait Training Goal 1, PT) new goal  -BP       Row Name 07/19/23 1513          Therapy Assessment/Plan (PT)    Planned Therapy Interventions (PT) balance training;bed mobility training;gait training;home exercise program;patient/family education;transfer training;strengthening  -BP               User Key  (r) = Recorded By, (t) = Taken By, (c) = Cosigned By      Initials Name Provider Type    BP Jelena Noland, PT Physical Therapist                   Clinical Impression       Row Name 07/19/23 1507          Pain    Pretreatment Pain Rating 0/10 - no pain  -BP     Posttreatment Pain Rating 0/10 - no pain  -BP     Additional Documentation Pain Scale: Numbers Pre/Post-Treatment (Group)  -BP       Row Name 07/19/23 1505          Plan of Care Review    Plan of Care Reviewed With patient;significant other  -BP     Outcome Evaluation PT Evaluation Complete: patient performs supine to sit transfer with mind A x 2, sit to supine transfer with mod A x 2, sit to/from stand transfers with mod A x 2, and stand pivot transfer with mod A x 2. Patient performed transfers with HHA and with device. Patient yasmin to take 3 steps forward with FWW with min/mod x 2 with use of FWW. Per significant other, patient is ambulatory at baseline  without assist with use of rollator. Patient is oriented x 2 and lethargic throughout. Sitting balance varies from CGA to mod A. Patient would benefit from physical therapy to further assess mobility as lethargy improves as well as to maximize functional independence. Plan is for patient to return home with significant other with continued 24/7 care.  -BP       Row Name 07/19/23 1509          Therapy Assessment/Plan (PT)    Rehab Potential (PT) good, to achieve stated therapy goals  -BP     Criteria for Skilled Interventions Met (PT) yes;meets criteria  -BP     Therapy Frequency (PT) 6 times/wk  -BP     Predicted Duration of Therapy Intervention (PT) 5 days  -BP       Row Name 07/19/23 1509          Positioning and Restraints    Pre-Treatment Position in bed  -BP     Post Treatment Position bed  -BP     In Bed supine;notified nsg;call light within reach;encouraged to call for assist;with family/caregiver;exit alarm on  -BP               User Key  (r) = Recorded By, (t) = Taken By, (c) = Cosigned By      Initials Name Provider Type    BP Jelena Noland, PT Physical Therapist                   Outcome Measures       Row Name 07/19/23 1514 07/19/23 0800       How much help from another person do you currently need...    Turning from your back to your side while in flat bed without using bedrails? 2  -BP 2  -AJ    Moving from lying on back to sitting on the side of a flat bed without bedrails? 2  -BP 2  -AJ    Moving to and from a bed to a chair (including a wheelchair)? 2  -BP 2  -AJ    Standing up from a chair using your arms (e.g., wheelchair, bedside chair)? 2  -BP 2  -AJ    Climbing 3-5 steps with a railing? 1  -BP 1  -AJ    To walk in hospital room? 1  -BP 1  -AJ    AM-PAC 6 Clicks Score (PT) 10  -BP 10  -AJ    Highest level of mobility 4 --> Transferred to chair/commode  -BP 4 --> Transferred to chair/commode  -AJ      Row Name 07/19/23 1514 07/19/23 1509       Modified Bells Scale    Pre-Stroke Modified  [place] Scale 2 - Slight disability.  Unable to carry out all previous activities but able to look after own affairs without assistance.  -BP 2 - Slight disability.  Unable to carry out all previous activities but able to look after own affairs without assistance.  -EN    Modified Smita Scale 4 - Moderately severe disability.  Unable to walk without assistance, and unable to attend to own bodily needs without assistance.  -BP 4 - Moderately severe disability.  Unable to walk without assistance, and unable to attend to own bodily needs without assistance.  -EN      Row Name 07/19/23 1514 07/19/23 1509       Functional Assessment    Outcome Measure Options AM-PAC 6 Clicks Basic Mobility (PT);Modified [place]  -BP AM-PAC 6 Clicks Daily Activity (OT);Modified [place]  -EN              User Key  (r) = Recorded By, (t) = Taken By, (c) = Cosigned By      Initials Name Provider Type    EN Marielena Francis, OTR Occupational Therapist    Jelena Elias, PT Physical Therapist    Cinda Reagan, RN Registered Nurse                                 Physical Therapy Education       Title: PT OT SLP Therapies (In Progress)       Topic: Physical Therapy (In Progress)       Point: Mobility training (Done)       Learning Progress Summary             Patient Acceptance, E,TB, VU by  at 7/19/2023 1514                         Point: Home exercise program (Not Started)       Learner Progress:  Not documented in this visit.                              User Key       Initials Effective Dates Name Provider Type Discipline     06/16/21 -  Jelena Noland, PT Physical Therapist PT                  PT Recommendation and Plan  Planned Therapy Interventions (PT): balance training, bed mobility training, gait training, home exercise program, patient/family education, transfer training, strengthening  Plan of Care Reviewed With: patient, significant other  Outcome Evaluation: PT Evaluation Complete: patient performs supine to sit  transfer with mind A x 2, sit to supine transfer with mod A x 2, sit to/from stand transfers with mod A x 2, and stand pivot transfer with mod A x 2. Patient performed transfers with HHA and with device. Patient yasmin to take 3 steps forward with FWW with min/mod x 2 with use of FWW. Per significant other, patient is ambulatory at baseline without assist with use of rollator. Patient is oriented x 2 and lethargic throughout. Sitting balance varies from CGA to mod A. Patient would benefit from physical therapy to further assess mobility as lethargy improves as well as to maximize functional independence. Plan is for patient to return home with significant other with continued 24/7 care.     Time Calculation:   PT Evaluation Complexity  History, PT Evaluation Complexity: 3 or more personal factors and/or comorbidities  Examination of Body Systems (PT Eval Complexity): total of 3 or more elements  Clinical Presentation (PT Evaluation Complexity): evolving  Clinical Decision Making (PT Evaluation Complexity): moderate complexity  Overall Complexity (PT Evaluation Complexity): moderate complexity     PT Charges       Row Name 07/19/23 1517             Time Calculation    Start Time 1400  -BP      Stop Time 1424  -BP      Time Calculation (min) 24 min  -BP      PT Received On 07/19/23  -BP      PT - Next Appointment 07/20/23  -BP                User Key  (r) = Recorded By, (t) = Taken By, (c) = Cosigned By      Initials Name Provider Type    BP Jelena Noland, PT Physical Therapist                  Therapy Charges for Today       Code Description Service Date Service Provider Modifiers Qty    13244540992 HC PT EVAL LOW COMPLEXITY 2 7/19/2023 Jelena Noland, PT GP 1            PT G-Codes  Outcome Measure Options: AM-PAC 6 Clicks Basic Mobility (PT), Modified [place]  AM-PAC 6 Clicks Score (PT): 10  AM-PAC 6 Clicks Score (OT): 14  Modified Smita Scale: 4 - Moderately severe disability.  Unable to walk without  assistance, and unable to attend to own bodily needs without assistance.  PT Discharge Summary  Anticipated Discharge Disposition (PT): home with 24/7 care    Jelena Noland, PT  7/19/2023

## 2023-07-19 NOTE — PLAN OF CARE
Goal Outcome Evaluation:  Plan of Care Reviewed With: patient, significant other           Outcome Evaluation: SLP: Pt demonstrates a suspected pharyngeal dysphagia with consistent coughing/strangling on thins from pt controlled straw drinks on 2 separate consecutive drink trials. Pt with inconsistent throat clearing on single drinks when SLP removed straw after one draw. No issues noted with trial of applesauce, but pt only allowed 2 trials. No difficulty noted with mastication of solid, onesimo cracker, with only one trial due to further refusal by this pt. Cough/strangle noted after drinking was characterized by a weak cough/respiratory effort. No further trials or po given at this time. Impression: Suspected pharyngeal dysphagia. Recommendation: NPO w/only necessary meds whole in puree/pudding consistency, and meds via alternate route if able to give in IV form; Reassess in am via clinical swallow eval and may warrant further instrumental via MBS.

## 2023-07-19 NOTE — CASE MANAGEMENT/SOCIAL WORK
Continued Stay Note  RADHA Capone     Patient Name: Arline Landaverde  MRN: [id number]  Today's Date: 7/19/2023    Admit Date: 7/18/2023    Plan: from home with s/o   Discharge Plan from home with s/o   Patient medicated recently and sleeping soundly in bed. Spoke with s/o, Sachin Hernández, at bedside. Face sheet verified. Patient street address is [address] [place], [place] [id number]. MEYERS explained, signed and copy declined. Patient lives with s/o in a house and is fairly independent of ADLs. She uses a rollator and no other DME. She does not drive, Sachin provides transportation and assists with ADLs as needed. She has used HH previously, he believes it was [facility]. She has not been to inpatient rehab. She sees Lanie QUEEN as PCP. She uses [facility] [place] and there are no issues obtaining medications. CM # placed on white board, will follow to assist with dc needs.                   Discharge Codes    No documentation.                       John Conner RN

## 2023-07-19 NOTE — SIGNIFICANT NOTE
07/19/23 1142   OTHER   Discipline occupational therapist   Rehab Time/Intention   Session Not Performed unable to evaluate, medical status change  (Patient sleeping and does not open eyes or respond to verbasl and tactile stimui. Too lethargic for OT evaluation at this time. Will check back later this afternoon to assess pt's status and ability to participate in evaluation.)

## 2023-07-20 ENCOUNTER — APPOINTMENT (OUTPATIENT)
Dept: GENERAL RADIOLOGY | Facility: HOSPITAL | Age: 73
DRG: 102 | End: 2023-07-20
Payer: MEDICARE

## 2023-07-20 ENCOUNTER — APPOINTMENT (OUTPATIENT)
Dept: CARDIOLOGY | Facility: HOSPITAL | Age: 73
DRG: 102 | End: 2023-07-20
Payer: MEDICARE

## 2023-07-20 PROBLEM — I63.9 ACUTE CVA (CEREBROVASCULAR ACCIDENT): Status: ACTIVE | Noted: 2023-07-20

## 2023-07-20 PROBLEM — G43.109 CLASSICAL MIGRAINE WITHOUT INTRACTABLE MIGRAINE: Status: ACTIVE | Noted: 2023-07-20

## 2023-07-20 PROBLEM — R47.01 EXPRESSIVE APHASIA: Status: ACTIVE | Noted: 2023-07-20

## 2023-07-20 LAB
ANION GAP SERPL CALCULATED.3IONS-SCNC: 12.2 MMOL/L (ref 5–15)
AORTIC DIMENSIONLESS INDEX: 0.7 (DI)
BH CV ECHO MEAS - AO MAX PG: 9.1 MMHG
BH CV ECHO MEAS - AO MEAN PG: 5 MMHG
BH CV ECHO MEAS - AO ROOT DIAM: 3.3 CM
BH CV ECHO MEAS - AO V2 MAX: 151 CM/SEC
BH CV ECHO MEAS - AO V2 VTI: 32.3 CM
BH CV ECHO MEAS - AVA(I,D): 2.12 CM2
BH CV ECHO MEAS - EDV(CUBED): 64 ML
BH CV ECHO MEAS - EDV(MOD-SP2): 58 ML
BH CV ECHO MEAS - EDV(MOD-SP4): 63 ML
BH CV ECHO MEAS - EF(MOD-BP): 63.5 %
BH CV ECHO MEAS - EF(MOD-SP2): 67.2 %
BH CV ECHO MEAS - EF(MOD-SP4): 63.5 %
BH CV ECHO MEAS - ESV(CUBED): 22.4 ML
BH CV ECHO MEAS - ESV(MOD-SP2): 19 ML
BH CV ECHO MEAS - ESV(MOD-SP4): 23 ML
BH CV ECHO MEAS - FS: 29.5 %
BH CV ECHO MEAS - IVS/LVPW: 1 CM
BH CV ECHO MEAS - IVSD: 0.9 CM
BH CV ECHO MEAS - LAT PEAK E' VEL: 6.9 CM/SEC
BH CV ECHO MEAS - LV DIASTOLIC VOL/BSA (35-75): 39.7 CM2
BH CV ECHO MEAS - LV MASS(C)D: 109.7 GRAMS
BH CV ECHO MEAS - LV MAX PG: 4.8 MMHG
BH CV ECHO MEAS - LV MEAN PG: 2 MMHG
BH CV ECHO MEAS - LV SYSTOLIC VOL/BSA (12-30): 14.5 CM2
BH CV ECHO MEAS - LV V1 MAX: 110 CM/SEC
BH CV ECHO MEAS - LV V1 VTI: 24 CM
BH CV ECHO MEAS - LVIDD: 4 CM
BH CV ECHO MEAS - LVIDS: 2.8 CM
BH CV ECHO MEAS - LVOT AREA: 2.9 CM2
BH CV ECHO MEAS - LVOT DIAM: 1.91 CM
BH CV ECHO MEAS - LVPWD: 0.9 CM
BH CV ECHO MEAS - MED PEAK E' VEL: 5.7 CM/SEC
BH CV ECHO MEAS - MR MAX PG: 48.6 MMHG
BH CV ECHO MEAS - MR MAX VEL: 348.6 CM/SEC
BH CV ECHO MEAS - MV A DUR: 0.08 SEC
BH CV ECHO MEAS - MV A MAX VEL: 146.7 CM/SEC
BH CV ECHO MEAS - MV DEC SLOPE: 997.2 CM/SEC2
BH CV ECHO MEAS - MV DEC TIME: 0.11 MSEC
BH CV ECHO MEAS - MV E MAX VEL: 91.5 CM/SEC
BH CV ECHO MEAS - MV E/A: 0.62
BH CV ECHO MEAS - MV MAX PG: 10.6 MMHG
BH CV ECHO MEAS - MV MEAN PG: 4 MMHG
BH CV ECHO MEAS - MV P1/2T: 30.3 MSEC
BH CV ECHO MEAS - MV V2 VTI: 21.1 CM
BH CV ECHO MEAS - MVA(P1/2T): 7.3 CM2
BH CV ECHO MEAS - MVA(VTI): 3.2 CM2
BH CV ECHO MEAS - PA ACC TIME: 0.12 SEC
BH CV ECHO MEAS - PA V2 MAX: 120.8 CM/SEC
BH CV ECHO MEAS - RAP SYSTOLE: 3 MMHG
BH CV ECHO MEAS - RV MAX PG: 3.7 MMHG
BH CV ECHO MEAS - RV V1 MAX: 96.8 CM/SEC
BH CV ECHO MEAS - RV V1 VTI: 22.3 CM
BH CV ECHO MEAS - RVSP: 35.5 MMHG
BH CV ECHO MEAS - SI(MOD-SP2): 24.6 ML/M2
BH CV ECHO MEAS - SI(MOD-SP4): 25.2 ML/M2
BH CV ECHO MEAS - SV(LVOT): 68.5 ML
BH CV ECHO MEAS - SV(MOD-SP2): 39 ML
BH CV ECHO MEAS - SV(MOD-SP4): 40 ML
BH CV ECHO MEAS - TAPSE (>1.6): 2.26 CM
BH CV ECHO MEAS - TR MAX PG: 32.5 MMHG
BH CV ECHO MEAS - TR MAX VEL: 285.1 CM/SEC
BH CV ECHO MEASUREMENTS AVERAGE E/E' RATIO: 14.52
BH CV XLRA - RV BASE: 2.7 CM
BH CV XLRA - TDI S': 12.4 CM/SEC
BUN SERPL-MCNC: 14 MG/DL (ref 8–23)
BUN/CREAT SERPL: 21.9 (ref 7–25)
CALCIUM SPEC-SCNC: 8.7 MG/DL (ref 8.6–10.5)
CHLORIDE SERPL-SCNC: 100 MMOL/L (ref 98–107)
CO2 SERPL-SCNC: 22.8 MMOL/L (ref 22–29)
CREAT SERPL-MCNC: 0.64 MG/DL (ref 0.57–1)
EGFRCR SERPLBLD CKD-EPI 2021: 93.4 ML/MIN/1.73
GLUCOSE BLDC GLUCOMTR-MCNC: 103 MG/DL (ref 70–130)
GLUCOSE BLDC GLUCOMTR-MCNC: 145 MG/DL (ref 70–130)
GLUCOSE SERPL-MCNC: 114 MG/DL (ref 65–99)
LEFT ATRIUM VOLUME INDEX: 19.7 ML/M2
POTASSIUM SERPL-SCNC: 4.1 MMOL/L (ref 3.5–5.2)
SODIUM SERPL-SCNC: 135 MMOL/L (ref 136–145)
TSH SERPL DL<=0.05 MIU/L-ACNC: 1.44 UIU/ML (ref 0.27–4.2)

## 2023-07-20 PROCEDURE — 82948 REAGENT STRIP/BLOOD GLUCOSE: CPT

## 2023-07-20 PROCEDURE — 92610 EVALUATE SWALLOWING FUNCTION: CPT

## 2023-07-20 PROCEDURE — 97110 THERAPEUTIC EXERCISES: CPT

## 2023-07-20 PROCEDURE — 74230 X-RAY XM SWLNG FUNCJ C+: CPT

## 2023-07-20 PROCEDURE — 92611 MOTION FLUOROSCOPY/SWALLOW: CPT

## 2023-07-20 PROCEDURE — 97530 THERAPEUTIC ACTIVITIES: CPT

## 2023-07-20 PROCEDURE — 99231 SBSQ HOSP IP/OBS SF/LOW 25: CPT | Performed by: PSYCHIATRY & NEUROLOGY

## 2023-07-20 PROCEDURE — G0378 HOSPITAL OBSERVATION PER HR: HCPCS

## 2023-07-20 PROCEDURE — 93306 TTE W/DOPPLER COMPLETE: CPT

## 2023-07-20 PROCEDURE — 84443 ASSAY THYROID STIM HORMONE: CPT | Performed by: NURSE PRACTITIONER

## 2023-07-20 PROCEDURE — 80048 BASIC METABOLIC PNL TOTAL CA: CPT | Performed by: NURSE PRACTITIONER

## 2023-07-20 PROCEDURE — 99232 SBSQ HOSP IP/OBS MODERATE 35: CPT | Performed by: NURSE PRACTITIONER

## 2023-07-20 PROCEDURE — 93306 TTE W/DOPPLER COMPLETE: CPT | Performed by: INTERNAL MEDICINE

## 2023-07-20 RX ORDER — AMITRIPTYLINE HYDROCHLORIDE 10 MG/1
10 TABLET, FILM COATED ORAL NIGHTLY
Status: DISCONTINUED | OUTPATIENT
Start: 2023-07-20 | End: 2023-07-21

## 2023-07-20 RX ADMIN — AMITRIPTYLINE HYDROCHLORIDE 10 MG: 10 TABLET, FILM COATED ORAL at 20:59

## 2023-07-20 RX ADMIN — SODIUM CHLORIDE 75 ML/HR: 9 INJECTION, SOLUTION INTRAVENOUS at 20:59

## 2023-07-20 RX ADMIN — ATORVASTATIN CALCIUM 80 MG: 40 TABLET, FILM COATED ORAL at 20:59

## 2023-07-20 RX ADMIN — Medication 10 ML: at 20:59

## 2023-07-20 NOTE — THERAPY TREATMENT NOTE
Acute Care - Occupational Therapy Treatment Note   Veronica Clements     Patient Name: Arline Landaverde  : 1950  MRN: [id number]  Today's Date: 2023             Admit Date: 2023       ICD-10-CM ICD-9-CM   1. Altered mental status, unspecified altered mental status type  R41.82 780.97   2. Weakness  R53.1 780.79   3. Dysphagia, unspecified type [R13.10 (ICD-10-CM)]  R13.10 787.20     Patient Active Problem List   Diagnosis    Accidental fall, initial encounter    Closed displaced fracture of right femoral neck    Moderate malnutrition    Altered mental status    Classical migraine without intractable migraine    Expressive aphasia    Acute CVA (cerebrovascular accident)     Past Medical History:   Diagnosis Date    Diabetes mellitus     Stroke      Past Surgical History:   Procedure Laterality Date    BACK SURGERY      CHOLECYSTECTOMY      HIP HEMIARTHROPLASTY Right 2023    Procedure: HIP HEMIARTHROPLASTY ANTERIOR;  Surgeon: Kumar Bonilla MD;  Location: [facility];  Service: Orthopedics;  Laterality: Right;         OT ASSESSMENT FLOWSHEET (last 12 hours)       OT Evaluation and Treatment       Row Name 23 1030                   OT Time and Intention    Subjective Information no complaints  pt with minimal verbal responses today  -JJ        Document Type therapy note (daily note)  -JJ        Mode of Treatment occupational therapy  -JJ        Patient Effort adequate  -JJ        Comment pt required encouragement to participate in therapy today with maximal effort  -JJ           General Information    Patient/Family/Caregiver Comments/Observations pt supine in bed, family in room, agreeable with encouragement  -JJ        Existing Precautions/Restrictions fall  -JJ        Barriers to Rehab previous functional deficit;cognitive status  -JJ           Pain Assessment    Pre/Posttreatment Pain Comment no co pain  -JJ           Cognition    Personal Safety Interventions gait belt;nonskid shoes/slippers  when out of bed  -JJ           Bed Mobility    Bed Mobility supine-sit  -JJ        Supine-Sit [place] (Bed Mobility) contact guard;verbal cues  -JJ        Assistive Device (Bed Mobility) bed rails;head of bed elevated  -JJ        Comment, (Bed Mobility) pt required encouragement to complete with maximal independence  -JJ           Functional Mobility    Functional Mobility- Ind. Level contact guard assist;verbal cues required  -JJ        Functional Mobility- Device walker, front-wheeled  -JJ        Functional Mobility-Distance (Feet) 8  -JJ           Transfer Assessment/Treatment    Transfers sit-stand transfer;stand-sit transfer  -JJ           Sit-Stand Transfer    Sit-Stand [place] (Transfers) contact guard;verbal cues  -JJ        Assistive Device (Sit-Stand Transfers) walker, front-wheeled  -JJ           Stand-Sit Transfer    Stand-Sit [place] (Transfers) contact guard;verbal cues  -JJ        Assistive Device (Stand-Sit Transfers) walker, front-wheeled  -JJ        Comment, (Stand-Sit Transfer) cues for hand placement  -JJ           Safety Issues, Functional Mobility    Safety Issues Affecting Function (Mobility) awareness of need for assistance;insight into deficits/self-awareness;safety precaution awareness;positioning of assistive device  -JJ           Plan of Care Review    Plan of Care Reviewed With patient  -JJ        Outcome Evaluation OT: pt required encouragement for participation with maximal effort. pt required CGA and extended time to complete supine to sit transfer to EOB. pt required CGA for functional transfers from EOB with RW and CGA for functional mobility x 8 feet with RW and verbal cues. pt with no co pain. plan to return home with continued assist from caregiver  -JJ           Positioning and Restraints    Pre-Treatment Position in bed  -JJ        Post Treatment Position chair  -JJ        In Chair reclined;call light within reach;encouraged to call for assist;exit alarm on;with  family/caregiver  -           Progress Summary (OT)    Progress Toward Functional Goals (OT) progress toward functional goals as expected  -        Daily Progress Summary (OT) cont poc  -           Therapy Plan Review/Discharge Plan (OT)    Anticipated Discharge Disposition (OT) home with 24/7 care  -                  User Key  (r) = Recorded By, (t) = Taken By, (c) = Cosigned By      Initials Name Effective Dates    Vanda Cervantes, OTR 06/16/21 -                      Occupational Therapy Education       Title: PT OT SLP Therapies (In Progress)       Topic: Occupational Therapy (In Progress)       Point: ADL training (Done)       Description:   Instruct learner(s) on proper safety adaptation and remediation techniques during self care or transfers.   Instruct in proper use of assistive devices.                  Learning Progress Summary             Patient Acceptance, E,TB, VU by JONNATHAN at 7/20/2023 1442    Comment: pt educated on bed mobility, functional transfers and benefits of activity    Acceptance, E, VU by AMADOR at 7/19/2023 1510    Comment: Patient educated on safety during functional transfers.   Significant Other Acceptance, E, VU by AMADOR at 7/19/2023 1510    Comment: Patient educated on safety during functional transfers.                         Point: Home exercise program (Not Started)       Description:   Instruct learner(s) on appropriate technique for monitoring, assisting and/or progressing therapeutic exercises/activities.                  Learner Progress:  Not documented in this visit.                              User Key       Initials Effective Dates Name Provider Type Discipline    EN 06/16/21 -  Marielena Francis OTR Occupational Therapist OT    JONNATHAN 06/16/21 -  Vanda Mendez, OTR Occupational Therapist OT                      OT Recommendation and Plan     Progress Toward Functional Goals (OT): progress toward functional goals as expected  Plan of Care Review  Plan of  Care Reviewed With: patient  Outcome Evaluation: OT: pt required encouragement for participation with maximal effort. pt required CGA and extended time to complete supine to sit transfer to EOB. pt required CGA for functional transfers from EOB with RW and CGA for functional mobility x 8 feet with RW and verbal cues. pt with no co pain. plan to return home with continued assist from caregiver  Plan of Care Reviewed With: patient  Outcome Evaluation: OT: pt required encouragement for participation with maximal effort. pt required CGA and extended time to complete supine to sit transfer to EOB. pt required CGA for functional transfers from EOB with RW and CGA for functional mobility x 8 feet with RW and verbal cues. pt with no co pain. plan to return home with continued assist from caregiver     Outcome Measures       Row Name 07/20/23 1030 07/20/23 1029          How much help from another person do you currently need...    Turning from your back to your side while in flat bed without using bedrails? -- 3  -JW     Moving from lying on back to sitting on the side of a flat bed without bedrails? -- 3  -JW     Moving to and from a bed to a chair (including a wheelchair)? -- 3  -JW     Standing up from a chair using your arms (e.g., wheelchair, bedside chair)? -- 3  -JW     Climbing 3-5 steps with a railing? -- 1  -JW     To walk in hospital room? -- 2  -JW     AM-PAC 6 Clicks Score (PT) -- 15  -JW        How much help from another is currently needed...    Putting on and taking off regular lower body clothing? 2  -JJ --     Bathing (including washing, rinsing, and drying) 2  -JJ --     Toileting (which includes using toilet bed pan or urinal) 2  -JJ --     Putting on and taking off regular upper body clothing 2  -JJ --     Taking care of personal grooming (such as brushing teeth) 3  -JJ --     Eating meals 3  -JJ --     AM-PAC 6 Clicks Score (OT) 14  -JJ --        Modified [place] Scale    Pre-Stroke Modified [place] Scale  2 - Slight disability.  Unable to carry out all previous activities but able to look after own affairs without assistance.  -JJ 2 - Slight disability.  Unable to carry out all previous activities but able to look after own affairs without assistance.  -     Modified [place] Scale 4 - Moderately severe disability.  Unable to walk without assistance, and unable to attend to own bodily needs without assistance.  -JJ 4 - Moderately severe disability.  Unable to walk without assistance, and unable to attend to own bodily needs without assistance.  -        Functional Assessment    Outcome Measure Options -- AM-PAC 6 Clicks Basic Mobility (PT)  -               User Key  (r) = Recorded By, (t) = Taken By, (c) = Cosigned By      Initials Name Provider Type    Vanda Cervantes OTR Occupational Therapist    Janeth Nassar PT Physical Therapist                    Time Calculation:    Time Calculation- OT       Row Name 07/20/23 1444             Time Calculation- OT    OT Start Time 1029  -JJ      OT Stop Time 1046  -J      OT Time Calculation (min) 17 min  -                User Key  (r) = Recorded By, (t) = Taken By, (c) = Cosigned By      Initials Name Provider Type    Vanda Cervantes OTR Occupational Therapist                  Therapy Charges for Today       Code Description Service Date Service Provider Modifiers Qty    [id number]  OT THERAPEUTIC ACT EA 15 MIN 7/20/2023 Vanda Mendez OTR GO 1                 KATHLEEN Orona  7/20/2023

## 2023-07-20 NOTE — PLAN OF CARE
Goal Outcome Evaluation:  Plan of Care Reviewed With: patient, significant other           Outcome Evaluation: MBS: Impressions: Mild oropharyngeal dysphagia. Recommendations: soft whole diet with nectar thick liquids. Meds whole w/thick liquids or in puree. May have small sips of water 30 minutes after meals post oral care in order to maintain hydration. No other thin liquids. Aspiration precautions and fatigue precautions. Further therapy for use of prepping for increased timing of swallow on thins. Education regarding aspiration risks and precautions with both pt and family.

## 2023-07-20 NOTE — PROGRESS NOTES
"  Patient Identification:  NAME:  Arline Landaverde  Age:  72 y.o.   Sex:  female   :  1950   MRN:  [id number]       Chief complaint: Classic migraine with aphasia    History of present illness: She looks great today and is feeling good.  No neurologic complaints.  Her significant other is here and recalls that she had trouble talking and understanding what was being said but it was in association with a terrible throbbing headache.  He thinks she is back to normal now.  He knows the MRI showed no acute stroke and that it definitely had something to do with that terrible headache    She now has noted she never sleeps good and sometimes awakens with a \"body shock\".  Past medical history:  Past Medical History:   Diagnosis Date    Diabetes mellitus     Stroke        Allergies:  Penicillins    Home medications:  Medications Prior to Admission   Medication Sig Dispense Refill Last Dose    aspirin 81 MG EC tablet Take 1 tablet by mouth Every 12 (Twelve) Hours. Indications: VTE Prophylaxis 60 tablet 1 2023    atorvastatin (LIPITOR) 20 MG tablet Take 1 tablet by mouth Daily. (Patient taking differently: Take 1 tablet by mouth 2 (Two) Times a Day.) 14 tablet 0 Patient Taking Differently    ferrous gluconate (FERGON) 324 MG tablet Take 1 tablet by mouth Daily With Breakfast. (Patient taking differently: Take 1 tablet by mouth 2 (Two) Times a Day.) 30 tablet 1 Patient Taking Differently    metFORMIN (GLUCOPHAGE) 500 MG tablet Take 1 tablet by mouth 2 (Two) Times a Day With Meals.   2023    acetaminophen (TYLENOL) 325 MG tablet Take 2 tablets by mouth Every 6 (Six) Hours As Needed for Mild Pain.   Unknown    nicotine (NICODERM CQ) 21 MG/24HR patch Place 1 patch on the skin as directed by provider Daily. (Patient not taking: Reported on 2023) 21 each 0     oxyCODONE-acetaminophen (PERCOCET) 5-325 MG per tablet Take 1 tablet by mouth Every 6 (Six) Hours As Needed for Severe Pain. (Patient not taking: Reported " on 5/26/2023) 40 tablet 0         Hospital medications:  atorvastatin, 80 mg, Oral, Nightly  ferrous gluconate, 324 mg, Oral, Daily With Breakfast  sodium chloride, 10 mL, Intravenous, Q12H      sodium chloride, 75 mL/hr, Last Rate: 75 mL/hr (07/20/23 0630)        dextrose    metoprolol tartrate    sodium chloride    sodium chloride    sodium chloride      Objective:  Vitals Ranges:   Temp:  [97.6 °F (36.4 °C)-98.2 °F (36.8 °C)] 97.6 °F (36.4 °C)  Heart Rate:  [] 97  Resp:  [18] 18  BP: (115-180)/(64-90) 160/78      Physical Exam:  Patient is awake alert interacts with me today.  Remembers me seeing her yesterday.  Normal cranial nerves II through VII tongue is midline.  Symmetrical smile.  Good  strength toe wiggle reflexes trace toes downgoing.  Speech is normal    Results review:   I reviewed the patient's new clinical results.    Data review:  Lab Results (last 24 hours)       Procedure Component Value Units Date/Time    Basic Metabolic Panel [[id number]]  (Abnormal) Collected: 07/20/23 1120    Specimen: Blood Updated: 07/20/23 1150     Glucose 114 mg/dL      BUN 14 mg/dL      Creatinine 0.64 mg/dL      Sodium 135 mmol/L      Potassium 4.1 mmol/L      Chloride 100 mmol/L      CO2 22.8 mmol/L      Calcium 8.7 mg/dL      BUN/Creatinine Ratio 21.9     Anion Gap 12.2 mmol/L      eGFR 93.4 mL/min/1.73     Narrative:      GFR Normal >60  Chronic Kidney Disease <60  Kidney Failure <15    The GFR formula is only valid for adults with stable renal function between ages 18 and 70.    POC Glucose Once [[id number]]  (Normal) Collected: 07/20/23 0048    Specimen: Blood Updated: 07/20/23 0055     Glucose 103 mg/dL      Comment: Meter: [id number] : [id number] Ehsan Salazar PCA       Respiratory Panel PCR w/COVID-19(SARS-CoV-2) CALVIN/CLARICE/NURIS/PAD/COR/MAD/CAMILO In-House, NP Swab in UTM/VTM, 3-4 HR TAT - Swab, Nasopharynx [[id number]]  (Normal) Collected: 07/19/23 1544    Specimen: Swab from Nasopharynx Updated: 07/19/23 2012      ADENOVIRUS, PCR Not Detected     Coronavirus 229E Not Detected     Coronavirus HKU1 Not Detected     Coronavirus NL63 Not Detected     Coronavirus OC43 Not Detected     COVID19 Not Detected     Human Metapneumovirus Not Detected     Human Rhinovirus/Enterovirus Not Detected     Influenza A PCR Not Detected     Influenza B PCR Not Detected     Parainfluenza Virus 1 Not Detected     Parainfluenza Virus 2 Not Detected     Parainfluenza Virus 3 Not Detected     Parainfluenza Virus 4 Not Detected     RSV, PCR Not Detected     Bordetella pertussis pcr Not Detected     Bordetella parapertussis PCR Not Detected     Chlamydophila pneumoniae PCR Not Detected     Mycoplasma pneumo by PCR Not Detected    Narrative:      In the setting of a positive respiratory panel with a viral infection PLUS a negative procalcitonin without other underlying concern for bacterial infection, consider observing off antibiotics or discontinuation of antibiotics and continue supportive care. If the respiratory panel is positive for atypical bacterial infection (Bordetella pertussis, Chlamydophila pneumoniae, or Mycoplasma pneumoniae), consider antibiotic de-escalation to target atypical bacterial infection.    Urine Drug Screen - Urine, Clean Catch [[id number]]  (Abnormal) Collected: 07/19/23 1754    Specimen: Urine, Clean Catch Updated: 07/19/23 1805     THC, Screen, Urine Negative     Phencyclidine (PCP), Urine Negative     Cocaine Screen, Urine Negative     Methamphetamine, Ur Negative     Opiate Screen Negative     Amphetamine Screen, Urine Negative     Benzodiazepine Screen, Urine Positive     Tricyclic Antidepressants Screen Negative     Methadone Screen, Urine Negative     Barbiturates Screen, Urine Negative     Oxycodone Screen, Urine Negative     Propoxyphene Screen Negative     Buprenorphine, Screen, Urine Negative    Narrative:      Urine drug screen results are to be used for medical purposes only.  They are not to be used for legal  purposes such as employment testing.  Negative results do not necessarily mean the complete absence of a subtance, but rather that the result is less than the cutoff for that substance.  Positive results are unconfirmed and considered Preliminary Positive.  [facility] does not automatically confirm Postitive Unconfirmed results.  The physician may request (order) an Unconfirmed Positive result to be sent out for confirmation.      Negative Thresholds for Drugs Screened:    THC screen, urine                          50 ng/ml  Phenycyclidine (PCP), urine                25 ng/ml  Cocaine screen, urine                     150 ng/ml  Methamphetamine, urine                    500 ng/ml  Opiate screen, urine                      100 ng/ml  Amphetamine screen, urine                 500 ng/ml  Benzodiazepine screen, urine              150 ng/ml  Tricyclic Antidepressants screen, urine   300 ng/ml  Methadone screen, urine                   200 ng/ml  Barbiturates screen, urine                200 ng/ml  Oxycodone screen, urine                   100 ng/ml  Propoxyphene screen, urine                300 ng/ml  Buprenorphine screen, urine                10 ng/ml    Folate [[id number]]  (Normal) Collected: 07/19/23 0446    Specimen: Blood Updated: 07/19/23 1639     Folate >20.00 ng/mL     Narrative:      Results may be falsely increased if patient taking Biotin.      Vitamin B12 [[id number]]  (Normal) Collected: 07/19/23 0446    Specimen: Blood Updated: 07/19/23 1639     Vitamin B-12 656 pg/mL     Narrative:      Results may be falsely increased if patient taking Biotin.      Iron Profile [[id number]]  (Abnormal) Collected: 07/19/23 0446    Specimen: Blood Updated: 07/19/23 1342     Iron 87 mcg/dL      Iron Saturation (TSAT) 33 %      TIBC 265 mcg/dL      UIBC 178 mcg/dL     Ferritin [[id number]]  (Abnormal) Collected: 07/19/23 0446    Specimen: Blood Updated: 07/19/23 1342     Ferritin 367.00 ng/mL     Narrative:       Results may be falsely decreased if patient taking Biotin.               Imaging:  Imaging Results (Last 24 Hours)       Procedure Component Value Units Date/Time    FL Video Swallow With Speech Single Contrast [[id number]] Resulted: 07/20/23 1217     Updated: 07/20/23 1217             She was not able to tolerate the EEG and kept pulling off the EEG wires so this had to be canceled  Assessment and Plan:     Given the history of her significant other, I am certain that this patient has suffered a classic migraine associated with brief aphasia and a throbbing headache.  This is not a stroke TIA type syndrome given this significant headache complaint.  MRI shows no acute stroke.  There was no evidence of an acute stroke by my exam.  Her B12 and folate are normal and at this point I would not recommend further neurologic work-up.  She has noted to the other physicians that she has a shocklike sensation that she has at night that wakes her up.  This does not sound like seizure at all but much more like myoclonus.  She is having difficulty sleeping however and complains of this  Therefore, given this complaint of insomnia along with his recent episode of classic migraine with aphasia.  I am going to go ahead and start her on the ultra low-dose Elavil 10 mg p.o. nightly this is a very small dose but might be beneficial in helping her sleep as well is preventing any recurrent headache phenomena.  Thanks      James Moise MD  07/20/23  12:19 EDT

## 2023-07-20 NOTE — CASE MANAGEMENT/SOCIAL WORK
Continued Stay Note  RADHA Capone     Patient Name: Arline Landaverde  MRN: [id number]  Today's Date: 7/20/2023    Admit Date: 7/18/2023    Plan: plan home with s/o   Discharge Plan       Row Name 07/20/23 1321       Plan    Plan plan home with s/o    Patient/Family in Agreement with Plan yes    Plan Comments Follow up visit, patient is sitting up in recliner and is alert and answering questions. IMM explained, signed and copy declined. Spoke with s/o Sachin at bedside and he does not think any HH services will be needed at dc. CM # remains on white board. Will continue to follow for dc needs.                   Discharge Codes    No documentation.                       John Conner RN

## 2023-07-20 NOTE — PLAN OF CARE
Goal Outcome Evaluation:  Plan of Care Reviewed With: patient, significant other           Outcome Evaluation: SLP: Re-evaluation at bedside. Pt continues to demonstrate s/s of aspiration on thins from single cup drink. Intermittent throat clear/wet vocal quality noted on nectar thick liquids and pudding as well. Pt declined solids at this time. Impressions: Mild to moderate oral dysphagia and suspected pharyngeal dysphagia w/signs and symptoms of penetration/aspiration. Mild to moderate dysarthria noted with imprecise articulation that waxes and wanes during conversation. Recommendations: Further evaluation via MBS to assess least restrictive diet recommendations.

## 2023-07-20 NOTE — PLAN OF CARE
Goal Outcome Evaluation:  Plan of Care Reviewed With: patient           Outcome Evaluation: OT: pt required encouragement for participation with maximal effort. pt required CGA and extended time to complete supine to sit transfer to EOB. pt required CGA for functional transfers from EOB with RW and CGA for functional mobility x 8 feet with RW and verbal cues. pt with no co pain. plan to return home with continued assist from caregiver      Anticipated Discharge Disposition (OT): home with 24/7 care

## 2023-07-20 NOTE — PLAN OF CARE
Goal Outcome Evaluation:              Outcome Evaluation: PT: Patient requires encouragement for participation.  Paitent performs supine to sit with CGA, requiring extended time to complete.  Patient performs sit to stand from elevated bed surface, able to performs gait x8 feet with rolling walker, CGA.  Patient requires encouragement for proper distance from device and equal step length.  Patient with improved mobility/assistance levels during session today compared to yesterday, will continue to follow.

## 2023-07-20 NOTE — PROGRESS NOTES
"SERVICE: [facility] HOSPITALIST    CONSULTANTS: Neurology    CHIEF COMPLAINT: Follow-up metabolic encephalopathy, rule out seizures    SUBJECTIVE: Patient is back to her baseline mentation this morning, conversant and notes that she does remember feeling confused yesterday she also notes frequent \"shocks\" through the night at her baseline of poor sleeping.  Staff notes patient was unable to complete EEG ordered yesterday due to motion.  She has yet to be out of bed working with PT and OT and SLP is planning to reevaluate today for swallow.  No other new concerns overnight    OBJECTIVE:    /90 (BP Location: Right arm, Patient Position: Lying)   Pulse 92   Temp 98.2 °F (36.8 °C) (Oral)   Resp 18   Ht 162.6 cm (64\")   Wt 55.3 kg (122 lb)   SpO2 98%   BMI 20.94 kg/m²     MEDS/LABS REVIEWED AND ORDERED    atorvastatin, 80 mg, Oral, Nightly  ferrous gluconate, 324 mg, Oral, Daily With Breakfast  sodium chloride, 10 mL, Intravenous, Q12H      Physical Exam  Vitals reviewed.   Constitutional:       General: She is not in acute distress.     Appearance: She is not ill-appearing.      Comments: Thin, chronically ill appearance, appears older than stated age   HENT:      Head: Normocephalic and atraumatic.      Mouth/Throat:      Mouth: Mucous membranes are moist.      Comments: Edentulous  Eyes:      Extraocular Movements: Extraocular movements intact.      Pupils: Pupils are equal, round, and reactive to light.   Cardiovascular:      Rate and Rhythm: Normal rate and regular rhythm.   Pulmonary:      Effort: Pulmonary effort is normal. No respiratory distress.      Breath sounds: Normal breath sounds. No wheezing or rales.   Abdominal:      General: Abdomen is flat. Bowel sounds are normal. There is no distension.      Palpations: Abdomen is soft.      Tenderness: There is no abdominal tenderness. There is no guarding.   Musculoskeletal:         General: No swelling.   Skin:     General: Skin is " warm and dry.      Capillary Refill: Capillary refill takes less than 2 seconds.      Findings: No erythema.   Neurological:      General: No focal deficit present.      Mental Status: She is alert and oriented to person, place, and time.   Psychiatric:         Mood and Affect: Mood normal.         Behavior: Behavior normal.     LAB/DIAGNOSTICS:    Lab Results (last 24 hours)       Procedure Component Value Units Date/Time    POC Glucose Once [[id number]]  (Normal) Collected: 07/20/23 0048    Specimen: Blood Updated: 07/20/23 0055     Glucose 103 mg/dL      Comment: Meter: [id number] : [id number] Ehsan Bangurai [facility]       Respiratory Panel PCR w/COVID-19(SARS-CoV-2) CALVIN/CLARICE/NURIS/PAD/COR/MAD/CAMILO In-House, NP Swab in UTM/VTM, 3-4 HR TAT - Swab, Nasopharynx [[id number]]  (Normal) Collected: 07/19/23 1544    Specimen: Swab from Nasopharynx Updated: 07/19/23 2012     ADENOVIRUS, PCR Not Detected     Coronavirus 229E Not Detected     Coronavirus HKU1 Not Detected     Coronavirus NL63 Not Detected     Coronavirus OC43 Not Detected     COVID19 Not Detected     Human Metapneumovirus Not Detected     Human Rhinovirus/Enterovirus Not Detected     Influenza A PCR Not Detected     Influenza B PCR Not Detected     Parainfluenza Virus 1 Not Detected     Parainfluenza Virus 2 Not Detected     Parainfluenza Virus 3 Not Detected     Parainfluenza Virus 4 Not Detected     RSV, PCR Not Detected     Bordetella pertussis pcr Not Detected     Bordetella parapertussis PCR Not Detected     Chlamydophila pneumoniae PCR Not Detected     Mycoplasma pneumo by PCR Not Detected    Narrative:      In the setting of a positive respiratory panel with a viral infection PLUS a negative procalcitonin without other underlying concern for bacterial infection, consider observing off antibiotics or discontinuation of antibiotics and continue supportive care. If the respiratory panel is positive for atypical bacterial infection (Bordetella pertussis,  Chlamydophila pneumoniae, or Mycoplasma pneumoniae), consider antibiotic de-escalation to target atypical bacterial infection.    Urine Drug Screen - Urine, Clean Catch [[id number]]  (Abnormal) Collected: 07/19/23 1754    Specimen: Urine, Clean Catch Updated: 07/19/23 1805     THC, Screen, Urine Negative     Phencyclidine (PCP), Urine Negative     Cocaine Screen, Urine Negative     Methamphetamine, Ur Negative     Opiate Screen Negative     Amphetamine Screen, Urine Negative     Benzodiazepine Screen, Urine Positive     Tricyclic Antidepressants Screen Negative     Methadone Screen, Urine Negative     Barbiturates Screen, Urine Negative     Oxycodone Screen, Urine Negative     Propoxyphene Screen Negative     Buprenorphine, Screen, Urine Negative    Narrative:      Urine drug screen results are to be used for medical purposes only.  They are not to be used for legal purposes such as employment testing.  Negative results do not necessarily mean the complete absence of a subtance, but rather that the result is less than the cutoff for that substance.  Positive results are unconfirmed and considered Preliminary Positive.  [facility] does not automatically confirm Postitive Unconfirmed results.  The physician may request (order) an Unconfirmed Positive result to be sent out for confirmation.      Negative Thresholds for Drugs Screened:    THC screen, urine                          50 ng/ml  Phenycyclidine (PCP), urine                25 ng/ml  Cocaine screen, urine                     150 ng/ml  Methamphetamine, urine                    500 ng/ml  Opiate screen, urine                      100 ng/ml  Amphetamine screen, urine                 500 ng/ml  Benzodiazepine screen, urine              150 ng/ml  Tricyclic Antidepressants screen, urine   300 ng/ml  Methadone screen, urine                   200 ng/ml  Barbiturates screen, urine                200 ng/ml  Oxycodone screen, urine                   100  ng/ml  Propoxyphene screen, urine                300 ng/ml  Buprenorphine screen, urine                10 ng/ml    Folate [[id number]]  (Normal) Collected: 07/19/23 0446    Specimen: Blood Updated: 07/19/23 1639     Folate >20.00 ng/mL     Narrative:      Results may be falsely increased if patient taking Biotin.      Vitamin B12 [[id number]]  (Normal) Collected: 07/19/23 0446    Specimen: Blood Updated: 07/19/23 1639     Vitamin B-12 656 pg/mL     Narrative:      Results may be falsely increased if patient taking Biotin.      Iron Profile [[id number]]  (Abnormal) Collected: 07/19/23 0446    Specimen: Blood Updated: 07/19/23 1342     Iron 87 mcg/dL      Iron Saturation (TSAT) 33 %      TIBC 265 mcg/dL      UIBC 178 mcg/dL     Ferritin [[id number]]  (Abnormal) Collected: 07/19/23 0446    Specimen: Blood Updated: 07/19/23 1342     Ferritin 367.00 ng/mL     Narrative:      Results may be falsely decreased if patient taking Biotin.            ECG 12 Lead Stroke Evaluation   Final Result   HEART RATE= 100  bpm   RR Interval= 596  ms   MT Interval= 175  ms   P Horizontal Axis=   deg   P Front Axis= 95  deg   QRSD Interval= 85  ms   QT Interval= 364  ms   QRS Axis= 3  deg   T Wave Axis= 95  deg   - ABNORMAL ECG -   Sinus tachycardia   Nonspecific repol abnormality, lateral leads   No change from prior tracing   Electronically Signed By: Dmitry Ruiz ([facility]) 19-Jul-2023 08:56:53   Date and Time of Study: 2023-07-18 18:41:09          CT Head Without Contrast    Result Date: 7/18/2023  No change. No acute findings  This report was finalized on 7/18/2023 10:55 PM by Dr. Chidi Johnson MD.      CT Angiogram Neck    Result Date: 7/19/2023  No change from 9 hours ago. No significant stenosis. No arterial occlusion is visible  This report was finalized on 7/19/2023 3:12 AM by Dr. Chidi Johnson MD.      CT Angiogram Neck    Result Date: 7/18/2023  Mild plaque formation at the left carotid bifurcation with a focal stenosis of the left internal  carotid artery of approximately 30% diameter stenosis.   This report was finalized on 7/18/2023 8:40 PM by Dr. Bernardo Zaldivar MD.      MRI Brain Without Contrast    Result Date: 7/19/2023  1.  Nondiagnostic examination due to continuous patient motion throughout the exam. 2.  There is no gross evidence of acute ischemic insult involving a large cerebral artery territory. Smaller restricted diffusion lesions would not be detectable on this study.  This report was finalized on 7/19/2023 10:21 AM by Dr. Kali Wong MD.      XR Chest 1 View    Result Date: 7/18/2023  No clearly acute cardiopulmonary pathology demonstrated.   This report was finalized on 7/18/2023 6:43 PM by Dr. Bernardo Zaldivar MD.      CT Angiogram Head    Result Date: 7/19/2023  No change from 9 hours ago. No significant stenosis. No arterial occlusion is visible  This report was finalized on 7/19/2023 3:12 AM by Dr. Chidi Johnson MD.      CT Head Without Contrast Stroke Protocol    Result Date: 7/18/2023   1. No clearly acute intracranial findings.  2. Remote lacunar infarcts in the right basal ganglia, the left thalamus and the left cerebellar hemisphere.  3. Generalized cerebral atrophy and white matter microvascular disease.   This report was finalized on 7/18/2023 6:29 PM by Dr. Bernardo Zaldivar MD.      CT Angiogram Head w AI Analysis of LVO    Result Date: 7/18/2023  CTA of the head demonstrating no significant abnormalities.     This report was finalized on 7/18/2023 8:45 PM by Dr. Bernardo Zaldivar MD.      CT CEREBRAL PERFUSION WITH & WITHOUT CONTRAST    Result Date: 7/18/2023  Normal CT cerebral perfusion study.   This report was finalized on 7/18/2023 7:08 PM by Dr. Bernardo Zaldivar MD.       ASSESSMENT/PLAN:  Acute metabolic encephalopathy/Apraxia/ataxia, ruled out new stroke, rule out seizures:  H/O strokes: neuro stroke followed, neuro following  Remains NPO pending further SLP eval as below  UDS appropriately +benzos  Echo pending reading  Awaiting PT/OT  "evaluations    Dysphagia: SLP evaluation ongoing     Chronic hyponatremia: initially 128, repeat pending now     Chronic normocytic anemia:  No active blood loss noted, hemoglobin stable at 11.1 on admit  Anemia panels generally unrevealing     Chronic moderate malnutrition, cachexia  TSH pending result  Awaiting SLP evaluation to determine ability to feed  Body mass index is 20.94 kg/m².    History of DM2: No longer an issue A1c is 5.3%, allow regular diet when able    PLAN FOR DISPOSITION: home when able     BRET Pettit  Hospitalist, [facility]  07/20/23  08:02 EDT    Note Disclaimer: At [facility], we believe that sharing information builds trust and better relationships. You are receiving this note because you recently visited [facility]. It is possible you will see health information before a provider has talked with you about it. This kind of information can be easy to misunderstand. To help you fully understand what it means for your health, we urge you to discuss this note with your provider.     \"Dictated utilizing Dragon dictation\"  '  "

## 2023-07-20 NOTE — THERAPY RE-EVALUATION
Acute Care - Speech Language Pathology   Swallow Re-Evaluation  Veronica Clements     Patient Name: Arline Landaverde  : 1950  MRN: [id number]  Today's Date: 2023               Admit Date: 2023    Visit Dx:     ICD-10-CM ICD-9-CM   1. Altered mental status, unspecified altered mental status type  R41.82 780.97   2. Weakness  R53.1 780.79   3. Dysphagia, unspecified type [R13.10 (ICD-10-CM)]  R13.10 787.20     Patient Active Problem List   Diagnosis    Accidental fall, initial encounter    Closed displaced fracture of right femoral neck    Moderate malnutrition    Altered mental status    Classical migraine without intractable migraine    Expressive aphasia    Acute CVA (cerebrovascular accident)     Past Medical History:   Diagnosis Date    Diabetes mellitus     Stroke      Past Surgical History:   Procedure Laterality Date    BACK SURGERY      CHOLECYSTECTOMY      HIP HEMIARTHROPLASTY Right 2023    Procedure: HIP HEMIARTHROPLASTY ANTERIOR;  Surgeon: Kumar Bonilla MD;  Location: [facility];  Service: Orthopedics;  Laterality: Right;       SLP Recommendation and Plan  SLP Swallowing Diagnosis: suspect acute-on-chronic, suspected pharyngeal dysphagia (23)  SLP Diet Recommendation: other (see comments) (further assessment before making recommendations) (23)     SLP Rec. for Method of Medication Administration: meds via alternate route (23)     Monitor for Signs of Aspiration: yes, cough, throat clearing (23)  Recommended Diagnostics: VFSS (MBS) (23)  Swallow Criteria for Skilled Therapeutic Interventions Met: demonstrates skilled criteria (23)  Anticipated Discharge Disposition (SLP): home with home health (23)  Rehab Potential/Prognosis, Swallowing: adequate, monitor progress closely (23)  Therapy Frequency (Swallow): 3 days per week, 5 days per week (23)  Predicted Duration Therapy Intervention (Days):  5 days (07/20/23 0930)  Oral Care Recommendations: Oral Care BID/PRN, Toothbrush (07/20/23 0930)                  Patient/Family Concerns, Anticipated Discharge Disposition (SLP): No current concerns reported per pt or s/o. (07/20/23 0930)                   Oral Care Recommendations: Oral Care BID/PRN, Toothbrush (07/20/23 0930)    Plan of Care Reviewed With: patient, significant other  Outcome Evaluation: SLP: Re-evaluation at bedside. Pt continues to demonstrate s/s of aspiration on thins from single cup drink. Intermittent throat clear/wet vocal quality noted on nectar thick liquids and pudding as well. Pt declined solids at this time. Impressions: Mild to moderate oral dysphagia and suspected pharyngeal dysphagia w/signs and symptoms of penetration/aspiration. Mild to moderate dysarthria noted with imprecise articulation that waxes and wanes during conversation. Recommendations: Further evaluation via MBS to assess least restrictive diet recommendations.      SWALLOW EVALUATION (last 72 hours)       SLP Adult Swallow Evaluation       Row Name 07/20/23 0930 07/19/23 1615 07/19/23 1058 07/19/23 0830          Rehab Evaluation    Document Type re-evaluation  -AD evaluation  -AD -- other (see comments)  Missed visit  -AD     Subjective Information no complaints  -AD no complaints  -AD -- --     Patient Observations alert  generally cooperative; refuses solids  -AD alert;cooperative  -AD -- lethargic;unable to respond  limited movement with tactile to feet and legs; does not respond to verbal at this time and only limited response to tactile as above  -AD     Patient/Family/Caregiver Comments/Observations Pt upright in bed after repositioning. Requires verbal prompts to maintain positioning.  -AD Pt seen upright in bed, RT present and prepping for EEG as well. Pt generally cooperative but not wanting to try much po for exam. Caregiver/significant other present.  -AD -- Family reports pt is not very alert at this time  and will most likely not be able to participate. They report no issues swallowing following previous strokes and mini-strokes. Spouse reports she ate some toast and drank liquids yesterday morning w/o any issue or concern.  -AD     Session Not Performed -- -- unable to evaluate, medical status change  -AD unable to evaluate, medical status change  -AD     Evaluation Not Performed, Comment -- -- Pt continues with decreased alertness and inability to safely participate in an evaluation. Pt will move to tactile stimulation but does not fully arouse, open eyes or respond. Will f/u.  -AD Pt just returned from MRI and had been given 2 doses of IV Ativan for the test. Pt not able to arouse at this time and safely participate in clinical swallow eval at this time. Will check back later.  -AD     Patient Effort fair  -AD other (see comments)  -AD -- --     Comment -- some limitations in responses.  -AD -- --     Symptoms Noted During/After Treatment none  -AD other (see comments)  -AD -- --     Symptoms Noted, Comment -- coughing with liquids  -AD -- --        General Information    Patient Profile Reviewed yes  -AD yes  -AD -- yes  -AD     Pertinent History Of Current Problem No current changes in history.  -AD No changes in history from earlier today. MRI limited due to motion and no large vessel occlusion noted. See report. S/O also reports a congested cough in the last 3 weeks. Not reported earlier.  -AD -- Pt is a 73 y/o female admitted for possible stroke, NSTEMI type II and chronic diabetes mellitus. Noted on 7/17 to have a 3 hour period of difficuty walking and talking that resolved and did not seek medical care at that time. Symptoms returned on 7/18/23 around 5 pm with increasing confusion and family brought pt to the ER for treatment. Pt with hx of prior stroke about 14-15 months ago w/residual left sided weakness. They also report mini strokes and CT of head indicated no acute process, remote lacunar infarcts in  right basal ganglia, left cerebellar hemisphere and generalized cerebral atrophy and white matter vascular disease.  -AD     Current Method of Nutrition NPO  -AD NPO  -AD -- NPO  -AD     Precautions/Limitations, Vision WFL;for purposes of eval  -AD WFL;for purposes of eval  -AD -- difficult to assess  -AD     Precautions/Limitations, Hearing WFL;for purposes of eval  -AD WFL;for purposes of eval  -AD -- difficult to assess  -AD     Prior Level of Function-Communication other (see comments)  s/o reports near baseline  -AD other (see comments)  difficult to assess as pt would not directly answer questions for this therapist  -AD -- unknown;other (see comments)  family reports no issues or residual from stroke  -AD     Prior Level of Function-Swallowing no diet consistency restrictions;safe, efficient swallowing in all situations;regular textures;thin liquids  -AD no diet consistency restrictions;safe, efficient swallowing in all situations;regular textures;thin liquids  -AD -- no diet consistency restrictions;safe, efficient swallowing in all situations;regular textures;thin liquids  per family  -AD     Plans/Goals Discussed with patient;spouse/S.O.;agreed upon  -AD patient;spouse/S.O.;agreed upon  s/o agrees  -AD -- family;agreed upon  Agree with deferring eval at this time and attempted when fully awake.  -AD     Barriers to Rehab medically complex  -AD medically complex  -AD -- --     Patient's Goals for Discharge patient did not state  -AD patient did not state  -AD -- patient could not state  -AD     Family Goals for Discharge family did not state  -AD family did not state  -AD -- patient able to eat/drink without coughing/choking;patient able to return to regular diet  -AD        Pain    Additional Documentation --  intermittent pain in left foot when moving in bed; no level given  -AD --  no current pain reported  -AD -- --  no current pain indicated  -AD        Oral Motor Structure and Function    Oral Lesions  or Structural Abnormalities and/or variants none  -AD no gross deficits; exam limited by pt cooperation  -AD -- --     Dentition Assessment edentulous, dentures not available  -AD -- -- --     Secretion Management WNL/WFL  -AD other (see comments)  no anterior loss  -AD -- --     Mucosal Quality moist, healthy  -AD moist, healthy  -AD -- --     Volitional Swallow unable to elicit  -AD unable to elicit  -AD -- --     Volitional Cough unable to elicit  -AD unable to elicit  -AD -- --        Oral Musculature and Cranial Nerve Assessment    Oral Motor General Assessment unable to assess  -AD unable to assess  -AD -- --     Oral Motor, Comment Pt does not participate in assessment of oral motor function.  -AD No gross deficits of asymmetry noted. Suspected generalized weakness, but not participating in exam.  -AD -- --        General Eating/Swallowing Observations    Respiratory Support Currently in Use room air  -AD room air  -AD -- --     Eating/Swallowing Skills self-fed;needed assist  -AD fed by SLP;self-fed;other (see comments)  Pt controlled drinks of thins by straw.  -AD -- --     Positioning During Eating upright in bed;other (see comments)  rolls to left side  -AD upright 90 degree;upright in chair;other (see comments)  legs drawn up to torso; arms kept crossed in front of her chest/neck  -AD -- --     Utensils Used spoon;cup  -AD spoon;straw  -AD -- --     Consistencies Trialed pureed;thin liquids;nectar/syrup-thick liquids  -AD regular textures;pureed;thin liquids  -AD -- --        Respiratory    Respiratory Status WFL;room air;during swallowing/eating  -AD WFL;room air;other (see comments)  coughing with thins  -AD -- --        Clinical Swallow Eval    Oral Prep Phase WFL  -AD WFL  -AD -- --     Oral Transit WFL  -AD impaired  -AD -- --     Oral Residue WFL  -AD --  Unable to fully assess as pt did not allow SLP to look in mouth following po trials. No noted residue when pt talking afterwards.  -AD -- --      Pharyngeal Phase suspected pharyngeal impairment  -AD suspected pharyngeal impairment  -AD -- --     Esophageal Phase unremarkable  -AD unremarkable  -AD -- --     Clinical Swallow Evaluation Summary Pt continues to demonstrate s/s of aspiration with coughing on thins from cup. Intermittent wet vocal quality after multiple trials of pudding. Appears to tolerate nectar thick from the cup, but limited trials overall (3 small sips total) as pt states it tastes terrible.  -AD Pt demonstrates a suspected pharyngeal dysphagia with consistent coughing/strangling on thins from pt controlled straw drinks on 2 separate consecutive drink trials. Pt with inconsistent throat clearing on single drinks when SLP removed straw after one draw. No issues noted with trial of applesauce, but pt only allowed 2 trials. No difficulty noted with mastication of solid, onesimo cracker, with only one trial due to further refusal by this pt. Cough/strangle noted after drinking was characterized by a weak cough/respiratory effort. No further trials or po given at this time.  -AD -- --        Oral Transit Concerns    Oral Transit Concerns other (see comments)  -AD other (see comments)  -AD -- --     Oral Transit Concerns, Comment suspected decreased back of tongue control with pooling and possible early spill into the pharynx  -AD suspected decreased back of tongue control with pooling and possible early spill into the pharynx.  -AD -- --        Pharyngeal Phase Concerns    Pharyngeal Phase Concerns cough;wet vocal quality  -AD cough;throat clear;other (see comments)  strangling  -AD -- --     Wet Vocal Quality pudding;nectar  -AD -- -- --     Cough thin  from cup  -AD thin  consecutive straw drinks  -AD -- --     Throat Clear -- thin;other (see comments)  single straw drinks  -AD -- --     Pharyngeal Phase Concerns, Comment Continued coughing on thins from cup in small sips, immediate.  -AD Coughing, strangling on consecutive straw drinks. Throat  clearing on single straw drinks. Pt with weak, ineffective cough and throat clear.  -AD -- --        SLP Evaluation Clinical Impression    SLP Swallowing Diagnosis suspect acute-on-chronic;suspected pharyngeal dysphagia  -AD suspect acute-on-chronic;suspected pharyngeal dysphagia  -AD -- --     Functional Impact risk of aspiration/pneumonia;risk of malnutrition  -AD risk of aspiration/pneumonia;risk of malnutrition  -AD -- --     Rehab Potential/Prognosis, Swallowing adequate, monitor progress closely  -AD adequate, monitor progress closely  -AD -- --     Swallow Criteria for Skilled Therapeutic Interventions Met demonstrates skilled criteria  -AD demonstrates skilled criteria  -AD -- --        Recommendations    Therapy Frequency (Swallow) 3 days per week;5 days per week  -AD 3 days per week;5 days per week  -AD -- --     Predicted Duration Therapy Intervention (Days) 5 days  -AD 5 days  -AD -- --     SLP Diet Recommendation other (see comments)  further assessment before making recommendations  -AD NPO;other (see comments)  only necessary oral meds whole in applesauce; provide medications via alternate route if able to provide in IV form.  -AD -- --     Recommended Diagnostics VFSS (MBS)  -AD reassess via clinical swallow evaluation;VFSS (MBS)  -AD -- --     Recommended Precautions and Strategies -- general aspiration precautions  -AD -- --     Oral Care Recommendations Oral Care BID/PRN;Toothbrush  -AD Oral Care BID/PRN;Toothbrush  -AD -- --     SLP Rec. for Method of Medication Administration meds via alternate route  -AD meds via alternate route  only necessary oral meds whole in applesauce; provide medications via alternate route if able to provide in IV form.  -AD -- --     Monitor for Signs of Aspiration yes;cough;throat clearing  -AD yes;cough;throat clearing  -AD -- --     Anticipated Discharge Disposition (SLP) home with home health  -AD unknown  -AD -- --     Patient/Family Concerns, Anticipated  Discharge Disposition (SLP) No current concerns reported per pt or s/o.  -AD No concerns reported per pt or s/o  -AD -- --        Swallow Goals (SLP)    Swallow LTGs -- Patient will demonstrate progress toward functional swallow for;Swallow Long Term Goal (free text)  -AD -- --        (LTG) Patient will demonstrate progress toward functional swallow for    Diet Texture (Demonstrate progress toward functional swallow) -- soft to chew (whole) textures  -AD -- --     Liquid viscosity (Demonstrate progress toward functional swallow) -- nectar/ mildly thick liquids  -AD -- --     [place] (Demonstrate progress towards functional swallow) -- with minimal cues (75-90% accuracy)  -AD -- --     Time Frame (Demonstrate progress toward functional swallow) -- 1 week  -AD -- --     Barriers (Demonstrate progress toward functional swallow) -- medically complex; suspected acute on chronic deficit  -AD -- --     Progress/Outcomes (Demonstrate progress toward functional swallow) -- new goal  -AD -- --        (LTG) Swallow    (LTG) Swallow -- Pt will participate in further evaluation of swallowing, both clinically and via instrumental assessment, in 2 days to determine least restrictive diet tolerance.  -AD -- --               User Key  (r) = Recorded By, (t) = Taken By, (c) = Cosigned By      Initials Name Effective Dates    Lorie Abbsai MS CCC-SLP 06/16/21 -                     EDUCATION  The patient has been educated in the following areas:   Dysphagia (Swallowing Impairment). S/o verbalizes understanding and agreement with further eval via MBS.        SLP GOALS       Row Name 07/19/23 0495             (LTG) Patient will demonstrate progress toward functional swallow for    Diet Texture (Demonstrate progress toward functional swallow) soft to chew (whole) textures  -AD      Liquid viscosity (Demonstrate progress toward functional swallow) nectar/ mildly thick liquids  -AD      [place] (Demonstrate progress  towards functional swallow) with minimal cues (75-90% accuracy)  -AD      Time Frame (Demonstrate progress toward functional swallow) 1 week  -AD      Barriers (Demonstrate progress toward functional swallow) medically complex; suspected acute on chronic deficit  -AD      Progress/Outcomes (Demonstrate progress toward functional swallow) new goal  -AD         (LTG) Swallow    (LTG) Swallow Pt will participate in further evaluation of swallowing, both clinically and via instrumental assessment, in 2 days to determine least restrictive diet tolerance.  -AD                User Key  (r) = Recorded By, (t) = Taken By, (c) = Cosigned By      Initials Name Provider Type    Lorie Abbasi MS CCC-SLP Speech and Language Pathologist                       Time Calculation:    Time Calculation- SLP       Row Name 07/20/23 1713             Time Calculation- SLP    SLP Start Time 0930  -AD      SLP Stop Time 1000  -AD      SLP Time Calculation (min) 30 min  -AD      Total Timed Code Minutes- SLP 0 minute(s)  -AD      SLP Non-Billable Time (min) 0 min  -AD      SLP Received On 07/20/23  -AD      SLP - Next Appointment 07/20/23  -AD         Untimed Charges    SLP Eval/Re-eval  ST Eval Oral Pharyng Swallow - 02163  -AD      43109-FD Eval Oral Pharyng Swallow Minutes 30  -AD         Total Minutes    Untimed Charges Total Minutes 30  -AD       Total Minutes 30  -AD                User Key  (r) = Recorded By, (t) = Taken By, (c) = Cosigned By      Initials Name Provider Type    Lorie Abbasi MS CCC-SLP Speech and Language Pathologist                    Therapy Charges for Today       Code Description Service Date Service Provider Modifiers Qty    68704592882 HC ST EVAL ORAL PHARYNG SWALLOW 2 7/19/2023 Lorie Garces MS CCC-SLP GN 1    [id number] HC ST EVAL ORAL PHARYNG SWALLOW 2 7/20/2023 Lorie Garces MS CCC-SLP GN 1                 Lorie Garces MS CCC-FRENCH  7/20/2023

## 2023-07-20 NOTE — PLAN OF CARE
Goal Outcome Evaluation:  Plan of Care Reviewed With: patient, significant other        Progress: no change  Outcome Evaluation: Pt's communication has improved since yesterday. She is more alert and orientated. Ambulating to bedside commode x1 assist. Able to eat dinner and excited to eat dinner. NSR on tele staying in the 80's to 90's.

## 2023-07-20 NOTE — PROGRESS NOTES
"Adult Nutrition  Assessment/PES    Patient Name:  Arline Landaverde  YOB: 1950  MRN: [id number]  Admit Date:  7/18/2023    Assessment Date:  7/20/2023    Comments:  Adv diet as clinically indicated and safe per SLP     Pt meets criteria for:   Moderate chronic disease releated malnutrition related to inadequate energy and protein intake as evidenced by muscle wasting and fat loss on exam, less than 75% of energy requirements for >= one month.     Will cont to follow and monitor plan for nutrition.     Reason for Assessment       Row Name 07/20/23 1143          Reason for Assessment    Reason For Assessment identified at risk by screening criteria     Diagnosis metabolic state;neurologic conditions  Acute Met Enceph, Hyponaremia hx malnutrition, CVA                    Nutrition/Diet History       Row Name 07/20/23 1144          Nutrition/Diet History    Typical Intake (Food/Fluid/EN/PN) Pt entdenulous on visit. SO at bedside for 30 years reported. NKFA. Denies issue chew/swallowing. Eats twice per day and takes 1 Ensure about 1 time per day. Eat at home. Not big on dairy products.                    Labs/Tests/Procedures/Meds       Row Name 07/20/23 1145          Labs/Procedures/Meds    Lab Results Reviewed reviewed     Lab Results Comments Na 128 L        Diagnostic Tests/Procedures    Diagnostic Test/Procedure Reviewed reviewed     Diagnostic Test/Procedures Comments SLP=BSW , awaiting MBSS        Medications    Pertinent Medications Reviewed reviewed                    Physical Findings       Row Name 07/20/23 1145          Physical Findings    Overall Physical Appearance muscle wasting and fat loss on exam                    Estimated/Assessed Needs - Anthropometrics       Row Name 07/20/23 1145 07/20/23 0853       Anthropometrics    Height -- 162.6 cm (64\")    Weight -- 55.3 kg (122 lb)    Weight for Calculation 55.3 kg (122 lb) --       Estimated/Assessed Needs    Additional Documentation Estimated " Calorie Needs (Group);Fluid Requirements (Group);Protein Requirements (Group) --       Estimated Calorie Needs    Estimated Calorie Requirement (kcal/day) 5284-0062 kcal ( 30-35 kcal/kg ) --    Estimated Calorie Need Method kcal/kg --       Protein Requirements    Est Protein Requirement Amount (gms/kg) 1.5 gm protein  83 gm pro --       Fluid Requirements    Estimated Fluid Requirement Method RDA Method  6315-7643 ml --                   Nutrition Prescription Ordered       Row Name 07/20/23 1147          Nutrition Prescription PO    Current PO Diet NPO                    Evaluation of Received Nutrient/Fluid Intake       Row Name 07/20/23 1147          Fluid Intake Evaluation    Other Fluid (mL) 804  IVF insufficient data        PO Evaluation    Number of Days PO Intake Evaluated Insufficient Data                    Malnutrition Severity Assessment       Row Name 07/20/23 1147          Malnutrition Severity Assessment    Malnutrition Type Chronic Disease - Related Malnutrition        Insufficient Energy Intake     Insufficient Energy Intake  <75% of est. energy requirement for > or equal to 3 months        Unintentional Weight Loss     Unintentional Weight Loss  --  difficult to assess state vs BSW varying        Muscle Loss    Temple Region Moderate - slight depression     Clavicle Bone Region Severe - protruding prominent bone     Acromion Bone Region Moderate - acromion may slightly protrude     Patellar Region Moderate - patella more prominent, less muscle definition around patella     Anterior Thigh Region Moderate - mild depression on inner thigh     Posterior Calf Region Moderate - some roundness, slight firmness        Fat Loss    Orbital Region  Moderate -  somewhat hollowness, slightly dark circles     Upper Arm Region Severe - mostly skin, very little space between folds, fingers touch        Criteria Met (Must meet criteria for severity in at least 2 of these categories: M Wasting, Fat Loss, Fluid,  Secondary Signs, Wt. Status, Intake)    Patient meets criteria for  Moderate (non-severe) Malnutrition                     Problem/Interventions:   Problem 1       Row Name 07/20/23 1149          Nutrition Diagnoses Problem 1    Problem 1 Other (comment)  Moderate chronic disease releated malnutrition related to inadequate energy and protein intake as evidenced by muscle wasting and fat loss on exam, less than 75% of energy requirements for >= one month.                          Intervention Goal       Row Name 07/20/23 1154          Intervention Goal    General Meet nutritional needs for age/condition     PO Establish PO;PO intake (%)     PO Intake % 50 %  or greater                    Nutrition Intervention       Row Name 07/20/23 1154          Nutrition Intervention    RD/Tech Action Follow Tx progress                      Education/Evaluation       Row Name 07/20/23 1154          Education    Education Other (comment)  edu on nutrition exam for muscle/fat loss. edu need adequate protein and energy intake and need to cont oral supplement as safe/indicated at home        Monitor/Evaluation    Monitor Per protocol;I&O;PO intake;Supplement intake;Pertinent labs;Weight;Symptoms     Education Follow-up Other (comment)  pt & SO seem frustrated by influx of assessments today                     Electronically signed by:  Anel Mejia RD  07/20/23 11:55 EDT

## 2023-07-20 NOTE — PLAN OF CARE
Goal Outcome Evaluation:  Plan of Care Reviewed With: daughter        Progress: improving  Outcome Evaluation: VS stable, on RA. NPO, continuous IV fluids. Pt confused, alert to self, more alert today and less restless. Pt rested well throughout night. Daughter at bedsdie.

## 2023-07-20 NOTE — MBS/VFSS/FEES
Acute Care - Speech Language Pathology   Swallow Initial Evaluation  Veronica Clements  Modified Barium Swallow Study     Patient Name: Arline Landaverde  : 1950  MRN: [id number]  Today's Date: 2023               Admit Date: 2023    Visit Dx:     ICD-10-CM ICD-9-CM   1. Altered mental status, unspecified altered mental status type  R41.82 780.97   2. Weakness  R53.1 780.79   3. Dysphagia, unspecified type [R13.10 (ICD-10-CM)]  R13.10 787.20   4. Oropharyngeal dysphagia [R13.12 (ICD-10-CM)]  R13.12 787.22     Patient Active Problem List   Diagnosis    Accidental fall, initial encounter    Closed displaced fracture of right femoral neck    Moderate malnutrition    Altered mental status    Classical migraine without intractable migraine    Expressive aphasia    Acute CVA (cerebrovascular accident)     Past Medical History:   Diagnosis Date    Diabetes mellitus     Stroke      Past Surgical History:   Procedure Laterality Date    BACK SURGERY      CHOLECYSTECTOMY      HIP HEMIARTHROPLASTY Right 2023    Procedure: HIP HEMIARTHROPLASTY ANTERIOR;  Surgeon: Kumar Bonilla MD;  Location: [facility];  Service: Orthopedics;  Laterality: Right;       SLP Recommendation and Plan  SLP Swallowing Diagnosis: suspect acute-on-chronic, mild, oral dysphagia, pharyngeal dysphagia (23)  SLP Diet Recommendation: soft to chew textures, whole, nectar thick liquids, water between meals after oral care, with supervision (23)  Recommended Precautions and Strategies: upright posture during/after eating, small bites of food and sips of liquid, 3 second prep, general aspiration precautions, fatigue precautions (23)  SLP Rec. for Method of Medication Administration: meds whole, with thick liquids, with puree, as tolerated (23)     Monitor for Signs of Aspiration: yes, cough, throat clearing (23)  Recommended Diagnostics: reassess via VFSS (MBS) (before upgrading liquids)  (07/20/23 1200)  Swallow Criteria for Skilled Therapeutic Interventions Met: demonstrates skilled criteria (07/20/23 1200)  Anticipated Discharge Disposition (SLP): home with home health (07/20/23 1200)  Rehab Potential/Prognosis, Swallowing: adequate, monitor progress closely (07/20/23 1200)  Therapy Frequency (Swallow): 3 days per week, 5 days per week (07/20/23 1200)  Predicted Duration Therapy Intervention (Days): 5 days (07/20/23 1200)  Oral Care Recommendations: Oral Care before breakfast, after meals and PRN, Toothbrush (07/20/23 1200)                  Patient/Family Concerns, Anticipated Discharge Disposition (SLP): No current concerns reported per significant otherBryson (07/20/23 1200)                   Oral Care Recommendations: Oral Care before breakfast, after meals and PRN, Toothbrush (07/20/23 1200)    Plan of Care Reviewed With: patient, significant other  Outcome Evaluation: MBS: Impressions: Mild oropharyngeal dysphagia. Recommendations: soft whole diet with nectar thick liquids. Meds whole w/thick liquids or in puree. May have small sips of water 30 minutes after meals post oral care in order to maintain hydration. No other thin liquids. Aspiration precautions and fatigue precautions. Further therapy for use of prepping for increased timing of swallow on thins. Education regarding aspiration risks and precautions with both pt and family.      SWALLOW EVALUATION (last 72 hours)       SLP Adult Swallow Evaluation       Row Name 07/20/23 1200 07/20/23 0930 07/19/23 1615 07/19/23 1058 07/19/23 0830       Rehab Evaluation    Document Type evaluation  MBS  -AD re-evaluation  -AD evaluation  -AD -- other (see comments)  Missed visit  -AD    Subjective Information complains of  disliking the taste of the contrast  -AD no complaints  -AD no complaints  -AD -- --    Patient Observations alert;cooperative  w/encouragement  -AD alert  generally cooperative; refuses solids  -AD alert;cooperative  -AD --  lethargic;unable to respond  limited movement with tactile to feet and legs; does not respond to verbal at this time and only limited response to tactile as above  -AD    Patient/Family/Caregiver Comments/Observations Pt seen upright in video imaging chair.  -AD Pt upright in bed after repositioning. Requires verbal prompts to maintain positioning.  -AD Pt seen upright in bed, RT present and prepping for EEG as well. Pt generally cooperative but not wanting to try much po for exam. Caregiver/significant other present.  -AD -- Family reports pt is not very alert at this time and will most likely not be able to participate. They report no issues swallowing following previous strokes and mini-strokes. Spouse reports she ate some toast and drank liquids yesterday morning w/o any issue or concern.  -AD    Session Not Performed -- -- -- unable to evaluate, medical status change  -AD unable to evaluate, medical status change  -AD    Evaluation Not Performed, Comment -- -- -- Pt continues with decreased alertness and inability to safely participate in an evaluation. Pt will move to tactile stimulation but does not fully arouse, open eyes or respond. Will f/u.  -AD Pt just returned from MRI and had been given 2 doses of IV Ativan for the test. Pt not able to arouse at this time and safely participate in clinical swallow eval at this time. Will check back later.  -AD    Patient Effort adequate  -AD fair  -AD other (see comments)  -AD -- --    Comment Consistent encouragement to participate and complete the evaluation to determine safe diet level.  -AD -- some limitations in responses.  -AD -- --    Symptoms Noted During/After Treatment none  -AD none  -AD other (see comments)  -AD -- --    Symptoms Noted, Comment -- -- coughing with liquids  -AD -- --       General Information    Patient Profile Reviewed yes  -AD yes  -AD yes  -AD -- yes  -AD    Pertinent History Of Current Problem No changes in current hx. Stroke ruled out  at this time.  -AD No current changes in history.  -AD No changes in history from earlier today. MRI limited due to motion and no large vessel occlusion noted. See report. S/O also reports a congested cough in the last 3 weeks. Not reported earlier.  -AD -- Pt is a 71 y/o female admitted for possible stroke, NSTEMI type II and chronic diabetes mellitus. Noted on 7/17 to have a 3 hour period of difficuty walking and talking that resolved and did not seek medical care at that time. Symptoms returned on 7/18/23 around 5 pm with increasing confusion and family brought pt to the ER for treatment. Pt with hx of prior stroke about 14-15 months ago w/residual left sided weakness. They also report mini strokes and CT of head indicated no acute process, remote lacunar infarcts in right basal ganglia, left cerebellar hemisphere and generalized cerebral atrophy and white matter vascular disease.  -AD    Current Method of Nutrition NPO  -AD NPO  -AD NPO  -AD -- NPO  -AD    Precautions/Limitations, Vision WFL;for purposes of eval  -AD WFL;for purposes of eval  -AD WFL;for purposes of eval  -AD -- difficult to assess  -AD    Precautions/Limitations, Hearing WFL;for purposes of eval  -AD WFL;for purposes of eval  -AD WFL;for purposes of eval  -AD -- difficult to assess  -AD    Prior Level of Function-Communication other (see comments)  near baseline  -AD other (see comments)  s/o reports near baseline  -AD other (see comments)  difficult to assess as pt would not directly answer questions for this therapist  -AD -- unknown;other (see comments)  family reports no issues or residual from stroke  -AD    Prior Level of Function-Swallowing no diet consistency restrictions;safe, efficient swallowing in all situations;regular textures;thin liquids  -AD no diet consistency restrictions;safe, efficient swallowing in all situations;regular textures;thin liquids  -AD no diet consistency restrictions;safe, efficient swallowing in all  situations;regular textures;thin liquids  -AD -- no diet consistency restrictions;safe, efficient swallowing in all situations;regular textures;thin liquids  per family  -AD    Plans/Goals Discussed with patient;spouse/S.O.;agreed upon  -AD patient;spouse/S.O.;agreed upon  -AD patient;spouse/S.O.;agreed upon  s/o agrees  -AD -- family;agreed upon  Agree with deferring eval at this time and attempted when fully awake.  -AD    Barriers to Rehab cognitive status;resistant to information  -AD medically complex  -AD medically complex  -AD -- --    Patient's Goals for Discharge return to PO diet;return to regular diet;return home  -AD patient did not state  -AD patient did not state  -AD -- patient could not state  -AD    Family Goals for Discharge patient able to return to PO diet;patient able to return to regular diet  -AD family did not state  -AD family did not state  -AD -- patient able to eat/drink without coughing/choking;patient able to return to regular diet  -AD       Pain    Additional Documentation --  no current pain reported or exhibited  -AD --  intermittent pain in left foot when moving in bed; no level given  -AD --  no current pain reported  -AD -- --  no current pain indicated  -AD       Oral Motor Structure and Function    Oral Lesions or Structural Abnormalities and/or variants -- none  -AD no gross deficits; exam limited by pt cooperation  -AD -- --    Dentition Assessment -- edentulous, dentures not available  -AD -- -- --    Secretion Management -- WNL/WFL  -AD other (see comments)  no anterior loss  -AD -- --    Mucosal Quality -- moist, healthy  -AD moist, healthy  -AD -- --    Volitional Swallow -- unable to elicit  -AD unable to elicit  -AD -- --    Volitional Cough -- unable to elicit  -AD unable to elicit  -AD -- --       Oral Musculature and Cranial Nerve Assessment    Oral Motor General Assessment -- unable to assess  -AD unable to assess  -AD -- --    Oral Motor, Comment -- Pt does not  participate in assessment of oral motor function.  -AD No gross deficits of asymmetry noted. Suspected generalized weakness, but not participating in exam.  -AD -- --       General Eating/Swallowing Observations    Respiratory Support Currently in Use room air  -AD room air  -AD room air  -AD -- --    Eating/Swallowing Skills fed by SLP;other (see comments)  Pt controlled cup and straw drinks  -AD self-fed;needed assist  -AD fed by SLP;self-fed;other (see comments)  Pt controlled drinks of thins by straw.  -AD -- --    Positioning During Eating upright 90 degree;upright in chair  -AD upright in bed;other (see comments)  rolls to left side  -AD upright 90 degree;upright in chair;other (see comments)  legs drawn up to torso; arms kept crossed in front of her chest/neck  -AD -- --    Utensils Used -- spoon;cup  -AD spoon;straw  -AD -- --    Consistencies Trialed -- pureed;thin liquids;nectar/syrup-thick liquids  -AD regular textures;pureed;thin liquids  -AD -- --       Respiratory    Respiratory Status WFL;room air;during swallowing/eating  -AD WFL;room air;during swallowing/eating  -AD WFL;room air;other (see comments)  coughing with thins  -AD -- --       Clinical Swallow Eval    Oral Prep Phase -- WFL  -AD WFL  -AD -- --    Oral Transit -- WFL  -AD impaired  -AD -- --    Oral Residue -- WFL  -AD --  Unable to fully assess as pt did not allow SLP to look in mouth following po trials. No noted residue when pt talking afterwards.  -AD -- --    Pharyngeal Phase -- suspected pharyngeal impairment  -AD suspected pharyngeal impairment  -AD -- --    Esophageal Phase -- unremarkable  -AD unremarkable  -AD -- --    Clinical Swallow Evaluation Summary -- Pt continues to demonstrate s/s of aspiration with coughing on thins from cup. Intermittent wet vocal quality after multiple trials of pudding. Appears to tolerate nectar thick from the cup, but limited trials overall (3 small sips total) as pt states it tastes terrible.  -AD  Pt demonstrates a suspected pharyngeal dysphagia with consistent coughing/strangling on thins from pt controlled straw drinks on 2 separate consecutive drink trials. Pt with inconsistent throat clearing on single drinks when SLP removed straw after one draw. No issues noted with trial of applesauce, but pt only allowed 2 trials. No difficulty noted with mastication of solid, onesimo cracker, with only one trial due to further refusal by this pt. Cough/strangle noted after drinking was characterized by a weak cough/respiratory effort. No further trials or po given at this time.  -AD -- --       Oral Transit Concerns    Oral Transit Concerns -- other (see comments)  -AD other (see comments)  -AD -- --    Oral Transit Concerns, Comment -- suspected decreased back of tongue control with pooling and possible early spill into the pharynx  -AD suspected decreased back of tongue control with pooling and possible early spill into the pharynx.  -AD -- --       Pharyngeal Phase Concerns    Pharyngeal Phase Concerns -- cough;wet vocal quality  -AD cough;throat clear;other (see comments)  strangling  -AD -- --    Wet Vocal Quality -- pudding;nectar  -AD -- -- --    Cough -- thin  from cup  -AD thin  consecutive straw drinks  -AD -- --    Throat Clear -- -- thin;other (see comments)  single straw drinks  -AD -- --    Pharyngeal Phase Concerns, Comment -- Continued coughing on thins from cup in small sips, immediate.  -AD Coughing, strangling on consecutive straw drinks. Throat clearing on single straw drinks. Pt with weak, ineffective cough and throat clear.  -AD -- --       MBS/VFSS    Utensils Used spoon;cup;straw  -AD -- -- -- --    Consistencies Trialed regular textures;pureed;thin liquids;nectar/syrup-thick liquids;honey-thick liquids  -AD -- -- -- --       MBS/VFSS Interpretation    Oral Prep Phase WFL  decreased mastication due to dentures not being present at hospital  -AD -- -- -- --    Oral Transit Phase impaired  -AD --  -- -- --    Oral Residue WFL  -AD -- -- -- --    VFSS Summary Pt presents with a mild oropharyngeal dysphagia w/noted difficulty with mastication of solids due to lack of dentition and does not currently have her dentures present. Oral phase is otherwise unremarkable with the exception of one episode of early spill on thins from cup. Good bolus hold and transit noted across trials and consistencies otherwise. She demonstrates one episode of aspiration during the swallow on cup trial of thins due to mistiming and early spill in to the pharynx. She does not sense this aspiration resulting in silent aspiration. Pt w/straw drink and spoon size trials of thins w/o aspiration or penetration. Timely swallow on all other trials and consistencies including nectar thick from spoon and cup, honey thick from spoon, pudding and regular solid. Mistiming felt to be cause of aspiration resulting in decreased vestibular closure when majority of bolus in the pharynx.  -AD -- -- -- --    Oral Phase, Comment Functional overall with one episode of early spill of thins by cup only. Limited trials overall due to pt needed strong encouragement to consume the barium.  -AD -- -- -- --       Oral Transit Phase    Impaired Oral Transit Phase premature spillage of liquids into pharynx  -AD -- -- -- --    Premature Spillage of Liquids into Pharynx thin liquids;secondary to reduced lingual control;other (see comments)  mistiming  -AD -- -- -- --    Oral Transit Phase, Comment Pt with one episode of early spill resulting in thins reaching the vestibule and pyriforms at the onset of the swallow.  -AD -- -- -- --       Initiation of Pharyngeal Swallow    Initiation of Pharyngeal Swallow WFL;other (see comments)  except one episode of spill to the vestibule/pyriforms at the initiation of the swallow.  -AD -- -- -- --    Pharyngeal Phase impaired pharyngeal phase of swallowing  -AD -- -- -- --    Aspiration During the Swallow thin liquids;secondary to  delayed swallow initiation or mistiming;secondary to reduced vestibular closure;other (see comments)  on 1 trial from cup; no aspiration on spoon size trials (2 x) and straw drink (1x).  -AD -- -- -- --    Response to Aspiration No  -AD -- -- -- --    No spontaneous response to aspiration and non-effective subglottic clearance with cue (see comments);other (see comments)  Cued cough not attempted as material was well below the field of view in the trachea.  -AD -- -- -- --    Rosenbek's Scale thin:;8--->level 8;nectar:;honey:;pudding/puree:;regular textures:;1--->level 1  -AD -- -- -- --    Pharyngeal Residue no significant pharyngeal residue noted  -AD -- -- -- --    Attempted Compensatory Maneuvers other (see comments)  Unable to attempt due to decreased cooperation and willingness to attempt techniques and intermittent nature of aspiration making it difficult to assess as well.  -AD -- -- -- --    Pharyngeal Phase, Comment Mild pharyngeal dysphagia characterized by mistiming/delay of swallow on 1/4 trials of thins. Aspiration occurs during the swallow due to mistiming resulting in decreased vestibular closure during the swallow. This results in pooled material entering the vestibule at the initiation of the swallow. This was silent aspiration in nature and immediately fell below the vocal folds and deep into the trachea past the field of view.  -AD -- -- -- --       SLP Evaluation Clinical Impression    SLP Swallowing Diagnosis suspect acute-on-chronic;mild;oral dysphagia;pharyngeal dysphagia  -AD suspect acute-on-chronic;suspected pharyngeal dysphagia  -AD suspect acute-on-chronic;suspected pharyngeal dysphagia  -AD -- --    Functional Impact risk of aspiration/pneumonia;risk of malnutrition;risk of dehydration  -AD risk of aspiration/pneumonia;risk of malnutrition  -AD risk of aspiration/pneumonia;risk of malnutrition  -AD -- --    Rehab Potential/Prognosis, Swallowing adequate, monitor progress closely  -AD  adequate, monitor progress closely  -AD adequate, monitor progress closely  -AD -- --    Swallow Criteria for Skilled Therapeutic Interventions Met demonstrates skilled criteria  -AD demonstrates skilled criteria  -AD demonstrates skilled criteria  -AD -- --       Recommendations    Therapy Frequency (Swallow) 3 days per week;5 days per week  -AD 3 days per week;5 days per week  -AD 3 days per week;5 days per week  -AD -- --    Predicted Duration Therapy Intervention (Days) 5 days  -AD 5 days  -AD 5 days  -AD -- --    SLP Diet Recommendation soft to chew textures;whole;nectar thick liquids;water between meals after oral care, with supervision  -AD other (see comments)  further assessment before making recommendations  -AD NPO;other (see comments)  only necessary oral meds whole in applesauce; provide medications via alternate route if able to provide in IV form.  -AD -- --    Recommended Diagnostics reassess via VFSS (MBS)  before upgrading liquids  -AD VFSS (MBS)  -AD reassess via clinical swallow evaluation;VFSS (MBS)  -AD -- --    Recommended Precautions and Strategies upright posture during/after eating;small bites of food and sips of liquid;3 second prep;general aspiration precautions;fatigue precautions  -AD -- general aspiration precautions  -AD -- --    Oral Care Recommendations Oral Care before breakfast, after meals and PRN;Toothbrush  -AD Oral Care BID/PRN;Toothbrush  -AD Oral Care BID/PRN;Toothbrush  -AD -- --    SLP Rec. for Method of Medication Administration meds whole;with thick liquids;with puree;as tolerated  -AD meds via alternate route  -AD meds via alternate route  only necessary oral meds whole in applesauce; provide medications via alternate route if able to provide in IV form.  -AD -- --    Monitor for Signs of Aspiration yes;cough;throat clearing  -AD yes;cough;throat clearing  -AD yes;cough;throat clearing  -AD -- --    Anticipated Discharge Disposition (SLP) home with home health  -AD home  with home health  -AD unknown  -AD -- --    Patient/Family Concerns, Anticipated Discharge Disposition (SLP) No current concerns reported per significant other, Bryson  -AD No current concerns reported per pt or s/o.  -AD No concerns reported per pt or s/o  -AD -- --       Swallow Goals (SLP)    Swallow LTGs -- -- Patient will demonstrate progress toward functional swallow for;Swallow Long Term Goal (free text)  -AD -- --    Swallow STGs swallow management recall goal selection (SLP);swallow compensatory strategies goal selection (SLP)  -AD -- -- -- --    Swallow Management Recall Goal Selection (SLP) swallow management recall, SLP goal 1  -AD -- -- -- --    Swallow Compensatory Strategies Goal Selection (SLP) swallow compensatory strategies, SLP goal 1  -AD -- -- -- --       (LTG) Patient will demonstrate progress toward functional swallow for    Diet Texture (Demonstrate progress toward functional swallow) soft to chew (whole) textures  -AD -- soft to chew (whole) textures  -AD -- --    Liquid viscosity (Demonstrate progress toward functional swallow) nectar/ mildly thick liquids  -AD -- nectar/ mildly thick liquids  -AD -- --    [place] (Demonstrate progress towards functional swallow) with minimal cues (75-90% accuracy)  -AD -- with minimal cues (75-90% accuracy)  -AD -- --    Time Frame (Demonstrate progress toward functional swallow) 1 week  -AD -- 1 week  -AD -- --    Barriers (Demonstrate progress toward functional swallow) medically complex; suspected acute on chronic deficit  -AD -- medically complex; suspected acute on chronic deficit  -AD -- --    Progress/Outcomes (Demonstrate progress toward functional swallow) good progress toward goal;goal ongoing  -AD -- new goal  -AD -- --    Comment (Demonstrate progress toward functional swallow) Diet recommended per MBS results/recommendations. Will follow for continued tolerance and understanding of rational for current diet/liquid modifications>  -AD --  -- -- --       (LTG) Swallow    (LTG) Swallow Pt will participate in further evaluation of swallowing, both clinically and via instrumental assessment, in 2 days to determine least restrictive diet tolerance.  -AD -- Pt will participate in further evaluation of swallowing, both clinically and via instrumental assessment, in 2 days to determine least restrictive diet tolerance.  -AD -- --    [place] (Swallow Long Term Goal) with minimal cues (75-90% accuracy)  -AD -- -- -- --    Time Frame (Swallow Long Term Goal) other (see comments)  2 days  -AD -- -- -- --    Progress/Outcomes (Swallow Long Term Goal) goal met  -AD -- -- -- --    Comment (Swallow Long Term Goal) Clinical reval and MBS completed today. See reports.  -AD -- -- -- --       (STG) Swallow Management Recall Goal 1 (SLP)    Activity (Swallow Management Recall Goal 1, SLP) recall of;aspiration precautions;oral care recommendations;safe liquid viscosity;rationale for use of strategies/techniques  -AD -- -- -- --    [place]/Accuracy (Swallow Management Recall Goal 1, SLP) with minimal cues (75-90% accuracy)  -AD -- -- -- --    Time Frame (Swallow Management Recall Goal 1, SLP) short term goal (STG);3 days  -AD -- -- -- --    Barriers (Swallow Management Recall Goal 1, SLP) resistance to teaching/education  -AD -- -- -- --    Progress/Outcomes (Swallow Management Recall Goal 1, SLP) new goal  -AD -- -- -- --       (STG) Swallow Compensatory Strategies Goal 1 (SLP)    Activity (Swallow Compensatory Strategies/Techniques Goal 1, SLP) aspiration precautions;compensatory strategies;other (see comments)  3 second prep  -AD -- -- -- --    [place]/Accuracy (Swallow Compensatory Strategies/Techniques Goal 1, SLP) with minimal cues (75-90% accuracy)  -AD -- -- -- --    Time Frame (Swallow Compensatory Strategies/Techniques Goal 1, SLP) short term goal (STG);3 days  -AD -- -- -- --    Barriers (Swallow Compensatory Strategies/Techniques Goal 1, SLP)  resistance to teaching/education  -AD -- -- -- --    Progress/Outcomes (Swallow Compensatory Strategies/Techniques Goal 1, SLP) new goal  -AD -- -- -- --              User Key  (r) = Recorded By, (t) = Taken By, (c) = Cosigned By      Initials Name Effective Dates    Lorie Abbasi, MS CCC-SLP 06/16/21 -                     EDUCATION  The patient has been educated in the following areas:   Dysphagia (Swallowing Impairment) Modified Diet Instruction.  Pt and s/o verbalize understanding and s/o reports agreement. Reinforcement for pt and family as well for diet recommendations and precautions/strategies.        SLP GOALS       Row Name 07/20/23 1200 07/19/23 1615          (LTG) Patient will demonstrate progress toward functional swallow for    Diet Texture (Demonstrate progress toward functional swallow) soft to chew (whole) textures  -AD soft to chew (whole) textures  -AD     Liquid viscosity (Demonstrate progress toward functional swallow) nectar/ mildly thick liquids  -AD nectar/ mildly thick liquids  -AD     [place] (Demonstrate progress towards functional swallow) with minimal cues (75-90% accuracy)  -AD with minimal cues (75-90% accuracy)  -AD     Time Frame (Demonstrate progress toward functional swallow) 1 week  -AD 1 week  -AD     Barriers (Demonstrate progress toward functional swallow) medically complex; suspected acute on chronic deficit  -AD medically complex; suspected acute on chronic deficit  -AD     Progress/Outcomes (Demonstrate progress toward functional swallow) good progress toward goal;goal ongoing  -AD new goal  -AD     Comment (Demonstrate progress toward functional swallow) Diet recommended per MBS results/recommendations. Will follow for continued tolerance and understanding of rational for current diet/liquid modifications>  -AD --        (LTG) Swallow    (LTG) Swallow Pt will participate in further evaluation of swallowing, both clinically and via instrumental assessment, in 2  days to determine least restrictive diet tolerance.  -AD Pt will participate in further evaluation of swallowing, both clinically and via instrumental assessment, in 2 days to determine least restrictive diet tolerance.  -AD     Hood (Swallow Long Term Goal) with minimal cues (75-90% accuracy)  -AD --     Time Frame (Swallow Long Term Goal) other (see comments)  2 days  -AD --     Progress/Outcomes (Swallow Long Term Goal) goal met  -AD --     Comment (Swallow Long Term Goal) Clinical reval and MBS completed today. See reports.  -AD --        (STG) Swallow Management Recall Goal 1 (SLP)    Activity (Swallow Management Recall Goal 1, SLP) recall of;aspiration precautions;oral care recommendations;safe liquid viscosity;rationale for use of strategies/techniques  -AD --     Hood/Accuracy (Swallow Management Recall Goal 1, SLP) with minimal cues (75-90% accuracy)  -AD --     Time Frame (Swallow Management Recall Goal 1, SLP) short term goal (STG);3 days  -AD --     Barriers (Swallow Management Recall Goal 1, SLP) resistance to teaching/education  -AD --     Progress/Outcomes (Swallow Management Recall Goal 1, SLP) new goal  -AD --        (STG) Swallow Compensatory Strategies Goal 1 (SLP)    Activity (Swallow Compensatory Strategies/Techniques Goal 1, SLP) aspiration precautions;compensatory strategies;other (see comments)  3 second prep  -AD --     Hood/Accuracy (Swallow Compensatory Strategies/Techniques Goal 1, SLP) with minimal cues (75-90% accuracy)  -AD --     Time Frame (Swallow Compensatory Strategies/Techniques Goal 1, SLP) short term goal (STG);3 days  -AD --     Barriers (Swallow Compensatory Strategies/Techniques Goal 1, SLP) resistance to teaching/education  -AD --     Progress/Outcomes (Swallow Compensatory Strategies/Techniques Goal 1, SLP) new goal  -AD --               User Key  (r) = Recorded By, (t) = Taken By, (c) = Cosigned By      Initials Name Provider Type    NEGRO Garces  Lorie MABRY MS CCC-SLP Speech and Language Pathologist                       Time Calculation:    Time Calculation- SLP       Row Name 07/20/23 1745 07/20/23 1713          Time Calculation- SLP    SLP Start Time 1200  -AD 0930  -AD     SLP Stop Time 1330  -AD 1000  -AD     SLP Time Calculation (min) 90 min  -AD 30 min  -AD     Total Timed Code Minutes- SLP 0 minute(s)  -AD 0 minute(s)  -AD     SLP Non-Billable Time (min) 0 min  -AD 0 min  -AD     SLP Received On 07/20/23  -AD 07/20/23  -AD     SLP - Next Appointment 07/21/23  -AD 07/20/23  -AD        Untimed Charges    SLP Eval/Re-eval  ST [facility] Fluoro Eval Swallow - 57680  -AD ST Eval Oral Pharyng Swallow - 57855  -AD     60876-GW Eval Oral Pharyng Swallow Minutes -- 30  -AD     00407-FU Motion Fluoro Eval Swallow Minutes 90  -AD --        Total Minutes    Untimed Charges Total Minutes 90  -AD 30  -AD      Total Minutes 90  -AD 30  -AD               User Key  (r) = Recorded By, (t) = Taken By, (c) = Cosigned By      Initials Name Provider Type    AD Lorie Garces, MS CCC-SLP Speech and Language Pathologist                    Therapy Charges for Today       Code Description Service Date Service Provider Modifiers Qty    56829604173 HC ST EVAL ORAL PHARYNG SWALLOW 2 7/19/2023 Lorie Garces MS CCC-SLP GN 1    46270461012 HC ST EVAL ORAL PHARYNG SWALLOW 2 7/20/2023 Lorie Garces MS CCC-SLP GN 1    18546575834 HC ST MOTION FLUORO EVAL SWALLOW 6 7/20/2023 Lorie Garces MS CCC-SLP GN, 59 1                 Lorie MABRY. MS XIOMARA Garces-SLP  7/20/2023

## 2023-07-20 NOTE — THERAPY TREATMENT NOTE
Acute Care - Physical Therapy Treatment Note   Veronica Clements     Patient Name: Arline Landaverde  : 1950  MRN: [id number]  Today's Date: 2023      Visit Dx:     ICD-10-CM ICD-9-CM   1. Altered mental status, unspecified altered mental status type  R41.82 780.97   2. Weakness  R53.1 780.79   3. Dysphagia, unspecified type [R13.10 (ICD-10-CM)]  R13.10 787.20     Patient Active Problem List   Diagnosis    Accidental fall, initial encounter    Closed displaced fracture of right femoral neck    Moderate malnutrition    Altered mental status     Past Medical History:   Diagnosis Date    Diabetes mellitus     Stroke      Past Surgical History:   Procedure Laterality Date    BACK SURGERY      CHOLECYSTECTOMY      HIP HEMIARTHROPLASTY Right 2023    Procedure: HIP HEMIARTHROPLASTY ANTERIOR;  Surgeon: Kumar Bonilla MD;  Location: [facility];  Service: Orthopedics;  Laterality: Right;     PT Assessment (last 12 hours)       PT Evaluation and Treatment       Row Name 23 1029          Physical Therapy Time and Intention    Subjective Information no complaints  pt with minimal verbal responses to questions  -     Document Type therapy note (daily note)  -     Mode of Treatment physical therapy  -     Patient Effort adequate  -     Comment pt requires encouragement and extended time to complete tasks  -       Row Name 23 1029          General Information    Patient Observations alert;agree to therapy  requires encouragement to participate  -     General Observations of Patient pt supine, significant other present  -     Existing Precautions/Restrictions fall  -     Barriers to Rehab previous functional deficit;cognitive status  -       Row Name 23 1029          Pain    Pre/Posttreatment Pain Comment no c/o pain  -[facility] Name 23 1029          Cognition    Personal Safety Interventions gait belt;nonskid shoes/slippers when out of bed  -       Row Name 23 1029           Bed Mobility    Bed Mobility supine-sit  -     Supine-Sit [place] (Bed Mobility) contact guard;verbal cues  -     Assistive Device (Bed Mobility) bed rails;head of bed elevated  -     Comment, (Bed Mobility) extended time to complete, requires encouragement for maximal effort  -       Row Name 07/20/23 1029          Transfers    Transfers sit-stand transfer;stand-sit transfer  -       Row Name 07/20/23 1029          Sit-Stand Transfer    Sit-Stand [place] (Transfers) contact guard;verbal cues  -     Assistive Device (Sit-Stand Transfers) walker, front-wheeled  -       Row Name 07/20/23 1029          Stand-Sit Transfer    Stand-Sit [place] (Transfers) contact guard;verbal cues  -     Assistive Device (Stand-Sit Transfers) walker, front-wheeled  -     Comment, (Stand-Sit Transfer) cues for controlled descent into sitting  -[facility] Name 07/20/23 1029          Gait/Stairs (Locomotion)    [place] Level (Gait) contact guard;verbal cues  -     Assistive Device (Gait) walker, front-wheeled  -     Distance in Feet (Gait) 8  -     Pattern (Gait) swing-to  -     Deviations/Abnormal Patterns (Gait) base of support, narrow;isidro decreased  -     Bilateral Gait Deviations forward flexed posture  -     Comment, (Gait/Stairs) pt requires cues for proper distance from walker and step length.  requires encouragement for activity  -       Row Name 07/20/23 1029          Safety Issues, Functional Mobility    Safety Issues Affecting Function (Mobility) awareness of need for assistance;impulsivity;judgment;problem-solving;positioning of assistive device  -       Row Name 07/20/23 1029          Plan of Care Review    Outcome Evaluation PT: Patient requires encouragement for participation.  Zaintent performs supine to sit with CGA, requiring extended time to complete.  Patient performs sit to stand from elevated bed surface, able to performs gait x8 feet with rolling walker,  CGA.  Patient requires encouragement for proper distance from device and equal step length.  Patient with improved mobility/assistance levels during session today compared to yesterday, will continue to follow.  -       Row Name 07/20/23 1029          Positioning and Restraints    Pre-Treatment Position in bed  -     Post Treatment Position chair  -JW     In Chair reclined;call light within reach;encouraged to call for assist;exit alarm on;with family/caregiver  -       Row Name 07/20/23 1029          Progress Summary (PT)    Progress Toward Functional Goals (PT) progress toward functional goals is fair  -     Barriers to Overall Progress (PT) PLOF  -               User Key  (r) = Recorded By, (t) = Taken By, (c) = Cosigned By      Initials Name Provider Type    Janeth Nassar, PT Physical Therapist                    Physical Therapy Education       Title: PT OT SLP Therapies (In Progress)       Topic: Physical Therapy (In Progress)       Point: Mobility training (Done)       Learning Progress Summary             Patient Acceptance, E,TB, VU by  at 7/20/2023 1110    Acceptance, E,TB, VU by  at 7/19/2023 1514                         Point: Home exercise program (Not Started)       Learner Progress:  Not documented in this visit.                              User Key       Initials Effective Dates Name Provider Type Discipline     06/16/21 -  Jelena Noland, PT Physical Therapist PT     06/16/21 -  Janeth Major PT Physical Therapist PT                  PT Recommendation and Plan  Anticipated Discharge Disposition (PT): home with 24/7 care  Progress Summary (PT)  Progress Toward Functional Goals (PT): progress toward functional goals is fair  Barriers to Overall Progress (PT): PLOF  Outcome Evaluation: PT: Patient requires encouragement for participation.  Paitent performs supine to sit with CGA, requiring extended time to complete.  Patient performs sit to stand from elevated bed surface,  able to performs gait x8 feet with rolling walker, CGA.  Patient requires encouragement for proper distance from device and equal step length.  Patient with improved mobility/assistance levels during session today compared to yesterday, will continue to follow.   Outcome Measures       Row Name 07/20/23 1029             How much help from another person do you currently need...    Turning from your back to your side while in flat bed without using bedrails? 3  -JW      Moving from lying on back to sitting on the side of a flat bed without bedrails? 3  -JW      Moving to and from a bed to a chair (including a wheelchair)? 3  -JW      Standing up from a chair using your arms (e.g., wheelchair, bedside chair)? 3  -JW      Climbing 3-5 steps with a railing? 1  -JW      To walk in hospital room? 2  -JW      AM-[facility] 6 Clicks Score (PT) 15  -         Modified Hines Scale    Pre-Stroke Modified Smita Scale 2 - Slight disability.  Unable to carry out all previous activities but able to look after own affairs without assistance.  -      Modified Hines Scale 4 - Moderately severe disability.  Unable to walk without assistance, and unable to attend to own bodily needs without assistance.  -         Functional Assessment    Outcome Measure Options AM-PAC 6 Clicks Basic Mobility (PT)  -                User Key  (r) = Recorded By, (t) = Taken By, (c) = Cosigned By      Initials Name Provider Type    Janeth Nassar, PT Physical Therapist                     Time Calculation:    PT Charges       Row Name 07/20/23 1111             Time Calculation    Start Time 1029  -      Stop Time 1046  -      Time Calculation (min) 17 min  -JW      PT Received On 07/20/23  -JW      PT - Next Appointment 07/21/23  -         Timed Charges    29234 - PT Therapeutic Exercise Minutes 17  -JW         Total Minutes    Timed Charges Total Minutes 17  -JW       Total Minutes 17  -                User Key  (r) = Recorded By, (t) =  Taken By, (c) = Cosigned By      Initials Name Provider Type    Janeth Nassar, PT Physical Therapist                  Therapy Charges for Today       Code Description Service Date Service Provider Modifiers Qty    71167155618 HC PT THER PROC EA 15 MIN 7/20/2023 Janeth Major, PT GP 1            PT G-Codes  Outcome Measure Options: AM-PAC 6 Clicks Basic Mobility (PT)  AM-PAC 6 Clicks Score (PT): 15  AM-PAC 6 Clicks Score (OT): 14  Modified Smita Scale: 4 - Moderately severe disability.  Unable to walk without assistance, and unable to attend to own bodily needs without assistance.    Janeth Major, EDIS  7/20/2023

## 2023-07-21 VITALS
BODY MASS INDEX: 20.83 KG/M2 | RESPIRATION RATE: 16 BRPM | SYSTOLIC BLOOD PRESSURE: 148 MMHG | HEART RATE: 89 BPM | DIASTOLIC BLOOD PRESSURE: 81 MMHG | TEMPERATURE: 97.7 F | WEIGHT: 122 LBS | HEIGHT: 64 IN | OXYGEN SATURATION: 96 %

## 2023-07-21 LAB
ANION GAP SERPL CALCULATED.3IONS-SCNC: 8.9 MMOL/L (ref 5–15)
BUN SERPL-MCNC: 22 MG/DL (ref 8–23)
BUN/CREAT SERPL: 27.5 (ref 7–25)
CALCIUM SPEC-SCNC: 8.2 MG/DL (ref 8.6–10.5)
CHLORIDE SERPL-SCNC: 103 MMOL/L (ref 98–107)
CO2 SERPL-SCNC: 23.1 MMOL/L (ref 22–29)
CREAT SERPL-MCNC: 0.8 MG/DL (ref 0.57–1)
EGFRCR SERPLBLD CKD-EPI 2021: 77.9 ML/MIN/1.73
GLUCOSE SERPL-MCNC: 121 MG/DL (ref 65–99)
POTASSIUM SERPL-SCNC: 3.9 MMOL/L (ref 3.5–5.2)
SODIUM SERPL-SCNC: 135 MMOL/L (ref 136–145)

## 2023-07-21 PROCEDURE — 99239 HOSP IP/OBS DSCHRG MGMT >30: CPT | Performed by: NURSE PRACTITIONER

## 2023-07-21 PROCEDURE — 80048 BASIC METABOLIC PNL TOTAL CA: CPT | Performed by: NURSE PRACTITIONER

## 2023-07-21 PROCEDURE — 97530 THERAPEUTIC ACTIVITIES: CPT

## 2023-07-21 PROCEDURE — 97110 THERAPEUTIC EXERCISES: CPT

## 2023-07-21 PROCEDURE — 92526 ORAL FUNCTION THERAPY: CPT

## 2023-07-21 RX ADMIN — Medication 10 ML: at 09:04

## 2023-07-21 RX ADMIN — Medication 324 MG: at 08:58

## 2023-07-21 NOTE — THERAPY DISCHARGE NOTE
Acute Care - Physical Therapy Treatment Note/Discharge   Veronica Clements     Patient Name: Arline Landaverde  : 1950  MRN: [id number]  Today's Date: 2023                Admit Date: 2023    Visit Dx:    ICD-10-CM ICD-9-CM   1. Altered mental status, unspecified altered mental status type  R41.82 780.97   2. Weakness  R53.1 780.79   3. Dysphagia, unspecified type [R13.10 (ICD-10-CM)]  R13.10 787.20   4. Oropharyngeal dysphagia [R13.12 (ICD-10-CM)]  R13.12 787.22     Patient Active Problem List   Diagnosis    Accidental fall, initial encounter    Closed displaced fracture of right femoral neck    Moderate malnutrition    Altered mental status    Classical migraine without intractable migraine    Expressive aphasia    Acute CVA (cerebrovascular accident)     Past Medical History:   Diagnosis Date    Diabetes mellitus     Stroke      Past Surgical History:   Procedure Laterality Date    BACK SURGERY      CHOLECYSTECTOMY      HIP HEMIARTHROPLASTY Right 2023    Procedure: HIP HEMIARTHROPLASTY ANTERIOR;  Surgeon: Kumar Bonilla MD;  Location: [facility];  Service: Orthopedics;  Laterality: Right;       PT Assessment (last 12 hours)       PT Evaluation and Treatment       Row Name 23 0951          Physical Therapy Time and Intention    Subjective Information complains of;pain  L UE-intermittently with movement. Notified RN  -BP     Document Type therapy note (daily note);discharge treatment  -BP     Mode of Treatment physical therapy  -BP     Patient Effort fair  -BP     Symptoms Noted During/After Treatment none  -BP     Comment Patient requires max encouragement for minimal effort. Patient refuses further gait distance despite encouragement from family and therapists  -BP       Row Name 23 4902          General Information    Patient Profile Reviewed yes  -BP     Patient/Family/Caregiver Comments/Observations Patient supine in bed with HOB elevated. Significant other present. Patient  agreeabel to PT treatment with significant encouragement.  -BP     Existing Precautions/Restrictions fall  -BP     Risks Reviewed patient:;LOB;increased discomfort  -BP     Benefits Reviewed patient:;improve function;increase strength;increase independence;increase balance  -BP     Barriers to Rehab previous functional deficit;cognitive status  -BP       Row Name 07/21/23 0930          Pain    Pre/Posttreatment Pain Comment Patient reports intermittent L UE pain with mobility. Unable to localize or re create with movement or palpation. Does not rate. Notified RN.  -BP       Row Name 07/21/23 0930          Cognition    Personal Safety Interventions gait belt;nonskid shoes/slippers when out of bed  -BP       Row Name 07/21/23 0930          Bed Mobility    Bed Mobility supine-sit  -BP     Supine-Sit [place] (Bed Mobility) modified independence  -BP     Assistive Device (Bed Mobility) bed rails;head of bed elevated  -BP     Comment, (Bed Mobility) extended time to complete however to perform with use of rails  -BP       Row Name 07/21/23 0930          Transfers    Transfers sit-stand transfer;stand-sit transfer  -BP     Comment, (Transfers) Verbal cues for hand placement  -BP       Row Name 07/21/23 0930          Sit-Stand Transfer    Sit-Stand [place] (Transfers) standby assist  -BP     Assistive Device (Sit-Stand Transfers) walker, front-wheeled  -BP       Row Name 07/21/23 0930          Stand-Sit Transfer    Stand-Sit [place] (Transfers) standby assist  -BP     Assistive Device (Stand-Sit Transfers) walker, front-wheeled  -BP       Row Name 07/21/23 0930          Gait/Stairs (Locomotion)    [place] Level (Gait) contact guard;verbal cues  -BP     Assistive Device (Gait) walker, front-wheeled  -BP     Distance in Feet (Gait) 5  -BP     Pattern (Gait) swing-through  -BP     Deviations/Abnormal Patterns (Gait) base of support, narrow;isidro decreased  -BP     Comment, (Gait/Stairs) Patient requires  CGA with cues for safety with directional change to chair. Patient adamently refuses further gait training despite encouragement from family and therapists. Per significant other, patient is at baseline.  -BP       Row Name 07/21/23 0930          Balance    Comment, Balance sitting balance-supervision. Standing balance-SBA with device  -BP       Row Name 07/21/23 0930          Plan of Care Review    Plan of Care Reviewed With patient  -BP     Outcome Evaluation PT: Patient requires max encouragement for minimal effort and particpipation. Patient performs supine to sit transfer with modified independence, sit to/from stand transfers with SBA, and gait x 5 feet with CGA with use of FWW. Patient adamently refuses further gait training despite encouragement. Per significant other, patient is at baseline. He reports they will resume home health PT upon return home and he will continue to provide patient with 24/7 care. Recommend continued mobility with nursing staff. No further inpatient skilled PT needs at this time.  -BP       Row Name 07/21/23 0930          Positioning and Restraints    Pre-Treatment Position in bed  -BP     Post Treatment Position chair  -BP     In Chair notified nsg;reclined;call light within reach;encouraged to call for assist;exit alarm on;with family/caregiver  -BP       Row Name 07/21/23 0930          Progress Summary (PT)    Progress Toward Functional Goals (PT) prepare for discharge  -BP       Row Name 07/21/23 0930          Therapy Plan Review/Discharge Plan (PT)    Therapy Plan Review (PT) spouse/significant other;patient;risks/benefits reviewed;participants included  -BP       Row Name 07/21/23 0930          Bed Mobility Goal 1 (PT)    Activity/Assistive Device (Bed Mobility Goal 1, PT) bed mobility activities, all  -BP     [place] Level/Cues Needed (Bed Mobility Goal 1, PT) standby assist  -BP     Time Frame (Bed Mobility Goal 1, PT) 5 days  -BP     Progress/Outcomes (Bed Mobility  Goal 1, PT) goal met  -BP       Row Name 07/21/23 0930          Transfer Goal 1 (PT)    Activity/Assistive Device (Transfer Goal 1, PT) sit-to-stand/stand-to-sit;bed-to-chair/chair-to-bed;walker, rolling  -BP     [place] Level/Cues Needed (Transfer Goal 1, PT) standby assist  -BP     Time Frame (Transfer Goal 1, PT) 5 days  -BP     Progress/Outcome (Transfer Goal 1, PT) goal met  -BP       Row Name 07/21/23 0930          Gait Training Goal 1 (PT)    Activity/Assistive Device (Gait Training Goal 1, PT) gait (walking locomotion);walker, rolling  -BP     [place] Level (Gait Training Goal 1, PT) standby assist  -BP     Distance (Gait Training Goal 1, PT) 25  -BP     Time Frame (Gait Training Goal 1, PT) 5 days  -BP     Progress/Outcome (Gait Training Goal 1, PT) goal not met  -BP               User Key  (r) = Recorded By, (t) = Taken By, (c) = Cosigned By      Initials Name Provider Type    BP Jelena Noland, PT Physical Therapist                      Physical Therapy Education       Title: PT OT SLP Therapies (Resolved)       Topic: Physical Therapy (Resolved)       Point: Mobility training (Resolved)       Learning Progress Summary             Patient Acceptance, E,TB, NR by  at 7/21/2023 1008    Acceptance, E,TB, VU by  at 7/20/2023 1110    Acceptance, E,TB, VU by  at 7/19/2023 1514                         Point: Home exercise program (Resolved)       Learner Progress:  Not documented in this visit.                              User Key       Initials Effective Dates Name Provider Type Discipline     06/16/21 -  Jelena Noland, PT Physical Therapist PT    GRZEGORZ 06/16/21 -  Janeth Major PT Physical Therapist PT                    PT Recommendation and Plan  Anticipated Discharge Disposition (PT): home with 24/7 care, home with home health  Planned Therapy Interventions (PT): balance training, bed mobility training, gait training, home exercise program, patient/family education, transfer  training, strengthening  Therapy Frequency (PT): 6 times/wk  Progress Summary (PT)  Progress Toward Functional Goals (PT): prepare for discharge  Plan of Care Reviewed With: patient  Outcome Evaluation: PT: Patient requires max encouragement for minimal effort and particpipation. Patient performs supine to sit transfer with modified independence, sit to/from stand transfers with SBA, and gait x 5 feet with CGA with use of FWW. Patient adamently refuses further gait training despite encouragement. Per significant other, patient is at baseline. He reports they will resume home health PT upon return home and he will continue to provide patient with 24/7 care. Recommend continued mobility with nursing staff. No further inpatient skilled PT needs at this time.     Outcome Measures       Row Name 07/21/23 0930 07/21/23 0900 07/20/23 1030       How much help from another person do you currently need...    Turning from your back to your side while in flat bed without using bedrails? 3  -BP -- --    Moving from lying on back to sitting on the side of a flat bed without bedrails? 3  -BP -- --    Moving to and from a bed to a chair (including a wheelchair)? 3  -BP -- --    Standing up from a chair using your arms (e.g., wheelchair, bedside chair)? 3  -BP -- --    Climbing 3-5 steps with a railing? 3  -BP -- --    To walk in hospital room? 3  -BP -- --    AM-PAC 6 Clicks Score (PT) 18  -BP -- --       How much help from another is currently needed...    Putting on and taking off regular lower body clothing? -- 3  -EN 2  -JJ    Bathing (including washing, rinsing, and drying) -- 3  -EN 2  -JJ    Toileting (which includes using toilet bed pan or urinal) -- 3  -EN 2  -JJ    Putting on and taking off regular upper body clothing -- 4  -EN 2  -JJ    Taking care of personal grooming (such as brushing teeth) -- 4  -EN 3  -JJ    Eating meals -- 4  -EN 3  -JJ    AM-PAC 6 Clicks Score (OT) -- 21  -EN 14  -JJ       Modified [place]mita Scale     Pre-Stroke Modified [place] Scale -- -- 2 - Slight disability.  Unable to carry out all previous activities but able to look after own affairs without assistance.  -    Modified [place] Scale -- -- 4 - Moderately severe disability.  Unable to walk without assistance, and unable to attend to own bodily needs without assistance.  -       Functional Assessment    Outcome Measure Options [facility] 6 Clicks Basic Mobility (PT)  -BP -- --      Row Name 07/20/23 1029             How much help from another person do you currently need...    Turning from your back to your side while in flat bed without using bedrails? 3  -JW      Moving from lying on back to sitting on the side of a flat bed without bedrails? 3  -JW      Moving to and from a bed to a chair (including a wheelchair)? 3  -JW      Standing up from a chair using your arms (e.g., wheelchair, bedside chair)? 3  -JW      Climbing 3-5 steps with a railing? 1  -JW      To walk in hospital room? 2  -[facility] 6 Clicks Score (PT) 15  -         Modified [place] Scale    Pre-Stroke Modified [place] Scale 2 - Slight disability.  Unable to carry out all previous activities but able to look after own affairs without assistance.  -      Modified [place] Scale 4 - Moderately severe disability.  Unable to walk without assistance, and unable to attend to own bodily needs without assistance.  -         Functional Assessment    Outcome Measure Options -[facility] 6 Clicks Basic Mobility (PT)  -                User Key  (r) = Recorded By, (t) = Taken By, (c) = Cosigned By      Initials Name Provider Type    Marielena Field, OTR Occupational Therapist    Vanda Cervantes, OTR Occupational Therapist    Jelena Elias, PT Physical Therapist    JW Janeth Major, PT Physical Therapist                     Time Calculation:    PT Charges       Row Name 07/21/23 1009             Time Calculation    Start Time 0930  -BP      Stop Time 0945  -BP      Time  Calculation (min) 15 min  -BP      PT Received On 07/21/23  -BP         Timed Charges    42563 - PT Therapeutic Exercise Minutes 15  -BP         Total Minutes    Timed Charges Total Minutes 15  -BP       Total Minutes 15  -BP                User Key  (r) = Recorded By, (t) = Taken By, (c) = Cosigned By      Initials Name Provider Type    BP Jelena Noland, PT Physical Therapist                  Therapy Charges for Today       Code Description Service Date Service Provider Modifiers Qty    [id number] HC PT THER PROC EA 15 MIN 7/21/2023 Jelena Noland PT GP 1            PT G-Codes  Outcome Measure Options: AM-PAC 6 Clicks Basic Mobility (PT)  AM-PAC 6 Clicks Score (PT): 18  AM-PAC 6 Clicks Score (OT): 21  Modified [place] Scale: 4 - Moderately severe disability.  Unable to walk without assistance, and unable to attend to own bodily needs without assistance.    PT Discharge Summary  Anticipated Discharge Disposition (PT): home with 24/7 care, home with home health  Reason for Discharge: At baseline function  Outcomes Achieved: Patient able to partially acheive established goals  Discharge Destination: Home with home health (with 24/7 care)    Jelena Noland, PT  7/21/2023

## 2023-07-21 NOTE — THERAPY TREATMENT NOTE
Acute Care - Speech Language Pathology   Swallow Treatment Note  [place]     Patient Name: Arline Landaverde  : 1950  MRN: [id number]  Today's Date: 2023               Admit Date: 2023    Visit Dx:     ICD-10-CM ICD-9-CM   1. Altered mental status, unspecified altered mental status type  R41.82 780.97   2. Weakness  R53.1 780.79   3. Dysphagia, unspecified type [R13.10 (ICD-10-CM)]  R13.10 787.20   4. Oropharyngeal dysphagia [R13.12 (ICD-10-CM)]  R13.12 787.22     Patient Active Problem List   Diagnosis    Accidental fall, initial encounter    Closed displaced fracture of right femoral neck    Moderate malnutrition    Altered mental status    Classical migraine without intractable migraine    Expressive aphasia    Acute CVA (cerebrovascular accident)     Past Medical History:   Diagnosis Date    Diabetes mellitus     Stroke      Past Surgical History:   Procedure Laterality Date    BACK SURGERY      CHOLECYSTECTOMY      HIP HEMIARTHROPLASTY Right 2023    Procedure: HIP HEMIARTHROPLASTY ANTERIOR;  Surgeon: Kumar Bonilla MD;  Location: [facility];  Service: Orthopedics;  Laterality: Right;       SLP Recommendation and Plan  SLP Swallowing Diagnosis: suspect acute-on-chronic, mild, oral dysphagia, pharyngeal dysphagia (23 1200)  SLP Diet Recommendation: soft to chew textures, whole, nectar thick liquids, water between meals after oral care, with supervision (23 0845)  Recommended Precautions and Strategies: upright posture during/after eating, small bites of food and sips of liquid, 3 second prep, general aspiration precautions (23 0845)  SLP Rec. for Method of Medication Administration: meds whole, with thick liquids, with puree, as tolerated (23 0845)     Monitor for Signs of Aspiration: yes, cough, throat clearing, none - silent aspiration present (23 0845)  Recommended Diagnostics: reassess via VFSS (MBS) (before upgrading to thins due to silent aspiration)  (07/21/23 0845)  Swallow Criteria for Skilled Therapeutic Interventions Met: demonstrates skilled criteria (07/20/23 1200)  Anticipated Discharge Disposition (SLP): home with 24/7 care (do not feel pt will participate in home health at this time; defer to family decision) (07/21/23 0845)  Rehab Potential/Prognosis, Swallowing: adequate, monitor progress closely (07/20/23 1200)  Therapy Frequency (Swallow): other (see comments) (will attempt one more day if continued stay) (07/21/23 0845)  Predicted Duration Therapy Intervention (Days): until discharge (07/21/23 0845)  Oral Care Recommendations: Oral Care before breakfast, after meals and PRN, Toothbrush, Denture Care (07/21/23 0845)        Daily Summary of Progress (SLP): unable to show any progress toward functional goals, prepare for discharge (07/21/23 0845)         Patient/Family Concerns, Anticipated Discharge Disposition (SLP): No concerns reported per s/Sachin coleman. Pt does not speak to this therapist or report any concerns. (07/21/23 0845)     Treatment Assessment (SLP):  (no change; unwilling to participate) (07/21/23 0845)  Treatment Assessment Comments (SLP): Pt not participating in therapy. Refusing food/liquids currently due to liquid modification. Briefly reviewed handouts for water protocol, aspiration precautions, oral/denture care and use of 3 second prep with s/Sachin coleman, and he indicates understanding. Pt did not allow for teaching. (07/21/23 0845)          Oral Care Recommendations: Oral Care before breakfast, after meals and PRN, Toothbrush, Denture Care (07/21/23 0845)    Plan of Care Reviewed With: patient, significant other  Outcome Evaluation: MBS: Impressions: Mild oropharyngeal dysphagia. Recommendations: soft whole diet with nectar thick liquids. Meds whole w/thick liquids or in puree. May have small sips of water 30 minutes after meals post oral care in order to maintain hydration. No other thin liquids. Aspiration precautions and fatigue  precautions. Further therapy for use of prepping for increased timing of swallow on thins. Education regarding aspiration risks and precautions with both pt and family.      SWALLOW EVALUATION (last 72 hours)       SLP Adult Swallow Evaluation       Row Name 07/21/23 0845 07/20/23 1200 07/20/23 0930 07/19/23 1615 07/19/23 [id number]       Rehab Evaluation    Document Type therapy note (daily note)  -AD evaluation  MBS  -AD re-evaluation  -AD evaluation  -AD --    Subjective Information complains of  ruining her food, referring to thickened liquids  -AD complains of  disliking the taste of the contrast  -AD no complaints  -AD no complaints  -AD --    Patient Observations alert;poorly cooperative  -AD alert;cooperative  w/encouragement  -AD alert  generally cooperative; refuses solids  -AD alert;cooperative  -AD --    Patient/Family/Caregiver Comments/Observations S/o, Sachin present with pt. Strong encouragement to get pt to sit up and start her breakfast. Does not like the thickened liquids and states 'they know how to ruin a breakfast'. SLP reviewing rationale for thickened liquids to silent aspiration on MBS. Pt folds her arms and refuses further interaction.  -AD Pt seen upright in video imaging chair.  -AD Pt upright in bed after repositioning. Requires verbal prompts to maintain positioning.  -AD Pt seen upright in bed, RT present and prepping for EEG as well. Pt generally cooperative but not wanting to try much po for exam. Caregiver/significant other present.  -AD --    Session Not Performed -- -- -- -- unable to evaluate, medical status change  -AD    Evaluation Not Performed, Comment -- -- -- -- Pt continues with decreased alertness and inability to safely participate in an evaluation. Pt will move to tactile stimulation but does not fully arouse, open eyes or respond. Will f/u.  -AD    Patient Effort poor  -AD adequate  -AD fair  -AD other (see comments)  -AD --    Comment -- Consistent encouragement to  participate and complete the evaluation to determine safe diet level.  -AD -- some limitations in responses.  -AD --    Symptoms Noted During/After Treatment none  -AD none  -AD none  -AD other (see comments)  -AD --    Symptoms Noted, Comment -- -- -- coughing with liquids  -AD --       General Information    Patient Profile Reviewed yes  -AD yes  -AD yes  -AD yes  -AD --    Pertinent History Of Current Problem No changes to date.  -AD No changes in current hx. Stroke ruled out at this time.  -AD No current changes in history.  -AD No changes in history from earlier today. MRI limited due to motion and no large vessel occlusion noted. See report. S/O also reports a congested cough in the last 3 weeks. Not reported earlier.  -AD --    Current Method of Nutrition soft to chew textures;whole;nectar/syrup-thick liquids  -AD NPO  -AD NPO  -AD NPO  -AD --    Precautions/Limitations, Vision -- WFL;for purposes of eval  -AD WFL;for purposes of eval  -AD WFL;for purposes of eval  -AD --    Precautions/Limitations, Hearing -- WFL;for purposes of eval  -AD WFL;for purposes of eval  -AD WFL;for purposes of eval  -AD --    Prior Level of Function-Communication -- other (see comments)  near baseline  -AD other (see comments)  s/o reports near baseline  -AD other (see comments)  difficult to assess as pt would not directly answer questions for this therapist  -AD --    Prior Level of Function-Swallowing -- no diet consistency restrictions;safe, efficient swallowing in all situations;regular textures;thin liquids  -AD no diet consistency restrictions;safe, efficient swallowing in all situations;regular textures;thin liquids  -AD no diet consistency restrictions;safe, efficient swallowing in all situations;regular textures;thin liquids  -AD --    Plans/Goals Discussed with -- patient;spouse/S.O.;agreed upon  -AD patient;spouse/S.O.;agreed upon  -AD patient;spouse/S.O.;agreed upon  s/o agrees  -AD --    Barriers to Rehab --  cognitive status;resistant to information  -AD medically complex  -AD medically complex  -AD --    Patient's Goals for Discharge -- return to PO diet;return to regular diet;return home  -AD patient did not state  -AD patient did not state  -AD --    Family Goals for Discharge -- patient able to return to PO diet;patient able to return to regular diet  -AD family did not state  -AD family did not state  -AD --       Pain    Additional Documentation --  no pain reported  -AD --  no current pain reported or exhibited  -AD --  intermittent pain in left foot when moving in bed; no level given  -AD --  no current pain reported  -AD --       Oral Motor Structure and Function    Oral Lesions or Structural Abnormalities and/or variants -- -- none  -AD no gross deficits; exam limited by pt cooperation  -AD --    Dentition Assessment -- -- edentulous, dentures not available  -AD -- --    Secretion Management -- -- WNL/WFL  -AD other (see comments)  no anterior loss  -AD --    Mucosal Quality -- -- moist, healthy  -AD moist, healthy  -AD --    Volitional Swallow -- -- unable to elicit  -AD unable to elicit  -AD --    Volitional Cough -- -- unable to elicit  -AD unable to elicit  -AD --       Oral Musculature and Cranial Nerve Assessment    Oral Motor General Assessment -- -- unable to assess  -AD unable to assess  -AD --    Oral Motor, Comment -- -- Pt does not participate in assessment of oral motor function.  -AD No gross deficits of asymmetry noted. Suspected generalized weakness, but not participating in exam.  -AD --       General Eating/Swallowing Observations    Respiratory Support Currently in Use room air  -AD room air  -AD room air  -AD room air  -AD --    Eating/Swallowing Skills other (see comments)  refused po during this session  -AD fed by SLP;other (see comments)  Pt controlled cup and straw drinks  -AD self-fed;needed assist  -AD fed by SLP;self-fed;other (see comments)  Pt controlled drinks of thins by straw.   -AD --    Positioning During Eating upright 90 degree;upright in bed  -AD upright 90 degree;upright in chair  -AD upright in bed;other (see comments)  rolls to left side  -AD upright 90 degree;upright in chair;other (see comments)  legs drawn up to torso; arms kept crossed in front of her chest/neck  -AD --    Utensils Used -- -- spoon;cup  -AD spoon;straw  -AD --    Consistencies Trialed -- -- pureed;thin liquids;nectar/syrup-thick liquids  -AD regular textures;pureed;thin liquids  -AD --       Respiratory    Respiratory Status WFL;room air  -AD WFL;room air;during swallowing/eating  -AD WFL;room air;during swallowing/eating  -AD WFL;room air;other (see comments)  coughing with thins  -AD --       Clinical Swallow Eval    Oral Prep Phase -- -- WFL  -AD WFL  -AD --    Oral Transit -- -- WFL  -AD impaired  -AD --    Oral Residue -- -- WFL  -AD --  Unable to fully assess as pt did not allow SLP to look in mouth following po trials. No noted residue when pt talking afterwards.  -AD --    Pharyngeal Phase -- -- suspected pharyngeal impairment  -AD suspected pharyngeal impairment  -AD --    Esophageal Phase -- -- unremarkable  -AD unremarkable  -AD --    Clinical Swallow Evaluation Summary -- -- Pt continues to demonstrate s/s of aspiration with coughing on thins from cup. Intermittent wet vocal quality after multiple trials of pudding. Appears to tolerate nectar thick from the cup, but limited trials overall (3 small sips total) as pt states it tastes terrible.  -AD Pt demonstrates a suspected pharyngeal dysphagia with consistent coughing/strangling on thins from pt controlled straw drinks on 2 separate consecutive drink trials. Pt with inconsistent throat clearing on single drinks when SLP removed straw after one draw. No issues noted with trial of applesauce, but pt only allowed 2 trials. No difficulty noted with mastication of solid, onesimo cracker, with only one trial due to further refusal by this pt.  Cough/strangle noted after drinking was characterized by a weak cough/respiratory effort. No further trials or po given at this time.  -AD --       Oral Transit Concerns    Oral Transit Concerns -- -- other (see comments)  -AD other (see comments)  -AD --    Oral Transit Concerns, Comment -- -- suspected decreased back of tongue control with pooling and possible early spill into the pharynx  -AD suspected decreased back of tongue control with pooling and possible early spill into the pharynx.  -AD --       Pharyngeal Phase Concerns    Pharyngeal Phase Concerns -- -- cough;wet vocal quality  -AD cough;throat clear;other (see comments)  strangling  -AD --    Wet Vocal Quality -- -- pudding;nectar  -AD -- --    Cough -- -- thin  from cup  -AD thin  consecutive straw drinks  -AD --    Throat Clear -- -- -- thin;other (see comments)  single straw drinks  -AD --    Pharyngeal Phase Concerns, Comment -- -- Continued coughing on thins from cup in small sips, immediate.  -AD Coughing, strangling on consecutive straw drinks. Throat clearing on single straw drinks. Pt with weak, ineffective cough and throat clear.  -AD --       MBS/VFSS    Utensils Used -- spoon;cup;straw  -AD -- -- --    Consistencies Trialed -- regular textures;pureed;thin liquids;nectar/syrup-thick liquids;honey-thick liquids  -AD -- -- --       MBS/VFSS Interpretation    Oral Prep Phase -- WFL  decreased mastication due to dentures not being present at hospital  -AD -- -- --    Oral Transit Phase -- impaired  -AD -- -- --    Oral Residue -- WFL  -AD -- -- --    VFSS Summary -- Pt presents with a mild oropharyngeal dysphagia w/noted difficulty with mastication of solids due to lack of dentition and does not currently have her dentures present. Oral phase is otherwise unremarkable with the exception of one episode of early spill on thins from cup. Good bolus hold and transit noted across trials and consistencies otherwise. She demonstrates one episode of  aspiration during the swallow on cup trial of thins due to mistiming and early spill in to the pharynx. She does not sense this aspiration resulting in silent aspiration. Pt w/straw drink and spoon size trials of thins w/o aspiration or penetration. Timely swallow on all other trials and consistencies including nectar thick from spoon and cup, honey thick from spoon, pudding and regular solid. Mistiming felt to be cause of aspiration resulting in decreased vestibular closure when majority of bolus in the pharynx.  -AD -- -- --    Oral Phase, Comment -- Functional overall with one episode of early spill of thins by cup only. Limited trials overall due to pt needed strong encouragement to consume the barium.  -AD -- -- --       Oral Transit Phase    Impaired Oral Transit Phase -- premature spillage of liquids into pharynx  -AD -- -- --    Premature Spillage of Liquids into Pharynx -- thin liquids;secondary to reduced lingual control;other (see comments)  mistiming  -AD -- -- --    Oral Transit Phase, Comment -- Pt with one episode of early spill resulting in thins reaching the vestibule and pyriforms at the onset of the swallow.  -AD -- -- --       Initiation of Pharyngeal Swallow    Initiation of Pharyngeal Swallow -- WFL;other (see comments)  except one episode of spill to the vestibule/pyriforms at the initiation of the swallow.  -AD -- -- --    Pharyngeal Phase -- impaired pharyngeal phase of swallowing  -AD -- -- --    Aspiration During the Swallow -- thin liquids;secondary to delayed swallow initiation or mistiming;secondary to reduced vestibular closure;other (see comments)  on 1 trial from cup; no aspiration on spoon size trials (2 x) and straw drink (1x).  -AD -- -- --    Response to Aspiration -- No  -AD -- -- --    No spontaneous response to aspiration and -- non-effective subglottic clearance with cue (see comments);other (see comments)  Cued cough not attempted as material was well below the field of view  in the trachea.  -AD -- -- --    Rosenbek's Scale -- thin:;8--->level 8;nectar:;honey:;pudding/puree:;regular textures:;1--->level 1  -AD -- -- --    Pharyngeal Residue -- no significant pharyngeal residue noted  -AD -- -- --    Attempted Compensatory Maneuvers -- other (see comments)  Unable to attempt due to decreased cooperation and willingness to attempt techniques and intermittent nature of aspiration making it difficult to assess as well.  -AD -- -- --    Pharyngeal Phase, Comment -- Mild pharyngeal dysphagia characterized by mistiming/delay of swallow on 1/4 trials of thins. Aspiration occurs during the swallow due to mistiming resulting in decreased vestibular closure during the swallow. This results in pooled material entering the vestibule at the initiation of the swallow. This was silent aspiration in nature and immediately fell below the vocal folds and deep into the trachea past the field of view.  -AD -- -- --       SLP Evaluation Clinical Impression    SLP Swallowing Diagnosis -- suspect acute-on-chronic;mild;oral dysphagia;pharyngeal dysphagia  -AD suspect acute-on-chronic;suspected pharyngeal dysphagia  -AD suspect acute-on-chronic;suspected pharyngeal dysphagia  -AD --    Functional Impact -- risk of aspiration/pneumonia;risk of malnutrition;risk of dehydration  -AD risk of aspiration/pneumonia;risk of malnutrition  -AD risk of aspiration/pneumonia;risk of malnutrition  -AD --    Rehab Potential/Prognosis, Swallowing -- adequate, monitor progress closely  -AD adequate, monitor progress closely  -AD adequate, monitor progress closely  -AD --    Swallow Criteria for Skilled Therapeutic Interventions Met -- demonstrates skilled criteria  -AD demonstrates skilled criteria  -AD demonstrates skilled criteria  -AD --       SLP Treatment Clinical Impressions    Treatment Assessment (SLP) --  no change; unwilling to participate  -AD -- -- -- --    Treatment Assessment Comments (SLP) Pt not participating in  therapy. Refusing food/liquids currently due to liquid modification. Briefly reviewed handouts for water protocol, aspiration precautions, oral/denture care and use of 3 second prep with s/oSachin, and he indicates understanding. Pt did not allow for teaching.  -AD -- -- -- --    Daily Summary of Progress (SLP) unable to show any progress toward functional goals;prepare for discharge  -AD -- -- -- --    Barriers to Overall Progress (SLP) Resistant to information;Cognitive status  -AD -- -- -- --    Care Plan Review evaluation/treatment results reviewed;care plan/treatment goals reviewed;risks/benefits reviewed;current/potential barriers reviewed  pt, but she does not agree with plan  -AD -- -- -- --    Care Plan Review, Other Participant(s) significant other;other (see comments)  Sachin appears understanding  -AD -- -- -- --       Recommendations    Therapy Frequency (Swallow) other (see comments)  will attempt one more day if continued stay  -AD 3 days per week;5 days per week  -AD 3 days per week;5 days per week  -AD 3 days per week;5 days per week  -AD --    Predicted Duration Therapy Intervention (Days) until discharge  -AD 5 days  -AD 5 days  -AD 5 days  -AD --    SLP Diet Recommendation soft to chew textures;whole;nectar thick liquids;water between meals after oral care, with supervision  -AD soft to chew textures;whole;nectar thick liquids;water between meals after oral care, with supervision  -AD other (see comments)  further assessment before making recommendations  -AD NPO;other (see comments)  only necessary oral meds whole in applesauce; provide medications via alternate route if able to provide in IV form.  -AD --    Recommended Diagnostics reassess via VFSS (MBS)  before upgrading to thins due to silent aspiration  -AD reassess via VFSS (MBS)  before upgrading liquids  -AD VFSS (MBS)  -AD reassess via clinical swallow evaluation;VFSS (MBS)  -AD --    Recommended Precautions and Strategies upright  posture during/after eating;small bites of food and sips of liquid;3 second prep;general aspiration precautions  -AD upright posture during/after eating;small bites of food and sips of liquid;3 second prep;general aspiration precautions;fatigue precautions  -AD -- general aspiration precautions  -AD --    Oral Care Recommendations Oral Care before breakfast, after meals and PRN;Toothbrush;Denture Care  -AD Oral Care before breakfast, after meals and PRN;Toothbrush  -AD Oral Care BID/PRN;Toothbrush  -AD Oral Care BID/PRN;Toothbrush  -AD --    SLP Rec. for Method of Medication Administration meds whole;with thick liquids;with puree;as tolerated  -AD meds whole;with thick liquids;with puree;as tolerated  -AD meds via alternate route  -AD meds via alternate route  only necessary oral meds whole in applesauce; provide medications via alternate route if able to provide in IV form.  -AD --    Monitor for Signs of Aspiration yes;cough;throat clearing;none - silent aspiration present  -AD yes;cough;throat clearing  -AD yes;cough;throat clearing  -AD yes;cough;throat clearing  -AD --    Anticipated Discharge Disposition (SLP) home with /7 care  do not feel pt will participate in home health at this time; defer to family decision  -AD home with home health  -AD home with home health  -AD unknown  -AD --    Patient/Family Concerns, Anticipated Discharge Disposition (SLP) No concerns reported per s/oSachin. Pt does not speak to this therapist or report any concerns.  -AD No current concerns reported per significant otherSachin.  -AD No current concerns reported per pt or s/o.  -AD No concerns reported per pt or s/o  -AD --       Swallow Goals (SLP)    Swallow LTGs -- -- -- Patient will demonstrate progress toward functional swallow for;Swallow Long Term Goal (free text)  -AD --    Swallow STGs -- swallow management recall goal selection (SLP);swallow compensatory strategies goal selection (SLP)  -AD -- -- --    Swallow  Management Recall Goal Selection (SLP) -- swallow management recall, SLP goal 1  -AD -- -- --    Swallow Compensatory Strategies Goal Selection (SLP) -- swallow compensatory strategies, SLP goal 1  -AD -- -- --       (LTG) Patient will demonstrate progress toward functional swallow for    Diet Texture (Demonstrate progress toward functional swallow) soft to chew (whole) textures  -AD soft to chew (whole) textures  -AD -- soft to chew (whole) textures  -AD --    Liquid viscosity (Demonstrate progress toward functional swallow) nectar/ mildly thick liquids  -AD nectar/ mildly thick liquids  -AD -- nectar/ mildly thick liquids  -AD --    [place] (Demonstrate progress towards functional swallow) with minimal cues (75-90% accuracy)  -AD with minimal cues (75-90% accuracy)  -AD -- with minimal cues (75-90% accuracy)  -AD --    Time Frame (Demonstrate progress toward functional swallow) 1 week  -AD 1 week  -AD -- 1 week  -AD --    Barriers (Demonstrate progress toward functional swallow) medically complex; suspected acute on chronic deficit; resistant to information  -AD medically complex; suspected acute on chronic deficit  -AD -- medically complex; suspected acute on chronic deficit  -AD --    Progress/Outcomes (Demonstrate progress toward functional swallow) unable to make needed progress;goal ongoing  -AD good progress toward goal;goal ongoing  -AD -- new goal  -AD --    Comment (Demonstrate progress toward functional swallow) Pt refuses to participate or take food at this time.  -AD Diet recommended per MBS results/recommendations. Will follow for continued tolerance and understanding of rational for current diet/liquid modifications>  -AD -- -- --       (LTG) Swallow    (LTG) Swallow -- Pt will participate in further evaluation of swallowing, both clinically and via instrumental assessment, in 2 days to determine least restrictive diet tolerance.  -AD -- Pt will participate in further evaluation of swallowing,  both clinically and via instrumental assessment, in 2 days to determine least restrictive diet tolerance.  -AD --    [place] (Swallow Long Term Goal) -- with minimal cues (75-90% accuracy)  -AD -- -- --    Time Frame (Swallow Long Term Goal) -- other (see comments)  2 days  -AD -- -- --    Progress/Outcomes (Swallow Long Term Goal) -- goal met  -AD -- -- --    Comment (Swallow Long Term Goal) -- Clinical reval and MBS completed today. See reports.  -AD -- -- --       (STG) Swallow Management Recall Goal 1 (SLP)    Activity (Swallow Management Recall Goal 1, SLP) recall of;aspiration precautions;oral care recommendations;safe liquid viscosity;rationale for use of strategies/techniques  -AD recall of;aspiration precautions;oral care recommendations;safe liquid viscosity;rationale for use of strategies/techniques  -AD -- -- --    [place]/Accuracy (Swallow Management Recall Goal 1, SLP) with minimal cues (75-90% accuracy)  -AD with minimal cues (75-90% accuracy)  -AD -- -- --    Time Frame (Swallow Management Recall Goal 1, SLP) short term goal (STG);3 days  -AD short term goal (STG);3 days  -AD -- -- --    Barriers (Swallow Management Recall Goal 1, SLP) medically complex; suspected acute on chronic deficit; resistant to information  -AD resistance to teaching/education  -AD -- -- --    Progress/Outcomes (Swallow Management Recall Goal 1, SLP) unable to make needed progress  -AD new goal  -AD -- -- --    Comment (Swallow Management Recall Goal 1, SLP) Pt refuses treatment. S/o verbalizes understanding of aspiration precautions, diet/liquid modifications, oral care and water protocol. Handouts also provided for later recall.  -AD -- -- -- --       (STG) Swallow Compensatory Strategies Goal 1 (SLP)    Activity (Swallow Compensatory Strategies/Techniques Goal 1, SLP) aspiration precautions;compensatory strategies;other (see comments)  3 second prep  -AD aspiration precautions;compensatory strategies;other (see  comments)  3 second prep  -AD -- -- --    [place]/Accuracy (Swallow Compensatory Strategies/Techniques Goal 1, SLP) with minimal cues (75-90% accuracy)  -AD with minimal cues (75-90% accuracy)  -AD -- -- --    Time Frame (Swallow Compensatory Strategies/Techniques Goal 1, SLP) short term goal (STG);3 days  -AD short term goal (STG);3 days  -AD -- -- --    Barriers (Swallow Compensatory Strategies/Techniques Goal 1, SLP) medically complex; suspected acute on chronic deficit; resistant to information  -AD resistance to teaching/education  -AD -- -- --    Progress/Outcomes (Swallow Compensatory Strategies/Techniques Goal 1, SLP) unable to make needed progress;goal ongoing  -AD new goal  -AD -- -- --    Comment (Swallow Compensatory Strategies/Techniques Goal 1, SLP) Pt refuses to participate at this time.  -AD -- -- -- --      Row Name 07/19/23 7270                   Rehab Evaluation    Document Type other (see comments)  Missed visit  -AD        Patient Observations lethargic;unable to respond  limited movement with tactile to feet and legs; does not respond to verbal at this time and only limited response to tactile as above  -AD        Patient/Family/Caregiver Comments/Observations Family reports pt is not very alert at this time and will most likely not be able to participate. They report no issues swallowing following previous strokes and mini-strokes. Spouse reports she ate some toast and drank liquids yesterday morning w/o any issue or concern.  -AD        Session Not Performed unable to evaluate, medical status change  -AD        Evaluation Not Performed, Comment Pt just returned from MRI and had been given 2 doses of IV Ativan for the test. Pt not able to arouse at this time and safely participate in clinical swallow eval at this time. Will check back later.  -AD           General Information    Patient Profile Reviewed yes  -AD        Pertinent History Of Current Problem Pt is a 73 y/o female admitted  for possible stroke, NSTEMI type II and chronic diabetes mellitus. Noted on 7/17 to have a 3 hour period of difficuty walking and talking that resolved and did not seek medical care at that time. Symptoms returned on 7/18/23 around 5 pm with increasing confusion and family brought pt to the ER for treatment. Pt with hx of prior stroke about 14-15 months ago w/residual left sided weakness. They also report mini strokes and CT of head indicated no acute process, remote lacunar infarcts in right basal ganglia, left cerebellar hemisphere and generalized cerebral atrophy and white matter vascular disease.  -AD        Current Method of Nutrition NPO  -AD        Precautions/Limitations, Vision difficult to assess  -AD        Precautions/Limitations, Hearing difficult to assess  -AD        Prior Level of Function-Communication unknown;other (see comments)  family reports no issues or residual from stroke  -AD        Prior Level of Function-Swallowing no diet consistency restrictions;safe, efficient swallowing in all situations;regular textures;thin liquids  per family  -AD        Plans/Goals Discussed with family;agreed upon  Agree with deferring eval at this time and attempted when fully awake.  -AD        Patient's Goals for Discharge patient could not state  -AD        Family Goals for Discharge patient able to eat/drink without coughing/choking;patient able to return to regular diet  -AD           Pain    Additional Documentation --  no current pain indicated  -AD                  User Key  (r) = Recorded By, (t) = Taken By, (c) = Cosigned By      Initials Name Effective Dates    Lorie Abbasi, MS CCC-SLP 06/16/21 -                     EDUCATION  The patient has been educated in the following areas:   Dysphagia (Swallowing Impairment) Oral Care/Hydration Modified Diet Instruction Aspiration Precautions Water Protocol . Pt refuses education. Above reviewed with s/oS[place]in, and handouts given for later recall.         SLP GOALS       Row Name 07/21/23 0845 07/20/23 1200 07/19/23 1615       (LTG) Patient will demonstrate progress toward functional swallow for    Diet Texture (Demonstrate progress toward functional swallow) soft to chew (whole) textures  -AD soft to chew (whole) textures  -AD soft to chew (whole) textures  -AD    Liquid viscosity (Demonstrate progress toward functional swallow) nectar/ mildly thick liquids  -AD nectar/ mildly thick liquids  -AD nectar/ mildly thick liquids  -AD    [place] (Demonstrate progress towards functional swallow) with minimal cues (75-90% accuracy)  -AD with minimal cues (75-90% accuracy)  -AD with minimal cues (75-90% accuracy)  -AD    Time Frame (Demonstrate progress toward functional swallow) 1 week  -AD 1 week  -AD 1 week  -AD    Barriers (Demonstrate progress toward functional swallow) medically complex; suspected acute on chronic deficit; resistant to information  -AD medically complex; suspected acute on chronic deficit  -AD medically complex; suspected acute on chronic deficit  -AD    Progress/Outcomes (Demonstrate progress toward functional swallow) unable to make needed progress;goal ongoing  -AD good progress toward goal;goal ongoing  -AD new goal  -AD    Comment (Demonstrate progress toward functional swallow) Pt refuses to participate or take food at this time.  -AD Diet recommended per MBS results/recommendations. Will follow for continued tolerance and understanding of rational for current diet/liquid modifications>  -AD --       (LTG) Swallow    (LTG) Swallow -- Pt will participate in further evaluation of swallowing, both clinically and via instrumental assessment, in 2 days to determine least restrictive diet tolerance.  -AD Pt will participate in further evaluation of swallowing, both clinically and via instrumental assessment, in 2 days to determine least restrictive diet tolerance.  -AD    [place] (Swallow Long Term Goal) -- with minimal cues (75-90%  accuracy)  -AD --    Time Frame (Swallow Long Term Goal) -- other (see comments)  2 days  -AD --    Progress/Outcomes (Swallow Long Term Goal) -- goal met  -AD --    Comment (Swallow Long Term Goal) -- Clinical reval and MBS completed today. See reports.  -AD --       (STG) Swallow Management Recall Goal 1 (SLP)    Activity (Swallow Management Recall Goal 1, SLP) recall of;aspiration precautions;oral care recommendations;safe liquid viscosity;rationale for use of strategies/techniques  -AD recall of;aspiration precautions;oral care recommendations;safe liquid viscosity;rationale for use of strategies/techniques  -AD --    [place]/Accuracy (Swallow Management Recall Goal 1, SLP) with minimal cues (75-90% accuracy)  -AD with minimal cues (75-90% accuracy)  -AD --    Time Frame (Swallow Management Recall Goal 1, SLP) short term goal (STG);3 days  -AD short term goal (STG);3 days  -AD --    Barriers (Swallow Management Recall Goal 1, SLP) medically complex; suspected acute on chronic deficit; resistant to information  -AD resistance to teaching/education  -AD --    Progress/Outcomes (Swallow Management Recall Goal 1, SLP) unable to make needed progress  -AD new goal  -AD --    Comment (Swallow Management Recall Goal 1, SLP) Pt refuses treatment. S/o verbalizes understanding of aspiration precautions, diet/liquid modifications, oral care and water protocol. Handouts also provided for later recall.  -AD -- --       (STG) Swallow Compensatory Strategies Goal 1 (SLP)    Activity (Swallow Compensatory Strategies/Techniques Goal 1, SLP) aspiration precautions;compensatory strategies;other (see comments)  3 second prep  -AD aspiration precautions;compensatory strategies;other (see comments)  3 second prep  -AD --    [place]/Accuracy (Swallow Compensatory Strategies/Techniques Goal 1, SLP) with minimal cues (75-90% accuracy)  -AD with minimal cues (75-90% accuracy)  -AD --    Time Frame (Swallow Compensatory  Strategies/Techniques Goal 1, SLP) short term goal (STG);3 days  -AD short term goal (STG);3 days  -AD --    Barriers (Swallow Compensatory Strategies/Techniques Goal 1, SLP) medically complex; suspected acute on chronic deficit; resistant to information  -AD resistance to teaching/education  -AD --    Progress/Outcomes (Swallow Compensatory Strategies/Techniques Goal 1, SLP) unable to make needed progress;goal ongoing  -AD new goal  -AD --    Comment (Swallow Compensatory Strategies/Techniques Goal 1, SLP) Pt refuses to participate at this time.  -AD -- --              User Key  (r) = Recorded By, (t) = Taken By, (c) = Cosigned By      Initials Name Provider Type    Lorie Abbasi MS CCC-SLP Speech and Language Pathologist                       Time Calculation:    Time Calculation- SLP       Row Name 07/21/23 1024             Time Calculation- SLP    SLP Start Time 0845  -AD      SLP Stop Time 0855  -AD      SLP Time Calculation (min) 10 min  -AD      Total Timed Code Minutes- SLP 0 minute(s)  -AD      SLP Non-Billable Time (min) 0 min  -AD      SLP Received On 07/21/23  -AD         Untimed Charges    14281-MH Treatment Swallow Minutes 10  -AD         Total Minutes    Untimed Charges Total Minutes 10  -AD       Total Minutes 10  -AD                User Key  (r) = Recorded By, (t) = Taken By, (c) = Cosigned By      Initials Name Provider Type    Lorie Abbasi MS CCC-SLP Speech and Language Pathologist                    Therapy Charges for Today       Code Description Service Date Service Provider Modifiers Qty    80051057767 HC ST EVAL ORAL PHARYNG SWALLOW 2 7/20/2023 Lorie Garces MS CCC-SLP GN 1    80367468058 HC ST MOTION FLUORO EVAL SWALLOW 6 7/20/2023 Lorie Garces MS CCC-SLP GN, 59 1    [id number] HC ST TREATMENT SWALLOW 1 7/21/2023 Lorie Garces MS CCC-SLP GN 1                 Lorie Garces MS CCC-FRENCH  7/21/2023

## 2023-07-21 NOTE — SIGNIFICANT NOTE
07/21/23 0840   Communication Assessment/Intervention   Session Not Performed patient/family declined evaluation  (Pt refuses communication evaluation. Will not answer specific questions for SLP. Will discontinue communication evaluation from stroke protocol at this time. Stroke also ruled out.)

## 2023-07-21 NOTE — DISCHARGE INSTR - APPOINTMENTS
Patient need to call and make a hospital follow up with Lanie Gomez within 1-2 weeks of discharge [phone]    Patient has new patient appointment with Dr. Pulliam on August 7 @  8am. [phone]

## 2023-07-21 NOTE — PLAN OF CARE
Goal Outcome Evaluation:  Plan of Care Reviewed With: patient           Outcome Evaluation: PT: Patient requires max encouragement for minimal effort and particpipation. Patient performs supine to sit transfer with modified independence, sit to/from stand transfers with SBA, and gait x 5 feet with CGA with use of FWW. Patient adamently refuses further gait training despite encouragement. Per significant other, patient is at baseline. He reports they will resume home health PT upon return home and he will continue to provide patient with 24/7 care. Recommend continued mobility with nursing staff. No further inpatient skilled PT needs at this time.      Anticipated Discharge Disposition (PT): home with 24/7 care, home with home health

## 2023-07-21 NOTE — CASE MANAGEMENT/SOCIAL WORK
Case Management Discharge Note      Final Note: DC Home with HH         Selected Continued Care - Admitted Since 7/18/2023       Destination    No services have been selected for the patient.                Durable Medical Equipment    No services have been selected for the patient.                Dialysis/Infusion    No services have been selected for the patient.                Home Medical Care    No services have been selected for the patient.                Therapy    No services have been selected for the patient.                Community Resources    No services have been selected for the patient.                Community & DME    No services have been selected for the patient.                    Selected Continued Care - Prior Encounters Includes continued care and service providers with selected services from prior encounters from 4/19/2023 to 7/21/2023      Discharged on 4/20/2023 Admission date: 4/19/2023 - Discharge disposition: Home-Health Care Svc      Durable Medical Equipment       Service Provider Selected Services Address Phone Fax Patient Preferred    [facility] [address] [place] [address] [phone] --              Home Medical Care       Service Provider Selected Services Address Phone Fax Patient Preferred    [facility] [address] [address] [phone] --                               Final Discharge Disposition Code: 06 - home with home health care

## 2023-07-21 NOTE — PLAN OF CARE
Goal Outcome Evaluation:  Plan of Care Reviewed With: patient, significant other        Progress: improving  Outcome Evaluation: OT- Patient performed supine to sit with modified independence and sit to stand with SBA. Patient performed functional mobility with FWW and CGA X 5 ft (declined further distance). Patient donned pants with CGA. Significant other in room and reports they have no concerns regarding discharge home and he has contacted home health. No furhter skilled OT needs at this time.

## 2023-07-21 NOTE — PLAN OF CARE
Goal Outcome Evaluation:  Plan of Care Reviewed With: patient, significant other        Progress: improving  Outcome Evaluation: VS stable, on RA. Pt rested well throughout night, much less restlessness much more alert. Set up in chair and ate dinner. Ambulating  x1 assist to bathroom, no ataxia noted. Continuous IV fluids. NSR on tele.

## 2023-07-21 NOTE — PROGRESS NOTES
"SERVICE: [facility] HOSPITALIST    CONSULTANTS: Neurology    CHIEF COMPLAINT: Follow-up to [place] encephalopathy, migraine with aphasia, dysphagia    SUBJECTIVE: Much more lethargic this morning, refusing to wake up to eat, states \"I do not have to eat to go home\".  Seems more confused again, significant other at bedside notes he cannot take care of patient in this state.  No \"body shocks\" overnight.  Staff notes patient ate food that significant other brought in last night.  Unable to fully assess this morning due to lethargy.    OBJECTIVE:    /81 (BP Location: Right arm, Patient Position: Lying)   Pulse 89   Temp 97.7 °F (36.5 °C) (Oral)   Resp 16   Ht 162.6 cm (64\")   Wt 55.3 kg (122 lb)   SpO2 96%   BMI 20.94 kg/m²     MEDS/LABS REVIEWED AND ORDERED    atorvastatin, 80 mg, Oral, Nightly  ferrous gluconate, 324 mg, Oral, Daily With Breakfast  sodium chloride, 10 mL, Intravenous, Q12H      Physical Exam  Vitals reviewed.   Constitutional:       General: She is not in acute distress.     Comments: Sleepy but awakens, thin, chronically ill appearance, appears older than stated age   HENT:      Head: Normocephalic and atraumatic.      Mouth/Throat:      Mouth: Mucous membranes are moist.      Comments: Edentulous  Eyes:      Extraocular Movements: Extraocular movements intact.      Pupils: Pupils are equal, round, and reactive to light.   Cardiovascular:      Rate and Rhythm: Normal rate and regular rhythm.   Pulmonary:      Effort: Pulmonary effort is normal. No respiratory distress.      Breath sounds: Normal breath sounds. No wheezing or rales.   Abdominal:      General: Abdomen is flat. Bowel sounds are normal. There is no distension.      Palpations: Abdomen is soft.      Tenderness: There is no abdominal tenderness. There is no guarding.   Musculoskeletal:         General: No swelling.   Skin:     General: Skin is warm and dry.      Capillary Refill: Capillary refill takes less " than 2 seconds.      Findings: No erythema.   Neurological:      General: No focal deficit present.      Comments: Unable to assess, sleepy but does answer questions, irritable.   Psychiatric:      Comments: Lethargic, irritable       LAB/DIAGNOSTICS:    Lab Results (last 24 hours)       Procedure Component Value Units Date/Time    Basic Metabolic Panel [[id number]]  (Abnormal) Collected: 07/21/23 0343    Specimen: Blood Updated: 07/21/23 0445     Glucose 121 mg/dL      BUN 22 mg/dL      Creatinine 0.80 mg/dL      Sodium 135 mmol/L      Potassium 3.9 mmol/L      Chloride 103 mmol/L      CO2 23.1 mmol/L      Calcium 8.2 mg/dL      BUN/Creatinine Ratio 27.5     Anion Gap 8.9 mmol/L      eGFR 77.9 mL/min/1.73     Narrative:      GFR Normal >60  Chronic Kidney Disease <60  Kidney Failure <15    The GFR formula is only valid for adults with stable renal function between ages 18 and 70.    TSH [[id number]]  (Normal) Collected: 07/20/23 1120    Specimen: Blood Updated: 07/20/23 1320     TSH 1.440 uIU/mL     POC Glucose Once [[id number]]  (Abnormal) Collected: 07/20/23 1216    Specimen: Blood Updated: 07/20/23 1223     Glucose 145 mg/dL      Comment: Meter: [id number] : [id number] Joseph CARPENTER       Basic Metabolic Panel [[id number]]  (Abnormal) Collected: 07/20/23 1120    Specimen: Blood Updated: 07/20/23 1150     Glucose 114 mg/dL      BUN 14 mg/dL      Creatinine 0.64 mg/dL      Sodium 135 mmol/L      Potassium 4.1 mmol/L      Chloride 100 mmol/L      CO2 22.8 mmol/L      Calcium 8.7 mg/dL      BUN/Creatinine Ratio 21.9     Anion Gap 12.2 mmol/L      eGFR 93.4 mL/min/1.73     Narrative:      GFR Normal >60  Chronic Kidney Disease <60  Kidney Failure <15    The GFR formula is only valid for adults with stable renal function between ages 18 and 70.          ECG 12 Lead Stroke Evaluation   Final Result   HEART RATE= 100  bpm   RR Interval= 596  ms   NV Interval= 175  ms   P Horizontal Axis=   deg   P Front Axis= 95  deg    QRSD Interval= 85  ms   QT Interval= 364  ms   QRS Axis= 3  deg   T Wave Axis= 95  deg   - ABNORMAL ECG -   Sinus tachycardia   Nonspecific repol abnormality, lateral leads   No change from prior tracing   Electronically Signed By: Dmitry Ruiz ([facility] 19-Jul-2023 08:56:53   Date and Time of Study: 2023-07-18 18:41:09        Results for orders placed during the hospital encounter of 07/18/23    Adult Transthoracic Echo Complete w/ Color, Spectral and Contrast if Necessary Per Protocol    Interpretation Summary    Left ventricular systolic function is normal. Calculated left ventricular EF = 63.5%    Left ventricular diastolic function is consistent with (grade Ia w/high LAP) impaired relaxation.    Estimated right ventricular systolic pressure from tricuspid regurgitation is mildly elevated (35-45 mmHg). Calculated right ventricular systolic pressure from tricuspid regurgitation is 36 mmHg.    FL Video Swallow With Speech Single Contrast    Result Date: 7/20/2023  Video swallowing study as noted above. Please see details and recommendations to follow from speech therapy.   This report was finalized on 7/20/2023 12:28 PM by Dr. Kali Wong MD.       ASSESSMENT/PLAN:  Acute metabolic encephalopathy/Apraxia/ataxia, ruled out new stroke, rule out seizures:  Classic migraine with aphasia: neuro diagnosis  H/O strokes: neuro stroke followed, neuro following  Reportedly tolerated diet last night, SLP continues to follow  Participated with PT/OT yesterday, continue today  Echo showed grade 1 diastolic dysfunction/normal EF     Dysphagia: SLP continues evaluating, diet changed to soft, regular, whole meat, nectar thick     Chronic hyponatremia: initially 128, now 135 with mentation unchanged     Chronic normocytic anemia:  No active blood loss noted, hemoglobin stable at 11.1 on admit  Anemia panels generally unrevealing     Chronic moderate malnutrition, cachexia  TSH normal  Body mass index is 20.94 kg/m².  Dietician  "monitoring     History of DM2: No longer an issue A1c is 5.3%,    PLAN FOR DISPOSITION: home when able     BRET Pettit  Hospitalist, [facility]  07/21/23  09:37 EDT    Note Disclaimer: At [facility], we believe that sharing information builds trust and better relationships. You are receiving this note because you recently visited [facility]. It is possible you will see health information before a provider has talked with you about it. This kind of information can be easy to misunderstand. To help you fully understand what it means for your health, we urge you to discuss this note with your provider.     \"Dictated utilizing Dragon dictation\"      "

## 2023-07-21 NOTE — CASE MANAGEMENT/SOCIAL WORK
Continued Stay Note  RADHA Capone     Patient Name: Arline Landaverde  MRN: [id number]  Today's Date: 7/21/2023    Admit Date: 7/18/2023    Plan: DC home w/[facility]   Discharge Plan       Row Name 07/21/23 1348       Plan    Plan DC home w/[facility]    Plan Comments CCP noted pt dc, order for HH, Pt has used [email or web address] before- call to Kim -[phone] with referral and pt information.  CCP will follow. Thanks, Stephany PIERRE    Final Discharge Disposition Code 06 - home with home health care    Final Note DC Home with HH                   Discharge Codes    No documentation.                 Expected Discharge Date and Time       Expected Discharge Date Expected Discharge Time    Jul 21, 2023               Stephany Guerra

## 2023-07-21 NOTE — DISCHARGE SUMMARY
"Arline Landaverde  1950  [id number]    Hospitalists Discharge Summary    Date of Admission: 7/18/2023  Date of Discharge:  7/21/2023    History of Present Illness from [facility] on admit:   \"Patient is a 72-year-old female with past medical history significant for previous stroke, diabetes mellitus type 2 non-insulin-requiring.  Patient is already taking aspirin and Lipitor due to previous stroke.  She was brought into the ED today at [facility] via private vehicle.  Her son who accompanied her to the ED stated that yesterday around 11 AM patient was having difficulty walking in [facility], also at this time had trouble speaking, this lasted 3 hours, during which no attempt was made to have her evaluated, her symptoms subsequently resolved.  Then again today around 5 PM she was home with her son and had sudden onset of difficulty getting out of her chair, could not reach the bathroom.  She was profoundly confused from her baseline and unable to answer questions so he decided at this point it was time to go to the ED. patient has persistent deficits on the left side from her previous CVA, but she had obvious trouble with ambulation that is atypical for her even with this residual weakness.      Work-up in the ED revealed normal CT head without contrast other than remote lacunar infarct, CTA head and neck were reviewed by stroke neurologist, CT cerebral perfusion was read as normal by radiology.  Stroke neurologist did not perform telehealth evaluation as camera was malfunctioning, but did recommend admission to hospitalist group with MRI brain.\"    Primary Discharge diagnoses:  Acute metabolic encephalopathy/Apraxia/ataxia, ruled out new stroke, rule out seizures  Classic migraine with aphasia  H/O strokes   Dysphagia    Secondary Discharge Diagnoses:    Chronic hyponatremia   Chronic normocytic anemia   Chronic moderate malnutrition, cachexia  History of DM2    Hospital Course Summary:   The patient was initially followed " in consultation by neuro stroke and when stroke was ruled out neurology was consulted.  After daily testing and evaluation, classic migraine with aphasia was final diagnosis.  Patient did not tolerate EEG and therefore seizures were not completely ruled out but were not suspected by neurology. Patient experienced excessive lethargy after low dose lorazepam for MRI. Elavil was trialled overnight with excessive sleepiness and was therefore discontinued.     Dysphagia was noted and modified soft diet, whole meat, nectar thick liquids were recommended however staff noted patient and family were noncompliant and that patient ate regular solid food that was brought in.    A1c was 5.3% and metformin was discontinued this admission.    Home today with HH and significant other  F/U Lanie Gomez APRN     PCP  Patient Care Team:  Lanie Gomez APRN as PCP - General (Family Medicine)    Consults:   Consults       Date and Time Order Name Status Description    7/19/2023 12:44 PM Inpatient Neurology Consult General Completed     7/18/2023  6:01 PM Inpatient Neurology Consult Stroke            Operations and Procedures Performed:     Adult Transthoracic Echo Complete w/ Color, Spectral and Contrast if Necessary Per Protocol    Result Date: 7/20/2023  Narrative:   Left ventricular systolic function is normal. Calculated left ventricular EF = 63.5%   Left ventricular diastolic function is consistent with (grade Ia w/high LAP) impaired relaxation.   Estimated right ventricular systolic pressure from tricuspid regurgitation is mildly elevated (35-45 mmHg). Calculated right ventricular systolic pressure from tricuspid regurgitation is 36 mmHg.     CT Head Without Contrast    Result Date: 7/18/2023  Narrative: CT HEAD, NONCONTRAST, 7/18/2023  HISTORY: Follow-up stroke, unable to speak, abnormal gait  COMPARISON: Earlier today   TECHNIQUE: CT examination of the head was performed without IV contrast. Radiation dose reduction  techniques included automated exposure control or exposure modulation based on body size. Radiation audit for CT and nuclear cardiology exams in the last12 months: 3.    FINDINGS: The ventricles and subarachnoid spaces are normal. There are no masses or extra-axial fluid collections or hemorrhage. There is a prominent CSF space in the left thalamus. Skull is normal.      Impression: No change. No acute findings  This report was finalized on 7/18/2023 10:55 PM by Dr. Chidi Johnson MD.      CT Angiogram Neck    Result Date: 7/18/2023  Narrative: CT ANGIOGRAM OF THE NECK WITH CONTRAST  HISTORY: Unable to speak  COMPARISON: No pertinent prior study  TECHNIQUE: CTA of the neck with contrast. Radiation dose reduction techniques included automated exposure control or exposure modulation based on body size. Radiation audit for CT and nuclear cardiology exams in the last 12 months: 0.  FINDINGS: The origin of the great vessels shows no significant stenosis. The right carotid bifurcation shows mild atherosclerotic plaque. No focal stenosis.  The left carotid bifurcation demonstrates mild atherosclerotic plaque. The degree of stenosis is approximately 30% diameter stenosis. This is not hemodynamically or clinically significant.  The vertebral arteries are of normal course and caliber. The patient demonstrates a dominant right vertebral. Both vertebral arteries contribute to the basilar artery.      Impression: Mild plaque formation at the left carotid bifurcation with a focal stenosis of the left internal carotid artery of approximately 30% diameter stenosis.   This report was finalized on 7/18/2023 8:40 PM by Dr. Bernardo Zaldivar MD.      MRI Brain Without Contrast    Result Date: 7/19/2023  Narrative: MRI BRAIN, NONCONTRAST, 7/19/2023     HISTORY: 72-year-old female admitted to the hospital on 7/18/2023 for acute stroke evaluation. Past history of stroke. Speech difficulty and abnormal gait. At the time of admission, CT head, CTA  head and neck and CT perfusion studies revealed no acute insult.  TECHNIQUE: MR imaging of the brain without IV contrast.  COMPARISON: *  MRI brain, 4/13/2022.  FINDINGS: Today, the examination is largely nondiagnostic. The patient was agitated and uncooperative during the examination and moved throughout the study despite premedication.  There is no gross evidence of a large acute intracranial abnormality. Only an ischemic lesion involving an entire cerebral artery territory could be confidently excluded on this examination. No visible large intracerebral mass or large territory cerebral edema is visible. On the FLAIR sequence, old lacunar infarcts are visible in the left cerebellar hemisphere.      Impression: 1.  Nondiagnostic examination due to continuous patient motion throughout the exam. 2.  There is no gross evidence of acute ischemic insult involving a large cerebral artery territory. Smaller restricted diffusion lesions would not be detectable on this study.  This report was finalized on 7/19/2023 10:21 AM by Dr. Kali Wong MD.      FL Video Swallow With Speech Single Contrast    Result Date: 7/20/2023  Narrative: VIDEO SWALLOWING STUDY, 7/20/2023  HISTORY: 72-year-old female hospital inpatient with dysphagia. Abnormal bedside swallowing evaluation.  TECHNIQUE: Video swallowing study was conducted by the speech pathologist in my presence and recorded on digital video disc.  Fluoroscopy time 3.5 minutes. A single spot image was recorded for documentation purposes. Cumulative radiation dose, reference air kerma, 12.60 mGy.  FINDINGS: A single isolated episode of clinically silent tracheal aspiration of thin liquids was observed. This was not observed with other larger or smaller thin liquid boluses. All other administered substances were swallowed normally with no laryngeal penetration or aspiration. No significant residual pooling. No observed stricture, diverticulum or other structural abnormality.       Impression: Video swallowing study as noted above. Please see details and recommendations to follow from speech therapy.   This report was finalized on 7/20/2023 12:28 PM by Dr. Kali Wong MD.      XR Chest 1 View    Result Date: 7/18/2023  Narrative: CHEST X-RAY, 1 VIEW  HISTORY: Gait problem and unable to speak  Comparison: 4/19/2023  FINDINGS: The heart size is normal. Mediastinal structures are midline. Pulmonary vascularity is normal.  No acute infiltrates. No pleural fluid. No pneumothorax.      Impression: No clearly acute cardiopulmonary pathology demonstrated.   This report was finalized on 7/18/2023 6:43 PM by Dr. Bernardo Zaldivar MD.      XR Pelvis 1 or 2 View    Impression: Ordering physician's impression is located in the Encounter Note dated 07/07/23.     CT Angiogram Head, CT Angiogram Neck    Result Date: 7/19/2023  Narrative: CTA HEAD AND NECK   7/18/2023  HISTORY: Follow-up stroke, unable to speak, abnormal gait  COMPARISON: 9 hours earlier  TECHNIQUE:  Spiral imaging was performed through the head and neck with angiographic reconstructions. IV contrast was utilized. Sagittal: Reconstructions generated. Radiation dose reduction techniques included automated exposure control or exposure modulation based on body size. Radiation audit for CT and nuclear cardiology exams in the last 12 months: 3.  FINDINGS:  The aortic arch is normal in size. The great vessels are all patent without stenosis. The vertebral arteries both arise from the subclavian arteries. The right 1 is dominant. These vessels unite to form the basilar artery. Both common carotid arteries are widely patent. There are small calcified plaque formations of each bifurcation region without significant stenosis. The internal carotid arteries are patent up to the skull base.  The basilar artery and posterior cerebral arteries are normal in appearance. The distal internal carotid arteries, middle cerebral arteries and anterior cerebral  arteries are normal in appearance. No aneurysm is visible.      Impression: No change from 9 hours ago. No significant stenosis. No arterial occlusion is visible  This report was finalized on 7/19/2023 3:12 AM by Dr. Chidi Johnson MD.      CT Head Without Contrast Stroke Protocol    Result Date: 7/18/2023  Narrative: CT HEAD WITHOUT  HISTORY: Abnormal gait and unable to speak.  COMPARISON: 4/13/2022  TECHNIQUE: CT head without. Radiation dose reduction techniques included automated exposure control or exposure modulation based on body size. Radiation audit for CT and nuclear cardiology exams in the last 12 months: 0.  FINDINGS: The ventricles are generous in size. Cortical sulci are correspondingly prominent. No extra-axial fluid collections. No significant shift of midline structures. There are areas of low-attenuation in the periventricular white matter likely representing white matter microvascular disease.  No parenchymal or subarachnoid hemorrhage. Focal lacunar infarct in the right basal ganglia. Focal lacunar infarct in the left thalamus. There is a focal lacunar infarct in the left cerebellar hemisphere.  Mastoid air cells are clear. Visualized paranasal sinuses are clear.      Impression:  1. No clearly acute intracranial findings.  2. Remote lacunar infarcts in the right basal ganglia, the left thalamus and the left cerebellar hemisphere.  3. Generalized cerebral atrophy and white matter microvascular disease.   This report was finalized on 7/18/2023 6:29 PM by Dr. Bernardo Zaldivar MD.      CT Angiogram Head w AI Analysis of LVO    Result Date: 7/18/2023  Narrative: CT ANGIOGRAM HEAD W AI ANALYSIS OF LVO-  INDICATION: Unable to talk  TECHNIQUE: CTA of the head with contrast. Coronal and sagittal reconstructions were obtained.  Radiation dose reduction techniques included automated exposure control or exposure modulation based on body size. Radiation audit for number of CT and nuclear cardiology exams performed in  the last year: 0.   COMPARISON: None available.  FINDINGS: The intracranial portions of the internal carotid arteries demonstrate no significant stenosis. No aneurysm is seen. The anterior and middle cerebral arteries are of normal course and caliber. There is symmetric bilateral runoff.  The posterior circulation shows the basilar artery to be of normal course and caliber. The posterior cerebral arteries are of normal course and caliber. No aneurysm is seen. No flow-limiting stenosis. There is symmetric distal runoff of the posterior circulation.      Impression: CTA of the head demonstrating no significant abnormalities.     This report was finalized on 7/18/2023 8:45 PM by Dr. Bernardo Zaldivar MD.      CT CEREBRAL PERFUSION WITH & WITHOUT CONTRAST    Result Date: 7/18/2023  Narrative: CT CEREBRAL PERFUSION WITH AND WITHOUT CONTRAST  HISTORY: Abnormal gait and unable to speak  COMPARISON: No pertinent prior study  TECHNIQUE: CT cerebral perfusion with and without contrast. Radiation dose reduction techniques included automated exposure control or exposure modulation based on body size. Radiation audit for CT and nuclear cardiology exams in the last 12 months: 0.  FINDINGS: CBF greater than 30%: 0 mL  Tmax greater than 6.0 seconds: 0 mL  Mismatch volume: 0  Mismatch ratio: 0  Hypoperfusion index: Cannot calculate CBV index: Cannot calculate      Impression: Normal CT cerebral perfusion study.   This report was finalized on 7/18/2023 7:08 PM by Dr. Bernardo Zaldivar MD.       Allergies:  is allergic to penicillins.    Jona  Oxycodone 4/2023 per report, reviewed by me    Discharge Medications:     Discharge Medications        Changes to Medications        Instructions Start Date   atorvastatin 20 MG tablet  Commonly known as: LIPITOR  What changed: when to take this   20 mg, Oral, Daily      ferrous gluconate 324 MG tablet  Commonly known as: FERGON  What changed: when to take this   324 mg, Oral, Daily With Breakfast              Continue These Medications        Instructions Start Date   acetaminophen 325 MG tablet  Commonly known as: TYLENOL   650 mg, Oral, Every 6 Hours PRN      aspirin 81 MG EC tablet   81 mg, Oral, Every 12 Hours Scheduled             Stop These Medications      metFORMIN 500 MG tablet  Commonly known as: GLUCOPHAGE     nicotine 21 MG/24HR patch  Commonly known as: NICODERM CQ     oxyCODONE-acetaminophen 5-325 MG per tablet  Commonly known as: PERCOCET              Last Lab Results:   Lab Results (most recent)       Procedure Component Value Units Date/Time    Basic Metabolic Panel [[id number]]  (Abnormal) Collected: 07/21/23 0343    Specimen: Blood Updated: 07/21/23 0445     Glucose 121 mg/dL      BUN 22 mg/dL      Creatinine 0.80 mg/dL      Sodium 135 mmol/L      Potassium 3.9 mmol/L      Chloride 103 mmol/L      CO2 23.1 mmol/L      Calcium 8.2 mg/dL      BUN/Creatinine Ratio 27.5     Anion Gap 8.9 mmol/L      eGFR 77.9 mL/min/1.73     Narrative:      GFR Normal >60  Chronic Kidney Disease <60  Kidney Failure <15    The GFR formula is only valid for adults with stable renal function between ages 18 and 70.    TSH [[id number]]  (Normal) Collected: 07/20/23 1120    Specimen: Blood Updated: 07/20/23 1320     TSH 1.440 uIU/mL     POC Glucose Once [[id number]]  (Abnormal) Collected: 07/20/23 1216    Specimen: Blood Updated: 07/20/23 1223     Glucose 145 mg/dL      Comment: Meter: [id number] : [id number] Joseph CARPENTER       Basic Metabolic Panel [[id number]]  (Abnormal) Collected: 07/20/23 1120    Specimen: Blood Updated: 07/20/23 1150     Glucose 114 mg/dL      BUN 14 mg/dL      Creatinine 0.64 mg/dL      Sodium 135 mmol/L      Potassium 4.1 mmol/L      Chloride 100 mmol/L      CO2 22.8 mmol/L      Calcium 8.7 mg/dL      BUN/Creatinine Ratio 21.9     Anion Gap 12.2 mmol/L      eGFR 93.4 mL/min/1.73     Narrative:      GFR Normal >60  Chronic Kidney Disease <60  Kidney Failure <15    The GFR formula is only valid for  adults with stable renal function between ages 18 and 70.    POC Glucose Once [[id number]]  (Normal) Collected: 07/20/23 0048    Specimen: Blood Updated: 07/20/23 0055     Glucose 103 mg/dL      Comment: Meter: [id number] : [id number] Ehsan Salazar [facility]       Respiratory Panel PCR w/COVID-19(SARS-CoV-2) CALVIN/CLARICE/NURIS/PAD/COR/MAD/CAMILO In-House, NP Swab in UTM/VTM, 3-4 HR TAT - Swab, Nasopharynx [[id number]]  (Normal) Collected: 07/19/23 1544    Specimen: Swab from Nasopharynx Updated: 07/19/23 2012     ADENOVIRUS, PCR Not Detected     Coronavirus 229E Not Detected     Coronavirus HKU1 Not Detected     Coronavirus NL63 Not Detected     Coronavirus OC43 Not Detected     COVID19 Not Detected     Human Metapneumovirus Not Detected     Human Rhinovirus/Enterovirus Not Detected     Influenza A PCR Not Detected     Influenza B PCR Not Detected     Parainfluenza Virus 1 Not Detected     Parainfluenza Virus 2 Not Detected     Parainfluenza Virus 3 Not Detected     Parainfluenza Virus 4 Not Detected     RSV, PCR Not Detected     Bordetella pertussis pcr Not Detected     Bordetella parapertussis PCR Not Detected     Chlamydophila pneumoniae PCR Not Detected     Mycoplasma pneumo by PCR Not Detected    Narrative:      In the setting of a positive respiratory panel with a viral infection PLUS a negative procalcitonin without other underlying concern for bacterial infection, consider observing off antibiotics or discontinuation of antibiotics and continue supportive care. If the respiratory panel is positive for atypical bacterial infection (Bordetella pertussis, Chlamydophila pneumoniae, or Mycoplasma pneumoniae), consider antibiotic de-escalation to target atypical bacterial infection.    Urine Drug Screen - Urine, Clean Catch [[id number]]  (Abnormal) Collected: 07/19/23 1754    Specimen: Urine, Clean Catch Updated: 07/19/23 1805     THC, Screen, Urine Negative     Phencyclidine (PCP), Urine Negative     Cocaine Screen, Urine Negative      Methamphetamine, Ur Negative     Opiate Screen Negative     Amphetamine Screen, Urine Negative     Benzodiazepine Screen, Urine Positive     Tricyclic Antidepressants Screen Negative     Methadone Screen, Urine Negative     Barbiturates Screen, Urine Negative     Oxycodone Screen, Urine Negative     Propoxyphene Screen Negative     Buprenorphine, Screen, Urine Negative    Narrative:      Urine drug screen results are to be used for medical purposes only.  They are not to be used for legal purposes such as employment testing.  Negative results do not necessarily mean the complete absence of a subtance, but rather that the result is less than the cutoff for that substance.  Positive results are unconfirmed and considered Preliminary Positive.  [facility] does not automatically confirm Postitive Unconfirmed results.  The physician may request (order) an Unconfirmed Positive result to be sent out for confirmation.      Negative Thresholds for Drugs Screened:    THC screen, urine                          50 ng/ml  Phenycyclidine (PCP), urine                25 ng/ml  Cocaine screen, urine                     150 ng/ml  Methamphetamine, urine                    500 ng/ml  Opiate screen, urine                      100 ng/ml  Amphetamine screen, urine                 500 ng/ml  Benzodiazepine screen, urine              150 ng/ml  Tricyclic Antidepressants screen, urine   300 ng/ml  Methadone screen, urine                   200 ng/ml  Barbiturates screen, urine                200 ng/ml  Oxycodone screen, urine                   100 ng/ml  Propoxyphene screen, urine                300 ng/ml  Buprenorphine screen, urine                10 ng/ml    Folate [[id number]]  (Normal) Collected: 07/19/23 0446    Specimen: Blood Updated: 07/19/23 1639     Folate >20.00 ng/mL     Narrative:      Results may be falsely increased if patient taking Biotin.      Vitamin B12 [[id number]]  (Normal) Collected: 07/19/23 0446     Specimen: Blood Updated: 07/19/23 1639     Vitamin B-12 656 pg/mL     Narrative:      Results may be falsely increased if patient taking Biotin.      Iron Profile [[id number]]  (Abnormal) Collected: 07/19/23 0446    Specimen: Blood Updated: 07/19/23 1342     Iron 87 mcg/dL      Iron Saturation (TSAT) 33 %      TIBC 265 mcg/dL      UIBC 178 mcg/dL     Ferritin [[id number]]  (Abnormal) Collected: 07/19/23 0446    Specimen: Blood Updated: 07/19/23 1342     Ferritin 367.00 ng/mL     Narrative:      Results may be falsely decreased if patient taking Biotin.      Lipid Panel [[id number]]  (Abnormal) Collected: 07/19/23 0446    Specimen: Blood Updated: 07/19/23 0536     Total Cholesterol 144 mg/dL      Triglycerides 58 mg/dL      HDL Cholesterol 66 mg/dL      LDL Cholesterol  66 mg/dL      VLDL Cholesterol 12 mg/dL      LDL/HDL Ratio 1.01    Narrative:      Cholesterol Reference Ranges  (U.S. Department of Health and Human Services ATP III Classifications)    Desirable          <200 mg/dL  Borderline High    200-239 mg/dL  High Risk          >240 mg/dL      Triglyceride Reference Ranges  (U.S. Department of Health and Human Services ATP III Classifications)    Normal           <150 mg/dL  Borderline High  150-199 mg/dL  High             200-499 mg/dL  Very High        >500 mg/dL    HDL Reference Ranges  (U.S. Department of Health and Human Services ATP III Classifications)    Low     <40 mg/dl (major risk factor for CHD)  High    >60 mg/dl ('negative' risk factor for CHD)        LDL Reference Ranges  (U.S. Department of Health and Human Services ATP III Classifications)    Optimal          <100 mg/dL  Near Optimal     100-129 mg/dL  Borderline High  130-159 mg/dL  High             160-189 mg/dL  Very High        >189 mg/dL    Hemoglobin A1c [[id number]]  (Normal) Collected: 07/19/23 0446    Specimen: Blood Updated: 07/19/23 0535     Hemoglobin A1C 5.30 %     Narrative:      Hemoglobin A1C Ranges:    Increased Risk for Diabetes   5.7% to 6.4%  Diabetes                     >= 6.5%  Diabetic Goal                < 7.0%    Urinalysis, Microscopic Only - Urine, Clean Catch [[id number]]  (Abnormal) Collected: 07/18/23 2145    Specimen: Urine, Clean Catch Updated: 07/18/23 2211     RBC, UA 0-2 /HPF      WBC, UA None Seen /HPF      Bacteria, UA None Seen /HPF      Squamous Epithelial Cells, UA 3-6 /HPF      Hyaline Casts, UA None Seen /LPF      Methodology Manual Light Microscopy    Urinalysis With Microscopic If Indicated (No Culture) - Urine, Clean Catch [[id number]]  (Abnormal) Collected: 07/18/23 2145    Specimen: Urine, Clean Catch Updated: 07/18/23 2154     Color, UA Yellow     Appearance, UA Clear     pH, UA 7.5     Specific Gravity, UA 1.015     Glucose, UA Negative     Ketones, UA Negative     Bilirubin, UA Negative     Blood, UA Trace     Protein, UA 30 mg/dL (1+)     Leuk Esterase, UA Negative     Nitrite, UA Negative     Urobilinogen, UA 0.2 E.U./dL    Single High Sensitivity Troponin T [[id number]]  (Abnormal) Collected: 07/18/23 1807    Specimen: Blood Updated: 07/18/23 1832     HS Troponin T 26 ng/L     Narrative:      High Sensitive Troponin T Reference Range:  <10.0 ng/L- Negative Female for AMI  <15.0 ng/L- Negative Male for AMI  >=10 - Abnormal Female indicating possible myocardial injury.  >=15 - Abnormal Male indicating possible myocardial injury.   Clinicians would have to utilize clinical acumen, EKG, Troponin, and serial changes to determine if it is an Acute Myocardial Infarction or myocardial injury due to an underlying chronic condition.         Comprehensive Metabolic Panel [[id number]]  (Abnormal) Collected: 07/18/23 1807    Specimen: Blood Updated: 07/18/23 1830     Glucose 110 mg/dL      BUN 15 mg/dL      Creatinine 0.64 mg/dL      Sodium 128 mmol/L      Potassium 4.6 mmol/L      Chloride 95 mmol/L      CO2 23.8 mmol/L      Calcium 9.4 mg/dL      Total Protein 6.7 g/dL      Albumin 3.9 g/dL      ALT (SGPT) 15 U/L      AST  (SGOT) 17 U/L      Alkaline Phosphatase 55 U/L      Total Bilirubin 0.3 mg/dL      Globulin 2.8 gm/dL      A/G Ratio 1.4 g/dL      BUN/Creatinine Ratio 23.4     Anion Gap 9.2 mmol/L      eGFR 93.4 mL/min/1.73     Narrative:      GFR Normal >60  Chronic Kidney Disease <60  Kidney Failure <15    The GFR formula is only valid for adults with stable renal function between ages 18 and 70.    Protime-INR [[id number]]  (Normal) Collected: 07/18/23 1807    Specimen: Blood Updated: 07/18/23 1821     Protime 12.8 Seconds      INR 0.96    Narrative:      Therapeutic Ranges for INR: 2.0-3.0 (PT 20-30)                              2.5-3.5 (PT 25-34)    aPTT [[id number]]  (Normal) Collected: 07/18/23 1807    Specimen: Blood Updated: 07/18/23 1821     PTT 26.6 seconds     Narrative:      PTT = The equivalent PTT values for the therapeutic range of heparin levels at 0.1 to 0.7 U/ml are 53 to 110 seconds.      CBC & Differential [[id number]]  (Abnormal) Collected: 07/18/23 1807    Specimen: Blood Updated: 07/18/23 1810    Narrative:      The following orders were created for panel order CBC & Differential.  Procedure                               Abnormality         Status                     ---------                               -----------         ------                     CBC Auto Differential[[id number]]        Abnormal            Final result                 Please view results for these tests on the individual orders.    CBC Auto Differential [[id number]]  (Abnormal) Collected: 07/18/23 1807    Specimen: Blood Updated: 07/18/23 1810     WBC 6.19 10*3/mm3      RBC 3.40 10*6/mm3      Hemoglobin 11.1 g/dL      Hematocrit 30.2 %      MCV 88.8 fL      MCH 32.6 pg      MCHC 36.8 g/dL      RDW 11.6 %      RDW-SD 37.2 fl      MPV 8.7 fL      Platelets 270 10*3/mm3      Neutrophil % 55.3 %      Lymphocyte % 28.9 %      Monocyte % 11.0 %      Eosinophil % 3.7 %      Basophil % 0.8 %      Immature Grans % 0.3 %      Neutrophils, Absolute  3.42 10*3/mm3      Lymphocytes, Absolute 1.79 10*3/mm3      Monocytes, Absolute 0.68 10*3/mm3      Eosinophils, Absolute 0.23 10*3/mm3      Basophils, Absolute 0.05 10*3/mm3      Immature Grans, Absolute 0.02 10*3/mm3      nRBC 0.0 /100 WBC           Imaging Results (Most Recent)       Procedure Component Value Units Date/Time    FL Video Swallow With Speech Single Contrast [[id number]] Collected: 07/20/23 1125     Updated: 07/20/23 1230    Narrative:      VIDEO SWALLOWING STUDY, 7/20/2023     HISTORY:  72-year-old female hospital inpatient with dysphagia. Abnormal bedside  swallowing evaluation.     TECHNIQUE:  Video swallowing study was conducted by the speech pathologist in my  presence and recorded on digital video disc.     Fluoroscopy time 3.5 minutes. A single spot image was recorded for  documentation purposes.  Cumulative radiation dose, reference air kerma, 12.60 mGy.      FINDINGS:  A single isolated episode of clinically silent tracheal aspiration of  thin liquids was observed. This was not observed with other larger or  smaller thin liquid boluses. All other administered substances were  swallowed normally with no laryngeal penetration or aspiration. No  significant residual pooling. No observed stricture, diverticulum or  other structural abnormality.       Impression:      Video swallowing study as noted above. Please see details and  recommendations to follow from speech therapy.        This report was finalized on 7/20/2023 12:28 PM by Dr. Kali Wong MD.       MRI Brain Without Contrast [[id number]] Collected: 07/19/23 0915     Updated: 07/19/23 1023    Narrative:      MRI BRAIN, NONCONTRAST, 7/19/2023         HISTORY:  72-year-old female admitted to the hospital on 7/18/2023 for acute  stroke evaluation. Past history of stroke. Speech difficulty and  abnormal gait. At the time of admission, CT head, CTA head and neck and  CT perfusion studies revealed no acute insult.      TECHNIQUE:  MR  imaging of the brain without IV contrast.     COMPARISON:  *  MRI brain, 4/13/2022.     FINDINGS:  Today, the examination is largely nondiagnostic. The patient was  agitated and uncooperative during the examination and moved throughout  the study despite premedication.     There is no gross evidence of a large acute intracranial abnormality.  Only an ischemic lesion involving an entire cerebral artery territory  could be confidently excluded on this examination. No visible large  intracerebral mass or large territory cerebral edema is visible. On the  FLAIR sequence, old lacunar infarcts are visible in the left cerebellar  hemisphere.       Impression:      1.  Nondiagnostic examination due to continuous patient motion  throughout the exam.  2.  There is no gross evidence of acute ischemic insult involving a  large cerebral artery territory. Smaller restricted diffusion lesions  would not be detectable on this study.     This report was finalized on 7/19/2023 10:21 AM by Dr. Kali Wong MD.       CT Angiogram Head [[id number]] Collected: 07/19/23 0207     Updated: 07/19/23 0315    Narrative:      CTA HEAD AND NECK   7/18/2023     HISTORY:  Follow-up stroke, unable to speak, abnormal gait     COMPARISON:  9 hours earlier     TECHNIQUE:    Spiral imaging was performed through the head and neck with angiographic  reconstructions. IV contrast was utilized. Sagittal: Reconstructions  generated. Radiation dose reduction techniques included automated  exposure control or exposure modulation based on body size. Radiation  audit for CT and nuclear cardiology exams in the last 12 months: 3.      FINDINGS:    The aortic arch is normal in size. The great vessels are all patent  without stenosis. The vertebral arteries both arise from the subclavian  arteries. The right 1 is dominant. These vessels unite to form the  basilar artery. Both common carotid arteries are widely patent. There  are small calcified plaque  formations of each bifurcation region without  significant stenosis. The internal carotid arteries are patent up to the  skull base.     The basilar artery and posterior cerebral arteries are normal in  appearance. The distal internal carotid arteries, middle cerebral  arteries and anterior cerebral arteries are normal in appearance. No  aneurysm is visible.       Impression:      No change from 9 hours ago. No significant stenosis. No  arterial occlusion is visible     This report was finalized on 7/19/2023 3:12 AM by Dr. Chidi Johnson MD.       CT Angiogram Neck [[id number]] Collected: 07/19/23 0207     Updated: 07/19/23 0315    Narrative:      CTA HEAD AND NECK   7/18/2023     HISTORY:  Follow-up stroke, unable to speak, abnormal gait     COMPARISON:  9 hours earlier     TECHNIQUE:    Spiral imaging was performed through the head and neck with angiographic  reconstructions. IV contrast was utilized. Sagittal: Reconstructions  generated. Radiation dose reduction techniques included automated  exposure control or exposure modulation based on body size. Radiation  audit for CT and nuclear cardiology exams in the last 12 months: 3.      FINDINGS:    The aortic arch is normal in size. The great vessels are all patent  without stenosis. The vertebral arteries both arise from the subclavian  arteries. The right 1 is dominant. These vessels unite to form the  basilar artery. Both common carotid arteries are widely patent. There  are small calcified plaque formations of each bifurcation region without  significant stenosis. The internal carotid arteries are patent up to the  skull base.     The basilar artery and posterior cerebral arteries are normal in  appearance. The distal internal carotid arteries, middle cerebral  arteries and anterior cerebral arteries are normal in appearance. No  aneurysm is visible.       Impression:      No change from 9 hours ago. No significant stenosis. No  arterial occlusion is visible      This report was finalized on 7/19/2023 3:12 AM by Dr. Chidi Johnson MD.       CT Head Without Contrast [[id number]] Collected: 07/18/23 2152     Updated: 07/18/23 2257    Narrative:      CT HEAD, NONCONTRAST, 7/18/2023     HISTORY:  Follow-up stroke, unable to speak, abnormal gait     COMPARISON:  Earlier today        TECHNIQUE:  CT examination of the head was performed without IV contrast. Radiation  dose reduction techniques included automated exposure control or  exposure modulation based on body size. Radiation audit for CT and  nuclear cardiology exams in the last12 months: 3.           FINDINGS:  The ventricles and subarachnoid spaces are normal. There are no masses  or extra-axial fluid collections or hemorrhage. There is a prominent CSF  space in the left thalamus. Skull is normal.       Impression:      No change. No acute findings     This report was finalized on 7/18/2023 10:55 PM by Dr. Chidi Johnson MD.       CT Angiogram Head w AI Analysis of LVO [[id number]] Collected: 07/18/23 1942     Updated: 07/18/23 2047    Narrative:      CT ANGIOGRAM HEAD W AI ANALYSIS OF LVO-     INDICATION:   Unable to talk     TECHNIQUE:   CTA of the head with contrast. Coronal and sagittal reconstructions were  obtained.  Radiation dose reduction techniques included automated  exposure control or exposure modulation based on body size. Radiation  audit for number of CT and nuclear cardiology exams performed in the  last year: 0.       COMPARISON:   None available.     FINDINGS:  The intracranial portions of the internal carotid arteries demonstrate  no significant stenosis. No aneurysm is seen. The anterior and middle  cerebral arteries are of normal course and caliber. There is symmetric  bilateral runoff.     The posterior circulation shows the basilar artery to be of normal  course and caliber. The posterior cerebral arteries are of normal course  and caliber. No aneurysm is seen. No flow-limiting stenosis. There is  symmetric  distal runoff of the posterior circulation.       Impression:      CTA of the head demonstrating no significant abnormalities.              This report was finalized on 7/18/2023 8:45 PM by Dr. Bernardo Zaldivar MD.       CT Angiogram Neck [[id number]] Collected: 07/18/23 1937     Updated: 07/18/23 2043    Narrative:      CT ANGIOGRAM OF THE NECK WITH CONTRAST     HISTORY:  Unable to speak     COMPARISON: No pertinent prior study     TECHNIQUE:  CTA of the neck with contrast. Radiation dose reduction techniques  included automated exposure control or exposure modulation based on body  size. Radiation audit for CT and nuclear cardiology exams in the last 12  months: 0.     FINDINGS:  The origin of the great vessels shows no significant stenosis. The right  carotid bifurcation shows mild atherosclerotic plaque. No focal  stenosis.     The left carotid bifurcation demonstrates mild atherosclerotic plaque.  The degree of stenosis is approximately 30% diameter stenosis. This is  not hemodynamically or clinically significant.     The vertebral arteries are of normal course and caliber. The patient  demonstrates a dominant right vertebral. Both vertebral arteries  contribute to the basilar artery.       Impression:      Mild plaque formation at the left carotid bifurcation with a focal  stenosis of the left internal carotid artery of approximately 30%  diameter stenosis.        This report was finalized on 7/18/2023 8:40 PM by Dr. Bernardo Zaldivar MD.       CT CEREBRAL PERFUSION WITH & WITHOUT CONTRAST [[id number]] Collected: 07/18/23 1804     Updated: 07/18/23 1910    Narrative:      CT CEREBRAL PERFUSION WITH AND WITHOUT CONTRAST     HISTORY:  Abnormal gait and unable to speak     COMPARISON: No pertinent prior study     TECHNIQUE:  CT cerebral perfusion with and without contrast. Radiation dose  reduction techniques included automated exposure control or exposure  modulation based on body size. Radiation audit for CT and  nuclear  cardiology exams in the last 12 months: 0.     FINDINGS:  CBF greater than 30%: 0 mL     Tmax greater than 6.0 seconds: 0 mL     Mismatch volume: 0     Mismatch ratio: 0     Hypoperfusion index: Cannot calculate  CBV index: Cannot calculate       Impression:      Normal CT cerebral perfusion study.        This report was finalized on 7/18/2023 7:08 PM by Dr. Bernrado Zaldivar MD.       XR Chest 1 View [[id number]] Collected: 07/18/23 1742     Updated: 07/18/23 1845    Narrative:      CHEST X-RAY, 1 VIEW     HISTORY:   Gait problem and unable to speak     Comparison:  4/19/2023     FINDINGS:  The heart size is normal. Mediastinal structures are midline. Pulmonary  vascularity is normal.     No acute infiltrates. No pleural fluid. No pneumothorax.       Impression:      No clearly acute cardiopulmonary pathology demonstrated.        This report was finalized on 7/18/2023 6:43 PM by Dr. Bernardo Zaldivar MD.       CT Head Without Contrast Stroke Protocol [[id number]] Collected: 07/18/23 1725     Updated: 07/18/23 1831    Narrative:      CT HEAD WITHOUT     HISTORY:  Abnormal gait and unable to speak.     COMPARISON: 4/13/2022     TECHNIQUE:  CT head without. Radiation dose reduction techniques included automated  exposure control or exposure modulation based on body size. Radiation  audit for CT and nuclear cardiology exams in the last 12 months: 0.     FINDINGS:  The ventricles are generous in size. Cortical sulci are correspondingly  prominent. No extra-axial fluid collections. No significant shift of  midline structures. There are areas of low-attenuation in the  periventricular white matter likely representing white matter  microvascular disease.     No parenchymal or subarachnoid hemorrhage. Focal lacunar infarct in the  right basal ganglia. Focal lacunar infarct in the left thalamus. There  is a focal lacunar infarct in the left cerebellar hemisphere.     Mastoid air cells are clear. Visualized paranasal sinuses are  clear.       Impression:         1. No clearly acute intracranial findings.     2. Remote lacunar infarcts in the right basal ganglia, the left thalamus  and the left cerebellar hemisphere.     3. Generalized cerebral atrophy and white matter microvascular disease.        This report was finalized on 7/18/2023 6:29 PM by Dr. Bernardo Zaldivar MD.             PROCEDURES: None    Condition on Discharge: Stable    Physical Exam at Discharge  Vital Signs  Temp:  [97.5 °F (36.4 °C)-98 °F (36.7 °C)] 97.7 °F (36.5 °C)  Heart Rate:  [] 89  Resp:  [16-18] 16  BP: (122-148)/(69-81) 148/81  Body mass index is 20.94 kg/m².    Physical Exam  Vitals reviewed.   Constitutional:       General: She is not in acute distress.     Comments: Sleepy but awakens, thin, chronically ill appearance, appears older than stated age   HENT:      Head: Normocephalic and atraumatic.      Mouth/Throat:      Mouth: Mucous membranes are moist.      Comments: Edentulous  Eyes:      Extraocular Movements: Extraocular movements intact.      Pupils: Pupils are equal, round, and reactive to light.   Cardiovascular:      Rate and Rhythm: Normal rate and regular rhythm.   Pulmonary:      Effort: Pulmonary effort is normal. No respiratory distress.      Breath sounds: Normal breath sounds. No wheezing or rales.   Abdominal:      General: Abdomen is flat. Bowel sounds are normal. There is no distension.      Palpations: Abdomen is soft.      Tenderness: There is no abdominal tenderness. There is no guarding.   Musculoskeletal:         General: No swelling.   Skin:     General: Skin is warm and dry.      Capillary Refill: Capillary refill takes less than 2 seconds.      Findings: No erythema.   Neurological:      General: No focal deficit present.      Comments: sleepy, irritable   Psychiatric:      Comments: Lethargic, irritable     Discharge Disposition  Home    Visiting Nurse:    Yes     Home PT/OT:  Yes     Home Safety Evaluation:  Yes     DME  Walker  "    Discharge Diet:      Dietary Orders (From admission, onward)       Start     Ordered    07/20/23 1428  Diet: Regular/House Diet; Texture: Soft to Chew (NDD 3); Soft to Chew: Whole Meat; Fluid Consistency: Nectar Thick  Diet Effective Now        Comments: soft whole diet with nectar thick and water in small sips 30 minutes after meals and after oral care. Meds whole with nectar thick or in puree.   References:    Diet Order [place]   Question Answer Comment   Diets: Regular/House Diet    Texture: Soft to Chew (NDD 3)    Soft to Chew: Whole Meat    Fluid Consistency: Nectar Thick        07/20/23 1428                    Activity at Discharge:  As tolerated, no driving    Pre-discharge education  Medications, follow up    Follow-up Appointments  Future Appointments   Date Time Provider Department Center   4/9/2024  8:00 AM Kumar Bonilla MD [facility]     Additional Instructions for the Follow-ups that You Need to Schedule       Discharge Follow-up with PCP   As directed       Currently Documented PCP:    Lanie Gomez APRN    PCP Phone Number:    [phone]     Follow Up Details: 1 week         Discharge Follow-up with Specified Provider: Dr. Mesa   As directed      To: Dr. Mesa    Follow Up Details: First available, new patient appointment             Test Results Pending at Discharge: none     BRET Mejia  07/21/23  11:28 EDT    Time: Discharge over 30 min (if over 30 minutes give explanation as to why it took greater than 30 minutes)  Secondary to:   Coordination of care/follow up  Medication reconciliation  D/W patient and family    \"Dictated utilizing Dragon dictation\"      "

## 2023-07-21 NOTE — THERAPY DISCHARGE NOTE
Acute Care - Speech Language Pathology Discharge Summary   Veronica Clements       Patient Name: Arline Landaverde  : 1950  MRN: [id number]    Today's Date: 2023                   Admit Date: 2023      SLP Recommendation and Plan    Visit Dx:    ICD-10-CM ICD-9-CM   1. Altered mental status, unspecified altered mental status type  R41.82 780.97   2. Weakness  R53.1 780.79   3. Dysphagia, unspecified type [R13.10 (ICD-10-CM)]  R13.10 787.20   4. Oropharyngeal dysphagia [R13.12 (ICD-10-CM)]  R13.12 787.22          Time Calculation- SLP       Row Name 23 1024             Time Calculation- SLP    SLP Start Time 0845  -AD      SLP Stop Time 0855  -AD      SLP Time Calculation (min) 10 min  -AD      Total Timed Code Minutes- SLP 0 minute(s)  -AD      SLP Non-Billable Time (min) 0 min  -AD      SLP Received On 23  -AD         Untimed Charges    62260-MZ Treatment Swallow Minutes 10  -AD         Total Minutes    Untimed Charges Total Minutes 10  -AD       Total Minutes 10  -AD                User Key  (r) = Recorded By, (t) = Taken By, (c) = Cosigned By      Initials Name Provider Type    Lorie Abbasi MS CCC-SLP Speech and Language Pathologist                       SLP GOALS       Row Name 23 0845 23 1200 23 1615       (LTG) Patient will demonstrate progress toward functional swallow for    Diet Texture (Demonstrate progress toward functional swallow) soft to chew (whole) textures  -AD soft to chew (whole) textures  -AD soft to chew (whole) textures  -AD    Liquid viscosity (Demonstrate progress toward functional swallow) nectar/ mildly thick liquids  -AD nectar/ mildly thick liquids  -AD nectar/ mildly thick liquids  -AD    [place] (Demonstrate progress towards functional swallow) with minimal cues (75-90% accuracy)  -AD with minimal cues (75-90% accuracy)  -AD with minimal cues (75-90% accuracy)  -AD    Time Frame (Demonstrate progress toward functional swallow) 1 week   -AD 1 week  -AD 1 week  -AD    Barriers (Demonstrate progress toward functional swallow) medically complex; suspected acute on chronic deficit; resistant to information  -AD medically complex; suspected acute on chronic deficit  -AD medically complex; suspected acute on chronic deficit  -AD    Progress/Outcomes (Demonstrate progress toward functional swallow) unable to make needed progress;goal ongoing  -AD good progress toward goal;goal ongoing  -AD new goal  -AD    Comment (Demonstrate progress toward functional swallow) Pt refuses to participate or take food at this time.  -AD Diet recommended per MBS results/recommendations. Will follow for continued tolerance and understanding of rational for current diet/liquid modifications>  -AD --       (LTG) Swallow    (LTG) Swallow -- Pt will participate in further evaluation of swallowing, both clinically and via instrumental assessment, in 2 days to determine least restrictive diet tolerance.  -AD Pt will participate in further evaluation of swallowing, both clinically and via instrumental assessment, in 2 days to determine least restrictive diet tolerance.  -AD    [place] (Swallow Long Term Goal) -- with minimal cues (75-90% accuracy)  -AD --    Time Frame (Swallow Long Term Goal) -- other (see comments)  2 days  -AD --    Progress/Outcomes (Swallow Long Term Goal) -- goal met  -AD --    Comment (Swallow Long Term Goal) -- Clinical reval and MBS completed today. See reports.  -AD --       (STG) Swallow Management Recall Goal 1 (SLP)    Activity (Swallow Management Recall Goal 1, SLP) recall of;aspiration precautions;oral care recommendations;safe liquid viscosity;rationale for use of strategies/techniques  -AD recall of;aspiration precautions;oral care recommendations;safe liquid viscosity;rationale for use of strategies/techniques  -AD --    [place]/Accuracy (Swallow Management Recall Goal 1, SLP) with minimal cues (75-90% accuracy)  -AD with minimal cues  (75-90% accuracy)  -AD --    Time Frame (Swallow Management Recall Goal 1, SLP) short term goal (STG);3 days  -AD short term goal (STG);3 days  -AD --    Barriers (Swallow Management Recall Goal 1, SLP) medically complex; suspected acute on chronic deficit; resistant to information  -AD resistance to teaching/education  -AD --    Progress/Outcomes (Swallow Management Recall Goal 1, SLP) unable to make needed progress  -AD new goal  -AD --    Comment (Swallow Management Recall Goal 1, SLP) Pt refuses treatment. S/o verbalizes understanding of aspiration precautions, diet/liquid modifications, oral care and water protocol. Handouts also provided for later recall.  -AD -- --       (STG) Swallow Compensatory Strategies Goal 1 (SLP)    Activity (Swallow Compensatory Strategies/Techniques Goal 1, SLP) aspiration precautions;compensatory strategies;other (see comments)  3 second prep  -AD aspiration precautions;compensatory strategies;other (see comments)  3 second prep  -AD --    [place]/Accuracy (Swallow Compensatory Strategies/Techniques Goal 1, SLP) with minimal cues (75-90% accuracy)  -AD with minimal cues (75-90% accuracy)  -AD --    Time Frame (Swallow Compensatory Strategies/Techniques Goal 1, SLP) short term goal (STG);3 days  -AD short term goal (STG);3 days  -AD --    Barriers (Swallow Compensatory Strategies/Techniques Goal 1, SLP) medically complex; suspected acute on chronic deficit; resistant to information  -AD resistance to teaching/education  -AD --    Progress/Outcomes (Swallow Compensatory Strategies/Techniques Goal 1, SLP) unable to make needed progress;goal ongoing  -AD new goal  -AD --    Comment (Swallow Compensatory Strategies/Techniques Goal 1, SLP) Pt refuses to participate at this time.  -AD -- --              User Key  (r) = Recorded By, (t) = Taken By, (c) = Cosigned By      Initials Name Provider Type    Lorie Abbasi MS CCC-SLP Speech and Language Pathologist                       Therapy Charges for Today       Code Description Service Date Service Provider Modifiers Qty    44466679080 HC ST EVAL ORAL PHARYNG SWALLOW 2 7/20/2023 Lorie Garces, MS CCC-SLP GN 1    [id number] HC ST MOTION FLUORO EVAL SWALLOW 6 7/20/2023 Lorie Garces, MS CCC-SLP GN, 58 1    [id number] HC ST TREATMENT SWALLOW 1 7/21/2023 Lorie Garces, MS CCC-SLP GN 1              SLP Discharge Summary  Anticipated Discharge Disposition (SLP): home with 24/7 care  Reason for Discharge: discharge from this facility, no further expectation of functional progress, other (see comments) (Pt does not participate in therapy at this time. Discharge today to home per APRN Hospitalist.)  Progress Toward Achieving Short/long Term Goals: goals not met within established timelines, unable to make functional progress at this time, other (see comments) (Pt declines therapy. Education provided with s/o and handouts provided for further education/review at home.)  Discharge Destination: home w/ 24/7 care      Lorie Garces MS CCC-SLP  7/21/2023

## 2023-07-21 NOTE — THERAPY DISCHARGE NOTE
Acute Care - Occupational Therapy Treatment Note/Discharge   [place]     Patient Name: Arline Landaverde  : 1950  MRN: [id number]  Today's Date: 2023               Admit Date: 2023       ICD-10-CM ICD-9-CM   1. Altered mental status, unspecified altered mental status type  R41.82 780.97   2. Weakness  R53.1 780.79   3. Dysphagia, unspecified type [R13.10 (ICD-10-CM)]  R13.10 787.20   4. Oropharyngeal dysphagia [R13.12 (ICD-10-CM)]  R13.12 787.22     Patient Active Problem List   Diagnosis    Accidental fall, initial encounter    Closed displaced fracture of right femoral neck    Moderate malnutrition    Altered mental status    Classical migraine without intractable migraine    Expressive aphasia    Acute CVA (cerebrovascular accident)     Past Medical History:   Diagnosis Date    Diabetes mellitus     Stroke      Past Surgical History:   Procedure Laterality Date    BACK SURGERY      CHOLECYSTECTOMY      HIP HEMIARTHROPLASTY Right 2023    Procedure: HIP HEMIARTHROPLASTY ANTERIOR;  Surgeon: Kumar Bonilla MD;  Location: [facility];  Service: Orthopedics;  Laterality: Right;       OT ASSESSMENT FLOWSHEET (last 12 hours)       OT Evaluation and Treatment       Row Name 23 0900                   OT Time and Intention    Subjective Information complains of;pain  L arm, RN notifed  -EN        Document Type discharge treatment  -EN        Mode of Treatment occupational therapy  -EN        Patient Effort adequate  -EN        Comment Significant other in room and states they are going home today. Reports no concerns.  -EN           General Information    Patient/Family/Caregiver Comments/Observations Patient reclined in bed, s/o present.  -EN        Risks Reviewed patient:;spouse/S.O.:;LOB  -EN        Benefits Reviewed patient:;spouse/S.O.:;improve function;increase independence;increase strength  -EN        Barriers to Rehab previous functional deficit  -EN           Pain Assessment     Pre/Posttreatment Pain Comment Reports left arm pain, ROM WFL. Pt unable to specify exact area.  -EN        Pain Intervention(s) Repositioned  RN notified  -EN           Cognition    Personal Safety Interventions gait belt;nonskid shoes/slippers when out of bed  -EN           Activities of Daily Living    BADL Assessment/Intervention lower body dressing  -EN           Lower Body Dressing Assessment/Training    [place] Level (Lower Body Dressing) don;pants/bottoms;contact guard assist  -EN           Bed Mobility    Supine-Sit [place] (Bed Mobility) modified independence  -EN        Assistive Device (Bed Mobility) bed rails;head of bed elevated  -EN           Functional Mobility    Functional Mobility- Ind. Level contact guard assist  -EN        Functional Mobility- Device walker, front-wheeled  -EN        Functional Mobility-Distance (Feet) 5  declined further distance  -EN           Transfer Assessment/Treatment    Transfers sit-stand transfer;stand-sit transfer  -EN        Comment, (Transfers) v/cs for hand placement  -EN           Sit-Stand Transfer    Sit-Stand [place] (Transfers) standby assist;verbal cues  -EN           Stand-Sit Transfer    Stand-Sit [place] (Transfers) standby assist;verbal cues  -EN           Plan of Care Review    Plan of Care Reviewed With patient;significant other  -EN        Progress improving  -EN        Outcome Evaluation OT- Patient performed supine to sit with modified independence and sit to stand with SBA. Patient performed functional mobility with FWW and CGA X 5 ft (declined furtherdistance). Patient donned pants with CGA. Significant other in room and reports they have no concerns regarding discharge home and he has contacted home health. No furhter skilled OT needs at this time.  -EN           Positioning and Restraints    Pre-Treatment Position in bed  -EN        Post Treatment Position chair  -EN        In Chair reclined;call light within reach;encouraged  to call for assist;exit alarm on;with family/caregiver  -EN           Progress Summary (OT)    Progress Toward Functional Goals (OT) prepare for discharge  -EN                  User Key  (r) = Recorded By, (t) = Taken By, (c) = Cosigned By      Initials Name Effective Dates    Marielena Field OTR 06/16/21 -                     Occupational Therapy Education       Title: PT OT SLP Therapies (In Progress)       Topic: Occupational Therapy (Resolved)       Point: ADL training (Resolved)       Description:   Instruct learner(s) on proper safety adaptation and remediation techniques during self care or transfers.   Instruct in proper use of assistive devices.                  Learning Progress Summary             Patient Acceptance, E, VU by AMADOR at 7/21/2023 0955    Comment: safety during transfers    Acceptance, E,TB, VU by JONNATHAN at 7/20/2023 1442    Comment: pt educated on bed mobility, functional transfers and benefits of activity    Acceptance, E, VU by EN at 7/19/2023 1510    Comment: Patient educated on safety during functional transfers.   Family Acceptance, E, VU by EN at 7/21/2023 0955    Comment: safety during transfers   Significant Other Acceptance, E, VU by EN at 7/19/2023 1510    Comment: Patient educated on safety during functional transfers.                         Point: Home exercise program (Resolved)       Description:   Instruct learner(s) on appropriate technique for monitoring, assisting and/or progressing therapeutic exercises/activities.                  Learner Progress:  Not documented in this visit.                              User Key       Initials Effective Dates Name Provider Type Discipline    AMADOR 06/16/21 -  Marielena Francis OTR Occupational Therapist OT    JONNATHAN 06/16/21 -  Vanda Mendez OTR Occupational Therapist OT                    OT Recommendation and Plan  Planned Therapy Interventions (OT): adaptive equipment training, BADL retraining, functional balance  retraining, patient/caregiver education/training, ROM/therapeutic exercise, strengthening exercise, transfer/mobility retraining  Therapy Frequency (OT): 5 times/wk  Progress Toward Functional Goals (OT): prepare for discharge  Plan of Care Review  Plan of Care Reviewed With: patient, significant other  Progress: improving  Outcome Evaluation: OT- Patient performed supine to sit with modified independence and sit to stand with SBA. Patient performed functional mobility with FWW and CGA X 5 ft (declined furtherdistance). Patient donned pants with CGA. Significant other in room and reports they have no concerns regarding discharge home and he has contacted home health. No furhter skilled OT needs at this time.        OT Rehab Goals       Row Name 07/21/23 0900             Transfer Goal 1 (OT)    Activity/Assistive Device (Transfer Goal 1, OT) sit-to-stand/stand-to-sit;toilet;commode, 3-in-1;walker, rolling  -EN      [place] Level/Cues Needed (Transfer Goal 1, OT) minimum assist (75% or more patient effort)  -EN      Time Frame (Transfer Goal 1, OT) 5 days  -EN      Progress/Outcome (Transfer Goal 1, OT) goal met  sba/cga  -EN         Dressing Goal 1 (OT)    Activity/Device (Dressing Goal 1, OT) lower body dressing  -EN      [place]/Cues Needed (Dressing Goal 1, OT) minimum assist (75% or more patient effort)  -EN      Time Frame (Dressing Goal 1, OT) 5 days  -EN      Progress/Outcome (Dressing Goal 1, OT) goal met  CGA  -EN         Problem Specific Goal 1 (OT)    Problem Specific Goal 1 (OT) Patient will perform dynamic sitting balance activity EOB with CGA for 3-4 minutes for improved safety during ADL routine.  -EN      Time Frame (Problem Specific Goal 1, OT) 1 week  -EN      Progress/Outcome (Problem Specific Goal 1, OT) good progress toward goal  -EN                User Key  (r) = Recorded By, (t) = Taken By, (c) = Cosigned By      Initials Name Provider Type Discipline    Marielena Field OTR  Occupational Therapist OT                     Outcome Measures       Row Name 07/21/23 0900 07/20/23 1030 07/20/23 1029       How much help from another person do you currently need...    Turning from your back to your side while in flat bed without using bedrails? -- -- 3  -JW    Moving from lying on back to sitting on the side of a flat bed without bedrails? -- -- 3  -JW    Moving to and from a bed to a chair (including a wheelchair)? -- -- 3  -JW    Standing up from a chair using your arms (e.g., wheelchair, bedside chair)? -- -- 3  -JW    Climbing 3-5 steps with a railing? -- -- 1  -JW    To walk in hospital room? -- -- 2  -JW    AM-PAC 6 Clicks Score (PT) -- -- 15  -JW       How much help from another is currently needed...    Putting on and taking off regular lower body clothing? 3  -EN 2  -JJ --    Bathing (including washing, rinsing, and drying) 3  -EN 2  -JJ --    Toileting (which includes using toilet bed pan or urinal) 3  -EN 2  -JJ --    Putting on and taking off regular upper body clothing 4  -EN 2  -JJ --    Taking care of personal grooming (such as brushing teeth) 4  -EN 3  -JJ --    Eating meals 4  -EN 3  -JJ --    AM-PAC 6 Clicks Score (OT) 21  -EN 14  -JJ --       Modified [place] Scale    Pre-Stroke Modified [place] Scale -- 2 - Slight disability.  Unable to carry out all previous activities but able to look after own affairs without assistance.  -JJ 2 - Slight disability.  Unable to carry out all previous activities but able to look after own affairs without assistance.  -    Modified [place] Scale -- 4 - Moderately severe disability.  Unable to walk without assistance, and unable to attend to own bodily needs without assistance.  -JJ 4 - Moderately severe disability.  Unable to walk without assistance, and unable to attend to own bodily needs without assistance.  -       Functional Assessment    Outcome Measure Options -- -- AM-PAC 6 Clicks Basic Mobility (PT)  -              User Key  (r) =  Recorded By, (t) = Taken By, (c) = Cosigned By      Initials Name Provider Type    EN Marielena Francis OTR Occupational Therapist    Vanda Cervantes, OTR Occupational Therapist    Janeth Nassar, PT Physical Therapist                    Time Calculation:    Time Calculation- OT       Row Name 07/21/23 0958             Time Calculation- OT    OT Start Time 0930  -EN      OT Stop Time 0944  -EN      OT Time Calculation (min) 14 min  -EN                User Key  (r) = Recorded By, (t) = Taken By, (c) = Cosigned By      Initials Name Provider Type    EN Marielena Francis OTISABELLE Occupational Therapist                    Therapy Charges for Today       Code Description Service Date Service Provider Modifiers Qty    [id number]  OT THERAPEUTIC ACT EA 15 MIN 7/21/2023 Marielena Francis OTR GO 1                 OT Discharge Summary  Anticipated Discharge Disposition (OT): other (see comments) (will continue to assess)    KATHLEEN Monteiro  7/21/2023

## 2023-07-26 ENCOUNTER — READMISSION MANAGEMENT (OUTPATIENT)
Dept: CALL CENTER | Facility: HOSPITAL | Age: 73
End: 2023-07-26
Payer: MEDICARE

## 2023-07-26 NOTE — OUTREACH NOTE
Stroke Week 1 Survey      Flowsheet Row Responses   [facility] facility patient discharged from? [place]   Does the patient have one of the following disease processes/diagnoses(primary or secondary)? Stroke   Week 1 attempt successful? No   Unsuccessful attempts Attempt 1            July Munoz Registered Nurse

## 2023-08-02 ENCOUNTER — READMISSION MANAGEMENT (OUTPATIENT)
Dept: CALL CENTER | Facility: HOSPITAL | Age: 73
End: 2023-08-02
Payer: MEDICARE

## 2023-08-07 ENCOUNTER — OFFICE VISIT (OUTPATIENT)
Dept: NEUROLOGY | Facility: CLINIC | Age: 73
End: 2023-08-07
Payer: MEDICARE

## 2023-08-07 ENCOUNTER — READMISSION MANAGEMENT (OUTPATIENT)
Dept: CALL CENTER | Facility: HOSPITAL | Age: 73
End: 2023-08-07
Payer: MEDICARE

## 2023-08-07 VITALS
HEIGHT: 64 IN | OXYGEN SATURATION: 98 % | DIASTOLIC BLOOD PRESSURE: 86 MMHG | HEART RATE: 86 BPM | SYSTOLIC BLOOD PRESSURE: 148 MMHG | BODY MASS INDEX: 19.29 KG/M2 | WEIGHT: 113 LBS

## 2023-08-07 DIAGNOSIS — Z86.73 HISTORY OF CVA (CEREBROVASCULAR ACCIDENT): Primary | ICD-10-CM

## 2023-08-07 DIAGNOSIS — G43.109 COMPLICATED MIGRAINE: ICD-10-CM

## 2023-08-07 PROCEDURE — 99214 OFFICE O/P EST MOD 30 MIN: CPT | Performed by: PSYCHIATRY & NEUROLOGY

## 2023-08-07 PROCEDURE — 1159F MED LIST DOCD IN RCRD: CPT | Performed by: PSYCHIATRY & NEUROLOGY

## 2023-08-07 PROCEDURE — 1160F RVW MEDS BY RX/DR IN RCRD: CPT | Performed by: PSYCHIATRY & NEUROLOGY

## 2023-08-07 NOTE — OUTREACH NOTE
Stroke Week 4 Survey      Flowsheet Row Responses   [facility] facility patient discharged from? [place]   Does the patient have one of the following disease processes/diagnoses(primary or secondary)? Stroke            FERNANDO PIERRE - Registered Nurse

## 2023-08-07 NOTE — PROGRESS NOTES
Notes by MA:  Patient presents today for a migraine hospital follow up. Patient presents today with their significant other, Sachin and has given consent to give health information to them. Patient sts she does not have classic migraine pain, though Sachin sts she was complaining of a headache and memory loss. Patient reports she went to the hospital with difficulty talking, which she reports she has experienced before with previous strokes. Patient denies any new s/s of stroke since discharge.        Subjective:   Reviewed her recent brain MRI with her.  Study reveals old infarcts in the left cerebellum.  Deep white matter chronic ischemic changes were also noted.  The study was compromised by motion.  There was no acute event.  B12 levels were normal.  Patient ID: Arline Landaverde is a 72 y.o. female.    Migraine  The following portions of the patient's history were reviewed and updated as appropriate: allergies, current medications, past family history, past medical history, past social history, past surgical history, and problem list.    Review of Systems   Constitutional:  Negative for activity change, appetite change and fatigue.   HENT:  Negative for facial swelling and trouble swallowing.    Eyes:  Negative for photophobia, pain and visual disturbance.   Respiratory:  Negative for chest tightness, shortness of breath and wheezing.    Cardiovascular:  Negative for chest pain, palpitations and leg swelling.   Gastrointestinal:  Negative for abdominal pain, nausea and vomiting.   Musculoskeletal:  Positive for gait problem. Negative for arthralgias, back pain, joint swelling, myalgias, neck pain and neck stiffness.   Neurological:  Negative for dizziness, tremors, seizures, syncope, facial asymmetry, speech difficulty, weakness, light-headedness, numbness and headaches.   Hematological:  Does not bruise/bleed easily.   Psychiatric/Behavioral:  Negative for agitation, behavioral problems, confusion, decreased  concentration, dysphoric mood, hallucinations, self-injury, sleep disturbance and suicidal ideas. The patient is not nervous/anxious and is not hyperactive.       Objective:    Neurologic Exam  Awake alert pleasant cooperative with fluent speech and normal speech comprehension.    Tongue protrudes to the right.  Minimal right facial weakness.    Increase in tone on the left.  Tendon reflexes are 1+ and symmetric.  Toes appear to be downgoing.  She is using a walker for gait support as she has midline ataxia.  Physical Exam    Assessment/Plan:     Diagnoses and all orders for this visit:    1. History of CVA (cerebrovascular accident) (Primary)    2. Complicated migraine     She has done well post discharge.  No further headaches or migraines.  She is still having trouble sleeping.  However, it appears that she is using caffeine all the way until 10 PM.  I have asked her to stop using any caffeinated products after 2 PM.  If she still having difficulty sleeping we may consider restarting the amitriptyline that was suggested at the hospital.  Otherwise no changes from a neurological perspective.  She is continuing on aspirin and atorvastatin for stroke prophylaxis.  We would be happy to see her back at any time.

## 2023-12-05 ENCOUNTER — APPOINTMENT (OUTPATIENT)
Dept: GENERAL RADIOLOGY | Facility: HOSPITAL | Age: 73
DRG: 292 | End: 2023-12-05
Payer: MEDICARE

## 2023-12-05 ENCOUNTER — HOSPITAL ENCOUNTER (INPATIENT)
Facility: HOSPITAL | Age: 73
LOS: 6 days | Discharge: HOME HEALTH | DRG: 292 | End: 2023-12-11
Attending: STUDENT IN AN ORGANIZED HEALTH CARE EDUCATION/TRAINING PROGRAM | Admitting: HOSPITALIST
Payer: MEDICARE

## 2023-12-05 DIAGNOSIS — E87.1 HYPONATREMIA: ICD-10-CM

## 2023-12-05 DIAGNOSIS — I50.9 NEW ONSET OF CONGESTIVE HEART FAILURE: Primary | ICD-10-CM

## 2023-12-05 PROBLEM — Z86.73 HISTORY OF STROKE: Status: ACTIVE | Noted: 2023-12-05

## 2023-12-05 PROBLEM — S72.001A CLOSED DISPLACED FRACTURE OF RIGHT FEMORAL NECK: Status: RESOLVED | Noted: 2023-04-19 | Resolved: 2023-12-05

## 2023-12-05 PROBLEM — I16.0 HYPERTENSIVE URGENCY: Status: ACTIVE | Noted: 2023-12-05
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
## 2023-12-05 PROBLEM — R79.89 ELEVATED TROPONIN: Status: ACTIVE | Noted: 2023-12-05

## 2023-12-05 PROBLEM — R41.82 ALTERED MENTAL STATUS: Status: RESOLVED | Noted: 2023-07-18 | Resolved: 2023-12-05

## 2023-12-05 PROBLEM — I50.33 ACUTE ON CHRONIC HEART FAILURE WITH PRESERVED EJECTION FRACTION (HFPEF): Status: ACTIVE | Noted: 2023-12-05

## 2023-12-05 LAB
25(OH)D3 SERPL-MCNC: 39 NG/ML (ref 30–100)
ALBUMIN SERPL-MCNC: 3.8 G/DL (ref 3.5–5.2)
ALBUMIN/GLOB SERPL: 1.5 G/DL
ALP SERPL-CCNC: 62 U/L (ref 39–117)
ALT SERPL W P-5'-P-CCNC: 14 U/L (ref 1–33)
ANION GAP SERPL CALCULATED.3IONS-SCNC: 10.7 MMOL/L (ref 5–15)
AST SERPL-CCNC: 19 U/L (ref 1–32)
BASOPHILS # BLD AUTO: 0.06 10*3/MM3 (ref 0–0.2)
BASOPHILS NFR BLD AUTO: 1 % (ref 0–1.5)
BILIRUB SERPL-MCNC: 0.3 MG/DL (ref 0–1.2)
BUN SERPL-MCNC: 24 MG/DL (ref 8–23)
BUN/CREAT SERPL: 27 (ref 7–25)
CALCIUM SPEC-SCNC: 9.2 MG/DL (ref 8.6–10.5)
CHLORIDE SERPL-SCNC: 88 MMOL/L (ref 98–107)
CHOLEST SERPL-MCNC: 147 MG/DL (ref 0–200)
CO2 SERPL-SCNC: 21.3 MMOL/L (ref 22–29)
CREAT SERPL-MCNC: 0.89 MG/DL (ref 0.57–1)
DEPRECATED RDW RBC AUTO: 36.5 FL (ref 37–54)
EGFRCR SERPLBLD CKD-EPI 2021: 68.1 ML/MIN/1.73
EOSINOPHIL # BLD AUTO: 0.17 10*3/MM3 (ref 0–0.4)
EOSINOPHIL NFR BLD AUTO: 2.8 % (ref 0.3–6.2)
ERYTHROCYTE [DISTWIDTH] IN BLOOD BY AUTOMATED COUNT: 11.8 % (ref 12.3–15.4)
FLUAV RNA RESP QL NAA+PROBE: NOT DETECTED
FLUBV RNA RESP QL NAA+PROBE: NOT DETECTED
GEN 5 2HR TROPONIN T REFLEX: 49 NG/L
GLOBULIN UR ELPH-MCNC: 2.6 GM/DL
GLUCOSE SERPL-MCNC: 112 MG/DL (ref 65–99)
HCT VFR BLD AUTO: 28.9 % (ref 34–46.6)
HDLC SERPL-MCNC: 61 MG/DL (ref 40–60)
HGB BLD-MCNC: 10.3 G/DL (ref 12–15.9)
IMM GRANULOCYTES # BLD AUTO: 0.02 10*3/MM3 (ref 0–0.05)
IMM GRANULOCYTES NFR BLD AUTO: 0.3 % (ref 0–0.5)
INR PPP: 1.03 (ref 0.9–1.1)
LDLC SERPL CALC-MCNC: 70 MG/DL (ref 0–100)
LDLC/HDLC SERPL: 1.13 {RATIO}
LYMPHOCYTES # BLD AUTO: 1.12 10*3/MM3 (ref 0.7–3.1)
LYMPHOCYTES NFR BLD AUTO: 18.3 % (ref 19.6–45.3)
MCH RBC QN AUTO: 30.4 PG (ref 26.6–33)
MCHC RBC AUTO-ENTMCNC: 35.6 G/DL (ref 31.5–35.7)
MCV RBC AUTO: 85.3 FL (ref 79–97)
MONOCYTES # BLD AUTO: 0.72 10*3/MM3 (ref 0.1–0.9)
MONOCYTES NFR BLD AUTO: 11.8 % (ref 5–12)
NEUTROPHILS NFR BLD AUTO: 4.03 10*3/MM3 (ref 1.7–7)
NEUTROPHILS NFR BLD AUTO: 65.8 % (ref 42.7–76)
NRBC BLD AUTO-RTO: 0 /100 WBC (ref 0–0.2)
NT-PROBNP SERPL-MCNC: 6232 PG/ML (ref 0–900)
PLATELET # BLD AUTO: 349 10*3/MM3 (ref 140–450)
PMV BLD AUTO: 8.8 FL (ref 6–12)
POTASSIUM SERPL-SCNC: 4.8 MMOL/L (ref 3.5–5.2)
PROT SERPL-MCNC: 6.4 G/DL (ref 6–8.5)
PROTHROMBIN TIME: 13.5 SECONDS (ref 12.1–15)
QT INTERVAL: 370 MS
QT INTERVAL: 392 MS
QTC INTERVAL: 464 MS
QTC INTERVAL: 478 MS
RBC # BLD AUTO: 3.39 10*6/MM3 (ref 3.77–5.28)
SARS-COV-2 RNA RESP QL NAA+PROBE: NOT DETECTED
SODIUM SERPL-SCNC: 120 MMOL/L (ref 136–145)
TRIGL SERPL-MCNC: 86 MG/DL (ref 0–150)
TROPONIN T DELTA: -4 NG/L
TROPONIN T SERPL HS-MCNC: 53 NG/L
TSH SERPL DL<=0.05 MIU/L-ACNC: 1.77 UIU/ML (ref 0.27–4.2)
VLDLC SERPL-MCNC: 16 MG/DL (ref 5–40)
WBC NRBC COR # BLD AUTO: 6.12 10*3/MM3 (ref 3.4–10.8)

## 2023-12-05 PROCEDURE — 87636 SARSCOV2 & INF A&B AMP PRB: CPT | Performed by: STUDENT IN AN ORGANIZED HEALTH CARE EDUCATION/TRAINING PROGRAM

## 2023-12-05 PROCEDURE — 93005 ELECTROCARDIOGRAM TRACING: CPT | Performed by: STUDENT IN AN ORGANIZED HEALTH CARE EDUCATION/TRAINING PROGRAM

## 2023-12-05 PROCEDURE — 93010 ELECTROCARDIOGRAM REPORT: CPT | Performed by: INTERNAL MEDICINE

## 2023-12-05 PROCEDURE — 25010000002 FUROSEMIDE PER 20 MG: Performed by: STUDENT IN AN ORGANIZED HEALTH CARE EDUCATION/TRAINING PROGRAM

## 2023-12-05 PROCEDURE — 85610 PROTHROMBIN TIME: CPT | Performed by: STUDENT IN AN ORGANIZED HEALTH CARE EDUCATION/TRAINING PROGRAM

## 2023-12-05 PROCEDURE — 71045 X-RAY EXAM CHEST 1 VIEW: CPT

## 2023-12-05 PROCEDURE — 99285 EMERGENCY DEPT VISIT HI MDM: CPT

## 2023-12-05 PROCEDURE — 82306 VITAMIN D 25 HYDROXY: CPT | Performed by: HOSPITALIST

## 2023-12-05 PROCEDURE — 99222 1ST HOSP IP/OBS MODERATE 55: CPT | Performed by: HOSPITALIST

## 2023-12-05 PROCEDURE — 84443 ASSAY THYROID STIM HORMONE: CPT | Performed by: HOSPITALIST

## 2023-12-05 PROCEDURE — 25010000002 ENOXAPARIN PER 10 MG: Performed by: HOSPITALIST

## 2023-12-05 PROCEDURE — 84484 ASSAY OF TROPONIN QUANT: CPT | Performed by: STUDENT IN AN ORGANIZED HEALTH CARE EDUCATION/TRAINING PROGRAM

## 2023-12-05 PROCEDURE — 85025 COMPLETE CBC W/AUTO DIFF WBC: CPT | Performed by: STUDENT IN AN ORGANIZED HEALTH CARE EDUCATION/TRAINING PROGRAM

## 2023-12-05 PROCEDURE — 83880 ASSAY OF NATRIURETIC PEPTIDE: CPT | Performed by: STUDENT IN AN ORGANIZED HEALTH CARE EDUCATION/TRAINING PROGRAM

## 2023-12-05 PROCEDURE — 25010000002 FUROSEMIDE PER 20 MG: Performed by: INTERNAL MEDICINE

## 2023-12-05 PROCEDURE — 99222 1ST HOSP IP/OBS MODERATE 55: CPT | Performed by: INTERNAL MEDICINE

## 2023-12-05 PROCEDURE — 36415 COLL VENOUS BLD VENIPUNCTURE: CPT

## 2023-12-05 PROCEDURE — 80053 COMPREHEN METABOLIC PANEL: CPT | Performed by: STUDENT IN AN ORGANIZED HEALTH CARE EDUCATION/TRAINING PROGRAM

## 2023-12-05 PROCEDURE — 80061 LIPID PANEL: CPT | Performed by: HOSPITALIST

## 2023-12-05 RX ORDER — ENOXAPARIN SODIUM 100 MG/ML
40 INJECTION SUBCUTANEOUS EVERY 24 HOURS
Status: DISCONTINUED | OUTPATIENT
Start: 2023-12-05 | End: 2023-12-11 | Stop reason: HOSPADM

## 2023-12-05 RX ORDER — FUROSEMIDE 10 MG/ML
80 INJECTION INTRAMUSCULAR; INTRAVENOUS EVERY 8 HOURS
Status: DISCONTINUED | OUTPATIENT
Start: 2023-12-05 | End: 2023-12-06

## 2023-12-05 RX ORDER — HYDRALAZINE HYDROCHLORIDE 20 MG/ML
10 INJECTION INTRAMUSCULAR; INTRAVENOUS EVERY 4 HOURS PRN
Status: DISCONTINUED | OUTPATIENT
Start: 2023-12-05 | End: 2023-12-11 | Stop reason: HOSPADM

## 2023-12-05 RX ORDER — NITROGLYCERIN 0.4 MG/1
0.4 TABLET SUBLINGUAL
Status: DISCONTINUED | OUTPATIENT
Start: 2023-12-05 | End: 2023-12-05

## 2023-12-05 RX ORDER — ACETAMINOPHEN 325 MG/1
650 TABLET ORAL EVERY 6 HOURS PRN
Status: DISCONTINUED | OUTPATIENT
Start: 2023-12-05 | End: 2023-12-11 | Stop reason: HOSPADM

## 2023-12-05 RX ORDER — NITROGLYCERIN 0.4 MG/1
0.4 TABLET SUBLINGUAL ONCE
Status: COMPLETED | OUTPATIENT
Start: 2023-12-05 | End: 2023-12-05

## 2023-12-05 RX ORDER — FUROSEMIDE 10 MG/ML
80 INJECTION INTRAMUSCULAR; INTRAVENOUS
Status: DISCONTINUED | OUTPATIENT
Start: 2023-12-05 | End: 2023-12-05

## 2023-12-05 RX ORDER — FUROSEMIDE 10 MG/ML
80 INJECTION INTRAMUSCULAR; INTRAVENOUS ONCE
Status: COMPLETED | OUTPATIENT
Start: 2023-12-05 | End: 2023-12-05

## 2023-12-05 RX ORDER — HYDRALAZINE HYDROCHLORIDE 20 MG/ML
10 INJECTION INTRAMUSCULAR; INTRAVENOUS EVERY 6 HOURS PRN
Status: DISCONTINUED | OUTPATIENT
Start: 2023-12-05 | End: 2023-12-05

## 2023-12-05 RX ADMIN — NITROGLYCERIN 0.4 MG: 0.4 TABLET SUBLINGUAL at 16:16

## 2023-12-05 RX ADMIN — FUROSEMIDE 80 MG: 10 INJECTION, SOLUTION INTRAMUSCULAR; INTRAVENOUS at 13:21

## 2023-12-05 RX ADMIN — NITROGLYCERIN 0.4 MG: 0.4 TABLET SUBLINGUAL at 13:59

## 2023-12-05 RX ADMIN — ENOXAPARIN SODIUM 40 MG: 40 INJECTION SUBCUTANEOUS at 16:17

## 2023-12-05 RX ADMIN — FUROSEMIDE 80 MG: 10 INJECTION, SOLUTION INTRAMUSCULAR; INTRAVENOUS at 17:26

## 2023-12-05 NOTE — H&P
"[facility] HOSPITALIST     Lanie Gomez, BRET    CHIEF COMPLAINT: Shortness of Breath    HISTORY OF PRESENT ILLNESS:    Ms. Landaverde is a 74 y/o  female who presents today to to worsening dyspnea and LE edema.  She is a somewhat poor historian but reports symptoms worsening over the last 2 days.  The individual who accompanied her to the floor told staff this has been going on for a few weeks (they have since left for the day upon my interview). She cannot tell me exacerbating or relieving factors, but apparently has had to be wheeled around in a wheelchair \"since this all started\".  She denies chest pain.  She does describe intermittent nausea and vomiting.  She hasn't had much appetite.      Past Medical History:   Diagnosis Date         Stroke      Past Surgical History:   Procedure Laterality Date    BACK SURGERY      CHOLECYSTECTOMY      HIP HEMIARTHROPLASTY Right 4/19/2023    Procedure: HIP HEMIARTHROPLASTY ANTERIOR;  Surgeon: Kumar Bonilla MD;  Location: [facility];  Service: Orthopedics;  Laterality: Right;     Family History   Problem Relation Age of Onset    Cancer Mother      Social History     Tobacco Use    Smoking status: Former     Packs/day: 1     Types: Cigarettes    Smokeless tobacco: Never   Vaping Use    Vaping Use: Never used   Substance Use Topics    Alcohol use: Never    Drug use: Never     Medications Prior to Admission   Medication Sig Dispense Refill Last Dose    acetaminophen (TYLENOL) 325 MG tablet Take 2 tablets by mouth Every 6 (Six) Hours As Needed for Mild Pain.   12/4/2023    aspirin 81 MG EC tablet Take 1 tablet by mouth Every 12 (Twelve) Hours. Indications: VTE Prophylaxis 60 tablet 1 12/5/2023    atorvastatin (LIPITOR) 20 MG tablet Take 1 tablet by mouth Daily. 14 tablet 0 12/5/2023     Allergies:  Penicillins    REVIEW OF SYSTEMS:  Please see the above history of present illness for pertinent positives and negatives.  The remainder of the patient's " "systems have been reviewed and are negative.    Vital Signs  Temp:  [97.1 °F (36.2 °C)-97.8 °F (36.6 °C)] 97.1 °F (36.2 °C)  Heart Rate:  [] 99  Resp:  [18-20] 18  BP: (173-206)/() 195/102  Oxygen Therapy  SpO2: 97 %  Pulse Oximetry Type: Intermittent  Device (Oxygen Therapy): room air}  Body mass index is 19.93 kg/m².  Flowsheet Rows      Flowsheet Row First Filed Value   Admission Height 162.6 cm (64\") Documented at 12/05/2023 1141   Admission Weight 52.7 kg (116 lb 1.6 oz) Documented at 12/05/2023 1141             Physical Exam:  Physical Exam   Constitutional: Frail female who appears much older than stated age.  HEENT:   Head: Normocephalic and atraumatic.   Eyes:  Pupils are equal and round. EOM are intact. Sclerae are anicteric and noninjected.  Mouth and Throat: Patient has moist mucous membranes.  Edentulous. Oropharynx is clear of any erythema or exudate.     Neck: Neck supple. No JVD present. No thyromegaly present. No lymphadenopathy present.  Cardiovascular: Regular rate, regular rhythm, S1 normal and S2 normal.  Exam reveals no gallop and no friction rub.  No murmur heard.  Radial pulses are 2+ and symmetric.  Pulmonary/Chest: Lungs are diminished at the bases, but otherwise clear.  No accessory use noted.   Abdominal: Soft. Bowel sounds are normal. There is no tenderness.   Musculoskeletal: Normal muscle tone  Extremities: Pitting pretibial and pedal edema bilaterally.  No asymmetry.  Neurological: Cranial nerves II-XII are grossly intact with no focal deficits.    Emotional Behavior:    Judgment and Insight: Unknown   Mental Status:  Alertness  Normal   Memory:  Unknown   Mood and Affect:         Depression  None               Anxiety  None    Debilities:   Physical Weakness  As per HPI   Handicaps  None   Disabilities  None   Agitation  None     Results Review:    I reviewed the patient's new clinical results.  Lab Results (most recent)       Procedure Component Value Units Date/Time    " Vitamin D,25-Hydroxy [[id number]] Collected: 12/05/23 1359    Specimen: Blood Updated: 12/05/23 1607    TSH [[id number]] Collected: 12/05/23 1359    Specimen: Blood from Arm, Left Updated: 12/05/23 1558    High Sensitivity Troponin T 2Hr [[id number]]  (Abnormal) Collected: 12/05/23 1359    Specimen: Blood from Arm, Left Updated: 12/05/23 1428     HS Troponin T 49 ng/L      Troponin T Delta -4 ng/L     Narrative:      High Sensitive Troponin T Reference Range:  <14.0 ng/L- Negative Female for AMI  <22.0 ng/L- Negative Male for AMI  >=14 - Abnormal Female indicating possible myocardial injury.  >=22 - Abnormal Male indicating possible myocardial injury.   Clinicians would have to utilize clinical acumen, EKG, Troponin, and serial changes to determine if it is an Acute Myocardial Infarction or myocardial injury due to an underlying chronic condition.         BNP [[id number]]  (Abnormal) Collected: 12/05/23 1143    Specimen: Blood Updated: 12/05/23 1240     proBNP 6,232.0 pg/mL     Narrative:      This assay is used as an aid in the diagnosis of individuals suspected of having heart failure. It can be used as an aid in the diagnosis of acute decompensated heart failure (ADHF) in patients presenting with signs and symptoms of ADHF to the emergency department (ED). In addition, NT-proBNP of <300 pg/mL indicates ADHF is not likely.    Age Range Result Interpretation  NT-proBNP Concentration (pg/mL:      <50             Positive            >450                   Gray                 300-450                    Negative             <300    50-75           Positive            >900                  Gray                300-900                  Negative            <300      >75             Positive            >1800                  Gray                300-1800                  Negative            <300    High Sensitivity Troponin T [[id number]]  (Abnormal) Collected: 12/05/23 1143    Specimen: Blood Updated: 12/05/23 1221     HS  Troponin T 53 ng/L     Narrative:      High Sensitive Troponin T Reference Range:  <14.0 ng/L- Negative Female for AMI  <22.0 ng/L- Negative Male for AMI  >=14 - Abnormal Female indicating possible myocardial injury.  >=22 - Abnormal Male indicating possible myocardial injury.   Clinicians would have to utilize clinical acumen, EKG, Troponin, and serial changes to determine if it is an Acute Myocardial Infarction or myocardial injury due to an underlying chronic condition.         Comprehensive Metabolic Panel [[id number]]  (Abnormal) Collected: 12/05/23 1143    Specimen: Blood Updated: 12/05/23 1216     Glucose 112 mg/dL      BUN 24 mg/dL      Creatinine 0.89 mg/dL      Sodium 120 mmol/L      Potassium 4.8 mmol/L      Chloride 88 mmol/L      CO2 21.3 mmol/L      Calcium 9.2 mg/dL      Total Protein 6.4 g/dL      Albumin 3.8 g/dL      ALT (SGPT) 14 U/L      AST (SGOT) 19 U/L      Alkaline Phosphatase 62 U/L      Total Bilirubin 0.3 mg/dL      Globulin 2.6 gm/dL      A/G Ratio 1.5 g/dL      BUN/Creatinine Ratio 27.0     Anion Gap 10.7 mmol/L      eGFR 68.1 mL/min/1.73     Narrative:      GFR Normal >60  Chronic Kidney Disease <60  Kidney Failure <15    The GFR formula is only valid for adults with stable renal function between ages 18 and 70.    COVID-19 and FLU A/B PCR, 1 HR TAT - Swab, Nasopharynx [[id number]]  (Normal) Collected: 12/05/23 1144    Specimen: Swab from Nasopharynx Updated: 12/05/23 1210     COVID19 Not Detected     Influenza A PCR Not Detected     Influenza B PCR Not Detected    Narrative:      Fact sheet for providers: https://www.fda.gov/media/090998/download    Fact sheet for patients: https://www.fda.gov/media/396461/download    Test performed by PCR.    Protime-INR [[id number]]  (Normal) Collected: 12/05/23 1143    Specimen: Blood Updated: 12/05/23 1206     Protime 13.5 Seconds      INR 1.03    Narrative:      Therapeutic Ranges for INR: 2.0-3.0 (PT 20-30)                              2.5-3.5 (PT  25-34)    CBC & Differential [[id number]]  (Abnormal) Collected: 12/05/23 1143    Specimen: Blood Updated: 12/05/23 1154    Narrative:      The following orders were created for panel order CBC & Differential.  Procedure                               Abnormality         Status                     ---------                               -----------         ------                     CBC Auto Differential[[id number]]        Abnormal            Final result                 Please view results for these tests on the individual orders.    CBC Auto Differential [[id number]]  (Abnormal) Collected: 12/05/23 1143    Specimen: Blood Updated: 12/05/23 1154     WBC 6.12 10*3/mm3      RBC 3.39 10*6/mm3      Hemoglobin 10.3 g/dL      Hematocrit 28.9 %      MCV 85.3 fL      MCH 30.4 pg      MCHC 35.6 g/dL      RDW 11.8 %      RDW-SD 36.5 fl      MPV 8.8 fL      Platelets 349 10*3/mm3      Neutrophil % 65.8 %      Lymphocyte % 18.3 %      Monocyte % 11.8 %      Eosinophil % 2.8 %      Basophil % 1.0 %      Immature Grans % 0.3 %      Neutrophils, Absolute 4.03 10*3/mm3      Lymphocytes, Absolute 1.12 10*3/mm3      Monocytes, Absolute 0.72 10*3/mm3      Eosinophils, Absolute 0.17 10*3/mm3      Basophils, Absolute 0.06 10*3/mm3      Immature Grans, Absolute 0.02 10*3/mm3      nRBC 0.0 /100 WBC             Imaging Results (Most Recent)       Procedure Component Value Units Date/Time    XR Chest 1 View [[id number]] Collected: 12/05/23 1218     Updated: 12/05/23 1222    Narrative:      Chest 1 view     HISTORY: Shortness of breath for 2 days     COMPARISON: 7/18/2023     FINDINGS: Cardiomegaly is noted and has increased since the previous  examination. Pulmonary vascular congestion is seen and there are  moderate bilateral pleural effusions that are new since the previous  exam. No pneumothorax.       Impression:      Moderate congestive heart failure with bilateral pleural  effusions. Vascular markings have not changed significantly since  the  previous examination but the effusions are new.     This report was finalized on 12/5/2023 12:19 PM by Dr. Robert West MD.             reviewed    ECG/EMG Results (most recent)       Procedure Component Value Units Date/Time    ECG 12 Lead Dyspnea [[id number]] Collected: 12/05/23 1144     Updated: 12/05/23 1145     QT Interval 370 ms      QTC Interval 464 ms     Narrative:      HEART RATE= 94  bpm  RR Interval= 636  ms  NH Interval= 189  ms  P Horizontal Axis= 2  deg  P Front Axis= 66  deg  QRSD Interval= 96  ms  QT Interval= 370  ms  QTcB= 464  ms  QRS Axis= 3  deg  T Wave Axis= 123  deg  - ABNORMAL ECG -  Sinus rhythm  LVH with secondary repolarization abnormality  Anterior Q waves, possibly due to LVH  Electronically Signed By:   Date and Time of Study: 2023-12-05 11:44:17    ECG 12 Lead Dyspnea [[id number]] Collected: 12/05/23 1244     Updated: 12/05/23 1246     QT Interval 392 ms      QTC Interval 478 ms     Narrative:      HEART RATE= 89  bpm  RR Interval= 672  ms  NH Interval= 209  ms  P Horizontal Axis= -33  deg  P Front Axis= 84  deg  QRSD Interval= 93  ms  QT Interval= 392  ms  QTcB= 478  ms  QRS Axis= 2  deg  T Wave Axis= 129  deg  - ABNORMAL ECG -  Sinus rhythm  LVH with secondary repolarization abnormality  Anterior infarct, old  Electronically Signed By:   Date and Time of Study: 2023-12-05 12:44:33          Reviewed and compared to tracings from 7/2023 and 4/2023    Assessment & Plan     Acute-on-Chronic HFpEF: Last echo was over the summer.  Discussed w/ Dr. Amor, and she would like to get a limited echo which has been ordered.  Will check a lipid panel as well.  Started on the CHF pathway.  Elevated Blood Pressure: No formal history of hypertension.  She received nitro in the ER.  Will continue Nitro paste and monitor.  May add prn Hydralazine as well.  Weakness: Likely due to #1.  She does have office visits for falls.  Checking TSH and Vitamin D.  Will have PT see and evaluate when  appropriate.  Hyponatremia: This has been chronic over the last year (although near normal when checked over the summer).  Fluid restriction placed, but she appears malnourished so this may not affect her.  Will trend as she is diuresed.  Hx/O CVA: Continues on ASA and Lipitor.  She has seen Dr. Connolly in the past.    I discussed the patient's findings and my recommendations with patient.     Roshan Low MD  12/05/23  16:16 EST

## 2023-12-05 NOTE — Clinical Note
Level of Care: Med/Surg [1]   Diagnosis: New onset of congestive heart failure [[id number]]   Certification: I Certify That Inpatient Hospital Services Are Medically Necessary For Greater Than 2 Midnights

## 2023-12-05 NOTE — ED PROVIDER NOTES
Subjective   History of Present Illness  Pt is a 73 y.o. female with PMH as listed who presents for   Chief Complaint   Patient presents with    Shortness of Breath    Cough     And leg swelling for unknown time       Patient presents for dyspnea, cough, bilateral lower extremity swelling for the past few days.  States that she is uncertain for exactly how long her legs have been swelling but is sure that she has been having shortness of breath for the past few days.  Denies any chest pain, fever, congestion or rhinorrhea.  She does state that she has a productive cough.  Denies history of smoking but states that she lives with an individual who smokes.  Has no other new complaints at this time.    Review of Systems    Past Medical History:   Diagnosis Date    Diabetes mellitus     Stroke        Allergies   Allergen Reactions    Penicillins Hives       Past Surgical History:   Procedure Laterality Date    BACK SURGERY      CHOLECYSTECTOMY      HIP HEMIARTHROPLASTY Right 4/19/2023    Procedure: HIP HEMIARTHROPLASTY ANTERIOR;  Surgeon: Kumar Bonilla MD;  Location: [facility];  Service: Orthopedics;  Laterality: Right;       Family History   Problem Relation Age of Onset    Cancer Mother        Social History     Socioeconomic History    Marital status: Single   Tobacco Use    Smoking status: Former     Packs/day: 1     Types: Cigarettes    Smokeless tobacco: Never   Vaping Use    Vaping Use: Never used   Substance and Sexual Activity    Alcohol use: Never    Drug use: Never    Sexual activity: Defer           Objective   Physical Exam  Constitutional:       Appearance: Normal appearance.   HENT:      Head: Normocephalic and atraumatic.      Mouth/Throat:      Mouth: Mucous membranes are moist.      Pharynx: Oropharynx is clear.   Eyes:      Conjunctiva/sclera: Conjunctivae normal.   Cardiovascular:      Rate and Rhythm: Normal rate and regular rhythm.   Pulmonary:      Effort: Pulmonary effort is normal.       Breath sounds: Normal breath sounds.   Chest:      Chest wall: No tenderness.   Abdominal:      General: Abdomen is flat.      Palpations: Abdomen is soft.   Musculoskeletal:      Cervical back: Neck supple.      Right lower leg: Edema (b/l) present.   Skin:     General: Skin is warm and dry.   Neurological:      Mental Status: She is alert.   Psychiatric:         Mood and Affect: Mood normal.         Procedures           ED Course  ED Course as of 12/05/23 1433   Tue Dec 05, 2023   1142 Patient presents with shortness of breath, cough, lower extremity swelling for the past few days.  Exam significant for 2+ edema bilateral lower extremities.  Will obtain CBC, CMP, troponin, coags, BNP, COVID/flu swab for cough as well as EKG and chest x-ray. [JF]   1431 Patient's lab work significant for BNP of greater than 6000, initial troponin 53, repeat 49.  Also hyponatremic with sodium of 120.  She was given sublingual nitro and 80 mg of Lasix for new onset heart failure with good urine output.  BP is improved from low 200s to 170s.  Spoke with Dr. Amor with cardiology who recommends admission to medicine for further management here at [facility].  Spoke with Dr. Low, hospitalist service who agrees to admit.  Spoke with patient regarding results of work-up and consults and plan for admission for new onset heart failure and hyponatremia.  She understands and agrees with plan of care.  All questions answered. [JF]      ED Course User Index  [JF] Preston Kaur MD                                             Medical Decision Making  My differential diagnosis for dyspnea includes but is not limited to:  Asthma, COPD, pneumonia, pulmonary embolus, acute respiratory distress syndrome, pneumothorax, pleural effusion, pulmonary fibrosis, congestive heart failure, myocardial infarction, DKA, uremia, acidosis, sepsis, anemia, drug related, hyperventilation, CNS disease      Problems Addressed:  Hyponatremia: complicated acute  illness or injury  New onset of congestive heart failure: complicated acute illness or injury    Amount and/or Complexity of Data Reviewed  Labs: ordered.     Details: Lab work shows hyponatremia with sodium of 120, hemoglobin of 10.3 and this is stable.  Patient's hyponatremia has been chronic though worse today.  Likely due to volume overload from heart failure.  Radiology: ordered.     Details: Chest x-ray concerning for bilateral pleural effusions and cardiomegaly based on interpretation.  ECG/medicine tests: ordered.     Details: EKG  12/5/2023 at 1144  Rhythm sinus, rate 94  Normal axis, Q waves anterior leads as well as lead III, no other ST changes  EKG same as prior from 7/18/2023  EKG interpreted by me contemporaneously by me with care    EKG  12/5/2023 at 1244  Rhythm sinus, rate 89  Normal axis, Q waves anterior leads as well as lead III, mild ST elevation anterior leads consistent with prior EKGs.  EKG interpreted by me contemporaneously by me with care  Discussion of management or test interpretation with external provider(s): Spoke with Dr. Amor with cardiology who recommends admission to [place] for further management of patient's new onset heart failure and hyponatremia.    Spoke with Dr. Low with hospitalist service who agrees to meet patient for further management of new onset heart failure and hyponatremia.    Risk  Prescription drug management.  Decision regarding hospitalization.        Final diagnoses:   New onset of congestive heart failure   Hyponatremia       ED Disposition  ED Disposition       ED Disposition   Decision to Admit    Condition   --    Comment   Level of Care: Telemetry [5]                 No follow-up provider specified.       Medication List      No changes were made to your prescriptions during this visit.            Preston Kaur MD  12/05/23 2204

## 2023-12-05 NOTE — ED NOTES
Nursing report ED to floor  Arline Landaverde  73 y.o.  female    HPI :   Chief Complaint   Patient presents with    Shortness of Breath    Cough     And leg swelling for unknown time       Admitting doctor:   No admitting provider for patient encounter.    Admitting diagnosis:   The primary encounter diagnosis was New onset of congestive heart failure. A diagnosis of Hyponatremia was also pertinent to this visit.    Code status:   Current Code Status       Date Active Code Status Order ID Comments User Context       Prior            Allergies:   Penicillins    Isolation:   Enhanced Droplet/Contact     Intake and Output  No intake or output data in the 24 hours ending 12/05/23 1502    Weight:       12/05/23  1141   Weight: 52.7 kg (116 lb 1.6 oz)       Most recent vitals:   Vitals:    12/05/23 1145 12/05/23 1220 12/05/23 1321 12/05/23 1341   BP:  (!) 195/98 (!) 190/102 (!) 193/105   Pulse:  90 89 91   Resp:  18     Temp: 97.8 °F (36.6 °C)      TempSrc: Oral      SpO2:  95%  96%   Weight:       Height:           Active LDAs/IV Access:   Lines, Drains & Airways       Active LDAs       Name Placement date Placement time Site Days    Peripheral IV 12/05/23 1144 Left Antecubital 12/05/23  1144  Antecubital  less than 1                    Labs (abnormal labs have a star):   Labs Reviewed   COMPREHENSIVE METABOLIC PANEL - Abnormal; Notable for the following components:       Result Value    Glucose 112 (*)     BUN 24 (*)     Sodium 120 (*)     Chloride 88 (*)     CO2 21.3 (*)     BUN/Creatinine Ratio 27.0 (*)     All other components within normal limits    Narrative:     GFR Normal >60  Chronic Kidney Disease <60  Kidney Failure <15    The GFR formula is only valid for adults with stable renal function between ages 18 and 70.   TROPONIN - Abnormal; Notable for the following components:    HS Troponin T 53 (*)     All other components within normal limits    Narrative:     High Sensitive Troponin T Reference Range:  <14.0  ng/L- Negative Female for AMI  <22.0 ng/L- Negative Male for AMI  >=14 - Abnormal Female indicating possible myocardial injury.  >=22 - Abnormal Male indicating possible myocardial injury.   Clinicians would have to utilize clinical acumen, EKG, Troponin, and serial changes to determine if it is an Acute Myocardial Infarction or myocardial injury due to an underlying chronic condition.        BNP (IN-HOUSE) - Abnormal; Notable for the following components:    proBNP 6,232.0 (*)     All other components within normal limits    Narrative:     This assay is used as an aid in the diagnosis of individuals suspected of having heart failure. It can be used as an aid in the diagnosis of acute decompensated heart failure (ADHF) in patients presenting with signs and symptoms of ADHF to the emergency department (ED). In addition, NT-proBNP of <300 pg/mL indicates ADHF is not likely.    Age Range Result Interpretation  NT-proBNP Concentration (pg/mL:      <50             Positive            >450                   Gray                 300-450                    Negative             <300    50-75           Positive            >900                  Gray                300-900                  Negative            <300      >75             Positive            >1800                  Gray                300-1800                  Negative            <300   CBC WITH AUTO DIFFERENTIAL - Abnormal; Notable for the following components:    RBC 3.39 (*)     Hemoglobin 10.3 (*)     Hematocrit 28.9 (*)     RDW 11.8 (*)     RDW-SD 36.5 (*)     Lymphocyte % 18.3 (*)     All other components within normal limits   HIGH SENSITIVITIY TROPONIN T 2HR - Abnormal; Notable for the following components:    HS Troponin T 49 (*)     Troponin T Delta -4 (*)     All other components within normal limits    Narrative:     High Sensitive Troponin T Reference Range:  <14.0 ng/L- Negative Female for AMI  <22.0 ng/L- Negative Male for AMI  >=14 - Abnormal Female  indicating possible myocardial injury.  >=22 - Abnormal Male indicating possible myocardial injury.   Clinicians would have to utilize clinical acumen, EKG, Troponin, and serial changes to determine if it is an Acute Myocardial Infarction or myocardial injury due to an underlying chronic condition.        COVID-19 AND FLU A/B, NP SWAB IN TRANSPORT MEDIA 1 HR TAT - Normal    Narrative:     Fact sheet for providers: https://www.fda.gov/media/936941/download    Fact sheet for patients: https://www.fda.gov/media/471176/download    Test performed by PCR.   PROTIME-INR - Normal    Narrative:     Therapeutic Ranges for INR: 2.0-3.0 (PT 20-30)                              2.5-3.5 (PT 25-34)   CBC AND DIFFERENTIAL    Narrative:     The following orders were created for panel order CBC & Differential.  Procedure                               Abnormality         Status                     ---------                               -----------         ------                     CBC Auto Differential[[id number]]        Abnormal            Final result                 Please view results for these tests on the individual orders.       Results       Procedure Component Value Ref Range Date/Time    High Sensitivity Troponin T 2Hr [[id number]]  (Abnormal) Collected: 12/05/23 1359    Order Status: Completed Specimen: Blood from Arm, Left Updated: 12/05/23 1428     HS Troponin T 49 <14 ng/L      Troponin T Delta -4 >=-4 - <+4 ng/L     Narrative:      High Sensitive Troponin T Reference Range:  <14.0 ng/L- Negative Female for AMI  <22.0 ng/L- Negative Male for AMI  >=14 - Abnormal Female indicating possible myocardial injury.  >=22 - Abnormal Male indicating possible myocardial injury.   Clinicians would have to utilize clinical acumen, EKG, Troponin, and serial changes to determine if it is an Acute Myocardial Infarction or myocardial injury due to an underlying chronic condition.         BNP [[id number]]  (Abnormal) Collected: 12/05/23  1143    Order Status: Completed Specimen: Blood Updated: 12/05/23 1240     proBNP 6,232.0 0.0 - 900.0 pg/mL     Narrative:      This assay is used as an aid in the diagnosis of individuals suspected of having heart failure. It can be used as an aid in the diagnosis of acute decompensated heart failure (ADHF) in patients presenting with signs and symptoms of ADHF to the emergency department (ED). In addition, NT-proBNP of <300 pg/mL indicates ADHF is not likely.    Age Range Result Interpretation  NT-proBNP Concentration (pg/mL:      <50             Positive            >450                   Gray                 300-450                    Negative             <300    50-75           Positive            >900                  Gray                300-900                  Negative            <300      >75             Positive            >1800                  Gray                300-1800                  Negative            <300    High Sensitivity Troponin T [[id number]]  (Abnormal) Collected: 12/05/23 1143    Order Status: Completed Specimen: Blood Updated: 12/05/23 1221     HS Troponin T 53 <14 ng/L     Narrative:      High Sensitive Troponin T Reference Range:  <14.0 ng/L- Negative Female for AMI  <22.0 ng/L- Negative Male for AMI  >=14 - Abnormal Female indicating possible myocardial injury.  >=22 - Abnormal Male indicating possible myocardial injury.   Clinicians would have to utilize clinical acumen, EKG, Troponin, and serial changes to determine if it is an Acute Myocardial Infarction or myocardial injury due to an underlying chronic condition.         Comprehensive Metabolic Panel [[id number]]  (Abnormal) Collected: 12/05/23 1143    Order Status: Completed Specimen: Blood Updated: 12/05/23 1216     Glucose 112 65 - 99 mg/dL      BUN 24 8 - 23 mg/dL      Creatinine 0.89 0.57 - 1.00 mg/dL      Sodium 120 136 - 145 mmol/L      Potassium 4.8 3.5 - 5.2 mmol/L      Chloride 88 98 - 107 mmol/L      CO2 21.3 22.0 - 29.0  mmol/L      Calcium 9.2 8.6 - 10.5 mg/dL      Total Protein 6.4 6.0 - 8.5 g/dL      Albumin 3.8 3.5 - 5.2 g/dL      ALT (SGPT) 14 1 - 33 U/L      AST (SGOT) 19 1 - 32 U/L      Alkaline Phosphatase 62 39 - 117 U/L      Total Bilirubin 0.3 0.0 - 1.2 mg/dL      Globulin 2.6 gm/dL      A/G Ratio 1.5 g/dL      BUN/Creatinine Ratio 27.0 7.0 - 25.0      Anion Gap 10.7 5.0 - 15.0 mmol/L      eGFR 68.1 >60.0 mL/min/1.73     Narrative:      GFR Normal >60  Chronic Kidney Disease <60  Kidney Failure <15    The GFR formula is only valid for adults with stable renal function between ages 18 and 70.    COVID-19 and FLU A/B PCR, 1 HR TAT - Swab, Nasopharynx [[id number]]  (Normal) Collected: 12/05/23 1144    Order Status: Completed Specimen: Swab from Nasopharynx Updated: 12/05/23 1210     COVID19 Not Detected Not Detected - Ref. Range      Influenza A PCR Not Detected Not Detected      Influenza B PCR Not Detected Not Detected     Narrative:      Fact sheet for providers: https://www.fda.gov/media/799491/download    Fact sheet for patients: https://www.fda.gov/media/012059/download    Test performed by PCR.    Protime-INR [[id number]]  (Normal) Collected: 12/05/23 1143    Order Status: Completed Specimen: Blood Updated: 12/05/23 1206     Protime 13.5 12.1 - 15.0 Seconds      INR 1.03 0.90 - 1.10     Narrative:      Therapeutic Ranges for INR: 2.0-3.0 (PT 20-30)                              2.5-3.5 (PT 25-34)    CBC Auto Differential [[id number]]  (Abnormal) Collected: 12/05/23 1143    Order Status: Completed Specimen: Blood Updated: 12/05/23 1154     WBC 6.12 3.40 - 10.80 10*3/mm3      RBC 3.39 3.77 - 5.28 10*6/mm3      Hemoglobin 10.3 12.0 - 15.9 g/dL      Hematocrit 28.9 34.0 - 46.6 %      MCV 85.3 79.0 - 97.0 fL      MCH 30.4 26.6 - 33.0 pg      MCHC 35.6 31.5 - 35.7 g/dL      RDW 11.8 12.3 - 15.4 %      RDW-SD 36.5 37.0 - 54.0 fl      MPV 8.8 6.0 - 12.0 fL      Platelets 349 140 - 450 10*3/mm3      Neutrophil % 65.8 42.7 - 76.0 %       Lymphocyte % 18.3 19.6 - 45.3 %      Monocyte % 11.8 5.0 - 12.0 %      Eosinophil % 2.8 0.3 - 6.2 %      Basophil % 1.0 0.0 - 1.5 %      Immature Grans % 0.3 0.0 - 0.5 %      Neutrophils, Absolute 4.03 1.70 - 7.00 10*3/mm3      Lymphocytes, Absolute 1.12 0.70 - 3.10 10*3/mm3      Monocytes, Absolute 0.72 0.10 - 0.90 10*3/mm3      Eosinophils, Absolute 0.17 0.00 - 0.40 10*3/mm3      Basophils, Absolute 0.06 0.00 - 0.20 10*3/mm3      Immature Grans, Absolute 0.02 0.00 - 0.05 10*3/mm3      nRBC 0.0 0.0 - 0.2 /100 WBC              EKG:   ECG 12 Lead Dyspnea   Preliminary Result   HEART RATE= 89  bpm   RR Interval= 672  ms   DC Interval= 209  ms   P Horizontal Axis= -33  deg   P Front Axis= 84  deg   QRSD Interval= 93  ms   QT Interval= 392  ms   QTcB= 478  ms   QRS Axis= 2  deg   T Wave Axis= 129  deg   - ABNORMAL ECG -   Sinus rhythm   LVH with secondary repolarization abnormality   Anterior infarct, old   Electronically Signed By:    Date and Time of Study: 2023-12-05 12:44:33      ECG 12 Lead Dyspnea   Preliminary Result   HEART RATE= 94  bpm   RR Interval= 636  ms   DC Interval= 189  ms   P Horizontal Axis= 2  deg   P Front Axis= 66  deg   QRSD Interval= 96  ms   QT Interval= 370  ms   QTcB= 464  ms   QRS Axis= 3  deg   T Wave Axis= 123  deg   - ABNORMAL ECG -   Sinus rhythm   LVH with secondary repolarization abnormality   Anterior Q waves, possibly due to LVH   Electronically Signed By:    Date and Time of Study: 2023-12-05 11:44:17          Meds given in ED:   Medications   furosemide (LASIX) injection 80 mg (80 mg Intravenous Given 12/5/23 1321)   nitroglycerin (NITROSTAT) SL tablet 0.4 mg (0.4 mg Sublingual Given 12/5/23 1359)       Imaging results:  XR Chest 1 View    Result Date: 12/5/2023  Moderate congestive heart failure with bilateral pleural effusions. Vascular markings have not changed significantly since the previous examination but the effusions are new.  This report was finalized on 12/5/2023  12:19 PM by Dr. Robert West MD.       Ambulatory status:   - complete    Social issues:   Social History     Socioeconomic History    Marital status: Single   Tobacco Use    Smoking status: Former     Packs/day: 1     Types: Cigarettes    Smokeless tobacco: Never   Vaping Use    Vaping Use: Never used   Substance and Sexual Activity    Alcohol use: Never    Drug use: Never    Sexual activity: Defer       NIH Stroke Scale:         Gudelia Chavez RN  12/05/23 15:02 EST

## 2023-12-05 NOTE — CONSULTS
"Date of Hospital Visit: 23  Encounter Provider: Uri Amor MD  Place of Service: [facility] CARDIOLOGY  Patient Name: Arline Landaverde  :1950  Referral Provider:Dr Low    Chief complaint: SOA    History of Present Illness    Ms. Landaverde is a 74yo woman with a history of recurrent strokes who presents with several days of dyspnea and edema. She says it's been going on for 2 days but her \"other half\" who is with her says it's been going on much longer. She reports significant NI. She has orthopnea. She denies chest pain. Her feet and ankles have been swollen. She denies palpitations or syncope.     Past Medical History:   Diagnosis Date    Diabetes mellitus     Stroke        Past Surgical History:   Procedure Laterality Date    BACK SURGERY      CHOLECYSTECTOMY      HIP HEMIARTHROPLASTY Right 2023    Procedure: HIP HEMIARTHROPLASTY ANTERIOR;  Surgeon: Kumar Bonilla MD;  Location: [facility];  Service: Orthopedics;  Laterality: Right;       Prior to Admission medications    Medication Sig Start Date End Date Taking? Authorizing Provider   acetaminophen (TYLENOL) 325 MG tablet Take 2 tablets by mouth Every 6 (Six) Hours As Needed for Mild Pain.   Yes Provider, MD Gerda   aspirin 81 MG EC tablet Take 1 tablet by mouth Every 12 (Twelve) Hours. Indications: VTE Prophylaxis 23  Yes Telma Copeland APRN   atorvastatin (LIPITOR) 20 MG tablet Take 1 tablet by mouth Daily. 3/8/23  Yes Thelma Tubbs PA-C   ferrous gluconate (FERGON) 324 MG tablet Take 1 tablet by mouth Daily With Breakfast.  Patient not taking: Reported on 2023  Telma Copeland APRN       Social History     Socioeconomic History    Marital status: Single   Tobacco Use    Smoking status: Former     Packs/day: 1     Types: Cigarettes    Smokeless tobacco: Never   Vaping Use    Vaping Use: Never used   Substance and Sexual Activity    Alcohol use: Never    Drug use: " "Never    Sexual activity: Defer       Family History   Problem Relation Age of Onset    Cancer Mother        Review of Systems   Constitutional:  Positive for fatigue.   HENT:  Positive for trouble swallowing.    Respiratory:  Positive for shortness of breath.    Cardiovascular:  Positive for leg swelling.   Neurological:  Positive for weakness.        Objective:     Vitals:    23 1441 23 1501 23 1538 23 1630   BP: (!) 184/99 (!) 190/104 (!) 195/102 113/67   BP Location:  Right arm Right arm Right arm   Patient Position:  Lying Lying Lying   Pulse:  90 99 89   Resp:  20 18    Temp:   97.1 °F (36.2 °C)    TempSrc:   Oral    SpO2:  96% 97%    Weight:       Height:         Body mass index is 19.93 kg/m².    Last Weight and Admission Weight        23  1141   Weight: 52.7 kg (116 lb 1.6 oz)     Flowsheet Rows      Flowsheet Row First Filed Value   Admission Height 162.6 cm (64\") Documented at 2023 1141   Admission Weight 52.7 kg (116 lb 1.6 oz) Documented at 2023 1141              Intake/Output Summary (Last 24 hours) at 2023 1634  Last data filed at 2023 1516  Gross per 24 hour   Intake --   Output 250 ml   Net -250 ml         Physical Exam  Constitutional:       Comments: Looks older than stated age   HENT:      Head: Normocephalic.   Eyes:      Conjunctiva/sclera: Conjunctivae normal.   Cardiovascular:      Rate and Rhythm: Normal rate.      Heart sounds:       with a grade of 1/6.   Pulmonary:      Effort: Tachypnea present.      Breath sounds: Rales present.   Abdominal:      Palpations: Abdomen is soft.      Tenderness: There is no abdominal tenderness.   Musculoskeletal:      Right lower le+ Pitting Edema present.      Left lower le+ Pitting Edema present.   Skin:     General: Skin is warm and dry.   Neurological:      Comments: Left sided hemiparesis   Psychiatric:         Mood and Affect: Affect is flat.                 Lab Review:              "   Results from last 7 days   Lab Units 12/05/23  1143   SODIUM mmol/L 120*   POTASSIUM mmol/L 4.8   CHLORIDE mmol/L 88*   CO2 mmol/L 21.3*   BUN mg/dL 24*   CREATININE mg/dL 0.89   GLUCOSE mg/dL 112*   CALCIUM mg/dL 9.2     Results from last 7 days   Lab Units 12/05/23  1359 12/05/23  1143   HSTROP T ng/L 49* 53*     Results from last 7 days   Lab Units 12/05/23  1143   WBC 10*3/mm3 6.12   HEMOGLOBIN g/dL 10.3*   HEMATOCRIT % 28.9*   PLATELETS 10*3/mm3 349     Results from last 7 days   Lab Units 12/05/23  1143   INR  1.03                     I personally viewed and interpreted the patient's EKG/Telemetry data            Assessment/Plan:     1. Acute HFpEF  2. Hypertensive urgency  3. Elevated Tn  4. Hyponatremia, Na 120  5. History of stroke  6. Moderate malnutrition    I suspect that this has been building for much longer than the 2 to 3 days that she reports.  She is quite volume overloaded.  She is also markedly short of breath and orthopneic despite not being terribly hypoxic.  Her EKG shows LVH with strain but this is unchanged.  There are no acute findings.  She denies angina.  Her troponin is elevated but downtrending.  I have a low suspicion that this represents ACS, and I suspect that this is a type II NSTEMI due to uncontrolled hypertension and heart failure.    She is diuresing extremely well and her blood pressure has normalized.  I increased her furosemide to 80 mg 3 times a day and we will recheck a BMP in the morning.  I suspect that her sodium level will start to come up.  I did go ahead and put her on a fluid restriction of 1.5 L/day.    I will going get a limited echocardiogram, since this is a clinical change for her, as she has never had a diagnosis of heart failure before.    Depending on her blood pressure trends overnight and tomorrow, we will then need to get her on an angiotensin receptor blocker.  I would avoid beta-blocker given her frailty and female sex, as patients with HFpEF who fell  into these categories tend to have worse outcomes with beta-blockers..    We will follow.

## 2023-12-05 NOTE — ED NOTES
Pt incontinent of urine even though she was instructed to press call light when she needed to urinate. Pt cleaned up and sheets changed. Pure wick applied

## 2023-12-05 NOTE — ED NOTES
Brief Postoperative Note  ______________________________________________________________    Patient: Diana Hinkle  YOB: 1955  MRN: [id number]  Date of Procedure: 5/1/Michelle Gibbons MD    Assistant: Johnna Thomas    Estimated Blood Loss (mL): less than 50     Complications: None    Specimens:   * No specimens in log *    Implants:  Implant Name Type Inv.  Item Serial No.  Lot No. LRB No. Used   IMPL SPINE GEL ALLYSON 1CC - [id number] Spine IMPL SPINE GEL ALLYSON 1CC [address]  N/A 1   SIDDHARTH-GRAFT CORTCL BLOC [id number] - [id number] Spine SIDDHARTH-GRAFT [facility] [id number] [facility] [id number] N/A 1   SIDDHARTH-GRAFT CORTCL BLOC [id number] - [id number] Spine SIDDHARTH-GRAFT CORTCL BLOC [id number] [facility] [id number] N/A 1   PLATE ESTEBAN ELITE 90IJ Spine PLATE ESTEBAN ELITE 58AD  [place] [employer]  N/A 1   SCREW 4.0X14 SELF DRILL DIAMOND Spine SCREW 4.0X14 SELF DRILL DIAMOND  [employer] [employer]  N/A 6         Drains: * No LDAs found *    Findings: same    Michelle Gibbons MD  Date: 5/1/2019  Time: 10:34 AM Cardiology Paged per Dr. Kaur

## 2023-12-06 ENCOUNTER — APPOINTMENT (OUTPATIENT)
Dept: CARDIOLOGY | Facility: HOSPITAL | Age: 73
DRG: 292 | End: 2023-12-06
Payer: MEDICARE

## 2023-12-06 LAB
ANION GAP SERPL CALCULATED.3IONS-SCNC: 14 MMOL/L (ref 5–15)
BH CV ECHO LEFT VENTRICLE GLOBAL LONGITUDINAL STRAIN: -14.7 %
BH CV ECHO MEAS - EDV(CUBED): 91.1 ML
BH CV ECHO MEAS - EDV(MOD-SP2): 83 ML
BH CV ECHO MEAS - EDV(MOD-SP4): 87 ML
BH CV ECHO MEAS - EF(MOD-BP): 61.9 %
BH CV ECHO MEAS - EF(MOD-SP2): 62.7 %
BH CV ECHO MEAS - EF(MOD-SP4): 62.1 %
BH CV ECHO MEAS - ESV(CUBED): 25.1 ML
BH CV ECHO MEAS - ESV(MOD-SP2): 31 ML
BH CV ECHO MEAS - ESV(MOD-SP4): 33 ML
BH CV ECHO MEAS - FS: 34.9 %
BH CV ECHO MEAS - IVS/LVPW: 1.1 CM
BH CV ECHO MEAS - IVSD: 1.1 CM
BH CV ECHO MEAS - LAT PEAK E' VEL: 6.3 CM/SEC
BH CV ECHO MEAS - LV DIASTOLIC VOL/BSA (35-75): 49.5 CM2
BH CV ECHO MEAS - LV MASS(C)D: 164 GRAMS
BH CV ECHO MEAS - LV SYSTOLIC VOL/BSA (12-30): 18.8 CM2
BH CV ECHO MEAS - LVIDD: 4.5 CM
BH CV ECHO MEAS - LVIDS: 2.9 CM
BH CV ECHO MEAS - LVPWD: 1 CM
BH CV ECHO MEAS - MED PEAK E' VEL: 6.7 CM/SEC
BH CV ECHO MEAS - MR MAX PG: 86.9 MMHG
BH CV ECHO MEAS - MR MAX VEL: 466.1 CM/SEC
BH CV ECHO MEAS - MV A DUR: 0.11 SEC
BH CV ECHO MEAS - MV A MAX VEL: 107.3 CM/SEC
BH CV ECHO MEAS - MV DEC SLOPE: 582 CM/SEC2
BH CV ECHO MEAS - MV DEC TIME: 0.13 SEC
BH CV ECHO MEAS - MV E MAX VEL: 125.5 CM/SEC
BH CV ECHO MEAS - MV E/A: 1.17
BH CV ECHO MEAS - MV MAX PG: 6.3 MMHG
BH CV ECHO MEAS - MV MEAN PG: 3.7 MMHG
BH CV ECHO MEAS - MV P1/2T: 62.7 MSEC
BH CV ECHO MEAS - MV V2 VTI: 28.7 CM
BH CV ECHO MEAS - MVA(P1/2T): 3.5 CM2
BH CV ECHO MEAS - RAP SYSTOLE: 3 MMHG
BH CV ECHO MEAS - RVSP: 44.4 MMHG
BH CV ECHO MEAS - SI(MOD-SP2): 29.6 ML/M2
BH CV ECHO MEAS - SI(MOD-SP4): 30.7 ML/M2
BH CV ECHO MEAS - SV(MOD-SP2): 52 ML
BH CV ECHO MEAS - SV(MOD-SP4): 54 ML
BH CV ECHO MEAS - TAPSE (>1.6): 2.13 CM
BH CV ECHO MEAS - TR MAX PG: 41.4 MMHG
BH CV ECHO MEAS - TR MAX VEL: 321.8 CM/SEC
BH CV ECHO MEASUREMENTS AVERAGE E/E' RATIO: 19.31
BH CV XLRA - TDI S': 12.4 CM/SEC
BUN SERPL-MCNC: 25 MG/DL (ref 8–23)
BUN/CREAT SERPL: 30.1 (ref 7–25)
CALCIUM SPEC-SCNC: 8.5 MG/DL (ref 8.6–10.5)
CHLORIDE SERPL-SCNC: 90 MMOL/L (ref 98–107)
CO2 SERPL-SCNC: 21 MMOL/L (ref 22–29)
CREAT SERPL-MCNC: 0.83 MG/DL (ref 0.57–1)
EGFRCR SERPLBLD CKD-EPI 2021: 74.1 ML/MIN/1.73
GLUCOSE SERPL-MCNC: 115 MG/DL (ref 65–99)
POTASSIUM SERPL-SCNC: 4 MMOL/L (ref 3.5–5.2)
SODIUM SERPL-SCNC: 125 MMOL/L (ref 136–145)

## 2023-12-06 PROCEDURE — 25010000002 FUROSEMIDE PER 20 MG: Performed by: HOSPITALIST

## 2023-12-06 PROCEDURE — 93321 DOPPLER ECHO F-UP/LMTD STD: CPT

## 2023-12-06 PROCEDURE — 84165 PROTEIN E-PHORESIS SERUM: CPT | Performed by: INTERNAL MEDICINE

## 2023-12-06 PROCEDURE — 93325 DOPPLER ECHO COLOR FLOW MAPG: CPT

## 2023-12-06 PROCEDURE — 93356 MYOCRD STRAIN IMG SPCKL TRCK: CPT | Performed by: INTERNAL MEDICINE

## 2023-12-06 PROCEDURE — 99232 SBSQ HOSP IP/OBS MODERATE 35: CPT | Performed by: HOSPITALIST

## 2023-12-06 PROCEDURE — 93308 TTE F-UP OR LMTD: CPT

## 2023-12-06 PROCEDURE — 99232 SBSQ HOSP IP/OBS MODERATE 35: CPT | Performed by: INTERNAL MEDICINE

## 2023-12-06 PROCEDURE — 25010000002 ENOXAPARIN PER 10 MG: Performed by: HOSPITALIST

## 2023-12-06 PROCEDURE — 25010000002 HYDRALAZINE PER 20 MG: Performed by: PHYSICIAN ASSISTANT

## 2023-12-06 PROCEDURE — 93321 DOPPLER ECHO F-UP/LMTD STD: CPT | Performed by: INTERNAL MEDICINE

## 2023-12-06 PROCEDURE — 97161 PT EVAL LOW COMPLEX 20 MIN: CPT

## 2023-12-06 PROCEDURE — 80048 BASIC METABOLIC PNL TOTAL CA: CPT | Performed by: HOSPITALIST

## 2023-12-06 PROCEDURE — 93308 TTE F-UP OR LMTD: CPT | Performed by: INTERNAL MEDICINE

## 2023-12-06 PROCEDURE — 84155 ASSAY OF PROTEIN SERUM: CPT | Performed by: INTERNAL MEDICINE

## 2023-12-06 PROCEDURE — 93325 DOPPLER ECHO COLOR FLOW MAPG: CPT | Performed by: INTERNAL MEDICINE

## 2023-12-06 PROCEDURE — 25010000002 FUROSEMIDE PER 20 MG: Performed by: INTERNAL MEDICINE

## 2023-12-06 PROCEDURE — 93356 MYOCRD STRAIN IMG SPCKL TRCK: CPT

## 2023-12-06 RX ORDER — CALCIUM CARBONATE 500 MG/1
2 TABLET, CHEWABLE ORAL 3 TIMES DAILY PRN
Status: DISCONTINUED | OUTPATIENT
Start: 2023-12-06 | End: 2023-12-11 | Stop reason: HOSPADM

## 2023-12-06 RX ORDER — LOSARTAN POTASSIUM 50 MG/1
50 TABLET ORAL
Status: DISCONTINUED | OUTPATIENT
Start: 2023-12-07 | End: 2023-12-07

## 2023-12-06 RX ORDER — FUROSEMIDE 10 MG/ML
80 INJECTION INTRAMUSCULAR; INTRAVENOUS EVERY 12 HOURS
Status: DISCONTINUED | OUTPATIENT
Start: 2023-12-06 | End: 2023-12-06

## 2023-12-06 RX ORDER — LOSARTAN POTASSIUM 50 MG/1
25 TABLET ORAL
Status: DISCONTINUED | OUTPATIENT
Start: 2023-12-06 | End: 2023-12-06

## 2023-12-06 RX ORDER — LIDOCAINE HYDROCHLORIDE 20 MG/ML
10 SOLUTION OROPHARYNGEAL ONCE
Status: COMPLETED | OUTPATIENT
Start: 2023-12-06 | End: 2023-12-06

## 2023-12-06 RX ORDER — LOSARTAN POTASSIUM 50 MG/1
25 TABLET ORAL ONCE
Status: COMPLETED | OUTPATIENT
Start: 2023-12-06 | End: 2023-12-06

## 2023-12-06 RX ORDER — FUROSEMIDE 10 MG/ML
80 INJECTION INTRAMUSCULAR; INTRAVENOUS
Status: DISCONTINUED | OUTPATIENT
Start: 2023-12-06 | End: 2023-12-06

## 2023-12-06 RX ORDER — FUROSEMIDE 10 MG/ML
80 INJECTION INTRAMUSCULAR; INTRAVENOUS
Status: DISCONTINUED | OUTPATIENT
Start: 2023-12-06 | End: 2023-12-07

## 2023-12-06 RX ORDER — ALUMINA, MAGNESIA, AND SIMETHICONE 2400; 2400; 240 MG/30ML; MG/30ML; MG/30ML
15 SUSPENSION ORAL ONCE
Status: COMPLETED | OUTPATIENT
Start: 2023-12-06 | End: 2023-12-06

## 2023-12-06 RX ADMIN — ENOXAPARIN SODIUM 40 MG: 40 INJECTION SUBCUTANEOUS at 16:30

## 2023-12-06 RX ADMIN — FUROSEMIDE 80 MG: 10 INJECTION, SOLUTION INTRAMUSCULAR; INTRAVENOUS at 01:55

## 2023-12-06 RX ADMIN — HYDRALAZINE HYDROCHLORIDE 10 MG: 20 INJECTION INTRAMUSCULAR; INTRAVENOUS at 02:30

## 2023-12-06 RX ADMIN — ALUMINUM HYDROXIDE, MAGNESIUM HYDROXIDE, AND DIMETHICONE 15 ML: 400; 400; 40 SUSPENSION ORAL at 05:11

## 2023-12-06 RX ADMIN — LOSARTAN POTASSIUM 25 MG: 50 TABLET, FILM COATED ORAL at 11:00

## 2023-12-06 RX ADMIN — FUROSEMIDE 80 MG: 10 INJECTION, SOLUTION INTRAMUSCULAR; INTRAVENOUS at 18:30

## 2023-12-06 RX ADMIN — FUROSEMIDE 80 MG: 10 INJECTION, SOLUTION INTRAMUSCULAR; INTRAVENOUS at 10:58

## 2023-12-06 RX ADMIN — LIDOCAINE HYDROCHLORIDE 10 ML: 20 SOLUTION ORAL at 05:11

## 2023-12-06 NOTE — PROGRESS NOTES
"    Patient Name: Arline Landaverde  :1950  73 y.o.      Patient Care Team:  Lanie Gomez APRN as PCP - General (Family Medicine)    Chief Complaint: Shortness of breath    Interval History: Sitting in chair, tired, still short of breath       Objective   Vital Signs  Temp:  [97 °F (36.1 °C)-97.9 °F (36.6 °C)] 97.6 °F (36.4 °C)  Heart Rate:  [] 86  Resp:  [16-20] 18  BP: (113-206)/() 152/77    Intake/Output Summary (Last 24 hours) at 2023 0908  Last data filed at 2023 0527  Gross per 24 hour   Intake 240 ml   Output 1100 ml   Net -860 ml     Flowsheet Rows      Flowsheet Row First Filed Value   Admission Height 162.6 cm (64\") Documented at 2023 1141   Admission Weight 52.7 kg (116 lb 1.6 oz) Documented at 2023 1141          Body mass index is 19.91 kg/m².    Physical Exam:   General Appearance:  Sleeping on arrival, comfortable   Lungs:   Coarse breath sounds normal respiratory effort and rate.      Heart:    Regular rhythm and normal rate, normal S1 and S2, no gallops or rubs.  1/6 holosystolic murmur   Chest Wall:    No abnormalities observed   Abdomen:     Soft, nontender, positive bowel sounds.     Extremities:   no cyanosis, clubbing or edema.  No marked joint deformities.       Results Review:    Results from last 7 days   Lab Units 23  0551   SODIUM mmol/L 125*   POTASSIUM mmol/L 4.0   CHLORIDE mmol/L 90*   CO2 mmol/L 21.0*   BUN mg/dL 25*   CREATININE mg/dL 0.83   GLUCOSE mg/dL 115*   CALCIUM mg/dL 8.5*     Results from last 7 days   Lab Units 23  1359 23  1143   HSTROP T ng/L 49* 53*     Results from last 7 days   Lab Units 23  1143   WBC 10*3/mm3 6.12   HEMOGLOBIN g/dL 10.3*   HEMATOCRIT % 28.9*   PLATELETS 10*3/mm3 349     Results from last 7 days   Lab Units 23  1143   INR  1.03         Results from last 7 days   Lab Units 23  1359   CHOLESTEROL mg/dL 147   TRIGLYCERIDES mg/dL 86   HDL CHOL mg/dL 61*   LDL CHOL mg/dL 70       "       Results for orders placed during the hospital encounter of 12/05/23    Adult Transthoracic Echo Limited W/ Cont if Necessary Per Protocol    Interpretation Summary    Left ventricular systolic function is normal. Calculated left ventricular EF = 61.9% Abnormal global longitudinal LV strain (GLS) = -14.7%. Left ventricle strain data was reviewed by the physician and found to be accurate. Normal left ventricular cavity size noted. Left ventricular wall thickness is consistent with mild concentric hypertrophy. All left ventricular wall segments contract normally. Left ventricular diastolic function is consistent with (grade II w/high LAP) pseudonormalization. The myocardium has a speckled, echo bright appearance.    The left atrial cavity is moderately dilated.    Mild to moderate mitral valve regurgitation is present.    Mild to moderate tricuspid valve regurgitation is present. Estimated right ventricular systolic pressure from tricuspid regurgitation is mildly elevated (35-45 mmHg). Calculated right ventricular systolic pressure from tricuspid regurgitation is 44.4 mmHg.    The inferior vena cava is normally sized. Normal IVC inspiratory collapse of greater than 50% noted.    There is a small (<1cm) pericardial effusion. There is a moderate sized left pleural effusion.      Medication Review:   enoxaparin, 40 mg, Subcutaneous, Q24H  furosemide, 80 mg, Intravenous, BID  losartan, 25 mg, Oral, Q24H              Assessment & Plan   Active Hospital Problems    Diagnosis  POA    Acute on chronic heart failure with preserved ejection fraction (HFpEF) [I50.33]  Yes    History of stroke [Z86.73]  Not Applicable    Hypertensive urgency [I16.0]  Unknown    Elevated troponin [R79.89]  Unknown    Moderate malnutrition [E44.0]  Yes      Resolved Hospital Problems   No resolved problems to display.     1. Acute HFpEF-responding to IV diuresis, improving clinical trajectory, continue current medical therapy.  Will advance  dose of losartan, consider adding Jardiance.  Echocardiogram has speckled appearance, strain imaging is abnormal but not classic for ATTR, I will send an SPEP, will consider PYP scan as an outpatient  2. Hypertensive urgency  3. Elevated Tn, no evidence of acute coronary syndrome  4. Hyponatremia, Na 120 on arrival, improving with diuresis, 125 today  5. History of stroke  6. Moderate malnutrition-BMI 19.9       Dmitry Ruiz III, MD  [facility]  12/06/23  09:08 EST

## 2023-12-06 NOTE — NURSING NOTE
Daily Care Plan Summary: Heart Failure    Diuretic in use (IV or PO):   IV        Daily weight (up or down):    loss: 1lbs      Output > Intake (yes/no):Yes      O2 Requirements (current, any change?): room air      Symptoms noted with Activity (Respiratory Tolerance, functional state):     SOA      Anticipated Discharge Plans:     N/A

## 2023-12-06 NOTE — PLAN OF CARE
Goal Outcome Evaluation:  Plan of Care Reviewed With: patient, significant other           Outcome Evaluation: PT Evaluation Complete: Patient performs supine to sit transfer with min A, sit to/from stand transfers with min A x 2, and gait x 15 feet with CGA/min A with use of FWW. Patient requires cues for safety with device, no loss of balance noted. Patient is oriented x 3 and follows one step commands with cues. Per caregiver, plan is for patient to return home with continued 24/7 care with home health PT. Patient would benefit from physical therapy to maximize functional independence and safety. Patient is ner baseline prior to onset of LE swelling, plan to see patient 1-2 visits.      Anticipated Discharge Disposition (PT): home with 24/7 care, home with home health

## 2023-12-06 NOTE — PROGRESS NOTES
Contacted by staff regarding patient awakening with dry heaving and coughing/gagging.  Upon interviewing patient, she notes this has been occurring at the same time of day most days for the last 1 week.  She reports she does not feel so nauseated as she has a sensation of something in the back of her throat that she needs to cough out.  She reports burning sensation posterior throat, no chest pain, shortness of air.  Telemetry shows NSR, VSS according to staff.  Reviewed past notes per hospitalist with note of dysphagia in the past.  With description of burning sensation and throat irritation, will trial lidocaine/Maalox as patient notes she is not so much nauseated as having the sensation in the back of her throat.  Will monitor for effect.  Add aspiration precautions as patient was noted to be slumped down in the bed.

## 2023-12-06 NOTE — CASE MANAGEMENT/SOCIAL WORK
Continued Stay Note  RADHA Capone     Patient Name: Arline Landaverde  MRN: [id number]  Today's Date: 12/6/2023    Admit Date: 12/5/2023    Plan: plan home with friend   Discharge Plan       Row Name 12/06/23 1502       Plan    Plan plan home with friend    Patient/Family in Agreement with Plan yes    Plan Comments Spoke with patient at bedside, permission to speak with visitor present. Face sheet verified.Street address is [address] [place] [address]. Patient lives in a home with Sachin Hernández. She is fairly independent of ADLs but no longer drives. Sachin provides transportation as needed.  Patient uses a rollator as needed and also has wc and bsc but does not typically use them. She has used HH in the past but does not recall the agency. She has not used inpatient rehab previously. She has a living will. She sees Lanie Gomez as PCP. She uses [facility] [place] and denies issues obtaining medications. Plan to reutn home with Sachin to assist. CM 3 placed on white board, will follow for dc needs.                   Discharge Codes    No documentation.                       John Conner RN

## 2023-12-06 NOTE — PROGRESS NOTES
Adult Nutrition  Assessment/PES    Patient Name:  Arline Landaverde  YOB: 1950  MRN: [id number]  Admit Date:  12/5/2023    Assessment Date:  12/6/2023    Comments:      Pt meets criteria for:  Severe chronic disease related malnutrition related to inadequate calorie and protein intake, CHF as evidenced by muscle and fat loss on exam, less 75% of est energy requirement >= 1month.     Recommend: 1) SLP eval due to pt refuses NTL home/here per SO    2) liberalize liquids as indicated or pt can sign form refusing diet modifications    3) will send magic cup with meals until NTL clarified ( Boost cannot be thickened at this time so cannot be sent with current order)    Will cont to follow and monitor.      Reason for Assessment       Row Name 12/06/23 1109          Reason for Assessment    Reason For Assessment identified at risk by screening criteria;diagnosis/disease state  HF, age/BMI     Diagnosis cardiac disease  Acute on CHF, HTN, weakness/falls                    Nutrition/Diet History       Row Name 12/06/23 1111          Nutrition/Diet History    Typical Intake (Food/Fluid/EN/PN) Pt remained asleep during visit, SO at bedside. Reports she will not drink thickened liquids here or at home. She refuses. She does drink jean claude boost twice per day. Eats mostly cereal. Maybe eats twice per day     Food Preferences pt will eat tuns, eggs, chicken, PB     Meal/Snack Patterns 2 melas per day + 2 Boost per day     Supplemental Drinks/Foods/Additives Boost-jean claude                    Labs/Tests/Procedures/Meds       Row Name 12/06/23 1112          Labs/Procedures/Meds    Lab Results Reviewed reviewed     Lab Results Comments Na 125 L, (chronic per H & P), glu 115, BUn 25        Diagnostic Tests/Procedures    Diagnostic Test/Procedure Reviewed reviewed        Medications    Pertinent Medications Reviewed reviewed     Pertinent Medications Comments lasix                    Physical Findings       Row Name 12/06/23 1112  "         Physical Findings    Overall Physical Appearance muscle/fat loss on exam                    Estimated/Assessed Needs - Anthropometrics       Row Name 12/06/23 1113 12/06/23 0825       Anthropometrics    Height -- 162.6 cm (64\")    Weight -- 52.6 kg (116 lb)    Weight for Calculation 52.6 kg (116 lb) --    Additional Documentation --  4/23 104.2#, 7/23 122.6# difficult to assess loss due to fluid/lasix --       Estimated/Assessed Needs    Additional Documentation Estimated Calorie Needs (Group);Fluid Requirements (Group);Protein Requirements (Group) --       Estimated Calorie Needs    Estimated Calorie Requirement (kcal/day) 2044-8168 kcal ( 30-35 kcal/kg IBW due to fluid/HF) --    Estimated Calorie Need Method kcal/kg --       Protein Requirements    Est Protein Requirement Amount (gms/kg) 1.2 gm protein  63-79 gm pro ( 1.2-1.5 gm/lkg pro) --       Fluid Requirements    Estimated Fluid Requirement Method other (see comments)  1400-1900ml HF guidelines --                   Nutrition Prescription Ordered       Row Name 12/06/23 1115          Nutrition Prescription PO    Current PO Diet Soft Texture     Texture Whole foods     Fluid Consistency Nectar/syrup thick     Supplement Boost Plus (Ensure Enlive, Ensure Plus)                    Evaluation of Received Nutrient/Fluid Intake       Row Name 12/06/23 1115          Fluid Intake Evaluation    Oral Fluid (mL) 240  insufficient data        PO Evaluation    Number of Days PO Intake Evaluated Insufficient Data                    Malnutrition Severity Assessment       Row Name 12/06/23 1118          Malnutrition Severity Assessment    Malnutrition Type Chronic Disease - Related Malnutrition        Insufficient Energy Intake     Insufficient Energy Intake  <75% of est. energy requirement for > or equal to 1 month        Unintentional Weight Loss     Unintentional Weight Loss  --  difficult to fluid status        Muscle Loss    Temple Region Moderate - slight " depression     Clavicle Bone Region Severe - protruding prominent bone     Patellar Region Moderate - patella more prominent, less muscle definition around patella     Anterior Thigh Region --  difficult to asses     Posterior Calf Region Severe - thin with very little definition/firmness  pt unable to engage calf        Fat Loss    Orbital Region  Moderate -  somewhat hollowness, slightly dark circles     Upper Arm Region Severe - mostly skin, very little space between folds, fingers touch        Criteria Met (Must meet criteria for severity in at least 2 of these categories: M Wasting, Fat Loss, Fluid, Secondary Signs, Wt. Status, Intake)    Patient meets criteria for  Severe Malnutrition                     Problem/Interventions:   Problem 1       Row Name 12/06/23 1119          Nutrition Diagnoses Problem 1    Problem 1 Other (comment)  Severe chronic disease related malnutrition related to inadequate calorie and protein intake, CHF as evidenced by muscle and fat loss on exam, less 75% of est energy requirement >= 1month.                          Intervention Goal       Row Name 12/06/23 1120          Intervention Goal    General Meet nutritional needs for age/condition     PO Establish PO;PO intake (%)     PO Intake % 50 %                    Nutrition Intervention       Row Name 12/06/23 1121          Nutrition Intervention    RD/Tech Action Encourage intake;Follow Tx progress                    Nutrition Prescription       Row Name 12/06/23 1121          Other Orders    Other Add magic cup while on NTL, if able to liberalize diet then suggest BOost Plus 2 per day                    Education/Evaluation       Row Name 12/06/23 1121          Education    Education Provided education regarding;Education topics  Explained nutriiton exam to SO & pt as completed ( she remain asleep). Edu on cont focus on pro stores and cont to use oral supplement at home. Edu on policy not thickening oral suppelments     Education  Topics Protein  Living Well Nourished provided w EDDIE contact        Monitor/Evaluation    Monitor Per protocol;I&O;PO intake;Supplement intake;Pertinent labs;Weight;Symptoms     Education Follow-up Other (comment)  SO verbalized understanding                     Electronically signed by:  Anel Mejia RD  12/06/23 11:23 EST

## 2023-12-06 NOTE — PROGRESS NOTES
"Hospitalist Team      Patient Care Team:  Lanie Gomez APRN as PCP - General (Family Medicine)      Chief Complaint:  Follow-up Acute-on-Chronic HFpEF    Subjective    Events noted overnight.  Ms. Landaverde reports she feels okay this morning but still does not have much appetite.  She denies nausea and abdominal pain.  She denies chest pain.    Objective    Vital Signs  Temp:  [97 °F (36.1 °C)-97.9 °F (36.6 °C)] 97.6 °F (36.4 °C)  Heart Rate:  [] 86  Resp:  [16-20] 18  BP: (113-206)/() 152/77  Oxygen Therapy  SpO2: 97 %  Pulse Oximetry Type: Intermittent  Device (Oxygen Therapy): room air}    Flowsheet Rows      Flowsheet Row First Filed Value   Admission Height 162.6 cm (64\") Documented at 12/05/2023 1141   Admission Weight 52.7 kg (116 lb 1.6 oz) Documented at 12/05/2023 1141            Physical Exam:    General: Frail, elderly appearing female in no acute distress.  Lungs: Breath sounds are clear throughout all fields.  Respirations are non-labored.  CV: Regular rate and rhythm.  Grade I/VI MEGHAN noted.  Radial and pedal pulses are 2+ and symmetric.  Abdomen: Soft and non-tender w/ active bowel sounds.  MSK: No C/C.  Markedly reduced edema w/ wrinkling of the skin BLE and feet.  Neuro: CN II-XII grossly intact.  Psych: Normal affect.    Results Review:     I reviewed the patient's new clinical results.    Lab Results (last 24 hours)       Procedure Component Value Units Date/Time    Basic Metabolic Panel [[id number]]  (Abnormal) Collected: 12/06/23 0551    Specimen: Blood Updated: 12/06/23 0641     Glucose 115 mg/dL      BUN 25 mg/dL      Creatinine 0.83 mg/dL      Sodium 125 mmol/L      Potassium 4.0 mmol/L      Chloride 90 mmol/L      CO2 21.0 mmol/L      Calcium 8.5 mg/dL      BUN/Creatinine Ratio 30.1     Anion Gap 14.0 mmol/L      eGFR 74.1 mL/min/1.73     Narrative:      GFR Normal >60  Chronic Kidney Disease <60  Kidney Failure <15    The GFR formula is only valid for adults with stable renal " function between ages 18 and 70.    Vitamin D,25-Hydroxy [[id number]]  (Normal) Collected: 12/05/23 1359    Specimen: Blood Updated: 12/05/23 2034     25 Hydroxy, Vitamin D 39.0 ng/ml     Narrative:      Reference Range for Total Vitamin D 25(OH)     Deficiency <20.0 ng/mL   Insufficiency 21-29 ng/mL   Sufficiency  ng/mL  Toxicity >100 ng/ml      Lipid Panel [[id number]]  (Abnormal) Collected: 12/05/23 1359    Specimen: Blood from Arm, Left Updated: 12/05/23 1727     Total Cholesterol 147 mg/dL      Triglycerides 86 mg/dL      HDL Cholesterol 61 mg/dL      LDL Cholesterol  70 mg/dL      VLDL Cholesterol 16 mg/dL      LDL/HDL Ratio 1.13    Narrative:      Cholesterol Reference Ranges  (U.S. Department of Health and Human Services ATP III Classifications)    Desirable          <200 mg/dL  Borderline High    200-239 mg/dL  High Risk          >240 mg/dL      Triglyceride Reference Ranges  (U.S. Department of Health and Human Services ATP III Classifications)    Normal           <150 mg/dL  Borderline High  150-199 mg/dL  High             200-499 mg/dL  Very High        >500 mg/dL    HDL Reference Ranges  (U.S. Department of Health and Human Services ATP III Classifications)    Low     <40 mg/dl (major risk factor for CHD)  High    >60 mg/dl ('negative' risk factor for CHD)        LDL Reference Ranges  (U.S. Department of Health and Human Services ATP III Classifications)    Optimal          <100 mg/dL  Near Optimal     100-129 mg/dL  Borderline High  130-159 mg/dL  High             160-189 mg/dL  Very High        >189 mg/dL    TSH [[id number]]  (Normal) Collected: 12/05/23 1359    Specimen: Blood from Arm, Left Updated: 12/05/23 1625     TSH 1.770 uIU/mL     High Sensitivity Troponin T 2Hr [[id number]]  (Abnormal) Collected: 12/05/23 1359    Specimen: Blood from Arm, Left Updated: 12/05/23 1428     HS Troponin T 49 ng/L      Troponin T Delta -4 ng/L     Narrative:      High Sensitive Troponin T Reference Range:  <14.0  ng/L- Negative Female for AMI  <22.0 ng/L- Negative Male for AMI  >=14 - Abnormal Female indicating possible myocardial injury.  >=22 - Abnormal Male indicating possible myocardial injury.   Clinicians would have to utilize clinical acumen, EKG, Troponin, and serial changes to determine if it is an Acute Myocardial Infarction or myocardial injury due to an underlying chronic condition.         BNP [[id number]]  (Abnormal) Collected: 12/05/23 1143    Specimen: Blood Updated: 12/05/23 1240     proBNP 6,232.0 pg/mL     Narrative:      This assay is used as an aid in the diagnosis of individuals suspected of having heart failure. It can be used as an aid in the diagnosis of acute decompensated heart failure (ADHF) in patients presenting with signs and symptoms of ADHF to the emergency department (ED). In addition, NT-proBNP of <300 pg/mL indicates ADHF is not likely.    Age Range Result Interpretation  NT-proBNP Concentration (pg/mL:      <50             Positive            >450                   Gray                 300-450                    Negative             <300    50-75           Positive            >900                  Gray                300-900                  Negative            <300      >75             Positive            >1800                  Gray                300-1800                  Negative            <300    High Sensitivity Troponin T [[id number]]  (Abnormal) Collected: 12/05/23 1143    Specimen: Blood Updated: 12/05/23 1221     HS Troponin T 53 ng/L     Narrative:      High Sensitive Troponin T Reference Range:  <14.0 ng/L- Negative Female for AMI  <22.0 ng/L- Negative Male for AMI  >=14 - Abnormal Female indicating possible myocardial injury.  >=22 - Abnormal Male indicating possible myocardial injury.   Clinicians would have to utilize clinical acumen, EKG, Troponin, and serial changes to determine if it is an Acute Myocardial Infarction or myocardial injury due to an underlying chronic  condition.         Comprehensive Metabolic Panel [[id number]]  (Abnormal) Collected: 12/05/23 1143    Specimen: Blood Updated: 12/05/23 1216     Glucose 112 mg/dL      BUN 24 mg/dL      Creatinine 0.89 mg/dL      Sodium 120 mmol/L      Potassium 4.8 mmol/L      Chloride 88 mmol/L      CO2 21.3 mmol/L      Calcium 9.2 mg/dL      Total Protein 6.4 g/dL      Albumin 3.8 g/dL      ALT (SGPT) 14 U/L      AST (SGOT) 19 U/L      Alkaline Phosphatase 62 U/L      Total Bilirubin 0.3 mg/dL      Globulin 2.6 gm/dL      A/G Ratio 1.5 g/dL      BUN/Creatinine Ratio 27.0     Anion Gap 10.7 mmol/L      eGFR 68.1 mL/min/1.73     Narrative:      GFR Normal >60  Chronic Kidney Disease <60  Kidney Failure <15    The GFR formula is only valid for adults with stable renal function between ages 18 and 70.    COVID-19 and FLU A/B PCR, 1 HR TAT - Swab, Nasopharynx [[id number]]  (Normal) Collected: 12/05/23 1144    Specimen: Swab from Nasopharynx Updated: 12/05/23 1210     COVID19 Not Detected     Influenza A PCR Not Detected     Influenza B PCR Not Detected    Narrative:      Fact sheet for providers: https://www.fda.gov/media/177561/download    Fact sheet for patients: https://www.fda.gov/media/381268/download    Test performed by PCR.    Protime-INR [[id number]]  (Normal) Collected: 12/05/23 1143    Specimen: Blood Updated: 12/05/23 1206     Protime 13.5 Seconds      INR 1.03    Narrative:      Therapeutic Ranges for INR: 2.0-3.0 (PT 20-30)                              2.5-3.5 (PT 25-34)    CBC & Differential [[id number]]  (Abnormal) Collected: 12/05/23 1143    Specimen: Blood Updated: 12/05/23 1154    Narrative:      The following orders were created for panel order CBC & Differential.  Procedure                               Abnormality         Status                     ---------                               -----------         ------                     CBC Auto Differential[[id number]]        Abnormal            Final result                  Please view results for these tests on the individual orders.    CBC Auto Differential [[id number]]  (Abnormal) Collected: 12/05/23 1143    Specimen: Blood Updated: 12/05/23 1154     WBC 6.12 10*3/mm3      RBC 3.39 10*6/mm3      Hemoglobin 10.3 g/dL      Hematocrit 28.9 %      MCV 85.3 fL      MCH 30.4 pg      MCHC 35.6 g/dL      RDW 11.8 %      RDW-SD 36.5 fl      MPV 8.8 fL      Platelets 349 10*3/mm3      Neutrophil % 65.8 %      Lymphocyte % 18.3 %      Monocyte % 11.8 %      Eosinophil % 2.8 %      Basophil % 1.0 %      Immature Grans % 0.3 %      Neutrophils, Absolute 4.03 10*3/mm3      Lymphocytes, Absolute 1.12 10*3/mm3      Monocytes, Absolute 0.72 10*3/mm3      Eosinophils, Absolute 0.17 10*3/mm3      Basophils, Absolute 0.06 10*3/mm3      Immature Grans, Absolute 0.02 10*3/mm3      nRBC 0.0 /100 WBC             Imaging Results (Last 24 Hours)       Procedure Component Value Units Date/Time    XR Chest 1 View [[id number]] Collected: 12/05/23 1218     Updated: 12/05/23 1222    Narrative:      Chest 1 view     HISTORY: Shortness of breath for 2 days     COMPARISON: 7/18/2023     FINDINGS: Cardiomegaly is noted and has increased since the previous  examination. Pulmonary vascular congestion is seen and there are  moderate bilateral pleural effusions that are new since the previous  exam. No pneumothorax.       Impression:      Moderate congestive heart failure with bilateral pleural  effusions. Vascular markings have not changed significantly since the  previous examination but the effusions are new.     This report was finalized on 12/5/2023 12:19 PM by Dr. Robert West MD.                 Medication Review:   I have reviewed the patient's current medication list    Current Facility-Administered Medications:     acetaminophen (TYLENOL) tablet 650 mg, 650 mg, Oral, Q6H PRN, Roshan Low MD    calcium carbonate (TUMS) chewable tablet 500 mg (200 mg elemental), 2 tablet, Oral, TID PRN, Telma Copeland  "APRN    Enoxaparin Sodium (LOVENOX) syringe 40 mg, 40 mg, Subcutaneous, Q24H, Roshan Low MD, 40 mg at 12/05/23 1617    furosemide (LASIX) injection 80 mg, 80 mg, Intravenous, BID, Roshan Low MD    hydrALAZINE (APRESOLINE) injection 10 mg, 10 mg, Intravenous, Q4H PRN, Arsh Saenz PA-C, 10 mg at 12/06/23 0230    losartan (COZAAR) tablet 25 mg, 25 mg, Oral, Q24H, Roshan Low MD      Assessment & Plan     Acute-on-Chronic HFpEF: Diuresed well overnight based on my exam this morning.  I&O's not completely accurate.  Surprisingly, weight relatively unchanged.  Echo reviewed and \"speckled\" pattern and longitudinal LV strain reported.  Cardiac amyloidosis?  I've changed her diuresis dosing and frequency to all initiation of Losartan.  Continue to monitor.  PT to see.  Elevated BP: Starting Losartan as noted above.  Chronic Hyponatremia: Little PO intake so I've stopped the fluid restriction.  Sodium has started correcting appropriately w/ diuresis.  Continue to trend.  Moderate Malnutrition: Nutritionist following.  See below.  Hx/O Dysphagia: Per staff, she is not following the dietary restrictions of nectar thick liquids at home.  Will have SLP re-evaluate.  Hx/O CVA: No acute issues.  Lipid panel excellent.  PT to evaluate.     Plan for disposition: Predicated on hospital course.    Roshan Low MD  12/06/23  09:10 EST  "

## 2023-12-06 NOTE — PLAN OF CARE
Phase 1 - Admission/Acute - Current (Heart Failure Pathway)  Initiate Guideline Directed Medical Therapy (ex.ACE, ARBs, ARNI, Beta Blockers, SGLT2 Inhibitors).  Outcome: Met   Goal Outcome Evaluation:  Plan of Care Reviewed With: patient        Progress: no change  Outcome Evaluation: Admitted 12/5 for  new onset CHF, Hyponatremia, BLE edema. Received 80mg Lasix this shift with good output. Purewick in place but patient often takes it apart and is incontinent so accurate I&O not able to be obtained. Had episode of gagging loudly but no vomiting. Received new order for medication. Patient complained of taste but stated it was effective. C/O waking up often with a burning in the back of her throat. Medicated for high blood pressure x1. Left side weaker than right. Bilateral 3+ foot edema noted. Glasses and dentures at home. Remains on 1500 FR. Discharge is undecided.

## 2023-12-06 NOTE — NURSING NOTE
Daily Care Plan Summary: Heart Failure    Diuretic in use (IV or PO):   IV        Daily weight (up or down):    loss: 0lbs      Output > Intake (yes/no):Yes      O2 Requirements (current, any change?): room air      Symptoms noted with Activity (Respiratory Tolerance, functional state):     soa      Anticipated Discharge Plans:     n/a

## 2023-12-06 NOTE — THERAPY EVALUATION
Patient Name: Arline Landaverde  : 1950    MRN: [id number]                              Today's Date: 2023       Admit Date: 2023    Visit Dx:     ICD-10-CM ICD-9-CM   1. New onset of congestive heart failure  I50.9 428.0   2. Hyponatremia  E87.1 276.1     Patient Active Problem List   Diagnosis    Moderate malnutrition    Classical migraine without intractable migraine    Expressive aphasia    Acute CVA (cerebrovascular accident)    Acute on chronic heart failure with preserved ejection fraction (HFpEF)    History of stroke    Hypertensive urgency    Elevated troponin     Past Medical History:   Diagnosis Date    Diabetes mellitus     Stroke      Past Surgical History:   Procedure Laterality Date    BACK SURGERY      CHOLECYSTECTOMY      HIP HEMIARTHROPLASTY Right 2023    Procedure: HIP HEMIARTHROPLASTY ANTERIOR;  Surgeon: Kumar Bonilla MD;  Location: [facility];  Service: Orthopedics;  Laterality: Right;      General Information       Row Name 23 1245          Physical Therapy Time and Intention    Document Type evaluation  -BP     Mode of Treatment physical therapy  -BP       Row Name 23 1245          General Information    Patient Profile Reviewed yes  Patient presents to ED due to SOA and B LE edema as well as generalized weakness. Admitted for treatment of acute CHF and hyponatremia.  -BP     Prior Level of Function --  Caregiver, Sachin, In room and provides history. Per caregiver, patient has been ambulating with use of walker x approximately 3 weeks ago however has been unable to due to increased LE swelling. Caregiver assist with all ADL's  -BP     Existing Precautions/Restrictions fall  chronic L sided weakness from prior CVA.  -BP       Row Name 23 1245          Living Environment    People in Home significant other  Caregiver/significant other-Sachin. Provides / care for patient  -BP       Row Name 23 1245          Stairs Within Home, Primary    Stairs,  Within Home, Primary one story home  -BP       Row Name 12/06/23 1245          Cognition    Orientation Status (Cognition) oriented x 3  -BP       Row Name 12/06/23 1245          Safety Issues, Functional Mobility    Safety Issues Affecting Function (Mobility) positioning of assistive device  -BP     Comment, Safety Issues/Impairments (Mobility) patient requires cues for safety with functional mobility  -BP               User Key  (r) = Recorded By, (t) = Taken By, (c) = Cosigned By      Initials Name Provider Type    Jelena Elias PT Physical Therapist                   Mobility       Row Name 12/06/23 1255          Bed Mobility    Bed Mobility supine-sit  -BP     Supine-Sit [place] (Bed Mobility) minimum assist (75% patient effort);verbal cues  -BP     Assistive Device (Bed Mobility) head of bed elevated  -BP       Row Name 12/06/23 1255          Transfers    Comment, (Transfers) Verbal cues for hand placement  -BP       Row Name 12/06/23 1255          Sit-Stand Transfer    Sit-Stand [place] (Transfers) verbal cues;minimum assist (75% patient effort);2 person assist  -BP     Assistive Device (Sit-Stand Transfers) walker, front-wheeled  -BP       Row Name 12/06/23 1255          Gait/Stairs (Locomotion)    [place] Level (Gait) contact guard;minimum assist (75% patient effort);verbal cues  -BP     Assistive Device (Gait) walker, front-wheeled  -BP     Distance in Feet (Gait) 15  -BP     Deviations/Abnormal Patterns (Gait) gait speed decreased;stride length decreased  -BP     Bilateral Gait Deviations forward flexed posture  -BP     Comment, (Gait/Stairs) Patient demonstrates slow gait speed with fluctuating step length. Verbal cues for improved distance to AD.  -BP               User Key  (r) = Recorded By, (t) = Taken By, (c) = Cosigned By      Initials Name Provider Type    Jelena Elias PT Physical Therapist                   Obj/Interventions       Row Name 12/06/23 1257           Range of Motion Comprehensive    Comment, General Range of Motion B LE AROM WFL.  -BP       Row Name 12/06/23 1257          Strength Comprehensive (MMT)    Comment, General Manual Muscle Testing (MMT) Assessment R LE gross MMT 5/5. L LE gross MMT 4/5  -BP       Row Name 12/06/23 1257          Balance    Comment, Balance sitting balance-CGA/min A. Standing balance-CGA/min A with device.  -BP               User Key  (r) = Recorded By, (t) = Taken By, (c) = Cosigned By      Initials Name Provider Type    Jelena Elias, PT Physical Therapist                   Goals/Plan    No documentation.                  Clinical Impression       Row Name 12/06/23 1258          Pain    Pretreatment Pain Rating 0/10 - no pain  -BP     Posttreatment Pain Rating 0/10 - no pain  -BP     Additional Documentation Pain Scale: Numbers Pre/Post-Treatment (Group)  -BP       Row Name 12/06/23 1258          Plan of Care Review    Plan of Care Reviewed With patient;significant other  -BP     Outcome Evaluation PT Evaluation Complete: Patient performs supine to sit transfer with min A, sit to/from stand transfers with min A x 2, and gait x 15 feet with CGA/min A with use of FWW. Patient requires cues for safety with device, no loss of balance noted. Patient is oriented x 3 and follows one step commands with cues. Per caregiver, plan is for patient to return home with continued 24/7 care with home health PT. Patient would benefit from physical therapy to maximize functional independence and safety. Patient is ner baseline prior to onset of LE swelling, plan to see patient 1-2 visits.  -       Row Name 12/06/23 1258          Therapy Assessment/Plan (PT)    Rehab Potential (PT) good, to achieve stated therapy goals  -BP     Criteria for Skilled Interventions Met (PT) yes;meets criteria  -BP     Therapy Frequency (PT) other (see comments)  1-2 visits  -BP     Predicted Duration of Therapy Intervention (PT) 3 days  -BP       Row Name 12/06/23  1258          Positioning and Restraints    Pre-Treatment Position in bed  -BP     Post Treatment Position chair  -BP     In Chair notified nsg;reclined;call light within reach;encouraged to call for assist;exit alarm on;with family/caregiver  -BP               User Key  (r) = Recorded By, (t) = Taken By, (c) = Cosigned By      Initials Name Provider Type    BP Jelena Noland, PT Physical Therapist                   Outcome Measures       Row Name 12/06/23 1302          How much help from another person do you currently need...    Turning from your back to your side while in flat bed without using bedrails? 3  -BP     Moving from lying on back to sitting on the side of a flat bed without bedrails? 3  -BP     Moving to and from a bed to a chair (including a wheelchair)? 3  -BP     Standing up from a chair using your arms (e.g., wheelchair, bedside chair)? 3  -BP     Climbing 3-5 steps with a railing? 2  -BP     To walk in hospital room? 3  -BP     AM-PAC 6 Clicks Score (PT) 17  -BP     Highest Level of Mobility Goal 5 --> Static standing  -BP       Row Name 12/06/23 1302          Functional Assessment    Outcome Measure Options AM-PAC 6 Clicks Basic Mobility (PT)  -BP               User Key  (r) = Recorded By, (t) = Taken By, (c) = Cosigned By      Initials Name Provider Type    BP Jelena Noland PT Physical Therapist                                 Physical Therapy Education       Title: PT OT SLP Therapies (In Progress)       Topic: Physical Therapy (In Progress)       Point: Mobility training (Done)       Learning Progress Summary             Patient Acceptance, E,TB, VU by  at 12/6/2023 1302                         Point: Home exercise program (Not Started)       Learner Progress:  Not documented in this visit.                              User Key       Initials Effective Dates Name Provider Type Discipline    BP 06/16/21 -  Jelena Noland, EDIS Physical Therapist PT                  PT Recommendation  and Plan     Plan of Care Reviewed With: patient, significant other  Outcome Evaluation: PT Evaluation Complete: Patient performs supine to sit transfer with min A, sit to/from stand transfers with min A x 2, and gait x 15 feet with CGA/min A with use of FWW. Patient requires cues for safety with device, no loss of balance noted. Patient is oriented x 3 and follows one step commands with cues. Per caregiver, plan is for patient to return home with continued 24/7 care with home health PT. Patient would benefit from physical therapy to maximize functional independence and safety. Patient is ner baseline prior to onset of LE swelling, plan to see patient 1-2 visits.     Time Calculation:   PT Evaluation Complexity  History, PT Evaluation Complexity: 3 or more personal factors and/or comorbidities  Examination of Body Systems (PT Eval Complexity): total of 4 or more elements  Clinical Presentation (PT Evaluation Complexity): stable  Clinical Decision Making (PT Evaluation Complexity): low complexity  Overall Complexity (PT Evaluation Complexity): low complexity     PT Charges       Row Name 12/06/23 1303             Time Calculation    Start Time 1030  -BP      PT Received On 12/06/23  -BP      PT - Next Appointment 12/07/23  -BP                User Key  (r) = Recorded By, (t) = Taken By, (c) = Cosigned By      Initials Name Provider Type    BP Jelena Noland, PT Physical Therapist                  Therapy Charges for Today       Code Description Service Date Service Provider Modifiers Qty    32923001366 HC PT EVAL LOW COMPLEXITY 3 12/6/2023 Jelena Noland, PT GP 1    38491849408 HC PT THER SUPP EA 15 MIN 12/6/2023 Jelena Noland PT GP 1            PT G-Codes  Outcome Measure Options: AM-PAC 6 Clicks Basic Mobility (PT)  AM-PAC 6 Clicks Score (PT): 17  PT Discharge Summary  Anticipated Discharge Disposition (PT): home with 24/7 care, home with home health    Jelena Noland PT  12/6/2023

## 2023-12-06 NOTE — PLAN OF CARE
Goal Outcome Evaluation:   New admit from the ER r/t new onset heart failure. IV lasix given. New IV placed R wrist.    Daily Care Plan Summary: Heart Failure    Diuretic in use (IV or PO):   IV        Daily weight (up or down):    New admit, pitting edema      Output > Intake (yes/no):Yes      O2 Requirements (current, any change?): room air      Symptoms noted with Activity (Respiratory Tolerance, functional state):     Shortness of breath but has improved since admission      Anticipated Discharge Plans:     Home with spouse

## 2023-12-06 NOTE — NURSING NOTE
Patient woke up dry heaving but not vomiting anything. No complaints of pain. VSS. NSR at 94. Notified hospitalist. Awaiting orders.

## 2023-12-06 NOTE — PLAN OF CARE
Goal Outcome Evaluation:              Outcome Evaluation: refusing thickened liquids, having her sign paperwork that she has refused the recommendation of thickened liquids, informed of risks. IV Lasix given. Resting well.

## 2023-12-07 PROBLEM — E43 SEVERE MALNUTRITION: Status: ACTIVE | Noted: 2023-04-20

## 2023-12-07 LAB
ALBUMIN SERPL ELPH-MCNC: 2.7 G/DL (ref 2.9–4.4)
ALBUMIN SERPL-MCNC: 2.8 G/DL (ref 3.5–5.2)
ALBUMIN/GLOB SERPL: 1.1 {RATIO} (ref 0.7–1.7)
ALBUMIN/GLOB SERPL: 1.4 G/DL
ALP SERPL-CCNC: 49 U/L (ref 39–117)
ALPHA1 GLOB SERPL ELPH-MCNC: 0.4 G/DL (ref 0–0.4)
ALPHA2 GLOB SERPL ELPH-MCNC: 0.6 G/DL (ref 0.4–1)
ALT SERPL W P-5'-P-CCNC: 11 U/L (ref 1–33)
ANION GAP SERPL CALCULATED.3IONS-SCNC: 10.5 MMOL/L (ref 5–15)
AST SERPL-CCNC: 16 U/L (ref 1–32)
B-GLOBULIN SERPL ELPH-MCNC: 0.8 G/DL (ref 0.7–1.3)
BASOPHILS # BLD AUTO: 0.04 10*3/MM3 (ref 0–0.2)
BASOPHILS NFR BLD AUTO: 0.7 % (ref 0–1.5)
BILIRUB SERPL-MCNC: 0.2 MG/DL (ref 0–1.2)
BUN SERPL-MCNC: 24 MG/DL (ref 8–23)
BUN/CREAT SERPL: 28.9 (ref 7–25)
CALCIUM SPEC-SCNC: 8.2 MG/DL (ref 8.6–10.5)
CHLORIDE SERPL-SCNC: 90 MMOL/L (ref 98–107)
CO2 SERPL-SCNC: 23.5 MMOL/L (ref 22–29)
CREAT SERPL-MCNC: 0.83 MG/DL (ref 0.57–1)
DEPRECATED RDW RBC AUTO: 36.7 FL (ref 37–54)
EGFRCR SERPLBLD CKD-EPI 2021: 74.1 ML/MIN/1.73
EOSINOPHIL # BLD AUTO: 0.12 10*3/MM3 (ref 0–0.4)
EOSINOPHIL NFR BLD AUTO: 2.1 % (ref 0.3–6.2)
ERYTHROCYTE [DISTWIDTH] IN BLOOD BY AUTOMATED COUNT: 11.9 % (ref 12.3–15.4)
GAMMA GLOB SERPL ELPH-MCNC: 0.7 G/DL (ref 0.4–1.8)
GLOBULIN SER CALC-MCNC: 2.5 G/DL (ref 2.2–3.9)
GLOBULIN UR ELPH-MCNC: 2 GM/DL
GLUCOSE SERPL-MCNC: 112 MG/DL (ref 65–99)
HCT VFR BLD AUTO: 24.2 % (ref 34–46.6)
HGB BLD-MCNC: 8.7 G/DL (ref 12–15.9)
IMM GRANULOCYTES # BLD AUTO: 0.01 10*3/MM3 (ref 0–0.05)
IMM GRANULOCYTES NFR BLD AUTO: 0.2 % (ref 0–0.5)
LABORATORY COMMENT REPORT: ABNORMAL
LYMPHOCYTES # BLD AUTO: 0.99 10*3/MM3 (ref 0.7–3.1)
LYMPHOCYTES NFR BLD AUTO: 17 % (ref 19.6–45.3)
M PROTEIN SERPL ELPH-MCNC: ABNORMAL G/DL
MAGNESIUM SERPL-MCNC: 1.7 MG/DL (ref 1.6–2.4)
MCH RBC QN AUTO: 30.7 PG (ref 26.6–33)
MCHC RBC AUTO-ENTMCNC: 36 G/DL (ref 31.5–35.7)
MCV RBC AUTO: 85.5 FL (ref 79–97)
MONOCYTES # BLD AUTO: 0.68 10*3/MM3 (ref 0.1–0.9)
MONOCYTES NFR BLD AUTO: 11.7 % (ref 5–12)
NEUTROPHILS NFR BLD AUTO: 3.97 10*3/MM3 (ref 1.7–7)
NEUTROPHILS NFR BLD AUTO: 68.3 % (ref 42.7–76)
NRBC BLD AUTO-RTO: 0 /100 WBC (ref 0–0.2)
PLATELET # BLD AUTO: 312 10*3/MM3 (ref 140–450)
PMV BLD AUTO: 9.2 FL (ref 6–12)
POTASSIUM SERPL-SCNC: 3.3 MMOL/L (ref 3.5–5.2)
PROT PATTERN SERPL ELPH-IMP: ABNORMAL
PROT SERPL-MCNC: 4.8 G/DL (ref 6–8.5)
PROT SERPL-MCNC: 5.2 G/DL (ref 6–8.5)
RBC # BLD AUTO: 2.83 10*6/MM3 (ref 3.77–5.28)
SODIUM SERPL-SCNC: 124 MMOL/L (ref 136–145)
WBC NRBC COR # BLD AUTO: 5.81 10*3/MM3 (ref 3.4–10.8)

## 2023-12-07 PROCEDURE — 85025 COMPLETE CBC W/AUTO DIFF WBC: CPT | Performed by: HOSPITALIST

## 2023-12-07 PROCEDURE — 99232 SBSQ HOSP IP/OBS MODERATE 35: CPT | Performed by: INTERNAL MEDICINE

## 2023-12-07 PROCEDURE — 83735 ASSAY OF MAGNESIUM: CPT | Performed by: INTERNAL MEDICINE

## 2023-12-07 PROCEDURE — 80053 COMPREHEN METABOLIC PANEL: CPT | Performed by: HOSPITALIST

## 2023-12-07 PROCEDURE — 25010000002 ENOXAPARIN PER 10 MG: Performed by: HOSPITALIST

## 2023-12-07 PROCEDURE — 97116 GAIT TRAINING THERAPY: CPT

## 2023-12-07 RX ORDER — TORSEMIDE 20 MG/1
20 TABLET ORAL DAILY
Status: DISCONTINUED | OUTPATIENT
Start: 2023-12-07 | End: 2023-12-08

## 2023-12-07 RX ORDER — DIAPER,BRIEF,INFANT-TODD,DISP
1 EACH MISCELLANEOUS EVERY 12 HOURS SCHEDULED
Status: DISCONTINUED | OUTPATIENT
Start: 2023-12-07 | End: 2023-12-11 | Stop reason: HOSPADM

## 2023-12-07 RX ORDER — ONDANSETRON 4 MG/1
4 TABLET, ORALLY DISINTEGRATING ORAL EVERY 6 HOURS PRN
Status: DISCONTINUED | OUTPATIENT
Start: 2023-12-07 | End: 2023-12-11 | Stop reason: HOSPADM

## 2023-12-07 RX ORDER — POTASSIUM CHLORIDE 20 MEQ/1
40 TABLET, EXTENDED RELEASE ORAL EVERY 4 HOURS
Status: COMPLETED | OUTPATIENT
Start: 2023-12-07 | End: 2023-12-07

## 2023-12-07 RX ADMIN — POTASSIUM CHLORIDE 40 MEQ: 1500 TABLET, EXTENDED RELEASE ORAL at 13:33

## 2023-12-07 RX ADMIN — TORSEMIDE 20 MG: 20 TABLET ORAL at 09:32

## 2023-12-07 RX ADMIN — HYDROCORTISONE 1 APPLICATION: 1 CREAM TOPICAL at 09:28

## 2023-12-07 RX ADMIN — POTASSIUM CHLORIDE 40 MEQ: 1500 TABLET, EXTENDED RELEASE ORAL at 09:28

## 2023-12-07 RX ADMIN — ENOXAPARIN SODIUM 40 MG: 40 INJECTION SUBCUTANEOUS at 15:42

## 2023-12-07 RX ADMIN — HYDROCORTISONE 1 APPLICATION: 1 CREAM TOPICAL at 20:58

## 2023-12-07 NOTE — PLAN OF CARE
Goal Outcome Evaluation:  Plan of Care Reviewed With: patient        Progress: no change  Outcome Evaluation: Patient signed paperwork refusing thickened liquids after being informed of possible risks. Daughter, Agustina,in room. Paperwork placed on chart. No complaints. Continues to receive Lasix on days but feet and lower legs remain edematous.

## 2023-12-07 NOTE — PLAN OF CARE
Goal Outcome Evaluation:              Outcome Evaluation: PT: Patient performs supine to sit with CGA and sit to stand with CGA.  Patient performs gait x30 feet with rolling walker, CGA with cues for safety and propse use of device.  Patient's significant other present, states current mobility is at baseline and reports she ambulates 25-30 feet at a time at home.  Plan to see x1 additional visit to ensure consistency with mobility.  Anticipate return home with support.      Anticipated Discharge Disposition (PT): home with 24/7 care, home with home health

## 2023-12-07 NOTE — NURSING NOTE
Daily Care Plan Summary: Heart Failure    Diuretic in use (IV or PO):   PO        Daily weight (up or down):    gain: 3 lbs comparing different methods.      Output > Intake (yes/no):Yes      O2 Requirements (current, any change?): room air      Symptoms noted with Activity (Respiratory Tolerance, functional state):     none      Anticipated Discharge Plans:     treatment continued.

## 2023-12-07 NOTE — PROGRESS NOTES
"Hospital Medicine Team    LOS 2 days      Patient Care Team:  Lanie Gomez APRN as PCP - General (Family Medicine)      Subjective       Chief Complaint:  f/u volume overload    Subjective    Reports improvement in leg swelling today but does report some general weakness. D/w nurse and did report \"hives\" of back and UE earlier, but sophie noted at time of interview     Objective       Vital Signs  Temp:  [97.2 °F (36.2 °C)-98.3 °F (36.8 °C)] 97.9 °F (36.6 °C)  Heart Rate:  [74-83] 77  Resp:  [16] 16  BP: (123-163)/(65-79) 135/76  Oxygen Therapy  SpO2: 97 %  Pulse Oximetry Type: Intermittent  Device (Oxygen Therapy): room air  Flowsheet Rows      Flowsheet Row First Filed Value   Admission Height 162.6 cm (64\") Documented at 12/05/2023 1141   Admission Weight 52.7 kg (116 lb 1.6 oz) Documented at 12/05/2023 1141                Physical Exam:  Physical Exam  Vitals reviewed.   Pulmonary:      Effort: No respiratory distress.   Musculoskeletal:      Right lower leg: No edema.      Left lower leg: No edema.   Skin:     General: Skin is warm.   Neurological:      Mental Status: She is alert.   Psychiatric:         Mood and Affect: Affect is flat.           Results Review:    I reviewed the patient's new clinical results.    [x]  Laboratory  []  Microbiology  [x]  Radiology  []  EKG/Telemetry   []  Cardiology/Vascular   []  Pathology  []  Old records  []  Other:      X-rays, labs reviewed personally by physician.    Medication Review:   I have reviewed the patient's current medication list    Scheduled Meds  enoxaparin, 40 mg, Subcutaneous, Q24H  hydrocortisone, 1 application , Topical, Q12H  torsemide, 20 mg, Oral, Daily        Meds Infusions       Meds PRN    acetaminophen    calcium carbonate    hydrALAZINE    ondansetron ODT        Assessment / Plan       Active Hospital Problems:  Active Hospital Problems    Diagnosis  POA    Acute on chronic heart failure with preserved ejection fraction (HFpEF) [I50.33]  Yes    " "History of stroke [Z86.73]  Not Applicable    Hypertensive urgency [I16.0]  Unknown    Elevated troponin [R79.89]  Unknown    Severe malnutrition [E43]  Yes      Resolved Hospital Problems   No resolved problems to display.       Acute-on-Chronic HFpEF: Diuresed well overnight based on my exam this morning.  I&O's not completely accurate.  Surprisingly, weight relatively unchanged.  Echo reviewed and \"speckled\" pattern and longitudinal LV strain reported.  change to PO torsemide today and cardiology following   Elevated BP: stable. Stopped losartan given reported hoves   Chronic Hyponatremia: baseline Na around 130. Was improving with diuresis but stalled today. F/u BMP in am   Severe Malnutrition: Nutritionist following.  See below.  Hx/O Dysphagia: Per staff, she is not following the dietary restrictions of nectar thick liquids at home and signed waiver and does not want the modified diet  Hx/O CVA: No acute issues.  Lipid panel excellent.  continue PT    DVT ppx-lovenox      Plan for disposition:home poss soon if continues to diurese and Na stable     Electronically signed by Alex Rivera DO, 12/07/23, 15:22 EST.        Note disclaimer: At [facility], we believe that sharing information builds trust and better relationships. You are receiving this note because you recently visited [facility]. It is possible you will see health information before a provider has talked with you about it. This kind of information can be easy to misunderstand. To help you fully understand what it means for your health, we urge you to discuss this note with your provider.     "

## 2023-12-07 NOTE — THERAPY TREATMENT NOTE
Acute Care - Physical Therapy Treatment Note   Veronica Clements     Patient Name: Arline Landaverde  : 1950  MRN: [id number]  Today's Date: 2023      Visit Dx:     ICD-10-CM ICD-9-CM   1. New onset of congestive heart failure  I50.9 428.0   2. Hyponatremia  E87.1 276.1     Patient Active Problem List   Diagnosis    Severe malnutrition    Classical migraine without intractable migraine    Expressive aphasia    Acute CVA (cerebrovascular accident)    Acute on chronic heart failure with preserved ejection fraction (HFpEF)    History of stroke    Hypertensive urgency    Elevated troponin     Past Medical History:   Diagnosis Date    Diabetes mellitus     Stroke      Past Surgical History:   Procedure Laterality Date    BACK SURGERY      CHOLECYSTECTOMY      HIP HEMIARTHROPLASTY Right 2023    Procedure: HIP HEMIARTHROPLASTY ANTERIOR;  Surgeon: Kumar Bonilla MD;  Location: [facility];  Service: Orthopedics;  Laterality: Right;     PT Assessment (last 12 hours)       PT Evaluation and Treatment       Row Name 23 1112          Physical Therapy Time and Intention    Subjective Information no complaints  -     Document Type therapy note (daily note)  -     Mode of Treatment physical therapy  -     Patient Effort good  -     Symptoms Noted During/After Treatment none  -JW       Row Name 23 1112          General Information    Patient Observations alert;cooperative;agree to therapy  -     Patient/Family/Caregiver Comments/Observations pt supine, significant other present  -     Existing Precautions/Restrictions fall  -     Barriers to Rehab previous functional deficit  -       Row Name 23 1112          Pain    Pre/Posttreatment Pain Comment no c/o pain  -       Row Name 23 1112          Cognition    Personal Safety Interventions gait belt;nonskid shoes/slippers when out of bed  -       Row Name 23 1112          Bed Mobility    Bed Mobility supine-sit  -JW      Supine-Sit [place] (Bed Mobility) contact guard  -     Assistive Device (Bed Mobility) bed rails;head of bed elevated  -       Row Name 12/07/23 1112          Transfers    Transfers sit-stand transfer  -       Row Name 12/07/23 1112          Sit-Stand Transfer    Sit-Stand [place] (Transfers) contact guard;verbal cues  -     Assistive Device (Sit-Stand Transfers) walker, front-wheeled  -     Comment, (Sit-Stand Transfer) cues for hand placement  -       Row Name 12/07/23 1112          Gait/Stairs (Locomotion)    [place] Level (Gait) contact guard;verbal cues  -     Assistive Device (Gait) walker, front-wheeled  -     Distance in Feet (Gait) 30  -JW     Pattern (Gait) swing-to  -JW     Deviations/Abnormal Patterns (Gait) base of support, narrow  -JW     Bilateral Gait Deviations forward flexed posture  -     Comment, (Gait/Stairs) pt requires verbal cues to remain within walker, cues for increased base of support.  -       Row Name 12/07/23 1112          Plan of Care Review    Outcome Evaluation PT: Patient performs supine to sit with CGA and sit to stand with CGA.  Patient performs gait x30 feet with rolling walker, CGA with cues for safety and propse use of device.  Patient's significant other present, states current mobility is at baseline and reports she ambulates 25-30 feet at a time at home.  Plan to see x1 additional visit to ensure consistency with mobility.  Anticipate return home with support.  -       Row Name 12/07/23 1112          Positioning and Restraints    Pre-Treatment Position in bed  -     Post Treatment Position chair  -     In Chair reclined;call light within reach;encouraged to call for assist;exit alarm on;with family/caregiver  -       Row Name 12/07/23 1112          Progress Summary (PT)    Progress Toward Functional Goals (PT) progress toward functional goals is good  -     Barriers to Overall Progress (PT) PLOF  -               User Key  (r) =  Recorded By, (t) = Taken By, (c) = Cosigned By      Initials Name Provider Type    JW Janeth Major, PT Physical Therapist                    Physical Therapy Education       Title: PT OT SLP Therapies (In Progress)       Topic: Physical Therapy (In Progress)       Point: Mobility training (Done)       Learning Progress Summary             Patient Acceptance, E,TB, VU by  at 12/7/2023 1307    Acceptance, E,TB, VU by  at 12/6/2023 1302                         Point: Home exercise program (Not Started)       Learner Progress:  Not documented in this visit.                              User Key       Initials Effective Dates Name Provider Type Discipline     06/16/21 -  Jelena Noland, PT Physical Therapist PT    GRZEGORZ 06/16/21 -  Janeth Major PT Physical Therapist PT                  PT Recommendation and Plan  Anticipated Discharge Disposition (PT): home with 24/7 care, home with home health  Progress Summary (PT)  Progress Toward Functional Goals (PT): progress toward functional goals is good  Barriers to Overall Progress (PT): PLOF  Outcome Evaluation: PT: Patient performs supine to sit with CGA and sit to stand with CGA.  Patient performs gait x30 feet with rolling walker, CGA with cues for safety and propse use of device.  Patient's significant other present, states current mobility is at baseline and reports she ambulates 25-30 feet at a time at home.  Plan to see x1 additional visit to ensure consistency with mobility.  Anticipate return home with support.   Outcome Measures       Row Name 12/07/23 1112             How much help from another person do you currently need...    Turning from your back to your side while in flat bed without using bedrails? 3  -JW      Moving from lying on back to sitting on the side of a flat bed without bedrails? 3  -JW      Moving to and from a bed to a chair (including a wheelchair)? 3  -JW      Standing up from a chair using your arms (e.g., wheelchair, bedside  chair)? 3  -JW      Climbing 3-5 steps with a railing? 2  -JW      To walk in hospital room? 3  -JW      AM-PAC 6 Clicks Score (PT) 17  -JW      Highest Level of Mobility Goal 5 --> Static standing  -         Functional Assessment    Outcome Measure Options AM-PAC 6 Clicks Basic Mobility (PT)  -JW                User Key  (r) = Recorded By, (t) = Taken By, (c) = Cosigned By      Initials Name Provider Type    Janeth Nassar, PT Physical Therapist                     Time Calculation:    PT Charges       Row Name 12/07/23 1307             Time Calculation    Start Time 1112  -      Stop Time 1129  -      Time Calculation (min) 17 min  -JW      PT Received On 12/07/23  -      PT - Next Appointment 12/08/23  -         Timed Charges    53926 - Gait Training Minutes  17  -         Total Minutes    Timed Charges Total Minutes 17  -       Total Minutes 17  -                User Key  (r) = Recorded By, (t) = Taken By, (c) = Cosigned By      Initials Name Provider Type    Janeth Nassar, PT Physical Therapist                  Therapy Charges for Today       Code Description Service Date Service Provider Modifiers Qty    82476764377 HC GAIT TRAINING EA 15 MIN 12/7/2023 Janeth Major, PT GP 1            PT G-Codes  Outcome Measure Options: AM-PAC 6 Clicks Basic Mobility (PT)  AM-PAC 6 Clicks Score (PT): 17    Janeth Major PT  12/7/2023

## 2023-12-07 NOTE — PROGRESS NOTES
"    Patient Name: Arline Landaverde  :1950  73 y.o.      Patient Care Team:  Lanie Gomez APRN as PCP - General (Family Medicine)    Chief Complaint: Shortness of breath    Interval History: No chest pain, less issues with shortness of breath.  Developing hives.       Objective   Vital Signs  Temp:  [97.2 °F (36.2 °C)-98.3 °F (36.8 °C)] 98.3 °F (36.8 °C)  Heart Rate:  [74-89] 74  Resp:  [16] 16  BP: (135-163)/(65-79) 145/79    Intake/Output Summary (Last 24 hours) at 2023 0815  Last data filed at 2023 0458  Gross per 24 hour   Intake 120 ml   Output 1550 ml   Net -1430 ml     Flowsheet Rows      Flowsheet Row First Filed Value   Admission Height 162.6 cm (64\") Documented at 2023 1141   Admission Weight 52.7 kg (116 lb 1.6 oz) Documented at 2023 1141          Body mass index is 20.04 kg/m².    Physical Exam:   General Appearance:  Alert   Lungs:   Coarse breath sounds normal respiratory effort and rate.      Heart:    Regular rhythm and normal rate, normal S1 and S2, no gallops or rubs.  1/6 holosystolic murmur   Chest Wall:    No abnormalities observed   Abdomen:     Soft, nontender, positive bowel sounds.     Extremities:   no cyanosis, clubbing or edema.  No marked joint deformities.  Developing new hives on both upper extremities, as well as anterior and posterior thorax, minimal on the legs.     Results Review:    Results from last 7 days   Lab Units 23  0400   SODIUM mmol/L 124*   POTASSIUM mmol/L 3.3*   CHLORIDE mmol/L 90*   CO2 mmol/L 23.5   BUN mg/dL 24*   CREATININE mg/dL 0.83   GLUCOSE mg/dL 112*   CALCIUM mg/dL 8.2*     Results from last 7 days   Lab Units 23  1359 23  1143   HSTROP T ng/L 49* 53*     Results from last 7 days   Lab Units 23  0400   WBC 10*3/mm3 5.81   HEMOGLOBIN g/dL 8.7*   HEMATOCRIT % 24.2*   PLATELETS 10*3/mm3 312     Results from last 7 days   Lab Units 23  1143   INR  1.03         Results from last 7 days   Lab Units " 12/05/23  1359   CHOLESTEROL mg/dL 147   TRIGLYCERIDES mg/dL 86   HDL CHOL mg/dL 61*   LDL CHOL mg/dL 70             Results for orders placed during the hospital encounter of 12/05/23    Adult Transthoracic Echo Limited W/ Cont if Necessary Per Protocol    Interpretation Summary    Left ventricular systolic function is normal. Calculated left ventricular EF = 61.9% Abnormal global longitudinal LV strain (GLS) = -14.7%. Left ventricle strain data was reviewed by the physician and found to be accurate. Normal left ventricular cavity size noted. Left ventricular wall thickness is consistent with mild concentric hypertrophy. All left ventricular wall segments contract normally. Left ventricular diastolic function is consistent with (grade II w/high LAP) pseudonormalization. The myocardium has a speckled, echo bright appearance.    The left atrial cavity is moderately dilated.    Mild to moderate mitral valve regurgitation is present.    Mild to moderate tricuspid valve regurgitation is present. Estimated right ventricular systolic pressure from tricuspid regurgitation is mildly elevated (35-45 mmHg). Calculated right ventricular systolic pressure from tricuspid regurgitation is 44.4 mmHg.    The inferior vena cava is normally sized. Normal IVC inspiratory collapse of greater than 50% noted.    There is a small (<1cm) pericardial effusion. There is a moderate sized left pleural effusion.      Medication Review:   enoxaparin, 40 mg, Subcutaneous, Q24H  furosemide, 80 mg, Intravenous, BID  losartan, 50 mg, Oral, Q24H              Assessment & Plan   Active Hospital Problems    Diagnosis  POA    Acute on chronic heart failure with preserved ejection fraction (HFpEF) [I50.33]  Yes    History of stroke [Z86.73]  Not Applicable    Hypertensive urgency [I16.0]  Unknown    Elevated troponin [R79.89]  Unknown    Moderate malnutrition [E44.0]  Yes      Resolved Hospital Problems   No resolved problems to display.     1. Acute  HFpEF-responding to IV diuresis, improving clinical trajectory, continue current medical therapy.  Consider adding Jardiance but not now with hives.  Echocardiogram has speckled appearance, strain imaging is abnormal but not classic for ATTR, we sent SPEP which is pending, will consider PYP scan as an outpatient  2. Hypertensive urgency  3. Elevated Tn, no evidence of acute coronary syndrome  4. Hyponatremia, Na 120 on arrival, improving with diuresis, 125 today  5. History of stroke  6. Moderate malnutrition-BMI 19.9  7.  Hives-I will await input from primary team, I will stop the IV Lasix and switch over to torsemide, will hold the losartan for now.    Dmitry Ruiz III, MD  [facility]  12/07/23  08:15 EST

## 2023-12-07 NOTE — PLAN OF CARE
Goal Outcome Evaluation:  Plan of Care Reviewed With: patient        Progress: no change  Outcome Evaluation: Patient signed paperwork refusing thickened liquids after being informed of possible risks. Daughter, Agustina,in room. Paperwork placed on chart. No complaints. Continues to receive Lasix on days but feet and lower legs remain edematous.    Daily Care Plan Summary: Heart Failure    Diuretic in use (IV or PO):   IV        Daily weight (up or down):    loss: 1lbs      Output > Intake (yes/no):Yes      O2 Requirements (current, any change?): room air      Symptoms noted with Activity (Respiratory Tolerance, functional state):     none      Anticipated Discharge Plans:     treatment continuing

## 2023-12-07 NOTE — PROGRESS NOTES
Nutrition Services    Patient Name:  Arline Landaverde  YOB: 1950  MRN: [id number]  Admit Date:  12/5/2023      Chart reviewed b/c pt refusing NTL yesterday.    Noted pt signed form refusing therapuetic liquids knowing risks overnight.    Order now for Thin Liquids.    Will add Boost Plus as pt takes at home to supplement po intake. Will cont to follow.     Electronically signed by:  Anel Mejia RD  12/07/23 13:19 EST

## 2023-12-07 NOTE — PLAN OF CARE
Goal Outcome Evaluation:  Plan of Care Reviewed With: patient        Progress: no change  Outcome Evaluation: vss. pt with hives this a.m., hydrocortisone cream applied, hives decreased. pt refusing to eat breakfast/lunch. states she 'isn't hungry'. family and nurse encouraging patient to eat. potassium replacement given, awaiting follow up labs. pt up in chair most of day. purewick in place when in bed. nsr on tele. no other complaints at this time. family at bedside most of day.

## 2023-12-07 NOTE — SIGNIFICANT NOTE
12/07/23 0835   Rehab Evaluation   Session Not Performed patient/family declined evaluation   Evaluation Not Performed, Comment Pt declined swallow evaluation at this time and states she has no difficulty swallowing. Pt's s/o reports she has been on thin liquids at home and mostly drinks Boost. He reports no difficulty that he has noted with her swallowing. He states no respiratory illness since last admission. Per chart, pt has signed a consent to take thin liquids against medical advice. Will complete eval order at this time due pt refusal.

## 2023-12-07 NOTE — NURSING NOTE
Discussed in length the need for patient to be on thickened liquids. She verbalized understanding but adamantly refuses to be put on thickened liquids. Form signed and placed on chart. Dr Low is aware.

## 2023-12-08 ENCOUNTER — APPOINTMENT (OUTPATIENT)
Dept: GENERAL RADIOLOGY | Facility: HOSPITAL | Age: 73
DRG: 292 | End: 2023-12-08
Payer: MEDICARE

## 2023-12-08 LAB
ANION GAP SERPL CALCULATED.3IONS-SCNC: 8.2 MMOL/L (ref 5–15)
BUN SERPL-MCNC: 27 MG/DL (ref 8–23)
BUN/CREAT SERPL: 29 (ref 7–25)
CALCIUM SPEC-SCNC: 8.3 MG/DL (ref 8.6–10.5)
CHLORIDE SERPL-SCNC: 91 MMOL/L (ref 98–107)
CO2 SERPL-SCNC: 23.8 MMOL/L (ref 22–29)
CREAT SERPL-MCNC: 0.93 MG/DL (ref 0.57–1)
EGFRCR SERPLBLD CKD-EPI 2021: 64.6 ML/MIN/1.73
GLUCOSE SERPL-MCNC: 110 MG/DL (ref 65–99)
MAGNESIUM SERPL-MCNC: 1.6 MG/DL (ref 1.6–2.4)
POTASSIUM SERPL-SCNC: 4.2 MMOL/L (ref 3.5–5.2)
SODIUM SERPL-SCNC: 123 MMOL/L (ref 136–145)

## 2023-12-08 PROCEDURE — 25010000002 HYDRALAZINE PER 20 MG: Performed by: PHYSICIAN ASSISTANT

## 2023-12-08 PROCEDURE — 83735 ASSAY OF MAGNESIUM: CPT | Performed by: INTERNAL MEDICINE

## 2023-12-08 PROCEDURE — 99232 SBSQ HOSP IP/OBS MODERATE 35: CPT | Performed by: INTERNAL MEDICINE

## 2023-12-08 PROCEDURE — 94799 UNLISTED PULMONARY SVC/PX: CPT

## 2023-12-08 PROCEDURE — 71045 X-RAY EXAM CHEST 1 VIEW: CPT

## 2023-12-08 PROCEDURE — 25010000002 ENOXAPARIN PER 10 MG: Performed by: HOSPITALIST

## 2023-12-08 PROCEDURE — 80048 BASIC METABOLIC PNL TOTAL CA: CPT | Performed by: INTERNAL MEDICINE

## 2023-12-08 PROCEDURE — 97116 GAIT TRAINING THERAPY: CPT

## 2023-12-08 RX ORDER — TORSEMIDE 20 MG/1
30 TABLET ORAL DAILY
Status: DISCONTINUED | OUTPATIENT
Start: 2023-12-08 | End: 2023-12-09

## 2023-12-08 RX ORDER — SPIRONOLACTONE 25 MG/1
12.5 TABLET ORAL DAILY
Status: DISCONTINUED | OUTPATIENT
Start: 2023-12-08 | End: 2023-12-10

## 2023-12-08 RX ADMIN — HYDROCORTISONE 1 APPLICATION: 1 CREAM TOPICAL at 08:55

## 2023-12-08 RX ADMIN — SPIRONOLACTONE 12.5 MG: 25 TABLET ORAL at 08:54

## 2023-12-08 RX ADMIN — ENOXAPARIN SODIUM 40 MG: 40 INJECTION SUBCUTANEOUS at 15:54

## 2023-12-08 RX ADMIN — HYDRALAZINE HYDROCHLORIDE 10 MG: 20 INJECTION INTRAMUSCULAR; INTRAVENOUS at 12:11

## 2023-12-08 RX ADMIN — TORSEMIDE 30 MG: 20 TABLET ORAL at 08:53

## 2023-12-08 RX ADMIN — HYDROCORTISONE 1 APPLICATION: 1 CREAM TOPICAL at 20:30

## 2023-12-08 NOTE — NURSING NOTE
Daily Care Plan Summary: Heart Failure    Diuretic in use (IV or PO):   PO        Daily weight (up or down):    gain: 3 lbs      Output > Intake (yes/no):Yes      O2 Requirements (current, any change?): room air      Symptoms noted with Activity (Respiratory Tolerance, functional state):     none      Anticipated Discharge Plans:     treatment continued, increase in torsemide dose per [facility].

## 2023-12-08 NOTE — PROGRESS NOTES
LOS: 3 days   Patient Care Team:  Lanie Gomez APRN as PCP - General (Family Medicine)    Chief Complaint:     F/u chf    Interval History:     She is weak.  She has not been out of bed much.  She has no chest pain.  Her breathing is better.  Her hives are better.    Echo 12/6/23  Interpretation Summary         Left ventricular systolic function is normal. Calculated left ventricular EF = 61.9% Abnormal global longitudinal LV strain (GLS) = -14.7%. Left ventricle strain data was reviewed by the physician and found to be accurate. Normal left ventricular cavity size noted. Left ventricular wall thickness is consistent with mild concentric hypertrophy. All left ventricular wall segments contract normally. Left ventricular diastolic function is consistent with (grade II w/high LAP) pseudonormalization. The myocardium has a speckled, echo bright appearance.    The left atrial cavity is moderately dilated.    Mild to moderate mitral valve regurgitation is present.    Mild to moderate tricuspid valve regurgitation is present. Estimated right ventricular systolic pressure from tricuspid regurgitation is mildly elevated (35-45 mmHg). Calculated right ventricular systolic pressure from tricuspid regurgitation is 44.4 mmHg.    The inferior vena cava is normally sized. Normal IVC inspiratory collapse of greater than 50% noted.    There is a small (<1cm) pericardial effusion. There is a moderate sized left pleural effusion.    Objective   Vital Signs  Temp:  [97.5 °F (36.4 °C)-98.3 °F (36.8 °C)] 97.5 °F (36.4 °C)  Heart Rate:  [73-83] 79  Resp:  [16] 16  BP: (123-159)/(61-89) 144/80    Intake/Output Summary (Last 24 hours) at 12/8/2023 0722  Last data filed at 12/7/2023 1735  Gross per 24 hour   Intake 360 ml   Output --   Net 360 ml       Comfortable NAD  Neck supple, no JVD or thyromegaly appreciated  S1/S2 RRR, no m/r/g  Lungs CTA B, normal effort  Abdomen S/NT/ND (+) BS, no HSM appreciated  Extremities warm, no  clubbing, cyanosis, or edema  No visible or palpable skin lesions  A/Ox4, mood and affect appropriate    Results Review:      Results from last 7 days   Lab Units 12/08/23  0349 12/07/23  0400 12/06/23  0551   SODIUM mmol/L 123* 124* 125*   POTASSIUM mmol/L 4.2 3.3* 4.0   CHLORIDE mmol/L 91* 90* 90*   CO2 mmol/L 23.8 23.5 21.0*   BUN mg/dL 27* 24* 25*   CREATININE mg/dL 0.93 0.83 0.83   GLUCOSE mg/dL 110* 112* 115*   CALCIUM mg/dL 8.3* 8.2* 8.5*     Results from last 7 days   Lab Units 12/05/23  1359 12/05/23  1143   HSTROP T ng/L 49* 53*     Results from last 7 days   Lab Units 12/07/23  0400 12/05/23  1143   WBC 10*3/mm3 5.81 6.12   HEMOGLOBIN g/dL 8.7* 10.3*   HEMATOCRIT % 24.2* 28.9*   PLATELETS 10*3/mm3 312 349     Results from last 7 days   Lab Units 12/05/23  1143   INR  1.03     Results from last 7 days   Lab Units 12/05/23  1359   CHOLESTEROL mg/dL 147     Results from last 7 days   Lab Units 12/07/23  0400   MAGNESIUM mg/dL 1.7     Results from last 7 days   Lab Units 12/05/23  1359   CHOLESTEROL mg/dL 147   TRIGLYCERIDES mg/dL 86   HDL CHOL mg/dL 61*   LDL CHOL mg/dL 70       I reviewed the patient's new clinical results.  I personally viewed and interpreted the patient's EKG/Telemetry data        Medication Review:   enoxaparin, 40 mg, Subcutaneous, Q24H  hydrocortisone, 1 application , Topical, Q12H  torsemide, 20 mg, Oral, Daily             Assessment & Plan       Severe malnutrition    Acute on chronic heart failure with preserved ejection fraction (HFpEF)    History of stroke    Hypertensive urgency    Elevated troponin    Acute HFpEF, improved  Hypertensive urgency, better  Troponin elevation  Hyponatremia - chronic.  Likely SIADH  History of stroke  Malnutrition  Hives-IV Lasix was stopped and switched to torsemide, losartan held due to this.  Anemia - worsened.      cxr shows improved pulmonary edema with bilateral pleural effusions, check mag level.  Na+ lower today - normal TSH.     Increase  torsemide to 30 mg daily.  Out of bed more.  Her rash may have been due to IV Lasix versus losartan.  May need to try to restart losartan and see if the hives come back.  For now though, try spironolactone 12.5 mg daily.  Watch for hives.    Will see Monday    Mena Machuca MD  12/08/23  07:22 EST

## 2023-12-08 NOTE — THERAPY DISCHARGE NOTE
Acute Care - Physical Therapy Treatment Note/Discharge   Veronica Clements     Patient Name: Arline Landaverde  : 1950  MRN: [id number]  Today's Date: 2023                Admit Date: 2023    Visit Dx:    ICD-10-CM ICD-9-CM   1. New onset of congestive heart failure  I50.9 428.0   2. Hyponatremia  E87.1 276.1     Patient Active Problem List   Diagnosis    Severe malnutrition    Classical migraine without intractable migraine    Expressive aphasia    Acute CVA (cerebrovascular accident)    Acute on chronic heart failure with preserved ejection fraction (HFpEF)    History of stroke    Hypertensive urgency    Elevated troponin     Past Medical History:   Diagnosis Date    Diabetes mellitus     Stroke      Past Surgical History:   Procedure Laterality Date    BACK SURGERY      CHOLECYSTECTOMY      HIP HEMIARTHROPLASTY Right 2023    Procedure: HIP HEMIARTHROPLASTY ANTERIOR;  Surgeon: Kumar Bonilla MD;  Location: [facility];  Service: Orthopedics;  Laterality: Right;       PT Assessment (last 12 hours)       PT Evaluation and Treatment       Row Name 2355          Physical Therapy Time and Intention    Subjective Information no complaints  -BP     Document Type discharge treatment  -BP     Mode of Treatment physical therapy  -BP     Patient Effort adequate  -BP     Symptoms Noted During/After Treatment none  -BP       Row Name 2382          General Information    Patient Profile Reviewed yes  -BP     Patient/Family/Caregiver Comments/Observations Patient sitting in bedside chair. Patient agreeable to PT treatment. Significant other present.  -BP     Existing Precautions/Restrictions fall  -BP     Risks Reviewed patient:;LOB;increased discomfort  -BP     Benefits Reviewed patient:;increase independence;increase strength;improve function  -BP     Barriers to Rehab previous functional deficit  -BP       Row Name 2393          Pain    Pre/Posttreatment Pain Comment Patient does not  complain of pain  -       Row Name 12/08/23 0825          Cognition    Personal Safety Interventions gait belt;nonskid shoes/slippers when out of bed  -       Row Name 12/08/23 0825          Bed Mobility    Comment, (Bed Mobility) deferred, patient up in chair  -       Row Name 12/08/23 0825          Transfers    Transfers sit-stand transfer;stand-sit transfer  -BP     Comment, (Transfers) verbal cues for hand placement  -       Row Name 12/08/23 0825          Sit-Stand Transfer    Sit-Stand [place] (Transfers) standby assist;verbal cues  -BP     Assistive Device (Sit-Stand Transfers) walker, front-wheeled  -BP       Row Name 12/08/23 0825          Stand-Sit Transfer    Stand-Sit [place] (Transfers) standby assist;verbal cues  -BP     Assistive Device (Stand-Sit Transfers) walker, front-wheeled  -BP       Row Name 12/08/23 0825          Gait/Stairs (Locomotion)    [place] Level (Gait) contact guard;verbal cues  -BP     Assistive Device (Gait) walker, front-wheeled  -BP     Distance in Feet (Gait) 45  -BP     Pattern (Gait) swing-to  -BP     Deviations/Abnormal Patterns (Gait) base of support, narrow  -BP     Comment, (Gait/Stairs) Patient requires cues for management of external obstacles. Patient with intermittent posterior sway without loss of balance. Per significnt other, patients mobility is at baseline.  -       Row Name 12/08/23 0825          Safety Issues, Functional Mobility    Safety Issues Affecting Function (Mobility) positioning of assistive device;at risk behavior observed  -       Row Name 12/08/23 0825          Balance    Comment, Balance sitting balance-supervision. standing balance-SBA with device  -       Row Name 12/08/23 0825          Plan of Care Review    Plan of Care Reviewed With patient  -BP     Outcome Evaluation PT: Patient performs sit to/from stand transfers with SBA and gait x 45 feet with CGA with use of FWW.  Patient requires cues for safety with devicew  with intermittent posterior lean during gait without loss of balance. Significant other present and reports patients mobility is at baseline. Recommend continued mobility with nursing staff while in the hospital. Patient may benefit from home health PT at discharge and will continue to have 24/7 care. No further inpatient skiled PT needs at this time.  -BP       Row Name 12/08/23 0825          Positioning and Restraints    Pre-Treatment Position sitting in chair/recliner  -BP     Post Treatment Position chair  -BP     In Chair reclined;call light within reach;encouraged to call for assist;exit alarm on;with family/caregiver  -BP       Row Name 12/08/23 0825          Progress Summary (PT)    Progress Toward Functional Goals (PT) prepare for discharge  -BP       Row Name 12/08/23 0825          Therapy Plan Review/Discharge Plan (PT)    Therapy Plan Review (PT) patient;risks/benefits reviewed;participants included;spouse/significant other  -BP       Row Name 12/08/23 0825          Bed Mobility Goal 1 (PT)    Activity/Assistive Device (Bed Mobility Goal 1, PT) bed mobility activities, all  -BP     [place] Level/Cues Needed (Bed Mobility Goal 1, PT) contact guard required  -BP     Time Frame (Bed Mobility Goal 1, PT) 3 days  -BP     Progress/Outcomes (Bed Mobility Goal 1, PT) goal met  late entry  -BP       Row Name 12/08/23 0825          Transfer Goal 1 (PT)    Activity/Assistive Device (Transfer Goal 1, PT) sit-to-stand/stand-to-sit;walker, rolling  -BP     [place] Level/Cues Needed (Transfer Goal 1, PT) standby assist  -BP     Time Frame (Transfer Goal 1, PT) 3 days  -BP     Progress/Outcome (Transfer Goal 1, PT) goal met  late entry  -BP       Row Name 12/08/23 0825          Gait Training Goal 1 (PT)    Activity/Assistive Device (Gait Training Goal 1, PT) gait (walking locomotion);walker, rolling  -BP     [place] Level (Gait Training Goal 1, PT) contact guard required  -BP     Distance (Gait Training  Goal 1, PT) 30  -BP     Time Frame (Gait Training Goal 1, PT) 3 days  -BP     Progress/Outcome (Gait Training Goal 1, PT) goal met  late entry  -BP               User Key  (r) = Recorded By, (t) = Taken By, (c) = Cosigned By      Initials Name Provider Type    BP Jelena Noland, PT Physical Therapist                      Physical Therapy Education       Title: PT OT SLP Therapies (Resolved)       Topic: Physical Therapy (Resolved)       Point: Mobility training (Resolved)       Learning Progress Summary             Patient Acceptance, E,TB, VU by  at 12/8/2023 0941    Acceptance, E,TB, VU by  at 12/7/2023 1307    Acceptance, E,TB, VU by  at 12/6/2023 1302   Significant Other Acceptance, E,TB, VU by  at 12/8/2023 0941                         Point: Home exercise program (Resolved)       Learner Progress:  Not documented in this visit.                              User Key       Initials Effective Dates Name Provider Type Discipline     06/16/21 -  Jelena Noland, DEIS Physical Therapist PT    GRZEGORZ 06/16/21 -  Janeth Major PT Physical Therapist PT                    PT Recommendation and Plan  Anticipated Discharge Disposition (PT): home with 24/7 care, home with home health  Therapy Frequency (PT): other (see comments) (1-2 visits)  Progress Summary (PT)  Progress Toward Functional Goals (PT): prepare for discharge  Plan of Care Reviewed With: patient  Outcome Evaluation: PT: Patient performs sit to/from stand transfers with SBA and gait x 45 feet with CGA with use of FWW.  Patient requires cues for safety with devicew with intermittent posterior lean during gait without loss of balance. Significant other present and reports patients mobility is at baseline. Recommend continued mobility with nursing staff while in the hospital. Patient may benefit from home health PT at discharge and will continue to have 24/7 care. No further inpatient skiled PT needs at this time.     Outcome Measures       Row Name  12/08/23 0825 12/07/23 1112          How much help from another person do you currently need...    Turning from your back to your side while in flat bed without using bedrails? 3  -BP 3  -JW     Moving from lying on back to sitting on the side of a flat bed without bedrails? 3  -BP 3  -JW     Moving to and from a bed to a chair (including a wheelchair)? 3  -BP 3  -JW     Standing up from a chair using your arms (e.g., wheelchair, bedside chair)? 3  -BP 3  -JW     Climbing 3-5 steps with a railing? 2  -BP 2  -JW     To walk in hospital room? 3  -BP 3  -JW     AM-PAC 6 Clicks Score (PT) 17  -BP 17  -JW     Highest Level of Mobility Goal 5 --> Static standing  -BP 5 --> Static standing  -JW        Functional Assessment    Outcome Measure Options AM-PAC 6 Clicks Basic Mobility (PT)  -BP AM-PAC 6 Clicks Basic Mobility (PT)  -JW               User Key  (r) = Recorded By, (t) = Taken By, (c) = Cosigned By      Initials Name Provider Type    Jelena Elias, PT Physical Therapist    JW Janeth Major, PT Physical Therapist                     Time Calculation:    PT Charges       Row Name 12/08/23 0942             Time Calculation    Start Time 0825  -BP      Stop Time 0840  -BP      Time Calculation (min) 15 min  -BP      PT Received On 12/08/23  -BP         Timed Charges    12552 - Gait Training Minutes  15  -BP         Total Minutes    Timed Charges Total Minutes 15  -BP       Total Minutes 15  -BP                User Key  (r) = Recorded By, (t) = Taken By, (c) = Cosigned By      Initials Name Provider Type    Jelena Elias, PT Physical Therapist                  Therapy Charges for Today       Code Description Service Date Service Provider Modifiers Qty    11337273958 HC GAIT TRAINING EA 15 MIN 12/8/2023 Jelena Noland, PT GP 1            PT G-Codes  Outcome Measure Options: AM-PAC 6 Clicks Basic Mobility (PT)  AM-PAC 6 Clicks Score (PT): 17    PT Discharge Summary  Anticipated Discharge Disposition  (PT): home with 24/7 care, home with home health  Reason for Discharge: At baseline function  Outcomes Achieved: Able to achieve all goals within established timeline  Discharge Destination: Home with home health (with 24/7 care)    Jelena Noland, PT  12/8/2023

## 2023-12-08 NOTE — PLAN OF CARE
Goal Outcome Evaluation:  Plan of Care Reviewed With: patient        Progress: no change  Outcome Evaluation: vss. Pt increase intake compared to yesterday.torsemide dosage increased per [facility]. Hydralazine prn given one time with positive results. Pt up in chair for breakfast/lunch. Currently resting in bed. Purewick in place. NSR on tele. Family is at bedside. No other complaints at this time.

## 2023-12-08 NOTE — PROGRESS NOTES
"Hospital Medicine Team    LOS 3 days      Patient Care Team:  Lanie Gomez APRN as PCP - General (Family Medicine)      Subjective       Chief Complaint:  f/u volume overload    Subjective    Sitting in chair but overall still appears somewhat weak.     Objective       Vital Signs  Temp:  [97.5 °F (36.4 °C)-98.3 °F (36.8 °C)] 97.5 °F (36.4 °C)  Heart Rate:  [73-80] 76  Resp:  [16-18] 18  BP: (126-186)/(61-90) 173/89  Oxygen Therapy  SpO2: 98 %  Pulse Oximetry Type: Intermittent  Device (Oxygen Therapy): room air  Flowsheet Rows      Flowsheet Row First Filed Value   Admission Height 162.6 cm (64\") Documented at 12/05/2023 1141   Admission Weight 52.7 kg (116 lb 1.6 oz) Documented at 12/05/2023 1141                Physical Exam:  Physical Exam  Vitals reviewed.   Pulmonary:      Effort: No respiratory distress.   Musculoskeletal:      Right lower leg: No edema.      Left lower leg: No edema.   Skin:     General: Skin is warm.   Neurological:      Mental Status: She is alert.   Psychiatric:         Mood and Affect: Affect is flat.           Results Review:    I reviewed the patient's new clinical results.    [x]  Laboratory  []  Microbiology  [x]  Radiology  []  EKG/Telemetry   []  Cardiology/Vascular   []  Pathology  []  Old records  []  Other:      X-rays, labs reviewed personally by physician.    Medication Review:   I have reviewed the patient's current medication list    Scheduled Meds  enoxaparin, 40 mg, Subcutaneous, Q24H  hydrocortisone, 1 application , Topical, Q12H  spironolactone, 12.5 mg, Oral, Daily  torsemide, 30 mg, Oral, Daily        Meds Infusions       Meds PRN    acetaminophen    calcium carbonate    hydrALAZINE    ondansetron ODT        Assessment / Plan       Active Hospital Problems:  Active Hospital Problems    Diagnosis  POA    Acute on chronic heart failure with preserved ejection fraction (HFpEF) [I50.33]  Yes    History of stroke [Z86.73]  Not Applicable    Hypertensive urgency [I16.0]  " "Unknown    Elevated troponin [R79.89]  Unknown    Severe malnutrition [E43]  Yes      Resolved Hospital Problems   No resolved problems to display.       Acute-on-Chronic HFpEF: Diuresed well overnight based on my exam this morning.  I&O's not completely accurate.  Surprisingly, weight relatively unchanged.  Echo reviewed and \"speckled\" pattern and longitudinal LV strain reported.  torsemide being increased today. cardiology following   Elevated BP: stable. Stopped losartan given reported hoves   Acute on Chronic Hyponatremia: baseline Na around 130.now decreasing again today. Increased diuretic dose as above and f/u in am   Severe Malnutrition: Nutritionist following.  See below.  Hx/O Dysphagia: Per staff, she is not following the dietary restrictions of nectar thick liquids at home and signed waiver and does not want the modified diet  Hx/O CVA: No acute issues.  Lipid panel excellent.  continue PT    DVT ppx-lovenox      Plan for disposition:home poss soon if continues to diurese and Na stable     Electronically signed by Alex Rivera DO, 12/08/23, 13:13 EST.        Note disclaimer: At [facility], we believe that sharing information builds trust and better relationships. You are receiving this note because you recently visited [facility]. It is possible you will see health information before a provider has talked with you about it. This kind of information can be easy to misunderstand. To help you fully understand what it means for your health, we urge you to discuss this note with your provider.     "

## 2023-12-08 NOTE — PLAN OF CARE
Goal Outcome Evaluation:  Plan of Care Reviewed With: patient           Outcome Evaluation: PT: Patient performs sit to/from stand transfers with SBA and gait x 45 feet with CGA with use of FWW.  Patient requires cues for safety with devicew with intermittent posterior lean during gait without loss of balance. Significant other present and reports patients mobility is at baseline. Recommend continued mobility with nursing staff while in the hospital. Patient may benefit from home health PT at discharge and will continue to have 24/7 care. No further inpatient skiled PT needs at this time.      Anticipated Discharge Disposition (PT): home with 24/7 care, home with home health

## 2023-12-08 NOTE — PROGRESS NOTES
Adult Nutrition  Assessment/PES    Patient Name:  Arline Landaverde  YOB: 1950  MRN: [id number]  Admit Date:  12/5/2023    Assessment Date:  12/8/2023    Comments:  Pt with poor intake.    Encouraged po and supplement at visit. Explained pt couldn't feel better or heal without fuel.    Recommend: clarify if care goals include nutrition support such as feeding tube    Will cont to follow and monitor.     Reason for Assessment       Row Name 12/08/23 6375          Reason for Assessment    Reason For Assessment follow-up protocol  HF, age/BMI     Diagnosis cardiac disease  Acute on CHF, HTN, weakness/falls, severe malnutrition                    Nutrition/Diet History       Row Name 12/08/23 1338          Nutrition/Diet History    Typical Intake (Food/Fluid/EN/PN) Pt up in chair alone at visit. Reports she needs to go home. reports she is trying to eat and drink Boost.                    Labs/Tests/Procedures/Meds       Row Name 12/08/23 4897          Labs/Procedures/Meds    Lab Results Reviewed reviewed     Lab Results Comments Na 123 L, glu 110, Bun 27        Diagnostic Tests/Procedures    Diagnostic Test/Procedure Reviewed reviewed        Medications    Pertinent Medications Reviewed reviewed     Pertinent Medications Comments torsemide , aldactone                      Estimated/Assessed Needs - Anthropometrics       Row Name 12/08/23 0645          Anthropometrics    Weight 55.1 kg (121 lb 6.4 oz)                    Nutrition Prescription Ordered       Row Name 12/08/23 0123          Nutrition Prescription PO    Current PO Diet Soft Texture     Texture Whole foods     Fluid Consistency Thin     Supplement Boost Plus (Ensure Enlive, Ensure Plus)     Supplement Frequency 3 times a day                    Evaluation of Received Nutrient/Fluid Intake       Row Name 12/08/23 0391          Fluid Intake Evaluation    Oral Fluid (mL) 240  ave x 3, 17%        PO Evaluation    % PO Intake 0-25%                        Problem/Interventions:   Problem 1       Row Name 12/08/23 1336          Nutrition Diagnoses Problem 1    Problem 1 Other (comment)  Severe chronic disease related malnutrition related to inadequate calorie and protein intake, CHF as evidenced by muscle and fat loss on exam, less 75% of est energy requirement >= 1month.                          Intervention Goal       Row Name 12/08/23 1336          Intervention Goal    General Meet nutritional needs for age/condition     PO Increase intake;PO intake (%)     PO Intake % 50 %                    Nutrition Intervention       Row Name 12/08/23 1336          Nutrition Intervention    RD/Tech Action Follow Tx progress                      Education/Evaluation       Row Name 12/08/23 1336          Education    Education Other (comment)  encourage po and voice food prefs        Monitor/Evaluation    Monitor Per protocol;I&O;PO intake;Supplement intake;Pertinent labs;Weight;Symptoms                     Electronically signed by:  Anel Mejia RD  12/08/23 13:37 EST

## 2023-12-09 LAB
ANION GAP SERPL CALCULATED.3IONS-SCNC: 8.3 MMOL/L (ref 5–15)
BUN SERPL-MCNC: 27 MG/DL (ref 8–23)
BUN/CREAT SERPL: 31 (ref 7–25)
CA-I BLD-MCNC: 4.46 MG/DL (ref 4.6–5.4)
CALCIUM SPEC-SCNC: 7.9 MG/DL (ref 8.6–10.5)
CHLORIDE SERPL-SCNC: 91 MMOL/L (ref 98–107)
CO2 SERPL-SCNC: 25.7 MMOL/L (ref 22–29)
CREAT SERPL-MCNC: 0.87 MG/DL (ref 0.57–1)
DEPRECATED RDW RBC AUTO: 37.3 FL (ref 37–54)
EGFRCR SERPLBLD CKD-EPI 2021: 70 ML/MIN/1.73
ERYTHROCYTE [DISTWIDTH] IN BLOOD BY AUTOMATED COUNT: 11.9 % (ref 12.3–15.4)
GLUCOSE SERPL-MCNC: 112 MG/DL (ref 65–99)
HCT VFR BLD AUTO: 23.3 % (ref 34–46.6)
HGB BLD-MCNC: 8.4 G/DL (ref 12–15.9)
IRON 24H UR-MRATE: 37 MCG/DL (ref 37–145)
IRON SATN MFR SERPL: 18 % (ref 20–50)
Lab: ABNORMAL
MAGNESIUM SERPL-MCNC: 1.4 MG/DL (ref 1.6–2.4)
MCH RBC QN AUTO: 31 PG (ref 26.6–33)
MCHC RBC AUTO-ENTMCNC: 36.1 G/DL (ref 31.5–35.7)
MCV RBC AUTO: 86 FL (ref 79–97)
PLATELET # BLD AUTO: 323 10*3/MM3 (ref 140–450)
PMV BLD AUTO: 9.3 FL (ref 6–12)
POTASSIUM SERPL-SCNC: 4.1 MMOL/L (ref 3.5–5.2)
RBC # BLD AUTO: 2.71 10*6/MM3 (ref 3.77–5.28)
RETICS # AUTO: 0.03 10*6/MM3 (ref 0.02–0.13)
RETICS/RBC NFR AUTO: 1.27 % (ref 0.7–1.9)
SODIUM SERPL-SCNC: 125 MMOL/L (ref 136–145)
TIBC SERPL-MCNC: 202 MCG/DL (ref 298–536)
UIBC SERPL-MCNC: 165 MCG/DL (ref 112–346)
WBC NRBC COR # BLD AUTO: 5.8 10*3/MM3 (ref 3.4–10.8)

## 2023-12-09 PROCEDURE — 83540 ASSAY OF IRON: CPT | Performed by: STUDENT IN AN ORGANIZED HEALTH CARE EDUCATION/TRAINING PROGRAM

## 2023-12-09 PROCEDURE — 85027 COMPLETE CBC AUTOMATED: CPT | Performed by: STUDENT IN AN ORGANIZED HEALTH CARE EDUCATION/TRAINING PROGRAM

## 2023-12-09 PROCEDURE — 94799 UNLISTED PULMONARY SVC/PX: CPT

## 2023-12-09 PROCEDURE — 83550 IRON BINDING TEST: CPT | Performed by: STUDENT IN AN ORGANIZED HEALTH CARE EDUCATION/TRAINING PROGRAM

## 2023-12-09 PROCEDURE — 83735 ASSAY OF MAGNESIUM: CPT | Performed by: STUDENT IN AN ORGANIZED HEALTH CARE EDUCATION/TRAINING PROGRAM

## 2023-12-09 PROCEDURE — 99232 SBSQ HOSP IP/OBS MODERATE 35: CPT | Performed by: STUDENT IN AN ORGANIZED HEALTH CARE EDUCATION/TRAINING PROGRAM

## 2023-12-09 PROCEDURE — 82330 ASSAY OF CALCIUM: CPT

## 2023-12-09 PROCEDURE — 25010000002 HYDRALAZINE PER 20 MG: Performed by: PHYSICIAN ASSISTANT

## 2023-12-09 PROCEDURE — 80048 BASIC METABOLIC PNL TOTAL CA: CPT | Performed by: STUDENT IN AN ORGANIZED HEALTH CARE EDUCATION/TRAINING PROGRAM

## 2023-12-09 PROCEDURE — 25010000002 ENOXAPARIN PER 10 MG: Performed by: HOSPITALIST

## 2023-12-09 PROCEDURE — 85045 AUTOMATED RETICULOCYTE COUNT: CPT | Performed by: STUDENT IN AN ORGANIZED HEALTH CARE EDUCATION/TRAINING PROGRAM

## 2023-12-09 RX ORDER — CETIRIZINE HYDROCHLORIDE 10 MG/1
10 TABLET ORAL DAILY
Status: DISCONTINUED | OUTPATIENT
Start: 2023-12-09 | End: 2023-12-11 | Stop reason: HOSPADM

## 2023-12-09 RX ORDER — TORSEMIDE 20 MG/1
20 TABLET ORAL DAILY
Status: DISCONTINUED | OUTPATIENT
Start: 2023-12-10 | End: 2023-12-11 | Stop reason: HOSPADM

## 2023-12-09 RX ADMIN — ENOXAPARIN SODIUM 40 MG: 40 INJECTION SUBCUTANEOUS at 15:00

## 2023-12-09 RX ADMIN — HYDRALAZINE HYDROCHLORIDE 10 MG: 20 INJECTION INTRAMUSCULAR; INTRAVENOUS at 16:31

## 2023-12-09 RX ADMIN — TORSEMIDE 30 MG: 20 TABLET ORAL at 08:28

## 2023-12-09 RX ADMIN — CETIRIZINE HYDROCHLORIDE 10 MG: 10 TABLET, FILM COATED ORAL at 16:31

## 2023-12-09 RX ADMIN — HYDROCORTISONE 1 APPLICATION: 1 CREAM TOPICAL at 08:28

## 2023-12-09 RX ADMIN — SPIRONOLACTONE 12.5 MG: 25 TABLET ORAL at 08:28

## 2023-12-09 NOTE — PLAN OF CARE
Goal Outcome Evaluation: Patient              Outcome Evaluation: Pt had no c/o of n/v/d. Pt had several very small bowel movements. Pt did not want anything to drink or eat during the night. A little agitated at 1st, thought she would go home yesterday. Anxious to go home. NSR on tele.

## 2023-12-09 NOTE — PLAN OF CARE
Goal Outcome Evaluation:              Outcome Evaluation: Pt had no c/o of n/v/d. Pt had several very small bowel movements. Pt did not want anything to drink or eat during the night. A little agitated at 1st, thought she would go home yesterday. Anxious to go home. NSR on tele.    Daily Care Plan Summary: Heart Failure    Diuretic in use (IV or PO):   PO        Daily weight (up or down):    loss: 3 lbs      Output > Intake (yes/no):      O2 Requirements (current, any change?): room air      Symptoms noted with Activity (Respiratory Tolerance, functional state):     none      Anticipated Discharge Plans:     plan continuing at this time

## 2023-12-09 NOTE — PLAN OF CARE
Problem: Adjustment to Illness (Heart Failure)  Goal: Optimal Coping  12/9/2023 1345 by Lucita Reagan, RN  Outcome: Ongoing, Progressing  12/9/2023 1342 by Lucita Reagan, RN  Outcome: Ongoing, Progressing   Goal Outcome Evaluation:           Progress: improving  Outcome Evaluation: patient ambulating to bathroom with assist, some incontinence noted. lovenox for DVT prevention. cream applied for itching/ hives. edema noted in BLE. continues to refuse diet recommendations.       Daily Care Plan Summary: Heart Failure    Diuretic in use (IV or PO):   PO        Daily weight (up or down):    loss: 2lbs      Output > Intake (yes/no):Yes      O2 Requirements (current, any change?): room air      Symptoms noted with Activity (Respiratory Tolerance, functional state):     none noted at this time       Anticipated Discharge Plans:     home

## 2023-12-09 NOTE — PROGRESS NOTES
"SERVICE: [facility] HOSPITALIST    CONSULTANTS: Cardiology    CHIEF COMPLAINT: Shortness of breath    SUBJECTIVE: Patient seen and examined at bedside. Patient reports no difficulty with breathing, denies palpitations.  Reports feeling the need to urinate frequently.  She wants to go home.  She complains about itching from her hives primarily on her buttocks, but also on her arms and legs.  The hydrocortisone cream may be providing some relief.  Patient denies headache, fever or chills, nausea, vomiting, diarrhea, abdominal pain, chest pain or palpitations, shortness of breath, wheezing or coughing, leg pain.     OBJECTIVE:    Physical exam is largely unchanged from previous exam, except where documented below, examination is accurate as of 12/9/2023    Physical Exam:  General: Patient is awake and alert. No acute distress noted.   HENT: Head is atraumatic, normocephalic. Hearing is grossly intact.    Eyes: Vision is grossly intact. Pupils appear equal and round.   Cardiovascular: Heart has regular rate and rhythm with no murmurs, rubs or gallops noted.   Respiratory: Lungs are clear to ausculation without wheezes, rhonchi or rales.   Abdominal/GI: Soft, nontender, bowel sounds present. No rebound or guarding present.   Extremities: No peripheral edema noted.   Musculoskeletal: Spontaneous movement of bilateral upper and lower extremities against gravity noted. No signs of injury or deformity noted.   Skin: Warm and dry.  Hives noted, most severe on buttocks and posterior thighs.  Significant itching in these locations, as well as arms and shoulders.  Psych: Mood and affect are appropriate. Cooperative with exam.   Neuro: No facial asymmetry noted. No focal deficits noted, hearing and vision are grossly intact.     /85 (BP Location: Left arm, Patient Position: Lying)   Pulse 79   Temp 97.3 °F (36.3 °C) (Oral)   Resp 16   Ht 162.6 cm (64\")   Wt 54.3 kg (119 lb 12.8 oz)   SpO2 95%   BMI " 20.56 kg/m²     MEDS/LABS REVIEWED AND ORDERED    cetirizine, 10 mg, Oral, Daily  enoxaparin, 40 mg, Subcutaneous, Q24H  hydrocortisone, 1 application , Topical, Q12H  spironolactone, 12.5 mg, Oral, Daily  [START ON 12/10/2023] torsemide, 20 mg, Oral, Daily          LAB/DIAGNOSTICS:    Lab Results (last 24 hours)       Procedure Component Value Units Date/Time    Iron Profile [[id number]]  (Abnormal) Collected: 12/09/23 0535    Specimen: Blood Updated: 12/09/23 0807     Iron 37 mcg/dL      Iron Saturation (TSAT) 18 %      TIBC 202 mcg/dL      UIBC 165 mcg/dL     Reticulocytes [[id number]]  (Normal) Collected: 12/09/23 0535    Specimen: Blood Updated: 12/09/23 0757     Reticulocyte % 1.27 %      Reticulocyte Absolute 0.0342 10*6/mm3     Calcium, Ionized [[id number]]  (Abnormal) Collected: 12/09/23 0653    Specimen: Blood Updated: 12/09/23 0655     Ionized Calcium 4.46 mg/dL      Comment: 84 Value below reference range        Collected by [id number]     Comment: Meter: [id number]     :  [id number]       Basic Metabolic Panel [[id number]]  (Abnormal) Collected: 12/09/23 0535    Specimen: Blood Updated: 12/09/23 0630     Glucose 112 mg/dL      BUN 27 mg/dL      Creatinine 0.87 mg/dL      Sodium 125 mmol/L      Potassium 4.1 mmol/L      Chloride 91 mmol/L      CO2 25.7 mmol/L      Calcium 7.9 mg/dL      BUN/Creatinine Ratio 31.0     Anion Gap 8.3 mmol/L      eGFR 70.0 mL/min/1.73     Narrative:      GFR Normal >60  Chronic Kidney Disease <60  Kidney Failure <15    The GFR formula is only valid for adults with stable renal function between ages 18 and 70.    Magnesium [[id number]]  (Abnormal) Collected: 12/09/23 0535    Specimen: Blood Updated: 12/09/23 0630     Magnesium 1.4 mg/dL     CBC (No Diff) [[id number]]  (Abnormal) Collected: 12/09/23 0535    Specimen: Blood Updated: 12/09/23 0608     WBC 5.80 10*3/mm3      RBC 2.71 10*6/mm3      Hemoglobin 8.4 g/dL      Hematocrit 23.3 %      MCV 86.0 fL      MCH 31.0 pg       MCHC 36.1 g/dL      RDW 11.9 %      RDW-SD 37.3 fl      MPV 9.3 fL      Platelets 323 10*3/mm3           ECG 12 Lead Dyspnea   Final Result   HEART RATE= 89  bpm   RR Interval= 672  ms   NY Interval= 209  ms   P Horizontal Axis= -33  deg   P Front Axis= 84  deg   QRSD Interval= 93  ms   QT Interval= 392  ms   QTcB= 478  ms   QRS Axis= 2  deg   T Wave Axis= 129  deg   - ABNORMAL ECG -   Sinus rhythm   LVH with secondary repolarization abnormality   Anterior infarct, old   When compared with ECG of 05-Dec-2023 11:44:17,   No significant change   Electronically Signed By: Juan Griggs ([facility]) 05-Dec-2023 21:28:05   Date and Time of Study: 2023-12-05 12:44:33      ECG 12 Lead Dyspnea   Final Result   HEART RATE= 94  bpm   RR Interval= 636  ms   NY Interval= 189  ms   P Horizontal Axis= 2  deg   P Front Axis= 66  deg   QRSD Interval= 96  ms   QT Interval= 370  ms   QTcB= 464  ms   QRS Axis= 3  deg   T Wave Axis= 123  deg   - ABNORMAL ECG -   Sinus rhythm   LVH with secondary repolarization abnormality   Anterior Q waves, possibly due to LVH   When compared with ECG of 18-Jul-2023 18:41:09,   No significant change   Electronically Signed By: Juan Griggs ([facility]) 05-Dec-2023 21:28:02   Date and Time of Study: 2023-12-05 11:44:17      SCANNED - TELEMETRY     Final Result        Results for orders placed during the hospital encounter of 12/05/23    Adult Transthoracic Echo Limited W/ Cont if Necessary Per Protocol    Interpretation Summary    Left ventricular systolic function is normal. Calculated left ventricular EF = 61.9% Abnormal global longitudinal LV strain (GLS) = -14.7%. Left ventricle strain data was reviewed by the physician and found to be accurate. Normal left ventricular cavity size noted. Left ventricular wall thickness is consistent with mild concentric hypertrophy. All left ventricular wall segments contract normally. Left ventricular diastolic function is consistent with (grade II w/high LAP)  "pseudonormalization. The myocardium has a speckled, echo bright appearance.    The left atrial cavity is moderately dilated.    Mild to moderate mitral valve regurgitation is present.    Mild to moderate tricuspid valve regurgitation is present. Estimated right ventricular systolic pressure from tricuspid regurgitation is mildly elevated (35-45 mmHg). Calculated right ventricular systolic pressure from tricuspid regurgitation is 44.4 mmHg.    The inferior vena cava is normally sized. Normal IVC inspiratory collapse of greater than 50% noted.    There is a small (<1cm) pericardial effusion. There is a moderate sized left pleural effusion.    XR Chest 1 View    Result Date: 12/8/2023  1.  Slight radiographic improvement in vascular congestion since 12/5/2023. 2.  Bilateral pleural effusions and lung base atelectasis, moderate on the right and small on the left.  This report was finalized on 12/8/2023 8:11 AM by Dr. Kali Wong MD.         ASSESSMENT/PLAN:  Please note portions of this assessment/plan may have been copied and pasted, but I have personally seen this patient and reviewed each line of this assessment and plan for accuracy and made updates to reflect my necessary changes on 12/9/2023    Acute-on-Chronic HFpEF: Appears to have diuresed well based on my exam today.  Suspect I&O's and weights not completely accurate.  Echo reviewed and \"speckled\" pattern and longitudinal LV strain reported.  Reducing torsemide today, cardiology following, but did not see patient today.  Hyponatremia not improved over the last 3 days.  Elevated BP: Mildly elevated over the past 24 hours.  Losartan was stopped given reported hives.  Unclear from previous notes whether hives had resolved completely, but she continues to have some today, so hesitant to restart losartan.  Providing hydralazine as needed, will consider switching to scheduled oral hydralazine.  Acute on Chronic Hyponatremia: baseline Na around 130.after some " "improvement to 125, has been stable for the past 3 days.  Continue fluid restriction.  Reducing torsemide dosage.  Severe Malnutrition: Nutritionist following.  See below.  Hx/O Dysphagia: Per staff, she is not following the dietary restrictions of nectar thick liquids at home and signed waiver and does not want the modified diet  Hx/O CVA: No acute issues.  Lipid panel excellent.  continue PT.  Staff to help patient ambulate in hallways.  Hives: Uncertain etiology.  Not fully resolved.  Continuing to hold losartan.  Continue steroid cream and adding Zyrtec.      PLAN FOR DISPOSITION: Home after resolution of hives, improved BP control, with cardiology follow-up outpatient.    Sachin Dang MD  Hospitalist, Family Medicine  [facility]  12/09/23  07:48 EST    At [facility], we believe that sharing information builds trust and better relationships. You are receiving this note because you recently visited [facility]. It is possible you will see health information before a provider has talked with you about it. This kind of information can be easy to misunderstand. To help you fully understand what it means for your health, we urge you to discuss this note with your provider.    \"Dictated utilizing Dragon dictation\"    "

## 2023-12-09 NOTE — PLAN OF CARE
Goal Outcome Evaluation:           Progress: improving  Outcome Evaluation: patient ambulating to bathroom with assist, some incontinence noted. lovenox for DVT prevention. cream applied for itching/ hives. edema noted in BLE. continues to refuse diet recommendations.

## 2023-12-10 LAB
ANION GAP SERPL CALCULATED.3IONS-SCNC: 11.4 MMOL/L (ref 5–15)
ANION GAP SERPL CALCULATED.3IONS-SCNC: 13.1 MMOL/L (ref 5–15)
BUN SERPL-MCNC: 27 MG/DL (ref 8–23)
BUN SERPL-MCNC: 31 MG/DL (ref 8–23)
BUN/CREAT SERPL: 26.5 (ref 7–25)
BUN/CREAT SERPL: 30.7 (ref 7–25)
CALCIUM SPEC-SCNC: 8.4 MG/DL (ref 8.6–10.5)
CALCIUM SPEC-SCNC: 8.8 MG/DL (ref 8.6–10.5)
CHLORIDE SERPL-SCNC: 90 MMOL/L (ref 98–107)
CHLORIDE SERPL-SCNC: 92 MMOL/L (ref 98–107)
CO2 SERPL-SCNC: 20.9 MMOL/L (ref 22–29)
CO2 SERPL-SCNC: 22.6 MMOL/L (ref 22–29)
CREAT SERPL-MCNC: 0.88 MG/DL (ref 0.57–1)
CREAT SERPL-MCNC: 1.17 MG/DL (ref 0.57–1)
DEPRECATED RDW RBC AUTO: 37 FL (ref 37–54)
EGFRCR SERPLBLD CKD-EPI 2021: 49.1 ML/MIN/1.73
EGFRCR SERPLBLD CKD-EPI 2021: 69.1 ML/MIN/1.73
ERYTHROCYTE [DISTWIDTH] IN BLOOD BY AUTOMATED COUNT: 11.8 % (ref 12.3–15.4)
GLUCOSE SERPL-MCNC: 112 MG/DL (ref 65–99)
GLUCOSE SERPL-MCNC: 165 MG/DL (ref 65–99)
HCT VFR BLD AUTO: 24.5 % (ref 34–46.6)
HGB BLD-MCNC: 8.7 G/DL (ref 12–15.9)
MAGNESIUM SERPL-MCNC: 1.5 MG/DL (ref 1.6–2.4)
MCH RBC QN AUTO: 30.5 PG (ref 26.6–33)
MCHC RBC AUTO-ENTMCNC: 35.5 G/DL (ref 31.5–35.7)
MCV RBC AUTO: 86 FL (ref 79–97)
PLATELET # BLD AUTO: 314 10*3/MM3 (ref 140–450)
PMV BLD AUTO: 8.8 FL (ref 6–12)
POTASSIUM SERPL-SCNC: 3.6 MMOL/L (ref 3.5–5.2)
POTASSIUM SERPL-SCNC: 4 MMOL/L (ref 3.5–5.2)
RBC # BLD AUTO: 2.85 10*6/MM3 (ref 3.77–5.28)
SODIUM SERPL-SCNC: 124 MMOL/L (ref 136–145)
SODIUM SERPL-SCNC: 126 MMOL/L (ref 136–145)
WBC NRBC COR # BLD AUTO: 6.71 10*3/MM3 (ref 3.4–10.8)

## 2023-12-10 PROCEDURE — 83735 ASSAY OF MAGNESIUM: CPT | Performed by: STUDENT IN AN ORGANIZED HEALTH CARE EDUCATION/TRAINING PROGRAM

## 2023-12-10 PROCEDURE — 94799 UNLISTED PULMONARY SVC/PX: CPT

## 2023-12-10 PROCEDURE — 80048 BASIC METABOLIC PNL TOTAL CA: CPT | Performed by: STUDENT IN AN ORGANIZED HEALTH CARE EDUCATION/TRAINING PROGRAM

## 2023-12-10 PROCEDURE — 25010000002 ENOXAPARIN PER 10 MG: Performed by: HOSPITALIST

## 2023-12-10 PROCEDURE — 85027 COMPLETE CBC AUTOMATED: CPT | Performed by: STUDENT IN AN ORGANIZED HEALTH CARE EDUCATION/TRAINING PROGRAM

## 2023-12-10 PROCEDURE — 99232 SBSQ HOSP IP/OBS MODERATE 35: CPT | Performed by: STUDENT IN AN ORGANIZED HEALTH CARE EDUCATION/TRAINING PROGRAM

## 2023-12-10 PROCEDURE — 25010000002 HYDRALAZINE PER 20 MG: Performed by: PHYSICIAN ASSISTANT

## 2023-12-10 RX ORDER — LOSARTAN POTASSIUM 50 MG/1
50 TABLET ORAL
Status: DISCONTINUED | OUTPATIENT
Start: 2023-12-10 | End: 2023-12-11 | Stop reason: HOSPADM

## 2023-12-10 RX ORDER — POTASSIUM CHLORIDE 20 MEQ/1
40 TABLET, EXTENDED RELEASE ORAL DAILY
Status: DISCONTINUED | OUTPATIENT
Start: 2023-12-10 | End: 2023-12-10

## 2023-12-10 RX ADMIN — CETIRIZINE HYDROCHLORIDE 10 MG: 10 TABLET, FILM COATED ORAL at 08:24

## 2023-12-10 RX ADMIN — HYDRALAZINE HYDROCHLORIDE 10 MG: 20 INJECTION INTRAMUSCULAR; INTRAVENOUS at 07:20

## 2023-12-10 RX ADMIN — TORSEMIDE 20 MG: 20 TABLET ORAL at 08:24

## 2023-12-10 RX ADMIN — ENOXAPARIN SODIUM 40 MG: 40 INJECTION SUBCUTANEOUS at 15:10

## 2023-12-10 RX ADMIN — SPIRONOLACTONE 12.5 MG: 25 TABLET ORAL at 08:24

## 2023-12-10 RX ADMIN — LOSARTAN POTASSIUM 50 MG: 50 TABLET, FILM COATED ORAL at 08:50

## 2023-12-10 RX ADMIN — Medication 400 MG: at 15:09

## 2023-12-10 RX ADMIN — HYDROCORTISONE 1 APPLICATION: 1 CREAM TOPICAL at 08:25

## 2023-12-10 RX ADMIN — POTASSIUM CHLORIDE 40 MEQ: 1500 TABLET, EXTENDED RELEASE ORAL at 15:09

## 2023-12-10 NOTE — PLAN OF CARE
Goal Outcome Evaluation:           Progress: improving  Outcome Evaluation: VSS, room air, daily weights, bed alarm on , A & O, michaelck, Strict I & O's, patient wants to go home.

## 2023-12-10 NOTE — PLAN OF CARE
Goal Outcome Evaluation:           Progress: improving  Outcome Evaluation: vss, up in chair this shift. hives appear improved, losartan restarted today. anticipating home soon       Daily Care Plan Summary: Heart Failure    Diuretic in use (IV or PO):   PO        Daily weight (up or down):        Output > Intake (yes/no):Yes      O2 Requirements (current, any change?): room air      Symptoms noted with Activity (Respiratory Tolerance, functional state):     none noted       Anticipated Discharge Plans:     home

## 2023-12-11 ENCOUNTER — READMISSION MANAGEMENT (OUTPATIENT)
Dept: CALL CENTER | Facility: HOSPITAL | Age: 73
End: 2023-12-11
Payer: MEDICARE

## 2023-12-11 VITALS
BODY MASS INDEX: 20.44 KG/M2 | RESPIRATION RATE: 19 BRPM | HEART RATE: 75 BPM | OXYGEN SATURATION: 99 % | SYSTOLIC BLOOD PRESSURE: 138 MMHG | WEIGHT: 119.72 LBS | HEIGHT: 64 IN | TEMPERATURE: 98.3 F | DIASTOLIC BLOOD PRESSURE: 63 MMHG

## 2023-12-11 LAB
ANION GAP SERPL CALCULATED.3IONS-SCNC: 10.9 MMOL/L (ref 5–15)
BUN SERPL-MCNC: 32 MG/DL (ref 8–23)
BUN/CREAT SERPL: 29.9 (ref 7–25)
CALCIUM SPEC-SCNC: 8.4 MG/DL (ref 8.6–10.5)
CHLORIDE SERPL-SCNC: 93 MMOL/L (ref 98–107)
CO2 SERPL-SCNC: 23.1 MMOL/L (ref 22–29)
CREAT SERPL-MCNC: 1.07 MG/DL (ref 0.57–1)
DEPRECATED RDW RBC AUTO: 36.9 FL (ref 37–54)
EGFRCR SERPLBLD CKD-EPI 2021: 54.6 ML/MIN/1.73
ERYTHROCYTE [DISTWIDTH] IN BLOOD BY AUTOMATED COUNT: 11.9 % (ref 12.3–15.4)
GLUCOSE SERPL-MCNC: 114 MG/DL (ref 65–99)
HCT VFR BLD AUTO: 23.6 % (ref 34–46.6)
HGB BLD-MCNC: 8.6 G/DL (ref 12–15.9)
MAGNESIUM SERPL-MCNC: 1.5 MG/DL (ref 1.6–2.4)
MCH RBC QN AUTO: 31.2 PG (ref 26.6–33)
MCHC RBC AUTO-ENTMCNC: 36.4 G/DL (ref 31.5–35.7)
MCV RBC AUTO: 85.5 FL (ref 79–97)
PLATELET # BLD AUTO: 288 10*3/MM3 (ref 140–450)
PMV BLD AUTO: 8.6 FL (ref 6–12)
POTASSIUM SERPL-SCNC: 3.7 MMOL/L (ref 3.5–5.2)
RBC # BLD AUTO: 2.76 10*6/MM3 (ref 3.77–5.28)
SODIUM SERPL-SCNC: 127 MMOL/L (ref 136–145)
WBC NRBC COR # BLD AUTO: 5.06 10*3/MM3 (ref 3.4–10.8)

## 2023-12-11 PROCEDURE — 80048 BASIC METABOLIC PNL TOTAL CA: CPT | Performed by: STUDENT IN AN ORGANIZED HEALTH CARE EDUCATION/TRAINING PROGRAM

## 2023-12-11 PROCEDURE — 99232 SBSQ HOSP IP/OBS MODERATE 35: CPT | Performed by: INTERNAL MEDICINE

## 2023-12-11 PROCEDURE — 83735 ASSAY OF MAGNESIUM: CPT | Performed by: STUDENT IN AN ORGANIZED HEALTH CARE EDUCATION/TRAINING PROGRAM

## 2023-12-11 PROCEDURE — 85027 COMPLETE CBC AUTOMATED: CPT | Performed by: STUDENT IN AN ORGANIZED HEALTH CARE EDUCATION/TRAINING PROGRAM

## 2023-12-11 RX ORDER — TORSEMIDE 20 MG/1
20 TABLET ORAL DAILY
Qty: 30 TABLET | Refills: 0 | Status: SHIPPED | OUTPATIENT
Start: 2023-12-12 | End: 2023-12-18 | Stop reason: SDUPTHER

## 2023-12-11 RX ORDER — LOSARTAN POTASSIUM 50 MG/1
50 TABLET ORAL
Qty: 30 TABLET | Refills: 0 | Status: SHIPPED | OUTPATIENT
Start: 2023-12-12

## 2023-12-11 RX ADMIN — TORSEMIDE 20 MG: 20 TABLET ORAL at 09:18

## 2023-12-11 RX ADMIN — Medication 400 MG: at 09:17

## 2023-12-11 RX ADMIN — CETIRIZINE HYDROCHLORIDE 10 MG: 10 TABLET, FILM COATED ORAL at 09:18

## 2023-12-11 RX ADMIN — LOSARTAN POTASSIUM 50 MG: 50 TABLET, FILM COATED ORAL at 09:18

## 2023-12-11 NOTE — PROGRESS NOTES
LOS: 6 days   Patient Care Team:  Lanie Gomez APRN as PCP - General (Family Medicine)    Chief Complaint:     F/u HFpEF    Interval History:     She denies chest pain, difficulty breathing, tachycardia, dizziness.    Objective   Vital Signs  Temp:  [97.9 °F (36.6 °C)-98.5 °F (36.9 °C)] 98.2 °F (36.8 °C)  Heart Rate:  [72-81] 75  Resp:  [16] 16  BP: (130-153)/(59-72) 153/72    Intake/Output Summary (Last 24 hours) at 12/11/2023 0737  Last data filed at 12/10/2023 1800  Gross per 24 hour   Intake 480 ml   Output 350 ml   Net 130 ml       Comfortable NAD  Neck supple, no JVD or thyromegaly appreciated  S1/S2 RRR, no m/r/g  Lungs CTA B, normal effort  Abdomen S/NT/ND (+) BS, no HSM appreciated  Extremities warm, no clubbing, cyanosis, or edema  No visible or palpable skin lesions  A/Ox4, mood and affect appropriate    Results Review:      Results from last 7 days   Lab Units 12/11/23  0416 12/10/23  1437 12/10/23  0654   SODIUM mmol/L 127* 124* 126*   POTASSIUM mmol/L 3.7 4.0 3.6   CHLORIDE mmol/L 93* 90* 92*   CO2 mmol/L 23.1 20.9* 22.6   BUN mg/dL 32* 31* 27*   CREATININE mg/dL 1.07* 1.17* 0.88   GLUCOSE mg/dL 114* 165* 112*   CALCIUM mg/dL 8.4* 8.8 8.4*     Results from last 7 days   Lab Units 12/05/23  1359 12/05/23  1143   HSTROP T ng/L 49* 53*     Results from last 7 days   Lab Units 12/11/23  0416 12/10/23  0653 12/09/23  0535   WBC 10*3/mm3 5.06 6.71 5.80   HEMOGLOBIN g/dL 8.6* 8.7* 8.4*   HEMATOCRIT % 23.6* 24.5* 23.3*   PLATELETS 10*3/mm3 288 314 323     Results from last 7 days   Lab Units 12/05/23  1143   INR  1.03     Results from last 7 days   Lab Units 12/05/23  1359   CHOLESTEROL mg/dL 147     Results from last 7 days   Lab Units 12/11/23  0416   MAGNESIUM mg/dL 1.5*     Results from last 7 days   Lab Units 12/05/23  1359   CHOLESTEROL mg/dL 147   TRIGLYCERIDES mg/dL 86   HDL CHOL mg/dL 61*   LDL CHOL mg/dL 70       I reviewed the patient's new clinical results.  I personally viewed and interpreted  the patient's EKG/Telemetry data        Medication Review:   cetirizine, 10 mg, Oral, Daily  enoxaparin, 40 mg, Subcutaneous, Q24H  hydrocortisone, 1 application , Topical, Q12H  losartan, 50 mg, Oral, Q24H  magnesium oxide, 400 mg, Oral, Daily  torsemide, 20 mg, Oral, Daily             Assessment & Plan       Severe malnutrition    Acute on chronic heart failure with preserved ejection fraction (HFpEF)    History of stroke    Hypertensive urgency    Elevated troponin    Acute HFpEF, improved, on torsemide 20mg daily.   Hypertensive urgency, better  Troponin elevation  Hyponatremia - chronic.  Likely SIADH  History of stroke  Malnutrition - low albumin contributing likely to edema and maybe effusions.   Hives-IV Lasix was stopped and switched to torsemide, losartan held due to this. May have been iv lasix, doing better on zyrtec and is back on losartan.   Anemia - worsened.        Anemia severe but stable - change from prior and may be nutritional but occult stool ordered, increase MagOx due to low Mag.  BP better on losartan.     We will see as needed.  Overall stable cardiac status.    Mena Machuca MD  12/11/23  07:37 EST

## 2023-12-11 NOTE — CASE MANAGEMENT/SOCIAL WORK
Continued Stay Note  RADHA Capone     Patient Name: Arline Landaverde  MRN: [id number]  Today's Date: 12/11/2023    Admit Date: 12/5/2023    Plan: plan home with s/o - [facility]   Discharge Plan       Row Name 12/11/23 1136       Plan    Plan plan home with s/o - [facility]     Patient/Family in Agreement with Plan yes    Plan Comments Follow up visit with patient, she is sitting up in chair with s/o pressent. Plan remains to reutrn home with s/o providing 24/7 care. Interest expressed  for HH [facility], they have used [facility] in the past. Dr Low updated and agrees with HH following at [facility]. referral placed via [facility] & spoke with Kim/[facility] - informed plan to dc home today. CM will follow through [facility].                   Discharge Codes    No documentation.                       John Conner RN

## 2023-12-11 NOTE — PROGRESS NOTES
Adult Nutrition  Assessment/PES    Patient Name:  Arline Landaverde  YOB: 1950  MRN: [id number]  Admit Date:  12/5/2023    Assessment Date:  12/11/2023    Comments:  Pt only drinks jean claude supplement, altered her flavor pref in system for trays.  Pt continues with poor intake ave 22% x 9 meals.     Recommend: 3 boost Plus per day would at least provide 66% of min est calorie needs.     Pt needs to consider g-tube for alt route of feeding     Will cont to follow and monitor.      Reason for Assessment       Row Name 12/11/23 1126          Reason for Assessment    Reason For Assessment follow-up protocol  HF, age/BMI     Diagnosis cardiac disease  Acute on CHF, HTN, weakness/falls, severe malnutrition                    Nutrition/Diet History       Row Name 12/11/23 1126          Nutrition/Diet History    Typical Intake (Food/Fluid/EN/PN) Pt up in chair, SO brushing her hair. No am meal consumed, he reports she won't drink vanilla boost.Offered other food from floor at visit.                    Labs/Tests/Procedures/Meds       Row Name 12/11/23 1127          Labs/Procedures/Meds    Lab Results Reviewed reviewed     Lab Results Comments Na 127 L, glu 114, BUn 32/creat 1 elevated, Mg 1.5 L        Diagnostic Tests/Procedures    Diagnostic Test/Procedure Reviewed reviewed        Medications    Pertinent Medications Reviewed reviewed     Pertinent Medications Comments magox, toresemide                        Nutrition Prescription Ordered       Row Name 12/11/23 1129          Nutrition Prescription PO    Current PO Diet Soft Texture     Texture Whole foods     Supplement Boost Plus (Ensure Enlive, Ensure Plus)     Supplement Frequency 3 times a day     Common Modifiers Cardiac                    Evaluation of Received Nutrient/Fluid Intake       Row Name 12/11/23 1129          Fluid Intake Evaluation    Oral Fluid (mL) 560  ave x 3, 40%        PO Evaluation    Number of Meals 9  100% x 2 supplements recorded      % PO Intake 22%                       Problem/Interventions:   Problem 1       Row Name 12/11/23 1132          Nutrition Diagnoses Problem 1    Problem 1 Other (comment)  Severe chronic disease related malnutrition related to inadequate calorie and protein intake, CHF as evidenced by muscle and fat loss on exam, less 75% of est energy requirement >= 1month.                          Intervention Goal       Row Name 12/11/23 1132          Intervention Goal    General Meet nutritional needs for age/condition     PO Increase intake;PO intake (%)     PO Intake % 50 %                    Nutrition Intervention       Row Name 12/11/23 1132          Nutrition Intervention    RD/Tech Action Encourage intake;Advise available snack;Advise alternate selection;Follow Tx progress;Adjusted supplement  adjusted flavor                      Education/Evaluation       Row Name 12/11/23 1132          Education    Education Other (comment)  encouraged po, supplement, voice prefs, ask for snacks from unit.        Monitor/Evaluation    Monitor Per protocol;I&O;PO intake;Supplement intake;Pertinent labs;Weight;Symptoms     Education Follow-up Other (comment)  SO verbalized understanding                     Electronically signed by:  Anel Mejia RD  12/11/23 11:33 EST

## 2023-12-11 NOTE — PLAN OF CARE
Goal Outcome Evaluation:  Plan of Care Reviewed With: patient        Progress: improving  Outcome Evaluation: VSS, hives noted to have cleared, states feeling better and wishes to go home today    Daily Care Plan Summary: Heart Failure    Diuretic in use (IV or PO):   IV        Daily weight (up or down):    loss: lbs      Output > Intake (yes/no):Yes      O2 Requirements (current, any change?): room air      Symptoms noted with Activity (Respiratory Tolerance, functional state):    none      Anticipated Discharge Plans:     home

## 2023-12-11 NOTE — DISCHARGE SUMMARY
Arline Landaverde  1950  [id number]    Hospitalists Discharge Summary    Date of Admission: 12/5/2023  Date of Discharge:  12/11/2023    Primary Discharge Diagnoses: ***    Secondary Discharge Diagnoses: ***      History of Present Illness:***    Hospital Course:      PCP  Patient Care Team:  Lanie Gomez APRN as PCP - General (Family Medicine)    Consults:   Consults       No orders found from 11/6/2023 to 12/6/2023.            Operations and Procedures Performed:       XR Chest 1 View    Result Date: 12/8/2023  Narrative: CHEST X-RAY, 12/8/2023  HISTORY: Follow-up congestive heart failure.  TECHNIQUE: AP portable chest x-ray.  COMPARISON: *  Chest x-ray, 12/5/2023.  FINDINGS: Stable mild cardiomegaly and mild pulmonary venous redistribution. Mild interstitial edema has improved since the prior study. Moderate right and small left pleural effusions with associated lung base atelectasis remain present.      Impression: 1.  Slight radiographic improvement in vascular congestion since 12/5/2023. 2.  Bilateral pleural effusions and lung base atelectasis, moderate on the right and small on the left.  This report was finalized on 12/8/2023 8:11 AM by Dr. Kali Wong MD.      Adult Transthoracic Echo Limited W/ Cont if Necessary Per Protocol    Result Date: 12/6/2023  Narrative:   Left ventricular systolic function is normal. Calculated left ventricular EF = 61.9% Abnormal global longitudinal LV strain (GLS) = -14.7%. Left ventricle strain data was reviewed by the physician and found to be accurate. Normal left ventricular cavity size noted. Left ventricular wall thickness is consistent with mild concentric hypertrophy. All left ventricular wall segments contract normally. Left ventricular diastolic function is consistent with (grade II w/high LAP) pseudonormalization. The myocardium has a speckled, echo bright appearance.   The left atrial cavity is moderately dilated.   Mild to moderate mitral valve  regurgitation is present.   Mild to moderate tricuspid valve regurgitation is present. Estimated right ventricular systolic pressure from tricuspid regurgitation is mildly elevated (35-45 mmHg). Calculated right ventricular systolic pressure from tricuspid regurgitation is 44.4 mmHg.   The inferior vena cava is normally sized. Normal IVC inspiratory collapse of greater than 50% noted.   There is a small (<1cm) pericardial effusion. There is a moderate sized left pleural effusion.     XR Chest 1 View    Result Date: 12/5/2023  Narrative: Chest 1 view  HISTORY: Shortness of breath for 2 days  COMPARISON: 7/18/2023  FINDINGS: Cardiomegaly is noted and has increased since the previous examination. Pulmonary vascular congestion is seen and there are moderate bilateral pleural effusions that are new since the previous exam. No pneumothorax.      Impression: Moderate congestive heart failure with bilateral pleural effusions. Vascular markings have not changed significantly since the previous examination but the effusions are new.  This report was finalized on 12/5/2023 12:19 PM by Dr. Robert West MD.       Allergies:  is allergic to penicillins.    Jona  {Desc; reviewed/not reviewed:06648}    Discharge Medications:     Discharge Medications        New Medications        Instructions Start Date   losartan 50 MG tablet  Commonly known as: COZAAR   50 mg, Oral, Every 24 Hours Scheduled   Start Date: December 12, 2023     magnesium oxide 400 tablet tablet  Commonly known as: MAG-OX   400 mg, Oral, 2 Times Daily      torsemide 20 MG tablet  Commonly known as: DEMADEX   20 mg, Oral, Daily   Start Date: December 12, 2023            Continue These Medications        Instructions Start Date   acetaminophen 325 MG tablet  Commonly known as: TYLENOL   650 mg, Oral, Every 6 Hours PRN      aspirin 81 MG EC tablet   81 mg, Oral, Every 12 Hours Scheduled      atorvastatin 20 MG tablet  Commonly known as: LIPITOR   20 mg, Oral, Daily                Last Lab Results:   Lab Results (most recent)       Procedure Component Value Units Date/Time    Basic Metabolic Panel [[id number]]  (Abnormal) Collected: 12/11/23 0416    Specimen: Blood Updated: 12/11/23 0443     Glucose 114 mg/dL      BUN 32 mg/dL      Creatinine 1.07 mg/dL      Sodium 127 mmol/L      Potassium 3.7 mmol/L      Chloride 93 mmol/L      CO2 23.1 mmol/L      Calcium 8.4 mg/dL      BUN/Creatinine Ratio 29.9     Anion Gap 10.9 mmol/L      eGFR 54.6 mL/min/1.73     Narrative:      GFR Normal >60  Chronic Kidney Disease <60  Kidney Failure <15    The GFR formula is only valid for adults with stable renal function between ages 18 and 70.    Magnesium [[id number]]  (Abnormal) Collected: 12/11/23 0416    Specimen: Blood Updated: 12/11/23 0443     Magnesium 1.5 mg/dL     CBC (No Diff) [[id number]]  (Abnormal) Collected: 12/11/23 0416    Specimen: Blood Updated: 12/11/23 0421     WBC 5.06 10*3/mm3      RBC 2.76 10*6/mm3      Hemoglobin 8.6 g/dL      Hematocrit 23.6 %      MCV 85.5 fL      MCH 31.2 pg      MCHC 36.4 g/dL      RDW 11.9 %      RDW-SD 36.9 fl      MPV 8.6 fL      Platelets 288 10*3/mm3     Basic Metabolic Panel [[id number]]  (Abnormal) Collected: 12/10/23 1437    Specimen: Blood Updated: 12/10/23 1458     Glucose 165 mg/dL      BUN 31 mg/dL      Creatinine 1.17 mg/dL      Sodium 124 mmol/L      Potassium 4.0 mmol/L      Chloride 90 mmol/L      CO2 20.9 mmol/L      Calcium 8.8 mg/dL      BUN/Creatinine Ratio 26.5     Anion Gap 13.1 mmol/L      eGFR 49.1 mL/min/1.73     Narrative:      GFR Normal >60  Chronic Kidney Disease <60  Kidney Failure <15    The GFR formula is only valid for adults with stable renal function between ages 18 and 70.    Magnesium [[id number]]  (Abnormal) Collected: 12/10/23 0654    Specimen: Blood Updated: 12/10/23 0718     Magnesium 1.5 mg/dL     CBC (No Diff) [[id number]]  (Abnormal) Collected: 12/10/23 0653    Specimen: Blood Updated: 12/10/23 0702     WBC 6.71  10*3/mm3      RBC 2.85 10*6/mm3      Hemoglobin 8.7 g/dL      Hematocrit 24.5 %      MCV 86.0 fL      MCH 30.5 pg      MCHC 35.5 g/dL      RDW 11.8 %      RDW-SD 37.0 fl      MPV 8.8 fL      Platelets 314 10*3/mm3     Iron Profile [[id number]]  (Abnormal) Collected: 12/09/23 0535    Specimen: Blood Updated: 12/09/23 0807     Iron 37 mcg/dL      Iron Saturation (TSAT) 18 %      TIBC 202 mcg/dL      UIBC 165 mcg/dL     Reticulocytes [[id number]]  (Normal) Collected: 12/09/23 0535    Specimen: Blood Updated: 12/09/23 0757     Reticulocyte % 1.27 %      Reticulocyte Absolute 0.0342 10*6/mm3     Calcium, Ionized [[id number]]  (Abnormal) Collected: 12/09/23 0653    Specimen: Blood Updated: 12/09/23 0655     Ionized Calcium 4.46 mg/dL      Comment: 84 Value below reference range        Collected by [id number]     Comment: Meter: [id number]     :  [id number]       Protein Elec + Interp, Serum [[id number]]  (Abnormal) Collected: 12/06/23 1306    Specimen: Blood Updated: 12/07/23 1613     Total Protein 5.2 g/dL      Albumin 2.7 g/dL      Alpha-1-Globulin 0.4 g/dL      Alpha-2-Globulin 0.6 g/dL      Beta Globulin 0.8 g/dL      Gamma Globulin 0.7 g/dL      M-Demar Not Observed g/dL      Globulin 2.5 g/dL      A/G Ratio 1.1     Please note Comment     Comment: Protein electrophoresis scan will follow via computer, mail, or   delivery.        SPE Interpretation Comment:     Comment: SPE shows decreased albumin and total protein.       Narrative:      Performed at:  [address] [place] [address]  [id number]  : Gene Stallworth PhD, Phone:  [phone]    Comprehensive Metabolic Panel [[id number]]  (Abnormal) Collected: 12/07/23 0400    Specimen: Blood Updated: 12/07/23 0521     Glucose 112 mg/dL      BUN 24 mg/dL      Creatinine 0.83 mg/dL      Sodium 124 mmol/L      Potassium 3.3 mmol/L      Chloride 90 mmol/L      CO2 23.5 mmol/L      Calcium 8.2 mg/dL      Total Protein 4.8 g/dL       Albumin 2.8 g/dL      ALT (SGPT) 11 U/L      AST (SGOT) 16 U/L      Alkaline Phosphatase 49 U/L      Total Bilirubin 0.2 mg/dL      Globulin 2.0 gm/dL      A/G Ratio 1.4 g/dL      BUN/Creatinine Ratio 28.9     Anion Gap 10.5 mmol/L      eGFR 74.1 mL/min/1.73     Narrative:      GFR Normal >60  Chronic Kidney Disease <60  Kidney Failure <15    The GFR formula is only valid for adults with stable renal function between ages 18 and 70.    CBC & Differential [[id number]]  (Abnormal) Collected: 12/07/23 0400    Specimen: Blood Updated: 12/07/23 0434    Narrative:      The following orders were created for panel order CBC & Differential.  Procedure                               Abnormality         Status                     ---------                               -----------         ------                     CBC Auto Differential[[id number]]        Abnormal            Final result                 Please view results for these tests on the individual orders.    CBC Auto Differential [[id number]]  (Abnormal) Collected: 12/07/23 0400    Specimen: Blood Updated: 12/07/23 0434     WBC 5.81 10*3/mm3      RBC 2.83 10*6/mm3      Hemoglobin 8.7 g/dL      Hematocrit 24.2 %      MCV 85.5 fL      MCH 30.7 pg      MCHC 36.0 g/dL      RDW 11.9 %      RDW-SD 36.7 fl      MPV 9.2 fL      Platelets 312 10*3/mm3      Neutrophil % 68.3 %      Lymphocyte % 17.0 %      Monocyte % 11.7 %      Eosinophil % 2.1 %      Basophil % 0.7 %      Immature Grans % 0.2 %      Neutrophils, Absolute 3.97 10*3/mm3      Lymphocytes, Absolute 0.99 10*3/mm3      Monocytes, Absolute 0.68 10*3/mm3      Eosinophils, Absolute 0.12 10*3/mm3      Basophils, Absolute 0.04 10*3/mm3      Immature Grans, Absolute 0.01 10*3/mm3      nRBC 0.0 /100 WBC     Vitamin D,25-Hydroxy [[id number]]  (Normal) Collected: 12/05/23 1359    Specimen: Blood Updated: 12/05/23 2034     25 Hydroxy, Vitamin D 39.0 ng/ml     Narrative:      Reference Range for Total Vitamin D 25(OH)      Deficiency <20.0 ng/mL   Insufficiency 21-29 ng/mL   Sufficiency  ng/mL  Toxicity >100 ng/ml      Lipid Panel [[id number]]  (Abnormal) Collected: 12/05/23 1359    Specimen: Blood from Arm, Left Updated: 12/05/23 1727     Total Cholesterol 147 mg/dL      Triglycerides 86 mg/dL      HDL Cholesterol 61 mg/dL      LDL Cholesterol  70 mg/dL      VLDL Cholesterol 16 mg/dL      LDL/HDL Ratio 1.13    Narrative:      Cholesterol Reference Ranges  (U.S. Department of Health and Human Services ATP III Classifications)    Desirable          <200 mg/dL  Borderline High    200-239 mg/dL  High Risk          >240 mg/dL      Triglyceride Reference Ranges  (U.S. Department of Health and Human Services ATP III Classifications)    Normal           <150 mg/dL  Borderline High  150-199 mg/dL  High             200-499 mg/dL  Very High        >500 mg/dL    HDL Reference Ranges  (U.S. Department of Health and Human Services ATP III Classifications)    Low     <40 mg/dl (major risk factor for CHD)  High    >60 mg/dl ('negative' risk factor for CHD)        LDL Reference Ranges  (U.S. Department of Health and Human Services ATP III Classifications)    Optimal          <100 mg/dL  Near Optimal     100-129 mg/dL  Borderline High  130-159 mg/dL  High             160-189 mg/dL  Very High        >189 mg/dL    TSH [[id number]]  (Normal) Collected: 12/05/23 1359    Specimen: Blood from Arm, Left Updated: 12/05/23 1625     TSH 1.770 uIU/mL     High Sensitivity Troponin T 2Hr [[id number]]  (Abnormal) Collected: 12/05/23 1359    Specimen: Blood from Arm, Left Updated: 12/05/23 1428     HS Troponin T 49 ng/L      Troponin T Delta -4 ng/L     Narrative:      High Sensitive Troponin T Reference Range:  <14.0 ng/L- Negative Female for AMI  <22.0 ng/L- Negative Male for AMI  >=14 - Abnormal Female indicating possible myocardial injury.  >=22 - Abnormal Male indicating possible myocardial injury.   Clinicians would have to utilize clinical acumen, EKG,  Troponin, and serial changes to determine if it is an Acute Myocardial Infarction or myocardial injury due to an underlying chronic condition.         BNP [[id number]]  (Abnormal) Collected: 12/05/23 1143    Specimen: Blood Updated: 12/05/23 1240     proBNP 6,232.0 pg/mL     Narrative:      This assay is used as an aid in the diagnosis of individuals suspected of having heart failure. It can be used as an aid in the diagnosis of acute decompensated heart failure (ADHF) in patients presenting with signs and symptoms of ADHF to the emergency department (ED). In addition, NT-proBNP of <300 pg/mL indicates ADHF is not likely.    Age Range Result Interpretation  NT-proBNP Concentration (pg/mL:      <50             Positive            >450                   Gray                 300-450                    Negative             <300    50-75           Positive            >900                  Gray                300-900                  Negative            <300      >75             Positive            >1800                  Gray                300-1800                  Negative            <300    High Sensitivity Troponin T [[id number]]  (Abnormal) Collected: 12/05/23 1143    Specimen: Blood Updated: 12/05/23 1221     HS Troponin T 53 ng/L     Narrative:      High Sensitive Troponin T Reference Range:  <14.0 ng/L- Negative Female for AMI  <22.0 ng/L- Negative Male for AMI  >=14 - Abnormal Female indicating possible myocardial injury.  >=22 - Abnormal Male indicating possible myocardial injury.   Clinicians would have to utilize clinical acumen, EKG, Troponin, and serial changes to determine if it is an Acute Myocardial Infarction or myocardial injury due to an underlying chronic condition.         Comprehensive Metabolic Panel [[id number]]  (Abnormal) Collected: 12/05/23 1143    Specimen: Blood Updated: 12/05/23 1216     Glucose 112 mg/dL      BUN 24 mg/dL      Creatinine 0.89 mg/dL      Sodium 120 mmol/L      Potassium 4.8  mmol/L      Chloride 88 mmol/L      CO2 21.3 mmol/L      Calcium 9.2 mg/dL      Total Protein 6.4 g/dL      Albumin 3.8 g/dL      ALT (SGPT) 14 U/L      AST (SGOT) 19 U/L      Alkaline Phosphatase 62 U/L      Total Bilirubin 0.3 mg/dL      Globulin 2.6 gm/dL      A/G Ratio 1.5 g/dL      BUN/Creatinine Ratio 27.0     Anion Gap 10.7 mmol/L      eGFR 68.1 mL/min/1.73     Narrative:      GFR Normal >60  Chronic Kidney Disease <60  Kidney Failure <15    The GFR formula is only valid for adults with stable renal function between ages 18 and 70.    COVID-19 and FLU A/B PCR, 1 HR TAT - Swab, Nasopharynx [[id number]]  (Normal) Collected: 12/05/23 1144    Specimen: Swab from Nasopharynx Updated: 12/05/23 1210     COVID19 Not Detected     Influenza A PCR Not Detected     Influenza B PCR Not Detected    Narrative:      Fact sheet for providers: https://www.fda.gov/media/976024/download    Fact sheet for patients: https://www.fda.gov/media/445978/download    Test performed by PCR.    Protime-INR [[id number]]  (Normal) Collected: 12/05/23 1143    Specimen: Blood Updated: 12/05/23 1206     Protime 13.5 Seconds      INR 1.03    Narrative:      Therapeutic Ranges for INR: 2.0-3.0 (PT 20-30)                              2.5-3.5 (PT 25-34)    CBC & Differential [[id number]]  (Abnormal) Collected: 12/05/23 1143    Specimen: Blood Updated: 12/05/23 1154    Narrative:      The following orders were created for panel order CBC & Differential.  Procedure                               Abnormality         Status                     ---------                               -----------         ------                     CBC Auto Differential[[id number]]        Abnormal            Final result                 Please view results for these tests on the individual orders.    CBC Auto Differential [[id number]]  (Abnormal) Collected: 12/05/23 1143    Specimen: Blood Updated: 12/05/23 1154     WBC 6.12 10*3/mm3      RBC 3.39 10*6/mm3      Hemoglobin 10.3  g/dL      Hematocrit 28.9 %      MCV 85.3 fL      MCH 30.4 pg      MCHC 35.6 g/dL      RDW 11.8 %      RDW-SD 36.5 fl      MPV 8.8 fL      Platelets 349 10*3/mm3      Neutrophil % 65.8 %      Lymphocyte % 18.3 %      Monocyte % 11.8 %      Eosinophil % 2.8 %      Basophil % 1.0 %      Immature Grans % 0.3 %      Neutrophils, Absolute 4.03 10*3/mm3      Lymphocytes, Absolute 1.12 10*3/mm3      Monocytes, Absolute 0.72 10*3/mm3      Eosinophils, Absolute 0.17 10*3/mm3      Basophils, Absolute 0.06 10*3/mm3      Immature Grans, Absolute 0.02 10*3/mm3      nRBC 0.0 /100 WBC           Imaging Results (Most Recent)       Procedure Component Value Units Date/Time    XR Chest 1 View [[id number]] Collected: 12/08/23 0809     Updated: 12/08/23 0813    Narrative:      CHEST X-RAY, 12/8/2023     HISTORY:  Follow-up congestive heart failure.     TECHNIQUE:  AP portable chest x-ray.     COMPARISON:  *  Chest x-ray, 12/5/2023.     FINDINGS:  Stable mild cardiomegaly and mild pulmonary venous redistribution. Mild  interstitial edema has improved since the prior study. Moderate right  and small left pleural effusions with associated lung base atelectasis  remain present.       Impression:      1.  Slight radiographic improvement in vascular congestion since  12/5/2023.  2.  Bilateral pleural effusions and lung base atelectasis, moderate on  the right and small on the left.     This report was finalized on 12/8/2023 8:11 AM by Dr. Kali Wong MD.       XR Chest 1 View [[id number]] Collected: 12/05/23 1218     Updated: 12/05/23 1222    Narrative:      Chest 1 view     HISTORY: Shortness of breath for 2 days     COMPARISON: 7/18/2023     FINDINGS: Cardiomegaly is noted and has increased since the previous  examination. Pulmonary vascular congestion is seen and there are  moderate bilateral pleural effusions that are new since the previous  exam. No pneumothorax.       Impression:      Moderate congestive heart failure with  bilateral pleural  effusions. Vascular markings have not changed significantly since the  previous examination but the effusions are new.     This report was finalized on 12/5/2023 12:19 PM by Dr. Robert West MD.               PROCEDURES      Condition on Discharge:  ***    Physical Exam at Discharge  Vital Signs  Temp:  [97.9 °F (36.6 °C)-98.5 °F (36.9 °C)] 98.2 °F (36.8 °C)  Heart Rate:  [72-77] 75  Resp:  [16] 16  BP: (130-153)/(59-72) 153/72    Physical Exam:  Physical Exam   Constitutional: Patient appears well-developed and well-nourished and in no acute distress   HEENT:   Head: Normocephalic and atraumatic.   Eyes:  Pupils are equal, round, and reactive to light. EOM are intact. Sclera are anicteric and non-injected.  Mouth and Throat: Patient has moist mucous membranes. Oropharynx is clear of any erythema or exudate.     Neck: Neck supple. No JVD present. No thyromegaly present. No lymphadenopathy present.  Cardiovascular: Regular rate, regular rhythm, S1 normal and S2 normal.  Exam reveals no gallop and no friction rub.  No murmur heard.  Pulmonary/Chest: Lungs are clear to auscultation bilaterally. No respiratory distress. No wheezes. No rhonchi. No rales.   Abdominal: Soft. Bowel sounds are normal. No distension and no mass. There is no hepatosplenomegaly. There is no tenderness.   Musculoskeletal: Normal Muscle tone  Extremities: No edema. Pulses are palpable in all 4 extremities.  Neurological: Patient is alert and oriented to person, place, and time. Cranial nerves II-XII are grossly intact with no focal deficits.  Skin: Skin is warm. No rash noted. Nails show no clubbing.  No cyanosis or erythema.    Discharge Disposition  ***    Visiting Nurse:    {YES/NO:200010}    Home PT/OT:  {YES/NO:200010}    Home Safety Evaluation:  {YES/NO:200010}    DME  ***    Discharge Diet:      Dietary Orders (From admission, onward)       Start     Ordered    12/07/23 1800  Dietary Nutrition Supplements Boost Plus  (Ensure Enlive, Ensure Plus)  Daily With Breakfast, Lunch & Dinner      Question:  Select Supplement:  Answer:  Boost Plus (Ensure Enlive, Ensure Plus)    12/07/23 1203    12/07/23 1120  Diet: Cardiac Diets; Healthy Heart (2-3 Na+); Texture: Soft to Chew (NDD 3); Soft to Chew: Whole Meat; Fluid Consistency: Thin (IDDSI 0)  Diet Effective Now        References:    Diet Order [facility]   Question Answer Comment   Diets: Cardiac Diets    Cardiac Diet: Healthy Heart (2-3 Na+)    Texture: Soft to Chew (NDD 3)    Soft to Chew: Whole Meat    Fluid Consistency: Thin (IDDSI 0)        12/07/23 1119                    Activity at Discharge:  ***    Pre-discharge education  {Discharge Education:96917}      Follow-up Appointments  Future Appointments   Date Time Provider Department Center   1/11/2024 11:30 AM Ruma Owen APRN [facility]   4/9/2024  8:00 AM Kumar Bonilla MD [facility]     Additional Instructions for the Follow-ups that You Need to Schedule       Discharge Follow-up with PCP   As directed       Currently Documented PCP:    Lanie Gomez APRN    PCP Phone Number:    [phone]     Follow Up Details: 1 week        Discharge Follow-up with Specialty: Cardiology; 1 Week   As directed      Specialty: Cardiology   Follow Up: 1 Week                Test Results Pending at Discharge       Roshan Low MD  12/11/23  11:51 EST    Time: {Time spent:176825931} (if over 30 minutes give explanation as to why it took greater than 30 minutes)                Documented PCP:    Lanie Gomez APRN    PCP Phone Number:    [phone]     Follow Up Details: 1 week        Discharge Follow-up with Specialty: Cardiology; 1 Week   As directed      Specialty: Cardiology   Follow Up: 1 Week                Test Results Pending at Discharge       Roshan Low MD  12/11/23  11:51 EST    Time: <30 minutes

## 2023-12-11 NOTE — DISCHARGE PLACEMENT REQUEST
"Arline Gutierrez (73 y.o. Female)       Date of Birth   1950    Social Security Number       Address   [address] [place] [address]    Home Phone   [phone]    MRN   [id number]       Taoism   None    Marital Status   Single                            Admission Date   12/5/23    Admission Type   Emergency    Admitting Provider   Roshan Low MD    Attending Provider   Roshan Low MD    Department, Room/Bed   [facility] MED SURG, 1410/1       Discharge Date       Discharge Disposition       Discharge Destination                                 Attending Provider: Roshan Low MD    Allergies: Penicillins    Isolation: None   Infection: None   Code Status: CPR    Ht: 162.6 cm (64\")   Wt: 54.3 kg (119 lb 11.6 oz)    Admission Cmt: None   Principal Problem: None                  Active Insurance as of 12/5/2023       Primary Coverage       Payor Plan Insurance Group Employer/Plan Group    MEDICARE MEDICARE A & B        Payor Plan Address Payor Plan Phone Number Payor Plan Fax Number Effective Dates    [address] [phone]  5/1/2015 - None Entered    [place] [address]         Subscriber Name Subscriber Birth Date Member ID       ARLINE GUTIERREZ 1950 [id number]                     Emergency Contacts        (Rel.) Home Phone Work Phone Mobile Phone    Sachin Hernández (Other) [phone] -- [phone]                "

## 2023-12-11 NOTE — PROGRESS NOTES
"SERVICE: [facility] HOSPITALIST    CONSULTANTS: Cardiology    CHIEF COMPLAINT: Shortness of breath    SUBJECTIVE: Patient seen and examined at bedside.  Patient reports no issues overnight.  She really wants to go home.  She denies any itching and is not aware of any hives since starting Zyrtec yesterday.  Patient denies headache, fever or chills, nausea, vomiting, diarrhea, abdominal pain, chest pain or palpitations, shortness of breath, wheezing or coughing, leg pain.     OBJECTIVE:    Physical exam is largely unchanged from previous exam, except where documented below, examination is accurate as of 12/10/2023    Physical Exam:  General: Patient is awake and alert. No acute distress noted.   HENT: Head is atraumatic, normocephalic. Hearing is grossly intact.    Eyes: Vision is grossly intact.  Cardiovascular: Heart has regular rate and rhythm with no murmurs, rubs or gallops noted.   Respiratory: Lungs are clear to ausculation without wheezes, rhonchi or rales.   Abdominal/GI: Soft, nontender, bowel sounds present. No rebound or guarding present.   Extremities: No peripheral edema noted.   Musculoskeletal: Spontaneous movement of bilateral upper and lower extremities against gravity noted. No signs of injury or deformity noted.   Skin: Warm and dry.  Hives have totally resolved, including on buttocks and posterior thighs.  Psych: Mood and affect are appropriate. Cooperative with exam.   Neuro: No facial asymmetry noted. No focal deficits noted, hearing and vision are grossly intact.     /59 (BP Location: Right arm, Patient Position: Lying)   Pulse 72   Temp 98.5 °F (36.9 °C) (Oral)   Resp 16   Ht 162.6 cm (64\")   Wt 54.3 kg (119 lb 11.6 oz)   SpO2 96%   BMI 20.55 kg/m²     MEDS/LABS REVIEWED AND ORDERED    cetirizine, 10 mg, Oral, Daily  enoxaparin, 40 mg, Subcutaneous, Q24H  hydrocortisone, 1 application , Topical, Q12H  losartan, 50 mg, Oral, Q24H  magnesium oxide, 400 mg, Oral, " Daily  torsemide, 20 mg, Oral, Daily          LAB/DIAGNOSTICS:    Lab Results (last 24 hours)       Procedure Component Value Units Date/Time    Basic Metabolic Panel [[id number]]  (Abnormal) Collected: 12/10/23 1437    Specimen: Blood Updated: 12/10/23 1458     Glucose 165 mg/dL      BUN 31 mg/dL      Creatinine 1.17 mg/dL      Sodium 124 mmol/L      Potassium 4.0 mmol/L      Chloride 90 mmol/L      CO2 20.9 mmol/L      Calcium 8.8 mg/dL      BUN/Creatinine Ratio 26.5     Anion Gap 13.1 mmol/L      eGFR 49.1 mL/min/1.73     Narrative:      GFR Normal >60  Chronic Kidney Disease <60  Kidney Failure <15    The GFR formula is only valid for adults with stable renal function between ages 18 and 70.    Basic Metabolic Panel [[id number]]  (Abnormal) Collected: 12/10/23 0654    Specimen: Blood Updated: 12/10/23 0718     Glucose 112 mg/dL      BUN 27 mg/dL      Creatinine 0.88 mg/dL      Sodium 126 mmol/L      Potassium 3.6 mmol/L      Chloride 92 mmol/L      CO2 22.6 mmol/L      Calcium 8.4 mg/dL      BUN/Creatinine Ratio 30.7     Anion Gap 11.4 mmol/L      eGFR 69.1 mL/min/1.73     Narrative:      GFR Normal >60  Chronic Kidney Disease <60  Kidney Failure <15    The GFR formula is only valid for adults with stable renal function between ages 18 and 70.    Magnesium [[id number]]  (Abnormal) Collected: 12/10/23 0654    Specimen: Blood Updated: 12/10/23 0718     Magnesium 1.5 mg/dL     CBC (No Diff) [[id number]]  (Abnormal) Collected: 12/10/23 0653    Specimen: Blood Updated: 12/10/23 0702     WBC 6.71 10*3/mm3      RBC 2.85 10*6/mm3      Hemoglobin 8.7 g/dL      Hematocrit 24.5 %      MCV 86.0 fL      MCH 30.5 pg      MCHC 35.5 g/dL      RDW 11.8 %      RDW-SD 37.0 fl      MPV 8.8 fL      Platelets 314 10*3/mm3           ECG 12 Lead Dyspnea   Final Result   HEART RATE= 89  bpm   RR Interval= 672  ms   VA Interval= 209  ms   P Horizontal Axis= -33  deg   P Front Axis= 84  deg   QRSD Interval= 93  ms   QT Interval= 392  ms    QTcB= 478  ms   QRS Axis= 2  deg   T Wave Axis= 129  deg   - ABNORMAL ECG -   Sinus rhythm   LVH with secondary repolarization abnormality   Anterior infarct, old   When compared with ECG of 05-Dec-2023 11:44:17,   No significant change   Electronically Signed By: Juan Griggs ([facility]) 05-Dec-2023 21:28:05   Date and Time of Study: 2023-12-05 12:44:33      ECG 12 Lead Dyspnea   Final Result   HEART RATE= 94  bpm   RR Interval= 636  ms   OH Interval= 189  ms   P Horizontal Axis= 2  deg   P Front Axis= 66  deg   QRSD Interval= 96  ms   QT Interval= 370  ms   QTcB= 464  ms   QRS Axis= 3  deg   T Wave Axis= 123  deg   - ABNORMAL ECG -   Sinus rhythm   LVH with secondary repolarization abnormality   Anterior Q waves, possibly due to LVH   When compared with ECG of 18-Jul-2023 18:41:09,   No significant change   Electronically Signed By: Juan Griggs ([facility]) 05-Dec-2023 21:28:02   Date and Time of Study: 2023-12-05 11:44:17      SCANNED - TELEMETRY     Final Result        Results for orders placed during the hospital encounter of 12/05/23    Adult Transthoracic Echo Limited W/ Cont if Necessary Per Protocol    Interpretation Summary    Left ventricular systolic function is normal. Calculated left ventricular EF = 61.9% Abnormal global longitudinal LV strain (GLS) = -14.7%. Left ventricle strain data was reviewed by the physician and found to be accurate. Normal left ventricular cavity size noted. Left ventricular wall thickness is consistent with mild concentric hypertrophy. All left ventricular wall segments contract normally. Left ventricular diastolic function is consistent with (grade II w/high LAP) pseudonormalization. The myocardium has a speckled, echo bright appearance.    The left atrial cavity is moderately dilated.    Mild to moderate mitral valve regurgitation is present.    Mild to moderate tricuspid valve regurgitation is present. Estimated right ventricular systolic pressure from tricuspid regurgitation  "is mildly elevated (35-45 mmHg). Calculated right ventricular systolic pressure from tricuspid regurgitation is 44.4 mmHg.    The inferior vena cava is normally sized. Normal IVC inspiratory collapse of greater than 50% noted.    There is a small (<1cm) pericardial effusion. There is a moderate sized left pleural effusion.    No radiology results for the last day      ASSESSMENT/PLAN:  Please note portions of this assessment/plan may have been copied and pasted, but I have personally seen this patient and reviewed each line of this assessment and plan for accuracy and made updates to reflect my necessary changes on 12/10/2023    Acute-on-Chronic HFpEF: No lower extremity edema noted on exam for the past 2 days.  Suspect I&O's and weights not completely accurate.  Echo reviewed and \"speckled\" pattern and longitudinal LV strain reported.  Cardiology did not follow patient over weekend.  Reduce patient's torsemide since no LE edema and still having difficulty with hyponatremia resolving.  Also discontinued Aldactone and restarted losartan to ensure hives do not recur.  Will continue to avoid Lasix at this time.    Elevated BP: Overall, BP better controlled since restarting losartan today (losartan was stopped given reported hives).  Patient did not require as needed hydralazine after restarting losartan.  Acute on Chronic Hyponatremia: baseline Na around 130. After some improvement to 125, has been stable for the past 3 days.  Continue fluid restriction.  Reducing torsemide dosage, Aldactone stopped.  Severe Malnutrition: Nutritionist following.  See below.  Hx/O Dysphagia: Per staff, she is not following the dietary restrictions of nectar thick liquids at home and signed waiver and does not want the modified diet  Hx/O CVA: No acute issues.  Lipid panel excellent.  Continue PT. Staff helping patient ambulate with walker in hallways.  Hives: Uncertain etiology.  Finally fully resolved after starting Zyrtec.  Restarted " "losartan today without recurrence of hives.    PLAN FOR DISPOSITION: Anticipate discharge to home soon with cardiology follow-up outpatient.    Sachin Dang MD  Hospitalist, Family Medicine  [facility]  12/10/23  07:48 EST    At [facility], we believe that sharing information builds trust and better relationships. You are receiving this note because you recently visited [facility]. It is possible you will see health information before a provider has talked with you about it. This kind of information can be easy to misunderstand. To help you fully understand what it means for your health, we urge you to discuss this note with your provider.    \"Dictated utilizing Dragon dictation\"    "

## 2023-12-11 NOTE — CASE MANAGEMENT/SOCIAL WORK
Case Management Discharge Note      Final Note: dc home with [facility]         Selected Continued Care - Discharged on 12/11/2023 Admission date: 12/5/2023 - Discharge disposition: Home-Health Care Svc      Destination    No services have been selected for the patient.                Durable Medical Equipment    No services have been selected for the patient.                Dialysis/Infusion    No services have been selected for the patient.                Home Medical Care       Service Provider Selected Services Address Phone Fax Patient Preferred    [facility] [address] [place] [id number] [phone] --              Therapy    No services have been selected for the patient.                Community Resources    No services have been selected for the patient.                Community & DME    No services have been selected for the patient.                         Final Discharge Disposition Code: 06 - home with home health care

## 2023-12-11 NOTE — DISCHARGE INSTR - APPOINTMENTS
Patient needs to call and make a hospital follow up with Lanie Gomez within 1-2 weeks of discharge. [phone]     Patient has follow up with  Ruma Owen on Dec 18 @ 3 pm @ the [place] office [phone]

## 2023-12-12 NOTE — OUTREACH NOTE
Prep Survey      Flowsheet Row Responses   [employer] facility patient discharged from? [place]   Is LACE score < 7 ? No   Eligibility Readm Mgmt   Discharge diagnosis A/C CHF   Does the patient have one of the following disease processes/diagnoses(primary or secondary)? CHF   Does the patient have Home health ordered? Yes   What is the Home health agency?  [facility]    Is there a DME ordered? No   Prep survey completed? Yes            Martita LYNN - Registered Nurse

## 2023-12-13 ENCOUNTER — READMISSION MANAGEMENT (OUTPATIENT)
Dept: CALL CENTER | Facility: HOSPITAL | Age: 73
End: 2023-12-13
Payer: MEDICARE

## 2023-12-13 NOTE — OUTREACH NOTE
CHF Week 1 Survey      Flowsheet Row Responses   [employer] facility patient discharged from? [facility]   Does the patient have one of the following disease processes/diagnoses(primary or secondary)? CHF   CHF Week 1 attempt successful? No   Unsuccessful attempts Attempt 1            Sara PIERRE - Registered Nurse

## 2023-12-18 ENCOUNTER — OFFICE VISIT (OUTPATIENT)
Dept: CARDIOLOGY | Facility: CLINIC | Age: 73
End: 2023-12-18
Payer: MEDICARE

## 2023-12-18 ENCOUNTER — HOSPITAL ENCOUNTER (OUTPATIENT)
Dept: CARDIOLOGY | Facility: HOSPITAL | Age: 73
Discharge: HOME | End: 2023-12-18
Admitting: NURSE PRACTITIONER
Payer: MEDICARE

## 2023-12-18 VITALS
WEIGHT: 122.6 LBS | DIASTOLIC BLOOD PRESSURE: 78 MMHG | HEART RATE: 74 BPM | SYSTOLIC BLOOD PRESSURE: 122 MMHG | HEIGHT: 64 IN | BODY MASS INDEX: 20.93 KG/M2

## 2023-12-18 DIAGNOSIS — Z86.73 HISTORY OF STROKE: ICD-10-CM

## 2023-12-18 DIAGNOSIS — J90 PLEURAL EFFUSION ON LEFT: ICD-10-CM

## 2023-12-18 DIAGNOSIS — I36.1 NONRHEUMATIC TRICUSPID VALVE REGURGITATION: ICD-10-CM

## 2023-12-18 DIAGNOSIS — I50.31 ACUTE DIASTOLIC CHF (CONGESTIVE HEART FAILURE): ICD-10-CM

## 2023-12-18 DIAGNOSIS — I21.A1 TYPE 2 MYOCARDIAL INFARCTION: ICD-10-CM

## 2023-12-18 DIAGNOSIS — I50.31 ACUTE DIASTOLIC CHF (CONGESTIVE HEART FAILURE): Primary | ICD-10-CM

## 2023-12-18 DIAGNOSIS — E22.2 SIADH (SYNDROME OF INAPPROPRIATE ADH PRODUCTION): ICD-10-CM

## 2023-12-18 DIAGNOSIS — R93.1 ABNORMAL ECHOCARDIOGRAM: ICD-10-CM

## 2023-12-18 DIAGNOSIS — R79.89 ELEVATED TROPONIN: ICD-10-CM

## 2023-12-18 DIAGNOSIS — D64.9 ANEMIA, UNSPECIFIED TYPE: ICD-10-CM

## 2023-12-18 DIAGNOSIS — I34.0 NONRHEUMATIC MITRAL VALVE REGURGITATION: ICD-10-CM

## 2023-12-18 DIAGNOSIS — I31.39 PERICARDIAL EFFUSION: ICD-10-CM

## 2023-12-18 DIAGNOSIS — E87.1 CHRONIC HYPONATREMIA: ICD-10-CM

## 2023-12-18 PROBLEM — I16.0 HYPERTENSIVE URGENCY: Status: RESOLVED | Noted: 2023-12-05 | Resolved: 2023-12-18

## 2023-12-18 PROBLEM — E88.09 HYPOALBUMINEMIA: Status: ACTIVE | Noted: 2023-12-01

## 2023-12-18 PROBLEM — I63.9 ACUTE CVA (CEREBROVASCULAR ACCIDENT): Status: RESOLVED | Noted: 2023-07-20 | Resolved: 2023-12-18

## 2023-12-18 LAB
ALBUMIN SERPL-MCNC: 3.8 G/DL (ref 3.5–5.2)
ALBUMIN/GLOB SERPL: 1.4 G/DL
ALP SERPL-CCNC: 57 U/L (ref 39–117)
ALT SERPL W P-5'-P-CCNC: 21 U/L (ref 1–33)
ANION GAP SERPL CALCULATED.3IONS-SCNC: 10 MMOL/L (ref 5–15)
AST SERPL-CCNC: 18 U/L (ref 1–32)
BASOPHILS # BLD AUTO: 0.05 10*3/MM3 (ref 0–0.2)
BASOPHILS NFR BLD AUTO: 0.9 % (ref 0–1.5)
BILIRUB SERPL-MCNC: 0.2 MG/DL (ref 0–1.2)
BUN SERPL-MCNC: 49 MG/DL (ref 8–23)
BUN/CREAT SERPL: 50.5 (ref 7–25)
CALCIUM SPEC-SCNC: 9.8 MG/DL (ref 8.6–10.5)
CHLORIDE SERPL-SCNC: 93 MMOL/L (ref 98–107)
CO2 SERPL-SCNC: 28 MMOL/L (ref 22–29)
CREAT SERPL-MCNC: 0.97 MG/DL (ref 0.57–1)
DEPRECATED RDW RBC AUTO: 39.2 FL (ref 37–54)
EGFRCR SERPLBLD CKD-EPI 2021: 61.4 ML/MIN/1.73
EOSINOPHIL # BLD AUTO: 0.27 10*3/MM3 (ref 0–0.4)
EOSINOPHIL NFR BLD AUTO: 4.9 % (ref 0.3–6.2)
ERYTHROCYTE [DISTWIDTH] IN BLOOD BY AUTOMATED COUNT: 12.2 % (ref 12.3–15.4)
GLOBULIN UR ELPH-MCNC: 2.7 GM/DL
GLUCOSE SERPL-MCNC: 103 MG/DL (ref 65–99)
HCT VFR BLD AUTO: 28.5 % (ref 34–46.6)
HGB BLD-MCNC: 9.8 G/DL (ref 12–15.9)
IMM GRANULOCYTES # BLD AUTO: 0.02 10*3/MM3 (ref 0–0.05)
IMM GRANULOCYTES NFR BLD AUTO: 0.4 % (ref 0–0.5)
LYMPHOCYTES # BLD AUTO: 1.42 10*3/MM3 (ref 0.7–3.1)
LYMPHOCYTES NFR BLD AUTO: 25.6 % (ref 19.6–45.3)
MCH RBC QN AUTO: 30.9 PG (ref 26.6–33)
MCHC RBC AUTO-ENTMCNC: 34.4 G/DL (ref 31.5–35.7)
MCV RBC AUTO: 89.9 FL (ref 79–97)
MONOCYTES # BLD AUTO: 0.57 10*3/MM3 (ref 0.1–0.9)
MONOCYTES NFR BLD AUTO: 10.3 % (ref 5–12)
NEUTROPHILS NFR BLD AUTO: 3.22 10*3/MM3 (ref 1.7–7)
NEUTROPHILS NFR BLD AUTO: 57.9 % (ref 42.7–76)
NRBC BLD AUTO-RTO: 0 /100 WBC (ref 0–0.2)
PLATELET # BLD AUTO: 367 10*3/MM3 (ref 140–450)
PMV BLD AUTO: 8.8 FL (ref 6–12)
POTASSIUM SERPL-SCNC: 3.9 MMOL/L (ref 3.5–5.2)
PROT SERPL-MCNC: 6.5 G/DL (ref 6–8.5)
RBC # BLD AUTO: 3.17 10*6/MM3 (ref 3.77–5.28)
SODIUM SERPL-SCNC: 131 MMOL/L (ref 136–145)
WBC NRBC COR # BLD AUTO: 5.55 10*3/MM3 (ref 3.4–10.8)

## 2023-12-18 PROCEDURE — 36415 COLL VENOUS BLD VENIPUNCTURE: CPT

## 2023-12-18 PROCEDURE — 85025 COMPLETE CBC W/AUTO DIFF WBC: CPT | Performed by: NURSE PRACTITIONER

## 2023-12-18 PROCEDURE — 80053 COMPREHEN METABOLIC PANEL: CPT | Performed by: NURSE PRACTITIONER

## 2023-12-18 RX ORDER — LATANOPROST 50 UG/ML
SOLUTION/ DROPS OPHTHALMIC
COMMUNITY
Start: 2023-11-03

## 2023-12-18 RX ORDER — TORSEMIDE 20 MG/1
10 TABLET ORAL DAILY
Start: 2023-12-18

## 2023-12-18 NOTE — PROGRESS NOTES
"  Date of Office Visit: 2023  Encounter Provider: BRET Pedroza  Place of Service: [facility] CARDIOLOGY  Patient Name: Arline Landaverde  :1950  Primary Cardiologist: Dr. Uri Amor     Chief Complaint   Patient presents with    HFpEF    Hospital Follow Up Visit   :     HPI: Arline Landaverde is a 73 y.o. female who presents today for hospital follow-up visit.  She is a new patient to me and I reviewed her medical records.    She has a history of recurrent strokes.  In 2023, echocardiogram showed normal EF, grade 1 diastolic dysfunction, trace to mild MR, mild TR, and mild pulmonary hypertension.    In 2023, Dr. Amor consulted on her for dyspnea, orthopnea, and edema.  Her troponin was elevated, but downtrending and it was suspected that she had a type II non-STEMI due to uncontrolled hypertension and heart failure.  Limited echocardiogram showed normal LVEF, mild LVH, grade 2 diastolic dysfunction, speckled echo right appearance of the myocardium, aortic valve sclerosis, mild to moderate MR, mild to moderate TR, elevated RVSP 44 mmHg, small pericardial effusion, and moderate size left pleural effusion. She was significantly volume overloaded and underwent IV diuresis.  She developed hives and her IV furosemide was stopped and she was switched to torsemide.  Losartan was also held, but then restarted.  She was hyponatremic slightly felt to be due to SIADH.  She was felt to have malnutrition and her low albumin was contributing to the edema and possibly the effusions. She had worsening anemia.    She presents today with her \"significant other\" accompanying her.  Her biggest concern is that she is going to the bathroom 25 times per day because of the diuretic use and she is unable to sleep.  They felt that her lower extremity edema has improved since the hospitalization.  She denies chest pain, shortness of air, PND, orthopnea, palpitations, dizziness, " syncope, or bleeding.  Her blood pressure is running 107-136/70s and heart rates in the 70s at home.  She had 1 single blood pressure with a systolic reading of 170s.  Her blood pressure was normal on recheck today.  She is due for repeat blood work.      Past Medical History:   Diagnosis Date    Acute CVA (cerebrovascular accident) 07/20/2023    Chronic hyponatremia     Classical migraine without intractable migraine 07/20/2023    Diabetes mellitus     Expressive aphasia 07/20/2023    Former cigarette smoker     Grade II diastolic dysfunction     History of stroke 12/05/2023    Hypoalbuminemia 12/2023    SIADH (syndrome of inappropriate ADH production)     Stroke     Type 2 myocardial infarction 12/18/2023       Past Surgical History:   Procedure Laterality Date    BACK SURGERY      CHOLECYSTECTOMY      HIP HEMIARTHROPLASTY Right 4/19/2023    Procedure: HIP HEMIARTHROPLASTY ANTERIOR;  Surgeon: Kumar Bonilla MD;  Location: [facility];  Service: Orthopedics;  Laterality: Right;       Social History     Socioeconomic History    Marital status: Single   Tobacco Use    Smoking status: Former     Packs/day: 1     Types: Cigarettes    Smokeless tobacco: Never   Vaping Use    Vaping Use: Never used   Substance and Sexual Activity    Alcohol use: Never    Drug use: Never    Sexual activity: Defer       Family History   Problem Relation Age of Onset    Cancer Mother        The following portion of the patient's history were reviewed and updated as appropriate: past medical history, past surgical history, past social history, past family history, allergies, current medications, and problem list.    Review of Systems   Constitutional: Negative.   Cardiovascular:  Positive for leg swelling.   Respiratory: Negative.     Hematologic/Lymphatic: Negative.    Neurological: Negative.        Allergies   Allergen Reactions    Penicillins Hives    Furosemide Hives         Current Outpatient Medications:     acetaminophen (TYLENOL)  "325 MG tablet, Take 2 tablets by mouth Every 6 (Six) Hours As Needed for Mild Pain., Disp: , Rfl:     aspirin 81 MG EC tablet, Take 1 tablet by mouth Every 12 (Twelve) Hours. Indications: VTE Prophylaxis, Disp: 60 tablet, Rfl: 1    atorvastatin (LIPITOR) 20 MG tablet, Take 1 tablet by mouth Daily., Disp: 14 tablet, Rfl: 0    latanoprost (XALATAN) 0.005 % ophthalmic solution, INSTILL 1 DROP INTO EYE(S) ONCE DAILY AT NIGHT, Disp: , Rfl:     losartan (COZAAR) 50 MG tablet, Take 1 tablet by mouth Daily., Disp: 30 tablet, Rfl: 0    magnesium oxide (MAG-OX) 400 tablet tablet, Take 1 tablet by mouth 2 (Two) Times a Day., Disp: 60 each, Rfl: 0    torsemide (DEMADEX) 20 MG tablet, Take 0.5 tablets by mouth Daily., Disp: , Rfl:          Objective:     Vitals:    12/18/23 1517 12/18/23 1554   BP: 150/70 122/78   BP Location: Left arm Left arm   Patient Position: Sitting Sitting   Cuff Size: Adult    Pulse: 74    Weight: 55.6 kg (122 lb 9.6 oz)    Height: 162.6 cm (64.02\")      Body mass index is 21.03 kg/m².    PHYSICAL EXAM:    Vitals Reviewed.   General Appearance: Thin. Pale.  HENT: No hearing loss noted.    Respiratory: No signs of respiratory distress. Respiration rhythm and depth normal.  Clear to auscultation.   Cardiovascular:  Jugular Venous Pressure: Normal  Heart Rate and Rhythm: Normal, Heart Sounds: Normal S1 and S2. No S3 or S4 noted.  Murmurs: No murmurs noted. No rubs, thrills, or gallops.   Lower Extremities: Bilateral doughy lower extremity edema noted.    Psychiatric: Patient alert and flat affect.       ECG 12 Lead    Date/Time: 12/18/2023 3:20 PM  Performed by: Ruma Owen APRN    Authorized by: Ruma Owen APRN  Comparison: compared with previous ECG from 12/5/2023  Similar to previous ECG  Rhythm: sinus rhythm  Rate: normal  BPM: 74  Q waves: V1 and V2    ST Segments: ST segments normal  T Waves: T waves normal  QRS axis: normal    Clinical impression: normal ECG            Assessment:       " Diagnosis Plan   1. Acute diastolic CHF (congestive heart failure)  CBC & Differential    Comprehensive Metabolic Panel    PYP Imaging for Cardiac Amyloidosis    Adult Transthoracic Echo Limited W/ Cont if Necessary Per Protocol      2. Pericardial effusion  PYP Imaging for Cardiac Amyloidosis    Adult Transthoracic Echo Limited W/ Cont if Necessary Per Protocol      3. Pleural effusion on left        4. Abnormal echocardiogram        5. Type 2 myocardial infarction        6. Elevated troponin        7. Nonrheumatic tricuspid valve regurgitation        8. Nonrheumatic mitral valve regurgitation        9. SIADH (syndrome of inappropriate ADH production)        10. Chronic hyponatremia        11. Anemia, unspecified type        12. History of stroke               Plan:       1-3.  HFpEF: NYHA class II.  Normal LVEF, grade 2 diastolic dysfunction, pleural effusion, and pericardial effusion noted on recent echocardiogram.  She developed hives when receiving IV furosemide.  She feels that she is urinating too much on the current dose of torsemide 20 mg daily.  We will do a trial of decreasing the torsemide to 10 mg daily and I will check on her on Friday.  Her goal would be to decrease urination, but keep her heart failure symptoms stable.  She does have bilateral edema noted today.  Recheck limited echocardiogram to reassess the pericardial effusion.    4.  Abnormal Echocardiogram: I discussed with Dr. Amor.  Speckled right appearance started on echocardiogram and will check a PYP test to rule out ATTR amyloid.    5/6.  Type II myocardial infarction due to hypertension and volume overload during recent hospitalization.  Stress testing not warranted at this time.    7/8.  Mild to moderate tricuspid and mitral valve regurgitation noted on recent echocardiogram.    9/10.  SIADH and chronic hyponatremia.  Dr. Amor several continue with current diuretic therapy unless her sodium drops less than 120.    11.  Anemia.  Defer to  her PCP.    12.  History of stroke.    13.  Will recheck a CBC and CMP today.  I have ordered a limited echocardiogram and PYP test.  I will check on her via phone on Friday.  Follow-up with Dr. Amor in 3 months at the [place] office.    As always, it has been a pleasure to participate in your patient's care. Thank you.         Sincerely,         BRET Soriano  [facility]      Dictated utilizing Dragon Dictation  I spent 38 minutes reviewing her medical records/testing/previous office notes/labs, face-to-face interaction with patient, physical examination, formulating the plan of care, and discussion of plan of care with patient.

## 2023-12-19 ENCOUNTER — TELEPHONE (OUTPATIENT)
Dept: CARDIOLOGY | Facility: CLINIC | Age: 73
End: 2023-12-19
Payer: MEDICARE

## 2023-12-19 PROBLEM — R79.9 ELEVATED BUN: Status: ACTIVE | Noted: 2023-12-19

## 2023-12-19 NOTE — LETTER
Arline Landaverde  [address]  [facility] [address]          12/26/23      Arline Landaverde    The nurses at [facility] are trying to reach you to go over your recent blood testing from 12/18/23.  Please call us at [phone] at your earliest convenience, and ask to speak with the triage department so we can review this information with you.      Thank you,    [facility] Triage Department

## 2023-12-19 NOTE — PROGRESS NOTES
I reviewed blood work.  BUN elevated and sodium level improved to 131.  I just decreased her torsemide to 10 mg daily and I plan to check on her on Friday.  I think her blood work looks stable.  I will ask triage RN to call.  Thank you

## 2023-12-20 ENCOUNTER — READMISSION MANAGEMENT (OUTPATIENT)
Dept: CALL CENTER | Facility: HOSPITAL | Age: 73
End: 2023-12-20
Payer: MEDICARE

## 2023-12-20 NOTE — OUTREACH NOTE
CHF Week 2 Survey      Flowsheet Row Responses   [employer] facility patient discharged from? [place]   Does the patient have one of the following disease processes/diagnoses(primary or secondary)? CHF   Week 2 attempt successful? No   Unsuccessful attempts Attempt 1            Kamala LYNN - Registered Nurse

## 2023-12-20 NOTE — PROGRESS NOTES
"Enter Query Response Below      Query Response: Unable to definitively determine Type II NSTEMI due to the presence of Q waves in initial EKG with essentially unchanged EKG noted after discharge.             If applicable, please update the problem list.   Patient: Arline Landaverde        : 1950  Account: [id number]           Admit Date: 2023        How to Respond to this query:       a. Click New Note     b. Answer query within the yellow box.                c. Update the Problem List, if applicable.    If you have any questions about this query contact me at: [email or web address]     Dr. Dang,      72 yo female admitted per History and Physical for \"acute on chronic HFpEF.\"   Risk Factors: Hypertensive Urgency  Clinical indicators: Cardiology consulted 23 \"Her troponin is elevated but downtrending.  I have a low suspicion that this represents ACS, and I suspect that this is a type II NSTEMI due to uncontrolled hypertension and heart failure.\"  Troponin T levels (23 @ 11:43) 53, (23 @ 13:59) 49, Delta -4.  BP reported upon presentation 206/107.  Treatment: IV Lasix, prn Nitrostat SL 0.4 mg, IV apresoline 10 mg x1 on , -12/10/23.    Please clarify the following:    Type II NSTEMI ruled in  Type II NSTEMI ruled out  Other- specify______  Unable to determine    By submitting this query, we are merely seeking further clarification of documentation to accurately reflect all conditions that you are monitoring, evaluating, treating or that extend the hospitalization or utilize additional resources of care. Please utilize your independent clinical judgment when addressing the question(s) above.     This query and your response, once completed, will be entered into the legal medical record.    Sincerely,  Lanie HOOKS RN CCDS  System Director Clinical Documentation Integrity Program     "

## 2023-12-20 NOTE — PROGRESS NOTES
"Enter Query Response Below      Query Response: Patient's symptoms were consistent with a presentation of SIADH, but unable to determine definitively that SIADH was present based on the testing performed.  Patient did respond to treatment.      If applicable, please update the problem list.   Patient: Arline Landaverde        : 1950  Account: [id number]           Admit Date: 2023        How to Respond to this query:       a. Click New Note     b. Answer query within the yellow box.                c. Update the Problem List, if applicable.    If you have any questions about this query contact me at: [email or web address]     Dr. Dang,     72 yo female admitted per History and Physical for \"acute on chronic HFpEF.\"   Risk Factors: severe malnutrition  Clinical indicators: H&P also includes \"Hyponatremia. This has been chronic over the last year (although near normal when checked over the summer).\"  Cardiology consulted  and 23 \"Hyponatremia - chronic.  Likely SIADH.\"  Sodium laboratory values reported as follows: (23) 120, (23) 124, (23) 126, and (23) 127.  Treatment: IV diuresis, fluid restriction, monitoring of laboratory values.    Please clarify the following:    SIADH ruled in  SIADH ruled out  Other- specify______  Unable to determine    By submitting this query, we are merely seeking further clarification of documentation to accurately reflect all conditions that you are monitoring, evaluating, treating or that extend the hospitalization or utilize additional resources of care. Please utilize your independent clinical judgment when addressing the question(s) above.     This query and your response, once completed, will be entered into the legal medical record.    Sincerely,  Lanie HOOKS RN CCDS  System Director Clinical Documentation Integrity Program     "

## 2023-12-20 NOTE — TELEPHONE ENCOUNTER
I tried to call Arline Landaverde but there was no answer.  Left a voicemail asking patient to call back.  Will continue to try to reach pt.    HUB- if pt calls back, please transfer through to triage.    Thank you,    Shaunna BHAKTA RN  Triage [facility] [place], [place]  12/20/23 08:37 EST

## 2023-12-22 ENCOUNTER — READMISSION MANAGEMENT (OUTPATIENT)
Dept: CALL CENTER | Facility: HOSPITAL | Age: 73
End: 2023-12-22
Payer: MEDICARE

## 2023-12-22 ENCOUNTER — TELEPHONE (OUTPATIENT)
Dept: CARDIOLOGY | Facility: CLINIC | Age: 73
End: 2023-12-22
Payer: MEDICARE

## 2023-12-22 NOTE — OUTREACH NOTE
CHF Week 2 Survey      Flowsheet Row Responses   [facility] facility patient discharged from? [place]   Does the patient have one of the following disease processes/diagnoses(primary or secondary)? CHF   Week 2 attempt successful? No   Unsuccessful attempts Attempt 2            Mary TAYLOR - Registered Nurse

## 2023-12-22 NOTE — TELEPHONE ENCOUNTER
I tried to call Arline Landaverde but there was no answer.  Left a voicemail asking patient to call back.  Will continue to try to reach pt.    HUB- if pt calls back, please transfer through to triage.    Thank you,    Shaunna BHAKTA RN  Triage [facility] [place]  12/22/23 09:13 EST

## 2023-12-26 NOTE — TELEPHONE ENCOUNTER
Group has tried to contact Arline Landaverde multiple times with no success.  A letter requesting patient to [place] [facility] been sent.    Thank you,    Shaunna BHAKTA RN  [place] [facility]  12/26/23 09:54 EST

## 2023-12-29 ENCOUNTER — TELEPHONE (OUTPATIENT)
Dept: CARDIOLOGY | Facility: CLINIC | Age: 73
End: 2023-12-29
Payer: MEDICARE

## 2024-01-02 ENCOUNTER — HOSPITAL ENCOUNTER (OUTPATIENT)
Dept: CARDIOLOGY | Facility: HOSPITAL | Age: 74
Discharge: HOME | End: 2024-01-02
Payer: MEDICARE

## 2024-01-02 DIAGNOSIS — I31.39 PERICARDIAL EFFUSION: ICD-10-CM

## 2024-01-02 DIAGNOSIS — I50.31 ACUTE DIASTOLIC CHF (CONGESTIVE HEART FAILURE): ICD-10-CM

## 2024-01-02 LAB
BH CV ECHO LEFT VENTRICLE GLOBAL LONGITUDINAL STRAIN: -16.5 %
BH CV ECHO MEAS - ACS: 1.96 CM
BH CV ECHO MEAS - AO MAX PG: 5.6 MMHG
BH CV ECHO MEAS - AO ROOT DIAM: 3.4 CM
BH CV ECHO MEAS - AO V2 MAX: 118.1 CM/SEC
BH CV ECHO MEAS - EDV(CUBED): 89.1 ML
BH CV ECHO MEAS - EDV(MOD-SP2): 58 ML
BH CV ECHO MEAS - EDV(MOD-SP4): 53 ML
BH CV ECHO MEAS - EF(MOD-BP): 66.3 %
BH CV ECHO MEAS - EF(MOD-SP2): 70.7 %
BH CV ECHO MEAS - EF(MOD-SP4): 60.4 %
BH CV ECHO MEAS - ESV(CUBED): 14.5 ML
BH CV ECHO MEAS - ESV(MOD-SP2): 17 ML
BH CV ECHO MEAS - ESV(MOD-SP4): 21 ML
BH CV ECHO MEAS - FS: 45.4 %
BH CV ECHO MEAS - IVS/LVPW: 1 CM
BH CV ECHO MEAS - IVSD: 1.17 CM
BH CV ECHO MEAS - LAT PEAK E' VEL: 5.9 CM/SEC
BH CV ECHO MEAS - LV DIASTOLIC VOL/BSA (35-75): 33.4 CM2
BH CV ECHO MEAS - LV MASS(C)D: 188.3 GRAMS
BH CV ECHO MEAS - LV SYSTOLIC VOL/BSA (12-30): 13.2 CM2
BH CV ECHO MEAS - LVIDD: 4.5 CM
BH CV ECHO MEAS - LVIDS: 2.44 CM
BH CV ECHO MEAS - LVPWD: 1.17 CM
BH CV ECHO MEAS - MED PEAK E' VEL: 4.7 CM/SEC
BH CV ECHO MEAS - MV A MAX VEL: 119.7 CM/SEC
BH CV ECHO MEAS - MV DEC SLOPE: 724.3 CM/SEC2
BH CV ECHO MEAS - MV DEC TIME: 0.15 SEC
BH CV ECHO MEAS - MV E MAX VEL: 71.7 CM/SEC
BH CV ECHO MEAS - MV E/A: 0.6
BH CV ECHO MEAS - MV MAX PG: 7.1 MMHG
BH CV ECHO MEAS - MV MEAN PG: 2.7 MMHG
BH CV ECHO MEAS - MV P1/2T: 32.4 MSEC
BH CV ECHO MEAS - MV V2 VTI: 21.1 CM
BH CV ECHO MEAS - MVA(P1/2T): 6.8 CM2
BH CV ECHO MEAS - RAP SYSTOLE: 3 MMHG
BH CV ECHO MEAS - RVSP: 33.7 MMHG
BH CV ECHO MEAS - SI(MOD-SP2): 25.9 ML/M2
BH CV ECHO MEAS - SI(MOD-SP4): 20.2 ML/M2
BH CV ECHO MEAS - SV(MOD-SP2): 41 ML
BH CV ECHO MEAS - SV(MOD-SP4): 32 ML
BH CV ECHO MEAS - TR MAX PG: 30.7 MMHG
BH CV ECHO MEAS - TR MAX VEL: 276.9 CM/SEC
BH CV ECHO MEASUREMENTS AVERAGE E/E' RATIO: 13.53
BH CV VAS BP LEFT ARM: NORMAL MMHG
BH CV XLRA - TDI S': 6.6 CM/SEC

## 2024-01-02 PROCEDURE — 93321 DOPPLER ECHO F-UP/LMTD STD: CPT

## 2024-01-02 PROCEDURE — 93325 DOPPLER ECHO COLOR FLOW MAPG: CPT

## 2024-01-02 PROCEDURE — A9538 TC99M PYROPHOSPHATE: HCPCS | Performed by: NURSE PRACTITIONER

## 2024-01-02 PROCEDURE — 93356 MYOCRD STRAIN IMG SPCKL TRCK: CPT

## 2024-01-02 PROCEDURE — 93308 TTE F-UP OR LMTD: CPT

## 2024-01-02 PROCEDURE — 0 TECHNETIUM TC99M PYROPHOSPHATE: Performed by: NURSE PRACTITIONER

## 2024-01-02 PROCEDURE — 78803 RP LOCLZJ TUM SPECT 1 AREA: CPT

## 2024-01-02 RX ADMIN — TECHNETIUM TC99M PYROPHOSPHATE 1 DOSE: 12 INJECTION INTRAVENOUS at 10:42

## 2024-01-04 NOTE — PROGRESS NOTES
I returned Mr. Landaverde's phone call.  Mrs. Landaverde was listening to the phone call as well.  I explained that the PYP imaging was negative for ATTR amyloidosis.  Limited echocardiogram showed normal LVEF, borderline concentric hypertrophy, grade 1 diastolic dysfunction, myocardium bright and speckled, and trivial pericardial effusion.  The effusion has improved significantly.    Mr. Landaverde said that she started developing lower extremity edema again.  He increased her torsemide back to 20 mg 1 full tablet daily and she is doing better.  They verbalized understanding of results.  They will keep their scheduled appointment with Arsh in [place] on 1/11.

## 2024-01-04 NOTE — PROGRESS NOTES
I returned Mr. Landaverde's phone call.  Mrs. Landaverde was listening to the phone call as well.  I explained that the PYP imaging was negative for ATTR amyloidosis.  Limited echocardiogram showed normal LVEF, borderline concentric hypertrophy, grade 1 diastolic dysfunction, myocardium bright and speckled, and trivial pericardial effusion.  The effusion has improved significantly.    Mr. Landaverde said that she started developing lower extremity edema again.  He increased her torsemide back to 20 mg 1 full tablet daily and she is doing better.  They verbalized understanding of results.  They will keep their scheduled appointment with rAsh in [place] on 1/11.

## 2024-01-11 ENCOUNTER — OFFICE VISIT (OUTPATIENT)
Dept: CARDIOLOGY | Facility: CLINIC | Age: 74
End: 2024-01-11
Payer: MEDICARE

## 2024-01-11 ENCOUNTER — LAB (OUTPATIENT)
Dept: LAB | Facility: HOSPITAL | Age: 74
End: 2024-01-11
Payer: MEDICARE

## 2024-01-11 VITALS
SYSTOLIC BLOOD PRESSURE: 160 MMHG | DIASTOLIC BLOOD PRESSURE: 85 MMHG | HEIGHT: 64 IN | HEART RATE: 76 BPM | WEIGHT: 127.8 LBS | OXYGEN SATURATION: 98 % | BODY MASS INDEX: 21.82 KG/M2

## 2024-01-11 DIAGNOSIS — E22.2 SIADH (SYNDROME OF INAPPROPRIATE ADH PRODUCTION): ICD-10-CM

## 2024-01-11 DIAGNOSIS — I50.30 HEART FAILURE WITH PRESERVED EJECTION FRACTION, BORDERLINE, CLASS II: ICD-10-CM

## 2024-01-11 DIAGNOSIS — Z86.73 HISTORY OF STROKE: ICD-10-CM

## 2024-01-11 DIAGNOSIS — I50.30 HEART FAILURE WITH PRESERVED EJECTION FRACTION, BORDERLINE, CLASS II: Primary | ICD-10-CM

## 2024-01-11 DIAGNOSIS — R79.89 ELEVATED TROPONIN: ICD-10-CM

## 2024-01-11 DIAGNOSIS — I31.39 PERICARDIAL EFFUSION: ICD-10-CM

## 2024-01-11 LAB
ANION GAP SERPL CALCULATED.3IONS-SCNC: 7.8 MMOL/L (ref 5–15)
BUN SERPL-MCNC: 45 MG/DL (ref 8–23)
BUN/CREAT SERPL: 42.1 (ref 7–25)
CALCIUM SPEC-SCNC: 9.3 MG/DL (ref 8.6–10.5)
CHLORIDE SERPL-SCNC: 95 MMOL/L (ref 98–107)
CO2 SERPL-SCNC: 27.2 MMOL/L (ref 22–29)
CREAT SERPL-MCNC: 1.07 MG/DL (ref 0.57–1)
EGFRCR SERPLBLD CKD-EPI 2021: 54.6 ML/MIN/1.73
GLUCOSE SERPL-MCNC: 88 MG/DL (ref 65–99)
NT-PROBNP SERPL-MCNC: 847 PG/ML (ref 0–900)
POTASSIUM SERPL-SCNC: 4.7 MMOL/L (ref 3.5–5.2)
SODIUM SERPL-SCNC: 130 MMOL/L (ref 136–145)

## 2024-01-11 PROCEDURE — 83880 ASSAY OF NATRIURETIC PEPTIDE: CPT

## 2024-01-11 PROCEDURE — 80048 BASIC METABOLIC PNL TOTAL CA: CPT

## 2024-01-11 PROCEDURE — 36415 COLL VENOUS BLD VENIPUNCTURE: CPT

## 2024-01-11 RX ORDER — LOSARTAN POTASSIUM 50 MG/1
50 TABLET ORAL
Qty: 90 TABLET | Refills: 2 | Status: SHIPPED | OUTPATIENT
Start: 2024-01-11

## 2024-01-11 RX ORDER — ATORVASTATIN CALCIUM 20 MG/1
20 TABLET, FILM COATED ORAL DAILY
Qty: 90 TABLET | Refills: 2 | Status: SHIPPED | OUTPATIENT
Start: 2024-01-11

## 2024-01-11 RX ORDER — TORSEMIDE 20 MG/1
20 TABLET ORAL DAILY
Qty: 90 TABLET | Refills: 2
Start: 2024-01-11

## 2024-01-11 NOTE — PROGRESS NOTES
CARDIOLOGY    Date of Office Visit: 2024  Patient Name: Arline Landaverde  : 1950  Encounter Provider: Arsh Redmond PA-C  Primary Cardiologist: Uri Amor MD    CHIEF COMPLAINT / REASON FOR OFFICE VISIT     1 month follow-up      HISTORY OF PRESENT ILLNESS     This is a 73 y.o. year old female who presents to [facility] for a  1 month follow-up after recent testing.    We initially met the patient when she was hospitalized in 2023 for an acute CHF exacerbation.  At that time she also had a type II NSTEMI thought to be due to uncontrolled hypertension and CHF.  Echo at that time had showed a small pericardial effusion and she received IV diuresis.  She developed hives when taking IV furosemide and had been switched to torsemide.  During hospitalization, she was noted to be hyponatremic and this was felt to be due to SIADH in coordination with malnutrition from low albumin levels.    She had completed an echocardiogram and PYP scan on 2024.  She was called with the results on 2024 and had noted that she was getting increasing lower extremity edema.  The nurse practitioner had increased her torsemide back to 20 mg daily and they recommended to follow-up in our office today for further evaluation.    Today the patient reports that her legs continue to remain swollen.  On exam she had +2 pitting edema bilaterally.  Her energy has slowly been increasing and she is starting to walk more than she had in the past few months.  She has been getting around well and is back to going shopping with her son.  Her son keeps a close eye on her and has been monitoring her blood pressures at home.  Consistently since the hospitalization his blood pressures been in the 160s to 169 systolics at home.  I do think that this could be contributing to ongoing volume overload.    She has been having issues with taking a higher dose of torsemide due to frequent urination.   "She will plan to get blood work today after today's visit.    She denies any dizziness, lightheadedness, nausea, omen, diaphoresis, left arm or jaw pain.  She does have mild tingling in both of her arms it has been present since the hospital.    On our scales today she is 5 pounds heavier than she was at discharge from the hospital    PMHx: Heart failure with preserved ejection fraction, recurrent strokes, mild pulmonary hypertension, anemia, malnutrition    PHYSICAL EXAMINATION     Vital Signs:  /85 (BP Location: Left arm)   Pulse 76   Ht 162.6 cm (64\")   Wt 58 kg (127 lb 12.8 oz)   SpO2 98%   BMI 21.94 kg/m²   Estimated body mass index is 21.94 kg/m² as calculated from the following:    Height as of this encounter: 162.6 cm (64\").    Weight as of this encounter: 58 kg (127 lb 12.8 oz).       BMI is within normal parameters. No other follow-up for BMI required.      Physical Exam  Constitutional:       Appearance: Normal appearance.   HENT:      Head: Normocephalic and atraumatic.   Cardiovascular:      Rate and Rhythm: Normal rate and regular rhythm.      Pulses: Normal pulses.      Heart sounds: Normal heart sounds.   Pulmonary:      Effort: Pulmonary effort is normal.      Breath sounds: Normal breath sounds.   Musculoskeletal:      Right lower le+ No edema.      Left lower le+ No edema.   Skin:     General: Skin is warm and dry.   Neurological:      General: No focal deficit present.      Mental Status: She is alert and oriented to person, place, and time.          Cardiac Testing/Results     Cardiac Testing:   - Echo 2024: LVEF 66.3% with abnormal global longitudinal strain at -16.5%, normal LV size with grade 1 diastolic dysfunction.  Myocardium is bright and speckled.  Trivial pericardial effusion.    Echo 2023: EF 61.9% with GLS -14.7%.  Grade 2 diastolic dysfunction, speckled myocardium.  Mild to moderate MR, mild to moderate TR with RVSP 44 mmHg.  Small less than 1 cm " pericardial effusion    -PYP scan 1/2/2024: No evidence of ATTR amyloidosis    Result Review :  The following data was reviewed by: Arsh Redmond PA-C on 01/11/2024:    Lipid Panel          7/19/2023    04:46 12/5/2023    13:59   Lipid Panel   Total Cholesterol 144  147    Triglycerides 58  86    HDL Cholesterol 66  61    VLDL Cholesterol 12  16    LDL Cholesterol  66  70    LDL/HDL Ratio 1.01  1.13       Lab Results   Component Value Date     (L) 12/18/2023     (L) 12/11/2023    K 3.9 12/18/2023    K 3.7 12/11/2023    CL 93 (L) 12/18/2023    CL 93 (L) 12/11/2023    CO2 28.0 12/18/2023    CO2 23.1 12/11/2023    BUN 49 (H) 12/18/2023    BUN 32 (H) 12/11/2023    CREATININE 0.97 12/18/2023    CREATININE 1.07 (H) 12/11/2023    GLUCOSE 103 (H) 12/18/2023    GLUCOSE 114 (H) 12/11/2023    CALCIUM 9.8 12/18/2023    CALCIUM 8.4 (L) 12/11/2023    ALBUMIN 3.8 12/18/2023    ALBUMIN 2.8 (L) 12/07/2023    AST 18 12/18/2023    AST 16 12/07/2023    ALT 21 12/18/2023    ALT 11 12/07/2023     Lab Results   Component Value Date    WBC 5.55 12/18/2023    WBC 5.06 12/11/2023    HGB 9.8 (L) 12/18/2023    HGB 8.6 (L) 12/11/2023    HCT 28.5 (L) 12/18/2023    HCT 23.6 (L) 12/11/2023    MCV 89.9 12/18/2023    MCV 85.5 12/11/2023     12/18/2023     12/11/2023     Lab Results   Component Value Date    PROBNP 6,232.0 (H) 12/05/2023     Lab Results   Component Value Date    TROPONINT 49 (H) 12/05/2023     Lab Results   Component Value Date    TSH 1.770 12/05/2023    TSH 1.440 07/20/2023                          ASSESSMENT & PLAN       Diagnoses and all orders for this visit:    1. Heart failure with preserved ejection fraction, borderline, class II (Primary)  Recent echocardiogram repeated showing grade 1 diastolic dysfunction and an EF of 66.3%  Echo showing speckled myocardium, follow-up PYP scan negative for ATTR  Recent CHF exacerbation requiring hospitalization December 2023  Monitor daily weights and limit  sodium  Continue losartan 50 mg daily and Demadex 20 mg daily, she had recurrence of lower extremity edema when taking torsemide 10 mg daily  >> Will increase Demadex to 40 mg for 5 days for increased lower extremity edema  Check BMP and BNP today  Start Farxiga 10 mg daily, samples were given I will check in with them in 1 week  Keep blood pressure log at home, if blood pressure remains elevated after removing fluid we will plan to uptitrate her losartan or consider switching to Entresto  Start wearing compression stockings and continue leg elevation  Baseline weight appears to be 121 pounds pounds, today she is 127.8  2. Pericardial effusion  Noted initially during CHF exacerbation, repeat limited echocardiogram 1/2/2023 showed resolution  3. Elevated troponin  In the setting of uncontrolled hypertension and CHF exacerbation.  No anginal symptoms today.  EKG with no ischemic changes reviewed from a month ago  4. SIADH (syndrome of inappropriate ADH production)  She has chronically low sodium, most recent sodium level was 131 and chloride was 93.  Will need to monitor for overdiuresis  5. History of stroke  Previous MRI has showed old infarcts of the left cerebellum  She follows with Dr. Mesa  Continue aspirin and atorvastatin      Follow Up:  Return in about 3 months (around 4/11/2024) for JK.  Patient was given instructions and counseling regarding her condition or for health maintenance advice. Please contact office if worsening symptoms or proceed to ER when appropriate.      Arsh Redmond PA-C  01/11/24  09:15 EST    MEDICATIONS         Discharge Medications            Accurate as of January 11, 2024  9:15 AM. If you have any questions, ask your nurse or doctor.                New Medications        Instructions Start Date   dapagliflozin Propanediol 10 MG tablet  Commonly known as: Farxiga  Started by: Arsh Redmond PA-C   10 mg, Oral, Daily             Continue These Medications         Instructions Start Date   acetaminophen 325 MG tablet  Commonly known as: TYLENOL   650 mg, Oral, Every 6 Hours PRN      aspirin 81 MG EC tablet   81 mg, Oral, Every 12 Hours Scheduled      atorvastatin 20 MG tablet  Commonly known as: LIPITOR   20 mg, Oral, Daily      losartan 50 MG tablet  Commonly known as: COZAAR   50 mg, Oral, Every 24 Hours Scheduled      magnesium oxide 400 tablet tablet  Commonly known as: MAG-OX   400 mg, Oral, 2 Times Daily      torsemide 20 MG tablet  Commonly known as: DEMADEX   20 mg, Oral, Daily                   **Dragon Disclaimer: This note was dictated using an electronic transcription. The electronic translation of spoken language may permit erroneous, or at times, nonsensical words or phrases to be inadvertently transcribed. Although I have reviewed the note for such errors, some may still exist.

## 2024-01-11 NOTE — PATIENT INSTRUCTIONS
Limit fluid intake to less than 1800 ml per day    Keep daily weight log. If over 3 lbs weight gain in 1 day or 5 lbs weight gain in 1 week contact office for medication recommendations.

## 2024-01-12 ENCOUNTER — TELEPHONE (OUTPATIENT)
Dept: CARDIOLOGY | Facility: CLINIC | Age: 74
End: 2024-01-12
Payer: MEDICARE

## 2024-01-12 NOTE — TELEPHONE ENCOUNTER
----- Message from Arsh Saenz PA-C sent at 1/12/2024  8:21 AM EST -----  Attempted to call patient and left voicemail.    Please let the patient know that her kidney function looked comparable to prior tests.  I had recommended yesterday that we increase her torsemide/Demadex to 40 mg for the next 5 days.  Based on her heart failure numbers I think that this would not help her symptoms of her leg swelling.  Please have her go back to taking her Demadex at 20 mg daily with 1 potassium.    >> I think her leg swelling is likely due to venous insufficiency and they were going to get compression socks.  Please follow-up that they got the compression socks and she can start wearing them during the day.  She will continue the Farxiga.  Thank you!

## 2024-01-12 NOTE — TELEPHONE ENCOUNTER
HUB- if pt calls back, please put patient straight through to triage    Sonam Colin RN  Triage RN  01/12/24 08:58 EST

## 2024-01-12 NOTE — PROGRESS NOTES
Attempted to call patient and left voicemail.    Please let the patient know that her kidney function looked comparable to prior tests.  I had recommended yesterday that we increase her torsemide/Demadex to 40 mg for the next 5 days.  Based on her heart failure numbers I think that this would not help her symptoms of her leg swelling.  Please have her go back to taking her Demadex at 20 mg daily with 1 potassium.    >> I think her leg swelling is likely due to venous insufficiency and they were going to get compression socks.  Please follow-up that they got the compression socks and she can start wearing them during the day.  She will continue the Farxiga.  Thank you! recent hospitalization for left leg pvd , redness , and for left bypass graft  thrombosed. s/p revision of the bypass with thrombectomy, brought to ED presents with 3-4 episodes of BPBPR

## 2024-01-15 NOTE — TELEPHONE ENCOUNTER
Results and recommendations called to pt.  Instructed to call with any further questions or concerns.  Verbalized understanding.  Pt will go back to torsemide 40mg daily with 1 potassium.  She states that she does have compression socks, and is wearing them during the day.    Sonam Colin RN  Triage Nurse, [facility] [place]  01/15/24 10:43 EST

## 2024-01-18 ENCOUNTER — TELEPHONE (OUTPATIENT)
Dept: CARDIOLOGY | Facility: CLINIC | Age: 74
End: 2024-01-18
Payer: MEDICARE

## 2024-01-18 NOTE — TELEPHONE ENCOUNTER
I talked to the patient's family member about her symptoms.  She is doing much better and her lower extremity edema has essentially resolved.  She is back to taking her torsemide at 20 mg daily and is now wearing compression stockings.  She is now able to walk around her home and is back to doing her daily activities.

## 2024-01-18 NOTE — TELEPHONE ENCOUNTER
----- Message from Arsh Saenz PA-C sent at 1/11/2024  9:07 AM EST -----  Call in 1 week to check on fluids and starting Fargixa

## 2024-03-04 ENCOUNTER — TELEPHONE (OUTPATIENT)
Dept: CARDIOLOGY | Facility: CLINIC | Age: 74
End: 2024-03-04
Payer: MEDICARE

## 2024-03-04 NOTE — TELEPHONE ENCOUNTER
Dr. Amor Pt:  Out of office: SICK    EST APC: ROMI OWUSU COON  [phone]  Please advise.     [facility] health nurse called to report that pt's bp reading are still increasing  and ranging from 170-180 sys and 90's dys.  Pt's pcp increased her Losartan to 100 mg daily on .  Pt is asymptomatic and she checks her bp in the morning and evening.  Please advise.

## 2024-03-05 DIAGNOSIS — I50.30 HEART FAILURE WITH PRESERVED EJECTION FRACTION, BORDERLINE, CLASS II: ICD-10-CM

## 2024-03-05 RX ORDER — LOSARTAN POTASSIUM 100 MG/1
100 TABLET ORAL
Start: 2024-03-05

## 2024-03-06 RX ORDER — AMLODIPINE BESYLATE 5 MG/1
1 TABLET ORAL DAILY
COMMUNITY
Start: 2024-03-03

## 2024-03-06 NOTE — TELEPHONE ENCOUNTER
Patient's significant other, Sachin, returned call with patient in background.  He let me know patient's PCP recently added 5 mg amlodipine daily and they just took first dose today.  Yesterday patient's BP was 137/64 HR 71.  Today before meds BP was 166/76.    They follow up with their PCP next month and also Dr. Amor on 4/2.  HH is also coming out weekly.  I advised they keep a written BP log checking BP 1-2 hours after morning meds and bring it to their follow ups.      Arsh, I did not recommend starting coreg because they just started amlodipine today.      If you have different recommendations let me know.  I am going to add amlodipine to her meds for accuracy (I will add it per historical provider).    Cinda Medley RN  [facility] Triage  03/06/24 11:21 EST

## 2024-03-06 NOTE — TELEPHONE ENCOUNTER
I tried to call Arline Landaverde but there was no answer.  Left a voicemail asking patient to call back.  Will continue to try to reach pt.    HUB- if pt calls back, please transfer through to triage.    Thank you,    Shaunna BHAKTA RN  Triage [facility] [place]  03/06/24 09:18 EST

## 2024-03-11 ENCOUNTER — HOSPITAL ENCOUNTER (EMERGENCY)
Facility: HOSPITAL | Age: 74
Discharge: HOME | End: 2024-03-11
Attending: EMERGENCY MEDICINE | Admitting: EMERGENCY MEDICINE
Payer: MEDICARE

## 2024-03-11 ENCOUNTER — APPOINTMENT (OUTPATIENT)
Dept: GENERAL RADIOLOGY | Facility: HOSPITAL | Age: 74
End: 2024-03-11
Payer: MEDICARE

## 2024-03-11 VITALS
DIASTOLIC BLOOD PRESSURE: 87 MMHG | TEMPERATURE: 97.8 F | HEIGHT: 64 IN | RESPIRATION RATE: 18 BRPM | WEIGHT: 127 LBS | OXYGEN SATURATION: 98 % | BODY MASS INDEX: 21.68 KG/M2 | SYSTOLIC BLOOD PRESSURE: 183 MMHG | HEART RATE: 78 BPM

## 2024-03-11 DIAGNOSIS — S70.01XA CONTUSION OF RIGHT HIP, INITIAL ENCOUNTER: ICD-10-CM
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
## 2024-03-11 PROCEDURE — 99283 EMERGENCY DEPT VISIT LOW MDM: CPT

## 2024-03-11 PROCEDURE — 73502 X-RAY EXAM HIP UNI 2-3 VIEWS: CPT

## 2024-03-11 NOTE — ED PROVIDER NOTES
Subjective   History of Present Illness    Chief complaint: Hip injury    Location: Right        Timing/Onset/Duration: Just prior to arrival    Modifying Factors: Nothing makes it better or worse    Associated Symptoms: No headache.  No neck or back pain.  No chest pain or shortness of breath.  No abdominal pain.  No numbness, tingling, weakness, or change in bladder or bowel function.  No nausea or vomiting.    Narrative: This 73-year-old fell and injured her right hip.  She states that she slid out of bed.  She is not on blood thinners.      PCP:Lanie Gomez APRN      Review of Systems   HENT:  Negative for nosebleeds and rhinorrhea.    Respiratory:  Negative for shortness of breath.    Cardiovascular:  Negative for chest pain.   Gastrointestinal:  Negative for abdominal pain, nausea and vomiting.   Genitourinary:  Negative for difficulty urinating.   Musculoskeletal:  Negative for back pain and neck pain.   Neurological:  Negative for weakness, numbness and headaches.         Past Medical History:   Diagnosis Date    Acute CVA (cerebrovascular accident) 07/20/2023    Chronic hyponatremia     Classical migraine without intractable migraine 07/20/2023    Diabetes mellitus     Elevated BUN 12/19/2023    Expressive aphasia 07/20/2023    Former cigarette smoker     Grade II diastolic dysfunction     History of stroke 12/05/2023    Hypoalbuminemia 12/2023    SIADH (syndrome of inappropriate ADH production)     Stroke     Type 2 myocardial infarction 12/18/2023       Allergies   Allergen Reactions    Penicillins Hives    Furosemide Hives       Past Surgical History:   Procedure Laterality Date    BACK SURGERY      CHOLECYSTECTOMY      HIP HEMIARTHROPLASTY Right 4/19/2023    Procedure: HIP HEMIARTHROPLASTY ANTERIOR;  Surgeon: Kumar Bonilla MD;  Location: [facility];  Service: Orthopedics;  Laterality: Right;       Family History   Problem Relation Age of Onset    Cancer Mother        Social History      Socioeconomic History    Marital status: Single   Tobacco Use    Smoking status: Former     Current packs/day: 1.00     Types: Cigarettes    Smokeless tobacco: Never   Vaping Use    Vaping status: Never Used   Substance and Sexual Activity    Alcohol use: Never    Drug use: Never    Sexual activity: Defer           Objective   Physical Exam  Vitals (The temperature is 97.8 °F, pulse 78, respirations 18, /87, room air pulse ox 98%.) and nursing note reviewed.   Constitutional:       Appearance: Normal appearance.   HENT:      Head: Normocephalic and atraumatic.      Nose: Nose normal. No rhinorrhea.   Neck:      Comments: There is no tenderness, deformity, or bony step-offs upon palpation of cervical, thoracic, lumbar sacrococcygeal spine.  Cardiovascular:      Rate and Rhythm: Normal rate and regular rhythm.      Pulses: Normal pulses.      Heart sounds: Normal heart sounds. No murmur heard.     No friction rub. No gallop.   Pulmonary:      Effort: Pulmonary effort is normal. No respiratory distress.      Breath sounds: Normal breath sounds. No stridor. No wheezing, rhonchi or rales.   Chest:      Chest wall: No tenderness.   Abdominal:      General: Abdomen is flat. Bowel sounds are normal. There is no distension.      Palpations: Abdomen is soft. There is no mass.      Tenderness: There is no abdominal tenderness. There is no guarding or rebound.      Hernia: No hernia is present.   Musculoskeletal:         General: No swelling or tenderness. Normal range of motion.      Cervical back: Normal range of motion and neck supple.   Skin:     General: Skin is warm and dry.      Capillary Refill: Capillary refill takes less than 2 seconds.   Neurological:      General: No focal deficit present.      Mental Status: She is alert and oriented to person, place, and time.      Cranial Nerves: No cranial nerve deficit.      Sensory: No sensory deficit.      Motor: No weakness.       Procedures           ED Course       06:12 EDT, 03/11/24:  Patient was reassessed.  She has no new complaints.  Her vital signs reviewed and are stable.  Abdominal exam: Soft, nontender, no masses, positive bowel sounds.  Neurological exam: Alert with no focal deficits noted.    06:12 EDT, 03/11/24:  Patient's diagnosis of fall with right hip contusion was discussed with her.  The patient should take Tylenol as needed as directed for pain.  She should ice her right hip for 20 minutes every 2-3 hours while she is awake for 2 to 3 days.  The patient should follow-up with her orthopedic surgeon or Yaz Toro within 1 week.  She should return to the emergency department if there is increased pain, numbness, tingling, weakness, change in color or temperature of the right lower extremity, worse in any way at all.  All the patient questions were answered she will be discharged in good condition.                                       Medical Decision Making      Final diagnoses:   None       ED Disposition  ED Disposition       None            No follow-up provider specified.       Medication List      No changes were made to your prescriptions during this visit.            Jared Fernandez MD  03/11/24 0619

## 2024-03-11 NOTE — DISCHARGE INSTRUCTIONS
Ice the right hip for 20 minutes every 2-3 hours while awake for 2 to 3 days.  Take Tylenol as needed as directed for pain.  Follow-up with Yaz Toro in 1 week or your orthopedist.  Return to the emergency department there is increased pain, numbness, tingling, weakness, change in color or temperature of the right lower extremity, worse in any way at all.

## 2024-03-21 ENCOUNTER — APPOINTMENT (OUTPATIENT)
Dept: CT IMAGING | Facility: HOSPITAL | Age: 74
DRG: 377 | End: 2024-03-21
Payer: MEDICARE

## 2024-03-21 ENCOUNTER — HOSPITAL ENCOUNTER (EMERGENCY)
Facility: HOSPITAL | Age: 74
Discharge: OTHER INSTITUTION | DRG: 377 | End: 2024-03-21
Attending: EMERGENCY MEDICINE
Payer: MEDICARE

## 2024-03-21 ENCOUNTER — HOSPITAL ENCOUNTER (INPATIENT)
Facility: HOSPITAL | Age: 74
LOS: 14 days | Discharge: HOSPICE | DRG: 377 | End: 2024-04-04
Attending: INTERNAL MEDICINE | Admitting: INTERNAL MEDICINE
Payer: MEDICARE

## 2024-03-21 ENCOUNTER — APPOINTMENT (OUTPATIENT)
Dept: GENERAL RADIOLOGY | Facility: HOSPITAL | Age: 74
DRG: 377 | End: 2024-03-21
Payer: MEDICARE

## 2024-03-21 VITALS
DIASTOLIC BLOOD PRESSURE: 53 MMHG | TEMPERATURE: 98 F | WEIGHT: 120.8 LBS | OXYGEN SATURATION: 100 % | HEART RATE: 89 BPM | RESPIRATION RATE: 16 BRPM | SYSTOLIC BLOOD PRESSURE: 104 MMHG | BODY MASS INDEX: 20.74 KG/M2

## 2024-03-21 DIAGNOSIS — M97.01XA PERIPROSTHETIC FRACTURE AROUND INTERNAL PROSTHETIC RIGHT HIP JOINT, INITIAL ENCOUNTER: ICD-10-CM

## 2024-03-21 DIAGNOSIS — R53.1 WEAKNESS: ICD-10-CM

## 2024-03-21 DIAGNOSIS — R79.9 ELEVATED BUN: ICD-10-CM

## 2024-03-21 DIAGNOSIS — E87.1 HYPONATREMIA: ICD-10-CM

## 2024-03-21 DIAGNOSIS — E87.5 HYPERKALEMIA: ICD-10-CM

## 2024-03-21 DIAGNOSIS — D64.9 SEVERE ANEMIA: Primary | ICD-10-CM

## 2024-03-21 DIAGNOSIS — K92.1 MELENA: Primary | ICD-10-CM

## 2024-03-21 LAB
ABO GROUP BLD: NORMAL
ALBUMIN SERPL-MCNC: 2.8 G/DL (ref 3.5–5.2)
ALBUMIN/GLOB SERPL: 1 G/DL
ALP SERPL-CCNC: 68 U/L (ref 39–117)
ALT SERPL W P-5'-P-CCNC: 11 U/L (ref 1–33)
AMMONIA BLD-SCNC: 20 UMOL/L (ref 11–51)
ANION GAP SERPL CALCULATED.3IONS-SCNC: 11.4 MMOL/L (ref 5–15)
ANION GAP SERPL CALCULATED.3IONS-SCNC: 11.8 MMOL/L (ref 5–15)
ANION GAP SERPL CALCULATED.3IONS-SCNC: 13 MMOL/L (ref 5–15)
APTT PPP: 25.3 SECONDS (ref 24.3–38.1)
AST SERPL-CCNC: 11 U/L (ref 1–32)
BASOPHILS # BLD AUTO: 0.03 10*3/MM3 (ref 0–0.2)
BASOPHILS NFR BLD AUTO: 0.2 % (ref 0–1.5)
BILIRUB SERPL-MCNC: <0.2 MG/DL (ref 0–1.2)
BLD GP AB SCN SERPL QL: NEGATIVE
BLD GP AB SCN SERPL QL: NEGATIVE
BUN SERPL-MCNC: 164 MG/DL (ref 8–23)
BUN SERPL-MCNC: 179 MG/DL (ref 8–23)
BUN SERPL-MCNC: 185 MG/DL (ref 8–23)
BUN/CREAT SERPL: 58 (ref 7–25)
BUN/CREAT SERPL: 62.6 (ref 7–25)
BUN/CREAT SERPL: 63.1 (ref 7–25)
CALCIUM SPEC-SCNC: 7.3 MG/DL (ref 8.6–10.5)
CALCIUM SPEC-SCNC: 7.7 MG/DL (ref 8.6–10.5)
CALCIUM SPEC-SCNC: 7.8 MG/DL (ref 8.6–10.5)
CHLORIDE SERPL-SCNC: 84 MMOL/L (ref 98–107)
CHLORIDE SERPL-SCNC: 86 MMOL/L (ref 98–107)
CHLORIDE SERPL-SCNC: 91 MMOL/L (ref 98–107)
CO2 SERPL-SCNC: 18 MMOL/L (ref 22–29)
CO2 SERPL-SCNC: 18.2 MMOL/L (ref 22–29)
CO2 SERPL-SCNC: 18.6 MMOL/L (ref 22–29)
CREAT SERPL-MCNC: 2.83 MG/DL (ref 0.57–1)
CREAT SERPL-MCNC: 2.86 MG/DL (ref 0.57–1)
CREAT SERPL-MCNC: 2.93 MG/DL (ref 0.57–1)
DEPRECATED RDW RBC AUTO: 46.3 FL (ref 37–54)
DEPRECATED RDW RBC AUTO: 49 FL (ref 37–54)
EGFRCR SERPLBLD CKD-EPI 2021: 16.3 ML/MIN/1.73
EGFRCR SERPLBLD CKD-EPI 2021: 16.8 ML/MIN/1.73
EGFRCR SERPLBLD CKD-EPI 2021: 17 ML/MIN/1.73
EOSINOPHIL # BLD AUTO: 0.06 10*3/MM3 (ref 0–0.4)
EOSINOPHIL NFR BLD AUTO: 0.5 % (ref 0.3–6.2)
ERYTHROCYTE [DISTWIDTH] IN BLOOD BY AUTOMATED COUNT: 14.3 % (ref 12.3–15.4)
ERYTHROCYTE [DISTWIDTH] IN BLOOD BY AUTOMATED COUNT: 15 % (ref 12.3–15.4)
GEN 5 2HR TROPONIN T REFLEX: 89 NG/L
GLOBULIN UR ELPH-MCNC: 2.7 GM/DL
GLUCOSE BLDC GLUCOMTR-MCNC: 195 MG/DL (ref 70–130)
GLUCOSE BLDC GLUCOMTR-MCNC: 203 MG/DL (ref 70–130)
GLUCOSE SERPL-MCNC: 160 MG/DL (ref 65–99)
GLUCOSE SERPL-MCNC: 171 MG/DL (ref 65–99)
GLUCOSE SERPL-MCNC: 190 MG/DL (ref 65–99)
HCT VFR BLD AUTO: 11.6 % (ref 34–46.6)
HCT VFR BLD AUTO: 15.9 % (ref 34–46.6)
HGB BLD-MCNC: 4 G/DL (ref 12–15.9)
HGB BLD-MCNC: 5.4 G/DL (ref 12–15.9)
HOLD SPECIMEN: NORMAL
HOLD SPECIMEN: NORMAL
IMM GRANULOCYTES # BLD AUTO: 0.14 10*3/MM3 (ref 0–0.05)
IMM GRANULOCYTES NFR BLD AUTO: 1.1 % (ref 0–0.5)
INR PPP: 1.34 (ref 0.9–1.1)
INR PPP: 1.48 (ref 0.9–1.1)
LYMPHOCYTES # BLD AUTO: 0.89 10*3/MM3 (ref 0.7–3.1)
LYMPHOCYTES NFR BLD AUTO: 6.8 % (ref 19.6–45.3)
MCH RBC QN AUTO: 30.5 PG (ref 26.6–33)
MCH RBC QN AUTO: 31 PG (ref 26.6–33)
MCHC RBC AUTO-ENTMCNC: 34 G/DL (ref 31.5–35.7)
MCHC RBC AUTO-ENTMCNC: 34.5 G/DL (ref 31.5–35.7)
MCV RBC AUTO: 89.8 FL (ref 79–97)
MCV RBC AUTO: 89.9 FL (ref 79–97)
MONOCYTES # BLD AUTO: 0.67 10*3/MM3 (ref 0.1–0.9)
MONOCYTES NFR BLD AUTO: 5.1 % (ref 5–12)
NEUTROPHILS NFR BLD AUTO: 11.26 10*3/MM3 (ref 1.7–7)
NEUTROPHILS NFR BLD AUTO: 86.3 % (ref 42.7–76)
NRBC BLD AUTO-RTO: 0 /100 WBC (ref 0–0.2)
OSMOLALITY UR: 338 MOSM/KG
PLATELET # BLD AUTO: 392 10*3/MM3 (ref 140–450)
PLATELET # BLD AUTO: 465 10*3/MM3 (ref 140–450)
PMV BLD AUTO: 8.7 FL (ref 6–12)
PMV BLD AUTO: 8.9 FL (ref 6–12)
POTASSIUM SERPL-SCNC: 5.5 MMOL/L (ref 3.5–5.2)
POTASSIUM SERPL-SCNC: 5.9 MMOL/L (ref 3.5–5.2)
POTASSIUM SERPL-SCNC: 6.1 MMOL/L (ref 3.5–5.2)
POTASSIUM UR-SCNC: 20.7 MMOL/L
PROT SERPL-MCNC: 5.5 G/DL (ref 6–8.5)
PROTHROMBIN TIME: 16.5 SECONDS (ref 12.1–15)
PROTHROMBIN TIME: 18.1 SECONDS (ref 11.7–14.2)
QT INTERVAL: 362 MS
QT INTERVAL: 391 MS
QTC INTERVAL: 423 MS
QTC INTERVAL: 439 MS
RBC # BLD AUTO: 1.29 10*6/MM3 (ref 3.77–5.28)
RBC # BLD AUTO: 1.77 10*6/MM3 (ref 3.77–5.28)
RH BLD: POSITIVE
SODIUM SERPL-SCNC: 114 MMOL/L (ref 136–145)
SODIUM SERPL-SCNC: 116 MMOL/L (ref 136–145)
SODIUM SERPL-SCNC: 122 MMOL/L (ref 136–145)
SODIUM UR-SCNC: 75 MMOL/L
T&S EXPIRATION DATE: NORMAL
T&S EXPIRATION DATE: NORMAL
TROPONIN T DELTA: -18 NG/L
TROPONIN T SERPL HS-MCNC: 107 NG/L
WBC NRBC COR # BLD AUTO: 13.05 10*3/MM3 (ref 3.4–10.8)
WBC NRBC COR # BLD AUTO: 17.03 10*3/MM3 (ref 3.4–10.8)
WHOLE BLOOD HOLD COAG: NORMAL
WHOLE BLOOD HOLD COAG: NORMAL
WHOLE BLOOD HOLD SPECIMEN: NORMAL

## 2024-03-21 PROCEDURE — 84484 ASSAY OF TROPONIN QUANT: CPT | Performed by: EMERGENCY MEDICINE

## 2024-03-21 PROCEDURE — 86900 BLOOD TYPING SEROLOGIC ABO: CPT

## 2024-03-21 PROCEDURE — 82140 ASSAY OF AMMONIA: CPT | Performed by: INTERNAL MEDICINE

## 2024-03-21 PROCEDURE — 85610 PROTHROMBIN TIME: CPT | Performed by: INTERNAL MEDICINE

## 2024-03-21 PROCEDURE — 36430 TRANSFUSION BLD/BLD COMPNT: CPT

## 2024-03-21 PROCEDURE — 86850 RBC ANTIBODY SCREEN: CPT | Performed by: EMERGENCY MEDICINE

## 2024-03-21 PROCEDURE — 84300 ASSAY OF URINE SODIUM: CPT | Performed by: INTERNAL MEDICINE

## 2024-03-21 PROCEDURE — 85730 THROMBOPLASTIN TIME PARTIAL: CPT | Performed by: EMERGENCY MEDICINE

## 2024-03-21 PROCEDURE — 82948 REAGENT STRIP/BLOOD GLUCOSE: CPT

## 2024-03-21 PROCEDURE — 25010000002 ONDANSETRON PER 1 MG: Performed by: INTERNAL MEDICINE

## 2024-03-21 PROCEDURE — 25010000002 CALCIUM GLUCONATE-NACL 1-0.675 GM/50ML-% SOLUTION: Performed by: INTERNAL MEDICINE

## 2024-03-21 PROCEDURE — 99285 EMERGENCY DEPT VISIT HI MDM: CPT

## 2024-03-21 PROCEDURE — 86901 BLOOD TYPING SEROLOGIC RH(D): CPT

## 2024-03-21 PROCEDURE — 93005 ELECTROCARDIOGRAM TRACING: CPT | Performed by: EMERGENCY MEDICINE

## 2024-03-21 PROCEDURE — 25810000003 SODIUM CHLORIDE 0.9 % SOLUTION: Performed by: EMERGENCY MEDICINE

## 2024-03-21 PROCEDURE — 86923 COMPATIBILITY TEST ELECTRIC: CPT

## 2024-03-21 PROCEDURE — 85610 PROTHROMBIN TIME: CPT | Performed by: EMERGENCY MEDICINE

## 2024-03-21 PROCEDURE — 70450 CT HEAD/BRAIN W/O DYE: CPT

## 2024-03-21 PROCEDURE — 70496 CT ANGIOGRAPHY HEAD: CPT

## 2024-03-21 PROCEDURE — 99214 OFFICE O/P EST MOD 30 MIN: CPT | Performed by: PSYCHIATRY & NEUROLOGY

## 2024-03-21 PROCEDURE — 0042T HC CT CEREBRAL PERFUSION W/WO CONTRAST: CPT

## 2024-03-21 PROCEDURE — 85027 COMPLETE CBC AUTOMATED: CPT | Performed by: INTERNAL MEDICINE

## 2024-03-21 PROCEDURE — 25510000001 IOPAMIDOL PER 1 ML: Performed by: EMERGENCY MEDICINE

## 2024-03-21 PROCEDURE — 63710000001 INSULIN REGULAR HUMAN PER 5 UNITS: Performed by: EMERGENCY MEDICINE

## 2024-03-21 PROCEDURE — 94640 AIRWAY INHALATION TREATMENT: CPT

## 2024-03-21 PROCEDURE — 86901 BLOOD TYPING SEROLOGIC RH(D): CPT | Performed by: EMERGENCY MEDICINE

## 2024-03-21 PROCEDURE — 36415 COLL VENOUS BLD VENIPUNCTURE: CPT

## 2024-03-21 PROCEDURE — 70498 CT ANGIOGRAPHY NECK: CPT

## 2024-03-21 PROCEDURE — 25810000003 SODIUM CHLORIDE 0.9 % SOLUTION

## 2024-03-21 PROCEDURE — 93010 ELECTROCARDIOGRAM REPORT: CPT | Performed by: INTERNAL MEDICINE

## 2024-03-21 PROCEDURE — P9016 RBC LEUKOCYTES REDUCED: HCPCS

## 2024-03-21 PROCEDURE — 94664 DEMO&/EVAL PT USE INHALER: CPT

## 2024-03-21 PROCEDURE — 85025 COMPLETE CBC W/AUTO DIFF WBC: CPT | Performed by: EMERGENCY MEDICINE

## 2024-03-21 PROCEDURE — 99222 1ST HOSP IP/OBS MODERATE 55: CPT | Performed by: INTERNAL MEDICINE

## 2024-03-21 PROCEDURE — 83935 ASSAY OF URINE OSMOLALITY: CPT | Performed by: INTERNAL MEDICINE

## 2024-03-21 PROCEDURE — 96375 TX/PRO/DX INJ NEW DRUG ADDON: CPT

## 2024-03-21 PROCEDURE — 86900 BLOOD TYPING SEROLOGIC ABO: CPT | Performed by: INTERNAL MEDICINE

## 2024-03-21 PROCEDURE — 86850 RBC ANTIBODY SCREEN: CPT | Performed by: INTERNAL MEDICINE

## 2024-03-21 PROCEDURE — 71045 X-RAY EXAM CHEST 1 VIEW: CPT

## 2024-03-21 PROCEDURE — 86901 BLOOD TYPING SEROLOGIC RH(D): CPT | Performed by: INTERNAL MEDICINE

## 2024-03-21 PROCEDURE — 96374 THER/PROPH/DIAG INJ IV PUSH: CPT

## 2024-03-21 PROCEDURE — 80053 COMPREHEN METABOLIC PANEL: CPT | Performed by: EMERGENCY MEDICINE

## 2024-03-21 PROCEDURE — 63710000001 INSULIN REGULAR HUMAN PER 5 UNITS: Performed by: INTERNAL MEDICINE

## 2024-03-21 PROCEDURE — 84133 ASSAY OF URINE POTASSIUM: CPT | Performed by: INTERNAL MEDICINE

## 2024-03-21 PROCEDURE — 25810000003 SODIUM CHLORIDE 0.9 % SOLUTION: Performed by: INTERNAL MEDICINE

## 2024-03-21 PROCEDURE — 86900 BLOOD TYPING SEROLOGIC ABO: CPT | Performed by: EMERGENCY MEDICINE

## 2024-03-21 PROCEDURE — 25010000002 ONDANSETRON PER 1 MG

## 2024-03-21 PROCEDURE — 94799 UNLISTED PULMONARY SVC/PX: CPT

## 2024-03-21 PROCEDURE — 93005 ELECTROCARDIOGRAM TRACING: CPT | Performed by: INTERNAL MEDICINE

## 2024-03-21 PROCEDURE — 25010000002 CALCIUM GLUCONATE PER 10 ML: Performed by: EMERGENCY MEDICINE

## 2024-03-21 PROCEDURE — 94761 N-INVAS EAR/PLS OXIMETRY MLT: CPT

## 2024-03-21 RX ORDER — NITROGLYCERIN 0.4 MG/1
0.4 TABLET SUBLINGUAL
Status: DISCONTINUED | OUTPATIENT
Start: 2024-03-21 | End: 2024-03-21

## 2024-03-21 RX ORDER — ONDANSETRON 2 MG/ML
4 INJECTION INTRAMUSCULAR; INTRAVENOUS EVERY 4 HOURS PRN
Status: DISCONTINUED | OUTPATIENT
Start: 2024-03-21 | End: 2024-04-04 | Stop reason: HOSPADM

## 2024-03-21 RX ORDER — ALUMINA, MAGNESIA, AND SIMETHICONE 2400; 2400; 240 MG/30ML; MG/30ML; MG/30ML
15 SUSPENSION ORAL EVERY 6 HOURS PRN
Status: DISCONTINUED | OUTPATIENT
Start: 2024-03-21 | End: 2024-04-04 | Stop reason: HOSPADM

## 2024-03-21 RX ORDER — POLYETHYLENE GLYCOL 3350 17 G/17G
17 POWDER, FOR SOLUTION ORAL DAILY PRN
Status: DISCONTINUED | OUTPATIENT
Start: 2024-03-21 | End: 2024-04-04 | Stop reason: HOSPADM

## 2024-03-21 RX ORDER — SODIUM CHLORIDE 9 MG/ML
50 INJECTION, SOLUTION INTRAVENOUS CONTINUOUS
Status: DISCONTINUED | OUTPATIENT
Start: 2024-03-21 | End: 2024-03-22

## 2024-03-21 RX ORDER — BISACODYL 10 MG
10 SUPPOSITORY, RECTAL RECTAL DAILY PRN
Status: DISCONTINUED | OUTPATIENT
Start: 2024-03-21 | End: 2024-04-04 | Stop reason: HOSPADM

## 2024-03-21 RX ORDER — AMOXICILLIN 250 MG
2 CAPSULE ORAL 2 TIMES DAILY
Status: DISCONTINUED | OUTPATIENT
Start: 2024-03-21 | End: 2024-04-04 | Stop reason: HOSPADM

## 2024-03-21 RX ORDER — ACETAMINOPHEN 325 MG/1
650 TABLET ORAL EVERY 4 HOURS PRN
Status: DISCONTINUED | OUTPATIENT
Start: 2024-03-21 | End: 2024-04-04 | Stop reason: HOSPADM

## 2024-03-21 RX ORDER — BISACODYL 5 MG/1
5 TABLET, DELAYED RELEASE ORAL DAILY PRN
Status: DISCONTINUED | OUTPATIENT
Start: 2024-03-21 | End: 2024-04-04 | Stop reason: HOSPADM

## 2024-03-21 RX ORDER — ONDANSETRON 2 MG/ML
4 INJECTION INTRAMUSCULAR; INTRAVENOUS EVERY 6 HOURS PRN
Status: DISCONTINUED | OUTPATIENT
Start: 2024-03-21 | End: 2024-03-21

## 2024-03-21 RX ORDER — BUMETANIDE 0.25 MG/ML
0.5 INJECTION INTRAMUSCULAR; INTRAVENOUS ONCE
Status: DISCONTINUED | OUTPATIENT
Start: 2024-03-21 | End: 2024-03-21

## 2024-03-21 RX ORDER — SODIUM CHLORIDE 9 MG/ML
INJECTION, SOLUTION INTRAVENOUS
Status: COMPLETED
Start: 2024-03-21 | End: 2024-03-21

## 2024-03-21 RX ORDER — PANTOPRAZOLE SODIUM 40 MG/10ML
40 INJECTION, POWDER, LYOPHILIZED, FOR SOLUTION INTRAVENOUS ONCE
Status: COMPLETED | OUTPATIENT
Start: 2024-03-21 | End: 2024-03-21

## 2024-03-21 RX ORDER — SODIUM CHLORIDE 0.9 % (FLUSH) 0.9 %
10 SYRINGE (ML) INJECTION AS NEEDED
Status: DISCONTINUED | OUTPATIENT
Start: 2024-03-21 | End: 2024-03-21 | Stop reason: HOSPADM

## 2024-03-21 RX ORDER — NITROGLYCERIN 0.4 MG/1
0.4 TABLET SUBLINGUAL
Status: DISCONTINUED | OUTPATIENT
Start: 2024-03-21 | End: 2024-04-04 | Stop reason: HOSPADM

## 2024-03-21 RX ORDER — ONDANSETRON 4 MG/1
4 TABLET, ORALLY DISINTEGRATING ORAL EVERY 6 HOURS PRN
Status: DISCONTINUED | OUTPATIENT
Start: 2024-03-21 | End: 2024-03-21

## 2024-03-21 RX ORDER — ONDANSETRON 2 MG/ML
4 INJECTION INTRAMUSCULAR; INTRAVENOUS ONCE
Status: COMPLETED | OUTPATIENT
Start: 2024-03-21 | End: 2024-03-21

## 2024-03-21 RX ORDER — PANTOPRAZOLE SODIUM 40 MG/10ML
80 INJECTION, POWDER, LYOPHILIZED, FOR SOLUTION INTRAVENOUS ONCE
Status: COMPLETED | OUTPATIENT
Start: 2024-03-21 | End: 2024-03-21

## 2024-03-21 RX ORDER — DEXTROSE MONOHYDRATE 25 G/50ML
25 INJECTION, SOLUTION INTRAVENOUS ONCE
Status: COMPLETED | OUTPATIENT
Start: 2024-03-21 | End: 2024-03-21

## 2024-03-21 RX ORDER — ACETAMINOPHEN 650 MG/1
650 SUPPOSITORY RECTAL EVERY 4 HOURS PRN
Status: DISCONTINUED | OUTPATIENT
Start: 2024-03-21 | End: 2024-04-04 | Stop reason: HOSPADM

## 2024-03-21 RX ORDER — CALCIUM GLUCONATE 20 MG/ML
1000 INJECTION, SOLUTION INTRAVENOUS ONCE
Status: COMPLETED | OUTPATIENT
Start: 2024-03-21 | End: 2024-03-21

## 2024-03-21 RX ORDER — BUMETANIDE 0.25 MG/ML
2.5 INJECTION INTRAMUSCULAR; INTRAVENOUS ONCE
Status: COMPLETED | OUTPATIENT
Start: 2024-03-21 | End: 2024-03-22

## 2024-03-21 RX ORDER — ONDANSETRON 2 MG/ML
INJECTION INTRAMUSCULAR; INTRAVENOUS
Status: COMPLETED
Start: 2024-03-21 | End: 2024-03-21

## 2024-03-21 RX ORDER — CALCIUM GLUCONATE 94 MG/ML
1 INJECTION, SOLUTION INTRAVENOUS ONCE
Status: COMPLETED | OUTPATIENT
Start: 2024-03-21 | End: 2024-03-21

## 2024-03-21 RX ORDER — ONDANSETRON 4 MG/1
4 TABLET, ORALLY DISINTEGRATING ORAL EVERY 4 HOURS PRN
Status: DISCONTINUED | OUTPATIENT
Start: 2024-03-21 | End: 2024-04-04 | Stop reason: HOSPADM

## 2024-03-21 RX ADMIN — CALCIUM GLUCONATE 1000 MG: 20 INJECTION, SOLUTION INTRAVENOUS at 17:47

## 2024-03-21 RX ADMIN — ALBUTEROL SULFATE 10 MG: 2.5 SOLUTION RESPIRATORY (INHALATION) at 17:48

## 2024-03-21 RX ADMIN — ONDANSETRON 4 MG: 2 INJECTION INTRAMUSCULAR; INTRAVENOUS at 18:46

## 2024-03-21 RX ADMIN — IOPAMIDOL 150 ML: 755 INJECTION, SOLUTION INTRAVENOUS at 09:13

## 2024-03-21 RX ADMIN — SODIUM CHLORIDE 50 ML/HR: 9 INJECTION, SOLUTION INTRAVENOUS at 18:22

## 2024-03-21 RX ADMIN — SODIUM CHLORIDE: 0.9 INJECTION, SOLUTION INTRAVENOUS at 13:10

## 2024-03-21 RX ADMIN — DEXTROSE MONOHYDRATE 25 G: 25 INJECTION, SOLUTION INTRAVENOUS at 17:47

## 2024-03-21 RX ADMIN — SODIUM CHLORIDE 1000 ML: 9 INJECTION, SOLUTION INTRAVENOUS at 09:21

## 2024-03-21 RX ADMIN — PANTOPRAZOLE SODIUM 40 MG: 40 INJECTION, POWDER, LYOPHILIZED, FOR SOLUTION INTRAVENOUS at 22:43

## 2024-03-21 RX ADMIN — PANTOPRAZOLE SODIUM 40 MG: 40 INJECTION, POWDER, FOR SOLUTION INTRAVENOUS at 10:09

## 2024-03-21 RX ADMIN — INSULIN HUMAN 5 UNITS: 100 INJECTION, SOLUTION PARENTERAL at 17:48

## 2024-03-21 RX ADMIN — ONDANSETRON 4 MG: 2 INJECTION INTRAMUSCULAR; INTRAVENOUS at 10:09

## 2024-03-21 RX ADMIN — CALCIUM GLUCONATE 1 G: 98 INJECTION, SOLUTION INTRAVENOUS at 09:48

## 2024-03-21 RX ADMIN — PANTOPRAZOLE SODIUM 80 MG: 40 INJECTION, POWDER, LYOPHILIZED, FOR SOLUTION INTRAVENOUS at 17:48

## 2024-03-21 RX ADMIN — SODIUM CHLORIDE 50 ML: 9 INJECTION, SOLUTION INTRAVENOUS at 09:47

## 2024-03-21 RX ADMIN — PANTOPRAZOLE SODIUM 40 MG: 40 INJECTION, POWDER, LYOPHILIZED, FOR SOLUTION INTRAVENOUS at 18:12

## 2024-03-21 RX ADMIN — SODIUM BICARBONATE 50 MEQ: 84 INJECTION INTRAVENOUS at 09:52

## 2024-03-21 RX ADMIN — HUMAN INSULIN 10 UNITS: 100 INJECTION, SOLUTION SUBCUTANEOUS at 09:51

## 2024-03-21 RX ADMIN — SODIUM BICARBONATE 50 MEQ: 84 INJECTION INTRAVENOUS at 17:48

## 2024-03-21 RX ADMIN — DEXTROSE MONOHYDRATE 25 G: 25 INJECTION, SOLUTION INTRAVENOUS at 09:49

## 2024-03-21 NOTE — PLAN OF CARE
Problem: Adult Inpatient Plan of Care  Goal: Readiness for Transition of Care  Intervention: Mutually Develop Transition Plan  Description: Identify available resources for support (e.g., family, friends, community).  Identify and address barriers to ongoing treatment and home management (e.g., environmental, financial).  Provide opportunities to practice self-management skills.  Assess and monitor emotional readiness for transition.  Establish or reconnect linkage with outpatient providers or community-based services.  Recent Flowsheet Documentation  Taken 3/21/2024 1645 by Dorinda Poon, RN  Transportation Anticipated: family or friend will provide  Patient/Family Anticipated Services at Transition: none  Patient/Family Anticipates Transition to:   home   home with family  Taken 3/21/2024 1644 by Dorinda Poon, RN  Equipment Currently Used at Home: none   Goal Outcome Evaluation:

## 2024-03-21 NOTE — ED NOTES
Spoke with  at , states she has not felt well for 3-4 days, not eating or drinking well, has had blood in urine and stool intermittently for 3-4 days with no medical intervention

## 2024-03-21 NOTE — CONSULTS
DOS: 3/21/2024  NAME: Arline Landaverde   : 1950  PCP: Lanie Gomez APRN  CC: New onset weakness of both lower extremities  Referring MD: Alberto Fontanez MD, Bernardo Estrada MD    Neurological Problem and Interval History:  73 y.o. right-handed white female with a Hx of diabetes and prior stroke who was seen by Dr. Fransico Estrada on 2023 for vision loss and over the last 2 to 3 days she has been losing control of both her legs and falling.  Got up this morning and fell at 715 and was brought in by her  who thought that she was having a stroke.  Patient is very lethargic but she is arousable and she was able to tell me her full name and a whispering voice as well as where she is but did not know the month of the year but could tell me her date of birth accurately.  She is able to  both upper upper extremities hold him up in the air for 10 seconds but she does not straighten up her elbows and it seems like she has not the strength to hold up the arms in a straight manner.  She was able to perform finger-to-nose coordination well.  She is able to feel her extremities but when we requested her to  her legs she could not and when the nurse tried to passively raise her right lower extremity, it was noticeable that she has severe pain in the right hip as she had taken a fall the day before.    Past Medical/Surgical Hx:  Past Medical History:   Diagnosis Date    Acute CVA (cerebrovascular accident) 2023    Chronic hyponatremia     Classical migraine without intractable migraine 2023    Diabetes mellitus     Elevated BUN 2023    Expressive aphasia 2023    Former cigarette smoker     Grade II diastolic dysfunction     History of stroke 2023    Hypoalbuminemia 2023    SIADH (syndrome of inappropriate ADH production)     Stroke     Type 2 myocardial infarction 2023     Past Surgical History:   Procedure Laterality Date    BACK SURGERY       CHOLECYSTECTOMY      HIP HEMIARTHROPLASTY Right 4/19/2023    Procedure: HIP HEMIARTHROPLASTY ANTERIOR;  Surgeon: Kumar Bonilla MD;  Location: [facility];  Service: Orthopedics;  Laterality: Right;       Review of Systems:    Constitutional: Very sick looking lady currently laying in the CT scanner with a hemoglobin of only 4 and elevated white cell count, not a candidate for any acute thrombolytic therapy.  Cardiovascular: Denies any chest pain or palpitations.  Respiratory: Denies any shortness of breath.  Gastrointestinal: Denies any nausea and vomiting.  Genitourinary: Denies any bladder incontinence.  Musculoskeletal: Severe pain in the right hip joint for which she cannot  the right leg and also complains of severe back pain after the recent fall  Dermatological: No skin breakdown noted.  Neurological: NIH of 8 as she could not raise either of her legs which could be secondary to mechanical injury.  Psychiatric: Unable to gauge at this time  Ophthalmological: No visual loss.          Medications On Admission  (Not in a hospital admission)      Prior to Admission medications    Medication Sig Start Date End Date Taking? Authorizing Provider   acetaminophen (TYLENOL) 325 MG tablet Take 2 tablets by mouth Every 6 (Six) Hours As Needed for Mild Pain.    ProviderGerda MD   amLODIPine (NORVASC) 5 MG tablet Take 1 tablet by mouth Daily. 3/3/24   ProviderGerda MD   aspirin 81 MG EC tablet Take 1 tablet by mouth Every 12 (Twelve) Hours. Indications: VTE Prophylaxis 4/20/23   Telma Copeland APRN   atorvastatin (LIPITOR) 20 MG tablet Take 1 tablet by mouth Daily. 1/11/24   Arsh Saenz PA-C   dapagliflozin Propanediol (Farxiga) 10 MG tablet Take 10 mg by mouth Daily. 1/11/24   Arsh Saenz PA-C   losartan (COZAAR) 100 MG tablet Take 1 tablet by mouth Daily. 3/5/24   Arsh Saenz PA-C   magnesium oxide (MAG-OX) 400 tablet tablet Take 1 tablet by mouth 2 (Two)  Times a Day. 12/11/23   Roshan Low MD   torsemide (DEMADEX) 20 MG tablet Take 1 tablet by mouth Daily. 1/11/24   Arsh Saenz PA-C        Allergies:  Allergies   Allergen Reactions    Penicillins Hives    Furosemide Hives       Social Hx:  Social History     Socioeconomic History    Marital status: Single   Tobacco Use    Smoking status: Former     Current packs/day: 1.00     Types: Cigarettes    Smokeless tobacco: Never   Vaping Use    Vaping status: Never Used   Substance and Sexual Activity    Alcohol use: Never    Drug use: Never    Sexual activity: Defer       Family Hx:  Family History   Problem Relation Age of Onset    Cancer Mother        Review of Imaging (Interpretation of images not reports): MRI of the brain without contrast performed on April 13, 2022 showed the following:    MRI Brain WO      Clinical history: Fall one month ago with head trauma. Bilateral leg weakness and numbness beginning last night.     Comparison: CT head of same date     Technique: Sagittal, axial and coronal images of the head were obtained using the standard protocol.     Findings:  The diffusion sequence demonstrates no evidence of restricted diffusion to indicate acute infarction. The gradient echo sequence shows no abnormal susceptibility artifact.     The FLAIR sequence shows the ventricles to be generous in size. Cortical sulci are correspondingly prominent. No extra-axial fluid collections. No significant shift of midline structures. There are minimal FLAIR hyperintensities identified within the  periventricular and subcortical white matter. This likely represents minimal microvascular disease. Also demonstrated are areas of remote lacunar infarct in the left cerebellar hemisphere.     The pituitary is within normal limits. The cervicomedullary junction is normal. The 7th and 8th cranial nerve complexes are within normal limits.     Mastoid air cells are clear. The visualized paranasal sinuses are clear. No  acute orbital findings.     IMPRESSION:  Impression:  1.  No acute intracranial findings. Specifically, no evidence of acute infarct.     2.  Aging changes of the brain consisting of mild cerebral atrophy and minimal white matter microvascular disease. Also demonstrated are areas of remote lacunar infarct in the left cerebellar hemisphere.          CT Head Without Contrast Stroke Protocol    Result Date: 3/21/2024  CT HEAD WO CONTRAST STROKE PROTOCOL Date of Exam: 3/21/2024 8:00 AM EDT Indication: Neuro deficit, acute, stroke suspected Neuro deficit, acute stroke suspected. Comparison: 7/18/2023 Technique: Axial CT images were obtained of the head without contrast administration.  Reconstructed coronal and sagittal images were also obtained. Automated exposure control and iterative construction methods were used. Scan Time: 7:59 a.m. Results discussed with Dr. Fontanez at 8:06 a.m. Findings: There is generalized enlargement of the ventricles and CSF containing spaces. There is decreased attenuation in the periventricular deep white matter. There are small areas of infarction identified in the inferior aspect of the left cerebellar hemisphere  which are stable in appearance compared with the prior study. No mass lesions, mass effect, acute hemorrhage or edema. There is a chronic lacunar infarction in the right thalamus and left thalamus which appears stable. No intra or extra-axial fluid collections are identified. Paranasal sinuses are clear. The orbital structures appear unremarkable.     Impression: Impression: 1. Generalized atrophy with chronic periventricular ischemic changes. 2. Chronic small areas of infarction in the left cerebellar hemisphere and both thalami. 3. No acute intracranial findings Electronically Signed: James Burgos MD  3/21/2024 8:09 AM EDT  Workstation ID: [id number]                 Additional Tests Performed: Lab work performed early this morning showed the following:     Latest Reference  Range & Units 03/21/24 08:05   Protime 12.1 - 15.0 Seconds 16.5 (H)   INR 0.90 - 1.10  1.34 (H)   PTT 24.3 - 38.1 seconds 25.3   WBC 3.40 - 10.80 10*3/mm3 13.05 (H)   RBC 3.77 - 5.28 10*6/mm3 1.29 (L)   Hemoglobin 12.0 - 15.9 g/dL 4.0 (C)   Hematocrit 34.0 - 46.6 % 11.6 (C)   Platelets 140 - 450 10*3/mm3 465 (H)   RDW 12.3 - 15.4 % 14.3   MCV 79.0 - 97.0 fL 89.9   MCH 26.6 - 33.0 pg 31.0   MCHC 31.5 - 35.7 g/dL 34.5   MPV 6.0 - 12.0 fL 8.9   RDW-SD 37.0 - 54.0 fl 46.3   Neutrophil Rel % 42.7 - 76.0 % 86.3 (H)   Lymphocyte Rel % 19.6 - 45.3 % 6.8 (L)   Monocyte Rel % 5.0 - 12.0 % 5.1   Eosinophil Rel % 0.3 - 6.2 % 0.5   Basophil Rel % 0.0 - 1.5 % 0.2   Immature Granulocyte Rel % 0.0 - 0.5 % 1.1 (H)   Neutrophils Absolute 1.70 - 7.00 10*3/mm3 11.26 (H)   Lymphocytes Absolute 0.70 - 3.10 10*3/mm3 0.89   Monocytes Absolute 0.10 - 0.90 10*3/mm3 0.67   Eosinophils Absolute 0.00 - 0.40 10*3/mm3 0.06   Basophils Absolute 0.00 - 0.20 10*3/mm3 0.03   Immature Grans, Absolute 0.00 - 0.05 10*3/mm3 0.14 (H)   (C): Data is critically low  (H): Data is abnormally high  (L): Data is abnormally low    Results for orders placed during the hospital encounter of 01/02/24    Adult Transthoracic Echo Limited W/ Cont if Necessary Per Protocol    Interpretation Summary    Left ventricular systolic function is normal. Calculated left ventricular EF = 66.3% Abnormal global longitudinal LV strain (GLS) = -16.5%. Left ventricle strain data was reviewed by the physician. Normal left ventricular cavity size noted. Left ventricular wall thickness is consistent with borderline concentric hypertrophy. Left ventricular diastolic function is consistent with (grade I) impaired relaxation. The myocardium is bright and speckled. Global longitudinal strain is only slightly abnormal, and the pattern is not consistent with amyloidosis.    There is a trivial pericardial effusion.            Laboratory Results:   Lab Results   Component Value Date    GLUCOSE  88 01/11/2024    CALCIUM 9.3 01/11/2024     (L) 01/11/2024    K 4.7 01/11/2024    CO2 27.2 01/11/2024    CL 95 (L) 01/11/2024    BUN 45 (H) 01/11/2024    CREATININE 1.07 (H) 01/11/2024    BCR 42.1 (H) 01/11/2024    ANIONGAP 7.8 01/11/2024     Lab Results   Component Value Date    WBC 13.05 (H) 03/21/2024    HGB 4.0 (C) 03/21/2024    HCT 11.6 (C) 03/21/2024    MCV 89.9 03/21/2024     (H) 03/21/2024     Lab Results   Component Value Date    CHOL 147 12/05/2023    CHOL 144 07/19/2023     Lab Results   Component Value Date    HDL 61 (H) 12/05/2023    HDL 66 (H) 07/19/2023     Lab Results   Component Value Date    LDL 70 12/05/2023    LDL 66 07/19/2023     Lab Results   Component Value Date    TRIG 86 12/05/2023    TRIG 58 07/19/2023     Lab Results   Component Value Date    HGBA1C 5.30 07/19/2023     Lab Results   Component Value Date    INR 1.34 (H) 03/21/2024    INR 1.03 12/05/2023    INR 0.96 07/18/2023    PROTIME 16.5 (H) 03/21/2024    PROTIME 13.5 12/05/2023    PROTIME 12.8 07/18/2023     Lab Results   Component Value Date    RBPESDCM84 656 07/19/2023     Lab Results   Component Value Date    FOLATE >20.00 07/19/2023     TSH          4/20/2023    04:26 7/20/2023    11:20 12/5/2023    13:59   TSH   TSH 2.150  1.440  1.770           Physical Examination:  There were no vitals taken for this visit.  General Appearance:   Well developed, with the BMI of only 22 kg/m², well groomed, alert, and cooperative.  HEENT: Normocephalic.    Neck and Spine: Normal range of motion.  Normal alignment. No mass or tenderness. No bruits.    Extremities:    No edema or deformities.  Limited range of motion of the right lower extremity secondary to severe hip pain after the fall.    Skin:    No rashes or birth marks.    Neurological examination:  Higher Integrative  Function: Oriented to person and place and she knows her date of birth and her full name.  She could not tell me the current month of the year.  Tends to fall  asleep during the examination because of underlying medical issues..  CN II:  Pupils are equal, round, and reactive to light. Normal visual acuity and visual fields.    CN III IV VI: Extraocular movements are full without nystagmus.   CN V:  Normal facial sensation and strength of muscles of mastication.  CN VII:  Facial movements are symmetric. No weakness.  CN VIII: Auditory acuity is normal.  CN IX & X: Symmetric palatal movement.  CN XI:  Sternocleidomastoid and trapezius are normal.  No weakness.  CN XII:  The tongue is midline.  No atrophy or fasciculations.  Motor:  Normal muscle strength, bulk and tone in upper extremities.  Because of severe pain in the right lower extremity secondary to the recent fall and the right hip injury she could not  the right leg at all.  The left leg she also will not  voluntarily and when it was raised passively by the registered nurse she did not complain of pain but she complained of pain on doing the same to the right lower extremity..  Sensation: Normal to light touch, pinprick, vibration, temperature, and proprioception in arms and legs. Normal graphesthesia and no extinction on DSS.  Station and Gait: She is not weightbearing at this time and she has taken numerous falls over the last 3 days at home.  Coordination:  Finger to nose test shows no dysmetria.     NIHSS:    Baseline  0-->Alert: keenly responsive  0-->Answers both questions correctly  0-->Performs both tasks correctly  0=normal  0=No visual loss  0=Normal symmetric movement  0-->No drift: limb holds 90 (or 45) degrees for full 10 secs  0-->No drift: limb holds 90 (or 45) degrees for full 10 secs  4-->No movement  4-->No movement  0=Absent  0=Normal; no sensory loss  0=No aphasia, normal  0=Normal  0=No abnormality    Total score: 8    Diagnoses / Discussion:  73 y.o. who presents with Sx of bilateral lower extremity weakness which has been ongoing over the last 3 days after she has taken a fall and  she is complaining of severe pain in the right hip joint.  She is also not moving the left lower extremity as well.  She says she cannot pick it up.  Complaining of severe low back pain as well.  Her hemoglobin and hematocrit are low at 4 and 11.6 respectively which is a dramatic drop from the last known hemoglobin and hematocrit of 9.8 and 28.5 on December 18, 2023.  Her white cell count is also elevated at 13,500.  There is an increased neutrophil count of 86.3%.    Plan:  Discussed with the charge nurse that this is not an acute stroke as this has been ongoing over the last 3 days.  She is too sick to undergo rest of the testing like CT angiogram and CT perfusion as this does not appear to be any acute stroke and having bilateral lower extremity weakness from bilateral anterior cerebral artery territory strokes is extremely rare.  She is not a candidate for any acute thrombolytic therapy as these features have been going on for the last 3 days and also her hemoglobin and hematocrit are extremely low as stated above.  She needs to be medically stabilized and then she can undergo an MRI of the brain with a stroke protocol as well as an MRI of the lumbar spine to look for any lumbar disc herniation or acute cord compression.  She can be followed up by the general inpatient neurologist Dr. James Moise once she has been medically stabilized..     I have discussed the above with the emergency room charge nurse.  Time spent with patient: 70 minutes in face-to-face evaluation and management of the patient using the dedicated telemedicine device without any interruption with the help of the emergency room charge nurse with the patient located at the [facility] and myself at a remote location.    Electronically signed by Omkar Willingham MD, 03/21/24, 8:27 AM EDT.    Dictated using Dragon dictation.

## 2024-03-21 NOTE — H&P
Patient Identification:  Name: Arline Landaverde  Age: 73 y.o.  Sex: female  :  1950  MRN: [id number]                     Primary Care Physician: Lanie Gomez APRN    Reason for visit:   Transfer from [facility] for severe anemia, hyperkalemia and hyponatremia    History of Present Illness:   73-year-old female who presented to [facility] via EMS for concerns of stroke.  They were called to family's residence after family found patient with inability to get out of bed with weakness.  Had pronounced left-sided weakness noted.  CT imaging performed did not show signs of acute stroke.  Lab work did however show severe anemia and metabolic disturbance with hyponatremia and hyperkalemia.  She received 1 unit of packed red blood cells and [place] and transferred here for further evaluation and treatment after being accepted by Dr. Grajeda earlier today.  Repeat labs after blood transfusion shows value of 5.4 compared to 4.0 previously in [place].  Family with her in the CCU at time of visit.  They have noted that she has been feeling worse for the last few days.  She has had some dark stools and hematuria.    Past Medical History:  Past Medical History:   Diagnosis Date    Acute CVA (cerebrovascular accident) 2023    Chronic hyponatremia     Classical migraine without intractable migraine 2023    Diabetes mellitus     Elevated BUN 2023    Expressive aphasia 2023    Former cigarette smoker     Grade II diastolic dysfunction     History of stroke 2023    Hypoalbuminemia 2023    SIADH (syndrome of inappropriate ADH production)     Stroke     Type 2 myocardial infarction 2023     Past Surgical History:  Past Surgical History:   Procedure Laterality Date    BACK SURGERY      CHOLECYSTECTOMY      HIP HEMIARTHROPLASTY Right 2023    Procedure: HIP HEMIARTHROPLASTY ANTERIOR;  Surgeon: Kumar Bonilla MD;  Location: [facility];  Service: Orthopedics;   Laterality: Right;      Home Meds:  Medications Prior to Admission   Medication Sig Dispense Refill Last Dose    acetaminophen (TYLENOL) 325 MG tablet Take 2 tablets by mouth Every 6 (Six) Hours As Needed for Mild Pain.       amLODIPine (NORVASC) 5 MG tablet Take 1 tablet by mouth Daily.       aspirin 81 MG EC tablet Take 1 tablet by mouth Every 12 (Twelve) Hours. Indications: VTE Prophylaxis 60 tablet 1     atorvastatin (LIPITOR) 20 MG tablet Take 1 tablet by mouth Daily. 90 tablet 2     dapagliflozin Propanediol (Farxiga) 10 MG tablet Take 10 mg by mouth Daily. 30 tablet 11     losartan (COZAAR) 100 MG tablet Take 1 tablet by mouth Daily.       magnesium oxide (MAG-OX) 400 tablet tablet Take 1 tablet by mouth 2 (Two) Times a Day. 60 each 0     torsemide (DEMADEX) 20 MG tablet Take 1 tablet by mouth Daily. 90 tablet 2        Allergies:  Allergies   Allergen Reactions    Penicillins Hives    Furosemide Hives     Immunizations:  Immunization History   Administered Date(s) Administered    COVID-19 (MODERNA) Monovalent Original Booster 12/29/2021    COVID-19 (PFIZER) Purple Cap Monovalent 06/07/2021, 06/28/2021     Social History:   Social History     Social History Narrative    Not on file     Social History     Tobacco Use    Smoking status: Former     Current packs/day: 1.00     Types: Cigarettes    Smokeless tobacco: Never   Substance Use Topics    Alcohol use: Never     Family History:  Family History   Problem Relation Age of Onset    Cancer Mother         Review of Systems  All below listed negative with exceptions of what is noted in the above mentioned history.  Denies fevers or chills  Denies nausea or vomiting  No new vision or hearing changes  No chest pain  No productive cough or shortness of breath  Has noted some dark stools and hematuria lately  No musculoskeletal complaints  No heat or cold intolerance  No skin rashes  No dizziness or confusion.  No seizure activity  No new anxiety or depression  12  "system review of systems performed and all else negative    Objective:  tMax 24 hrs: Temp (24hrs), Av.3 °F (36.3 °C), Min:94.1 °F (34.5 °C), Max:98.5 °F (36.9 °C)    Vitals Ranges:   Temp:  [94.1 °F (34.5 °C)-98.5 °F (36.9 °C)] 98.3 °F (36.8 °C)  Heart Rate:  [72-90] 86  Resp:  [15-20] 18  BP: (104-137)/(47-67) 120/53      Exam:  /53   Pulse 86   Temp 98.3 °F (36.8 °C) (Oral)   Resp 18   Ht 162.6 cm (64.02\")   Wt 54.4 kg (120 lb)   SpO2 100%   BMI 20.59 kg/m²     GENERAL APPEARANCE:   Well developed  Well nourished  No acute distress   EYES:    PERRL                                                                           Conjunctiva normal  Sclera non-icteric.  HENT:   Atraumatic, normocephalic  External ears and nose normal  Moist mucous membranes and no ulcers  NECK:  Thyroid not enlarged  Trachea midline   RESPIRATORY:    Nonlabored breathing   Normal breath sounds  No rales. No wheezing  No dullness  CARDIOVASCULAR:    RRR  Normal S1, S2  No murmur  Lower extremity edema: none    Peripheral pulses present.    GI:   Bowel sounds normal  Abdomen soft , non-distended, non-tender  MUSCULOSKELETAL:  Normal movement of extremities  No tenderness, no deformities  No clubbing or cyanosis   Skin:    No visible rashes  No palpable nodules.  No mottling.   PSYCHIATRIC:  Normal mood and affect  Oriented to person, place and time  NEUROLOGIC:   Cranial nerves II through XII grossly intact.  Sensation intact.  .     Data Review:  Labs reviewed  Results from last 7 days   Lab Units 24  1616 24  1335 24  0805   SODIUM mmol/L 122* 116* 114*   POTASSIUM mmol/L 5.9* 5.5* 6.1*   CHLORIDE mmol/L 91* 86* 84*   CO2 mmol/L 18.0* 18.2* 18.6*   BUN mg/dL 164* 179* 185*   CREATININE mg/dL 2.83* 2.86* 2.93*   CALCIUM mg/dL 7.7* 7.3* 7.8*   BILIRUBIN mg/dL  --   --  <0.2   ALK PHOS U/L  --   --  68   ALT (SGPT) U/L  --   --  11   AST (SGOT) U/L  --   --  11   GLUCOSE mg/dL 160* 190* 171*     Results from " last 7 days   Lab Units 03/21/24  1616 03/21/24  0805   WBC 10*3/mm3 17.03* 13.05*   HEMOGLOBIN g/dL 5.4* 4.0*   HEMATOCRIT % 15.9* 11.6*   PLATELETS 10*3/mm3 392 465*               Imaging reviewed  CT head reviewed  Chest x-ray 3/21 reviewed    Assessment:  Severe anemia  Hyponatremia  Hyperkalemia  History of previous stroke  CAD  Diabetes mellitus  Uremia/acute kidney injury    Plan:  Admit to intensive care unit.  Will require further transfusions with packed red blood cells.  Consult nephrology for acute kidney injury and severe uremia.  Consult GI with severe anemia.  With elevated BUN this could suggest an upper GI bleed.  Check ammonia level.  Hyperkalemia order set protocol.  Control glucose.  Control blood pressure.  Mechanical DVT prophylaxis.        CCT: 43 min    Kaiden Mckoy MD  [facility], [facility]  Pulmonary and Critical Care Medicine    3/21/2024  17:13 EDT

## 2024-03-21 NOTE — ED PROVIDER NOTES
Subjective   History of Present Illness  73-year-old female brought to the emergency room by EMS for stroke.  EMS stated they were called to family's residence for approximately 30 minutes prior to found patient inability to get bed with weakness.  They evidently reported the patient had pronounced left-sided weakness.  Patient was brought in the emergency room and taken immediately to CT scan.  Upon return from radiology I saw patient in the emergency room then.  Patient states she has no pain but does feel weak.  She cannot tell me when this started      Review of Systems    Past Medical History:   Diagnosis Date    Acute CVA (cerebrovascular accident) 07/20/2023    Chronic hyponatremia     Classical migraine without intractable migraine 07/20/2023    Diabetes mellitus     Elevated BUN 12/19/2023    Expressive aphasia 07/20/2023    Former cigarette smoker     Grade II diastolic dysfunction     History of stroke 12/05/2023    Hypoalbuminemia 12/2023    SIADH (syndrome of inappropriate ADH production)     Stroke     Type 2 myocardial infarction 12/18/2023       Allergies   Allergen Reactions    Penicillins Hives    Furosemide Hives       Past Surgical History:   Procedure Laterality Date    BACK SURGERY      CHOLECYSTECTOMY      HIP HEMIARTHROPLASTY Right 4/19/2023    Procedure: HIP HEMIARTHROPLASTY ANTERIOR;  Surgeon: Kumar Bonilla MD;  Location: [facility];  Service: Orthopedics;  Laterality: Right;       Family History   Problem Relation Age of Onset    Cancer Mother        Social History     Socioeconomic History    Marital status: Single   Tobacco Use    Smoking status: Former     Current packs/day: 1.00     Types: Cigarettes    Smokeless tobacco: Never   Vaping Use    Vaping status: Never Used   Substance and Sexual Activity    Alcohol use: Never    Drug use: Never    Sexual activity: Defer           Objective   Physical Exam    Procedures           ED Course  ED Course as of 03/21/24 1420   Thu Mar 21,  2024 0820 Patient arrived to this facility 26 minutes ago.  Patient was immediately taken out of the department.  I was not informed that patient had arrived and consequently have not seen this patient.  Vital signs have not been done in the emergency room. [BH]   0839 Patient finally arrived back in the emergency room department 37 minutes after arrival.  Vital signs being ascertained now as well as blood sugar. [BH]   0840 An EKG was obtained and CT as well as evidently the stroke neurologist was seeing the patient via telemedicine in radiology. [BH]   0916 Patient's lab work reflects severe abnormalities of a hemoglobin of 4 hematocrit of 11.6 with a sodium of 114 and potassium of 6.1.  Patient's troponin is also 107 but is probably secondary to patient's hemoglobin and electrolyte abnormalities.  CT head shows no acute stroke however CTA and perfusion scans are still pending.  Patient's chest x-ray shows no acute abnormality. [BH]   0917 EKG time 801  Normal sinus rhythm  Heart rate 76  Axes are normal  Intervals are normal  No acute ST abnormalities []   0918 Spoke with hospitalist Dr. Kennedy concerning patient.  He had already evaluated spoken with the ICU intensivist that was rounding over here today and they both agreed that patient needed to be transferred to [facility] intensive care.  In the meantime I will institute blood transfusion protocol as well as give normal saline to help improve patient's electrolyte abnormalities.  This was in agreement with hospitalist Dr. Kennedy. []   0931 I spoke with [facility] intensivist Dr. Grajeda who asked that patient received 1 unit of blood in the ER as well as receiving calcium gluconate 10 units of insulin and amp of D50 and amp of bicarb to help alleviate patient's hyperkalemia in the setting of giving blood products.  He asked that we recheck BMP at that time and if stable patient can be transported to their facility at that time.  He excepted  transfer. []   1024 I discussed patient's case with Dr. Fontanez, agree with his statement regarding our discussion. Appreciate his efforts to get patient to appropriate level of care at ICU at [facility].  [MN]   1417 Patient's repeat BMP shows a sodium of 116 which is a mild improvement creatinine 2.86 which is actually mildly improved as well and more most importantly a potassium of 5.5.  This is improved from past potassium.  We will call report and have patient transported to [facility]. []      ED Course User Index  [] Iraj Fontanez MD  [MN] Avelino Kennedy,                           Total (NIH Stroke Scale): 8                  Medical Decision Making  Problems Addressed:  Hyperkalemia: complicated acute illness or injury  Hyponatremia: complicated acute illness or injury  Severe anemia: complicated acute illness or injury  Weakness: complicated acute illness or injury    Amount and/or Complexity of Data Reviewed  Labs: ordered.  Radiology: ordered.  ECG/medicine tests: ordered.    Risk  OTC drugs.  Prescription drug management.        Final diagnoses:   Severe anemia   Hyponatremia   Hyperkalemia   Weakness       ED Disposition  ED Disposition       ED Disposition   Transfer to Another Facility     Condition   --    Comment   --               No follow-up provider specified.       Medication List      No changes were made to your prescriptions during this visit.            Iraj Fontanez MD  03/21/24 4403

## 2024-03-21 NOTE — CONSULTS
[facility] Consult Note      Patient Name: Arline Landaverde  : 1950  MRN: [id number]  Primary Care Physician:  Lanie Gomez APRN  Referring Physician: Iraj Fontanez MD  Date of admission: 3/21/2024    Subjective     Reason for Consult: Abnormal renal function    HPI:   Arline Landaverde is a 73 y.o. female with past medical history of tobacco abuse, cerebrovascular accident, with expected aphasia, chronic hyponatremia chronic migraines, diabetes mellitus type 2, chronic diastolic congestive failure, coronary artery disease    Patient presents to outside facility for altered mental status concern for cerebrovascular accident after new onset of left-sided hemiparesis initial workup showing severe anemia with  hemoglobin of 4 and hyperkalemia of 6.1 with acute kidney injury and a creatinine of 2.9. Patient required transfer to [facility] to continue medical care.     Nephrology consultation been requested for the management    Review of Systems:   14 point review of systems is otherwise negative except for mentioned above on HPI    Personal History     Past Medical History:   Diagnosis Date    Acute CVA (cerebrovascular accident) 2023    Chronic hyponatremia     Classical migraine without intractable migraine 2023    Diabetes mellitus     Elevated BUN 2023    Expressive aphasia 2023    Former cigarette smoker     Grade II diastolic dysfunction     History of stroke 2023    Hypoalbuminemia 2023    SIADH (syndrome of inappropriate ADH production)     Stroke     Type 2 myocardial infarction 2023       Past Surgical History:   Procedure Laterality Date    BACK SURGERY      CHOLECYSTECTOMY      HIP HEMIARTHROPLASTY Right 2023    Procedure: HIP HEMIARTHROPLASTY ANTERIOR;  Surgeon: Kumar Bonilla MD;  Location: [facility];  Service: Orthopedics;  Laterality: Right;       Family History: family history includes Cancer in her  mother.    Social History:  reports that she has quit smoking. Her smoking use included cigarettes. She has never used smokeless tobacco. She reports that she does not drink alcohol and does not use drugs.    Home Medications:  Prior to Admission medications    Medication Sig Start Date End Date Taking? Authorizing Provider   acetaminophen (TYLENOL) 325 MG tablet Take 2 tablets by mouth Every 6 (Six) Hours As Needed for Mild Pain.    Provider, MD Gerda   amLODIPine (NORVASC) 5 MG tablet Take 1 tablet by mouth Daily. 3/3/24   ProviderGerda MD   aspirin 81 MG EC tablet Take 1 tablet by mouth Every 12 (Twelve) Hours. Indications: VTE Prophylaxis 4/20/23   Telma Copeland APRN   atorvastatin (LIPITOR) 20 MG tablet Take 1 tablet by mouth Daily. 1/11/24   Arsh Saenz PA-C   dapagliflozin Propanediol (Farxiga) 10 MG tablet Take 10 mg by mouth Daily. 1/11/24   Arsh Saenz PA-C   losartan (COZAAR) 100 MG tablet Take 1 tablet by mouth Daily. 3/5/24   Arsh Saenz PA-C   magnesium oxide (MAG-OX) 400 tablet tablet Take 1 tablet by mouth 2 (Two) Times a Day. 12/11/23   Roshan Low MD   torsemide (DEMADEX) 20 MG tablet Take 1 tablet by mouth Daily. 1/11/24   Arsh Saenz PA-C       Allergies:  Allergies   Allergen Reactions    Penicillins Hives    Furosemide Hives       Objective     Vitals:   Temp:  [94.1 °F (34.5 °C)-98.5 °F (36.9 °C)] 98.2 °F (36.8 °C)  Heart Rate:  [72-90] 83  Resp:  [15-20] 18  BP: (104-137)/(47-67) 116/47  No intake or output data in the 24 hours ending 03/21/24 1801    Physical Exam:   Constitutional: Alert, no acute distress.  HEENT: Sclera anicteric, no conjunctival injection  Neck: Supple, no thyromegaly, no lymphadenopathy, trachea at midline, no JVD  Respiratory: Clear to auscultation bilaterally, nonlabored respiration  Cardiovascular: RRR,   Gastrointestinal: Positive bowel sounds, abdomen is soft, nontender and nondistended  : No  palpable bladder  Musculoskeletal: No edema, no clubbing or cyanosis  Neurologic:  alert          Scheduled Meds:     bumetanide, 0.5 mg, Intravenous, Once  calcium gluconate, 1,000 mg, Intravenous, Once  senna-docusate sodium, 2 tablet, Oral, BID      IV Meds:   pantoprazole, 40 mg, Last Rate: 40 mg (03/21/24 1362)        Results Reviewed:   I have personally reviewed the results from the time of this admission to 3/21/2024 18:01 EDT     Lab Results   Component Value Date    GLUCOSE 160 (H) 03/21/2024    CALCIUM 7.7 (L) 03/21/2024     (L) 03/21/2024    K 5.9 (H) 03/21/2024    CO2 18.0 (L) 03/21/2024    CL 91 (L) 03/21/2024     (H) 03/21/2024    CREATININE 2.83 (H) 03/21/2024    BCR 58.0 (H) 03/21/2024    ANIONGAP 13.0 03/21/2024      Lab Results   Component Value Date    MG 1.5 (L) 12/11/2023    PHOS 4.2 04/19/2023    ALBUMIN 2.8 (L) 03/21/2024           Assessment / Plan       Severe anemia      ASSESSMENT:    Acute kidney injury baseline creatinine between 0.  0.7 and 0.9 upon admission 2.9.  Likely secondary to hemorrhagic shock and hemodynamic changes while on home dose of losartan affecting renal autoregulation and worsening vasoconstriction from use of torsemide and dapagliflozin complicated with extensive IV contrast  exposure including CT angiogram head ,neck with cerebral perfusion.  Avoid nephrotoxins keep over 65.  Place Gamez catheter will obtain urinalysis no images available.     Acute on chronic  hyponatremia with previous history of  SIADH ? , aggravated with acute kidney injury following sodium trend , will complete work up     Metabolic acidosis secondary to acute kidney injury.  Continue fluid  resuscitation further recommendation will follow     Severe   azotemia , BUN baseline 27-32 upon admission 185  >  164 secondary to GI bleed .  Patient alert able to follow simple commands .No acute indication for hemodialysis Azotemia  gradually improving with medical management    Urinary  retention , > 1000 ml , will order huff for closely monitor     Hyperkalemia secondary to acute kidney injury.  Hyperkalemia protocol including insulin, D5, calcium gluconate and bumetanide will follow closely continue hydration.    Acute on chronic normocytic anemia secondary to GI bleed.  Currently n.p.o. PRBC as needed    Cerebrovascular accident as primary team    PLAN:  -Huff catheter in place to monitor closely urine output, after evidence of urinary tension  -Agree with the PRBCs will add gentle hydration w/ bumetanide x 1   -Avoiding home dose of dapagliflozin , torsemide and losartan  -Will order UA , UCPR     Discussed with ICU nursing staff    Thank you for involving us in the care of Arline Landaverde.  Please feel free to call with any questions.    Cory Thompson MD  03/21/24  18:01 EDT    [facility]  [phone]      Please note that portions of this note were completed with a voice recognition program.

## 2024-03-22 ENCOUNTER — ANESTHESIA (OUTPATIENT)
Dept: GASTROENTEROLOGY | Facility: HOSPITAL | Age: 74
End: 2024-03-22
Payer: MEDICARE

## 2024-03-22 ENCOUNTER — ANESTHESIA EVENT (OUTPATIENT)
Dept: GASTROENTEROLOGY | Facility: HOSPITAL | Age: 74
End: 2024-03-22
Payer: MEDICARE

## 2024-03-22 PROBLEM — K92.1 MELENA: Status: ACTIVE | Noted: 2024-03-21

## 2024-03-22 LAB
ANION GAP SERPL CALCULATED.3IONS-SCNC: 12 MMOL/L (ref 5–15)
BASOPHILS # BLD AUTO: 0.06 10*3/MM3 (ref 0–0.2)
BASOPHILS NFR BLD AUTO: 0.4 % (ref 0–1.5)
BH BB BLOOD EXPIRATION DATE: NORMAL
BH BB BLOOD EXPIRATION DATE: NORMAL
BH BB BLOOD TYPE BARCODE: 6200
BH BB BLOOD TYPE BARCODE: 6200
BH BB DISPENSE STATUS: NORMAL
BH BB DISPENSE STATUS: NORMAL
BH BB PRODUCT CODE: NORMAL
BH BB PRODUCT CODE: NORMAL
BH BB UNIT NUMBER: NORMAL
BH BB UNIT NUMBER: NORMAL
BUN SERPL-MCNC: 132 MG/DL (ref 8–23)
BUN/CREAT SERPL: 51.6 (ref 7–25)
CALCIUM SPEC-SCNC: 7.2 MG/DL (ref 8.6–10.5)
CHLORIDE SERPL-SCNC: 100 MMOL/L (ref 98–107)
CO2 SERPL-SCNC: 17 MMOL/L (ref 22–29)
CREAT SERPL-MCNC: 2.56 MG/DL (ref 0.57–1)
CROSSMATCH INTERPRETATION: NORMAL
CROSSMATCH INTERPRETATION: NORMAL
DEPRECATED RDW RBC AUTO: 45.5 FL (ref 37–54)
EGFRCR SERPLBLD CKD-EPI 2021: 19.2 ML/MIN/1.73
EOSINOPHIL # BLD AUTO: 0.02 10*3/MM3 (ref 0–0.4)
EOSINOPHIL NFR BLD AUTO: 0.1 % (ref 0.3–6.2)
ERYTHROCYTE [DISTWIDTH] IN BLOOD BY AUTOMATED COUNT: 14.3 % (ref 12.3–15.4)
GLUCOSE BLDC GLUCOMTR-MCNC: 191 MG/DL (ref 70–130)
GLUCOSE BLDC GLUCOMTR-MCNC: 257 MG/DL (ref 70–130)
GLUCOSE SERPL-MCNC: 209 MG/DL (ref 65–99)
HCT VFR BLD AUTO: 16.7 % (ref 34–46.6)
HCT VFR BLD AUTO: 22.2 % (ref 34–46.6)
HCT VFR BLD AUTO: 22.4 % (ref 34–46.6)
HCT VFR BLD AUTO: 22.4 % (ref 34–46.6)
HCT VFR BLD AUTO: 24.4 % (ref 34–46.6)
HGB BLD-MCNC: 5.6 G/DL (ref 12–15.9)
HGB BLD-MCNC: 7.6 G/DL (ref 12–15.9)
HGB BLD-MCNC: 7.7 G/DL (ref 12–15.9)
HGB BLD-MCNC: 7.8 G/DL (ref 12–15.9)
HGB BLD-MCNC: 8.3 G/DL (ref 12–15.9)
IMM GRANULOCYTES # BLD AUTO: 0.16 10*3/MM3 (ref 0–0.05)
IMM GRANULOCYTES NFR BLD AUTO: 1.1 % (ref 0–0.5)
LYMPHOCYTES # BLD AUTO: 1.06 10*3/MM3 (ref 0.7–3.1)
LYMPHOCYTES NFR BLD AUTO: 7.1 % (ref 19.6–45.3)
MCH RBC QN AUTO: 30.3 PG (ref 26.6–33)
MCHC RBC AUTO-ENTMCNC: 34.2 G/DL (ref 31.5–35.7)
MCV RBC AUTO: 88.4 FL (ref 79–97)
MONOCYTES # BLD AUTO: 1.07 10*3/MM3 (ref 0.1–0.9)
MONOCYTES NFR BLD AUTO: 7.2 % (ref 5–12)
NEUTROPHILS NFR BLD AUTO: 12.51 10*3/MM3 (ref 1.7–7)
NEUTROPHILS NFR BLD AUTO: 84.1 % (ref 42.7–76)
NRBC BLD AUTO-RTO: 0 /100 WBC (ref 0–0.2)
OSMOLALITY SERPL: 341 MOSM/KG (ref 280–301)
PLATELET # BLD AUTO: 243 10*3/MM3 (ref 140–450)
PMV BLD AUTO: 9 FL (ref 6–12)
POTASSIUM SERPL-SCNC: 5.8 MMOL/L (ref 3.5–5.2)
RBC # BLD AUTO: 2.51 10*6/MM3 (ref 3.77–5.28)
SODIUM SERPL-SCNC: 129 MMOL/L (ref 136–145)
UNIT  ABO: NORMAL
UNIT  ABO: NORMAL
UNIT  RH: NORMAL
UNIT  RH: NORMAL
WBC NRBC COR # BLD AUTO: 14.88 10*3/MM3 (ref 3.4–10.8)

## 2024-03-22 PROCEDURE — 25010000002 PROPOFOL 10 MG/ML EMULSION: Performed by: STUDENT IN AN ORGANIZED HEALTH CARE EDUCATION/TRAINING PROGRAM

## 2024-03-22 PROCEDURE — 63710000001 ONDANSETRON ODT 4 MG TABLET DISPERSIBLE

## 2024-03-22 PROCEDURE — 85014 HEMATOCRIT: CPT | Performed by: INTERNAL MEDICINE

## 2024-03-22 PROCEDURE — 0W3P8ZZ CONTROL BLEEDING IN GASTROINTESTINAL TRACT, VIA NATURAL OR ARTIFICIAL OPENING ENDOSCOPIC: ICD-10-PCS | Performed by: INTERNAL MEDICINE

## 2024-03-22 PROCEDURE — 25010000002 BUMETANIDE PER 0.5 MG: Performed by: INTERNAL MEDICINE

## 2024-03-22 PROCEDURE — 25010000002 EPINEPHRINE PER 0.1 MG: Performed by: INTERNAL MEDICINE

## 2024-03-22 PROCEDURE — 25010000002 PROCHLORPERAZINE 10 MG/2ML SOLUTION: Performed by: INTERNAL MEDICINE

## 2024-03-22 PROCEDURE — 83930 ASSAY OF BLOOD OSMOLALITY: CPT | Performed by: INTERNAL MEDICINE

## 2024-03-22 PROCEDURE — 86900 BLOOD TYPING SEROLOGIC ABO: CPT

## 2024-03-22 PROCEDURE — 25010000002 METOCLOPRAMIDE PER 10 MG: Performed by: INTERNAL MEDICINE

## 2024-03-22 PROCEDURE — 80048 BASIC METABOLIC PNL TOTAL CA: CPT | Performed by: INTERNAL MEDICINE

## 2024-03-22 PROCEDURE — 85018 HEMOGLOBIN: CPT | Performed by: INTERNAL MEDICINE

## 2024-03-22 PROCEDURE — 82948 REAGENT STRIP/BLOOD GLUCOSE: CPT

## 2024-03-22 PROCEDURE — P9016 RBC LEUKOCYTES REDUCED: HCPCS

## 2024-03-22 PROCEDURE — 25810000003 SODIUM CHLORIDE 0.9 % SOLUTION: Performed by: INTERNAL MEDICINE

## 2024-03-22 PROCEDURE — 3E0G8GC INTRODUCTION OF OTHER THERAPEUTIC SUBSTANCE INTO UPPER GI, VIA NATURAL OR ARTIFICIAL OPENING ENDOSCOPIC: ICD-10-PCS | Performed by: INTERNAL MEDICINE

## 2024-03-22 PROCEDURE — 85025 COMPLETE CBC W/AUTO DIFF WBC: CPT | Performed by: INTERNAL MEDICINE

## 2024-03-22 PROCEDURE — 43255 EGD CONTROL BLEEDING ANY: CPT | Performed by: INTERNAL MEDICINE

## 2024-03-22 PROCEDURE — 36430 TRANSFUSION BLD/BLD COMPNT: CPT

## 2024-03-22 PROCEDURE — 99232 SBSQ HOSP IP/OBS MODERATE 35: CPT | Performed by: INTERNAL MEDICINE

## 2024-03-22 RX ORDER — METOCLOPRAMIDE HYDROCHLORIDE 5 MG/ML
10 INJECTION INTRAMUSCULAR; INTRAVENOUS ONCE
Status: COMPLETED | OUTPATIENT
Start: 2024-03-22 | End: 2024-03-22

## 2024-03-22 RX ORDER — LATANOPROST 50 UG/ML
1 SOLUTION/ DROPS OPHTHALMIC NIGHTLY
Status: ON HOLD | COMMUNITY
End: 2024-03-22

## 2024-03-22 RX ORDER — LIDOCAINE HYDROCHLORIDE 20 MG/ML
INJECTION, SOLUTION INFILTRATION; PERINEURAL AS NEEDED
Status: DISCONTINUED | OUTPATIENT
Start: 2024-03-22 | End: 2024-03-22 | Stop reason: SURG

## 2024-03-22 RX ORDER — PROCHLORPERAZINE EDISYLATE 5 MG/ML
2.5 INJECTION INTRAMUSCULAR; INTRAVENOUS EVERY 6 HOURS PRN
Status: DISCONTINUED | OUTPATIENT
Start: 2024-03-22 | End: 2024-04-04 | Stop reason: HOSPADM

## 2024-03-22 RX ORDER — LOSARTAN POTASSIUM 50 MG/1
50 TABLET ORAL DAILY
COMMUNITY
End: 2024-04-04 | Stop reason: HOSPADM

## 2024-03-22 RX ORDER — ASPIRIN 81 MG/1
81 TABLET ORAL DAILY
COMMUNITY
End: 2024-04-04 | Stop reason: HOSPADM

## 2024-03-22 RX ORDER — SODIUM CHLORIDE 9 MG/ML
INJECTION, SOLUTION INTRAVENOUS CONTINUOUS PRN
Status: DISCONTINUED | OUTPATIENT
Start: 2024-03-22 | End: 2024-03-22 | Stop reason: SURG

## 2024-03-22 RX ORDER — PROPOFOL 10 MG/ML
VIAL (ML) INTRAVENOUS AS NEEDED
Status: DISCONTINUED | OUTPATIENT
Start: 2024-03-22 | End: 2024-03-22 | Stop reason: SURG

## 2024-03-22 RX ADMIN — PROPOFOL 120 MG: 10 INJECTION, EMULSION INTRAVENOUS at 11:34

## 2024-03-22 RX ADMIN — PANTOPRAZOLE SODIUM 40 MG: 40 INJECTION, POWDER, LYOPHILIZED, FOR SOLUTION INTRAVENOUS at 19:26

## 2024-03-22 RX ADMIN — Medication 1 APPLICATION: at 13:41

## 2024-03-22 RX ADMIN — PANTOPRAZOLE SODIUM 40 MG: 40 INJECTION, POWDER, LYOPHILIZED, FOR SOLUTION INTRAVENOUS at 15:11

## 2024-03-22 RX ADMIN — LIDOCAINE HYDROCHLORIDE 50 MG: 20 INJECTION, SOLUTION INFILTRATION; PERINEURAL at 11:34

## 2024-03-22 RX ADMIN — METOCLOPRAMIDE 10 MG: 5 INJECTION, SOLUTION INTRAMUSCULAR; INTRAVENOUS at 09:08

## 2024-03-22 RX ADMIN — PANTOPRAZOLE SODIUM 40 MG: 40 INJECTION, POWDER, LYOPHILIZED, FOR SOLUTION INTRAVENOUS at 03:52

## 2024-03-22 RX ADMIN — PROCHLORPERAZINE EDISYLATE 2.5 MG: 5 INJECTION INTRAMUSCULAR; INTRAVENOUS at 12:35

## 2024-03-22 RX ADMIN — Medication 1 APPLICATION: at 20:15

## 2024-03-22 RX ADMIN — SODIUM CHLORIDE 250 ML: 900 INJECTION, SOLUTION INTRAVENOUS at 09:11

## 2024-03-22 RX ADMIN — PANTOPRAZOLE SODIUM 40 MG: 40 INJECTION, POWDER, LYOPHILIZED, FOR SOLUTION INTRAVENOUS at 09:06

## 2024-03-22 RX ADMIN — BUMETANIDE 2.5 MG: 0.25 INJECTION INTRAMUSCULAR; INTRAVENOUS at 07:35

## 2024-03-22 RX ADMIN — SODIUM CHLORIDE: 9 INJECTION, SOLUTION INTRAVENOUS at 11:25

## 2024-03-22 RX ADMIN — ONDANSETRON 4 MG: 4 TABLET, ORALLY DISINTEGRATING ORAL at 00:32

## 2024-03-22 RX ADMIN — PROPOFOL 160 MCG/KG/MIN: 10 INJECTION, EMULSION INTRAVENOUS at 11:35

## 2024-03-22 NOTE — ANESTHESIA PROCEDURE NOTES
Airway  Urgency: elective    Date/Time: 3/22/2024 11:38 AM  Airway not difficult    General Information and Staff    Patient location during procedure: OR  Anesthesiologist: Laura Toledo MD    Indications and Patient Condition  Indications for airway management: airway protection    Preoxygenated: yes  Mask difficulty assessment: 0 - not attempted (rsi)    Final Airway Details  Final airway type: endotracheal airway      Successful airway: ETT  Cuffed: yes   Endotracheal tube insertion site: oral  Blade: Claus  Blade size: 3  ETT size (mm): 7.0  Cormack-Lehane Classification: grade I - full view of glottis  Placement verified by: chest auscultation and capnometry   Measured from: lips  Number of attempts at approach: 1  Assessment: lips, teeth, and gum same as pre-op and atraumatic intubation

## 2024-03-22 NOTE — DISCHARGE PLACEMENT REQUEST
"Arline Gutierrez (73 y.o. Female)       Date of Birth   1950    Social Security Number       Address   [address] [place] [address]    Home Phone   [phone]    MRN   [id number]       Worship   None    Marital Status   Single                            Admission Date   3/21/24    Admission Type   Urgent    Admitting Provider   Martin Grajeda MD    Attending Provider   Martin Grajeda MD    Department, Room/Bed   [facility], N327/1       Discharge Date       Discharge Disposition       Discharge Destination                                 Attending Provider: Martin Grajeda MD    Allergies: Penicillins, Furosemide    Isolation: None   Infection: None   Code Status: CPR    Ht: 162.6 cm (64.02\")   Wt: 56.3 kg (124 lb 1.9 oz)    Admission Cmt: None   Principal Problem: Severe anemia [D64.9]                   Active Insurance as of 3/21/2024       Primary Coverage       Payor Plan Insurance Group Employer/Plan Group    MEDICARE MEDICARE A & B        Payor Plan Address Payor Plan Phone Number Payor Plan Fax Number Effective Dates    [address] [phone]  5/1/2015 - None Entered    [place] [address]         Subscriber Name Subscriber Birth Date Member ID       ARLINE GUTIERREZ 1950 [id number]                     Emergency Contacts        (Rel.) Home Phone Work Phone Mobile Phone    Sachin Hernández (Other) [phone] -- [phone]            "

## 2024-03-22 NOTE — CONSULTS
[facility]  Initial Inpatient Consult Note    Referring Provider: Dr Grajeda    Reason for Consultation: Anemia    Subjective     History of present illness:    73 y.o. female transferred from [place] after presenting with stroke like sx.  Asked to see for anemia.  Hb 4.0 on presentation.  Increased to 5.4 after 1 U PRBCs.  Family at bedside. Pt does not provide any reliable hx.  They do not report any known GI bleeding.  They do state they have noticed blood in urine.  Uncertain if and when she last had colonoscopy.  Labs with MARNIE, elevated potassium and renal fx.  Low Na.  Significantly elevated BUN.  They do report pt has not really been drinking or eating much last 3-4 days.      Past Medical History:  Past Medical History:   Diagnosis Date    Acute CVA (cerebrovascular accident) 07/20/2023    Chronic hyponatremia     Classical migraine without intractable migraine 07/20/2023    Diabetes mellitus     Elevated BUN 12/19/2023    Expressive aphasia 07/20/2023    Former cigarette smoker     Grade II diastolic dysfunction     History of stroke 12/05/2023    Hypoalbuminemia 12/2023    SIADH (syndrome of inappropriate ADH production)     Stroke     Type 2 myocardial infarction 12/18/2023     Past Surgical History:  Past Surgical History:   Procedure Laterality Date    BACK SURGERY      CHOLECYSTECTOMY      HIP HEMIARTHROPLASTY Right 4/19/2023    Procedure: HIP HEMIARTHROPLASTY ANTERIOR;  Surgeon: Kumar Bonilla MD;  Location: [facility];  Service: Orthopedics;  Laterality: Right;      Social History:   Social History     Tobacco Use    Smoking status: Former     Current packs/day: 1.00     Types: Cigarettes    Smokeless tobacco: Never   Substance Use Topics    Alcohol use: Never      Family History:  Family History   Problem Relation Age of Onset    Cancer Mother        Home Meds:  Medications Prior to Admission   Medication Sig Dispense Refill Last Dose    acetaminophen (TYLENOL) 325 MG  tablet Take 2 tablets by mouth Every 6 (Six) Hours As Needed for Mild Pain.       amLODIPine (NORVASC) 5 MG tablet Take 1 tablet by mouth Daily.       aspirin 81 MG EC tablet Take 1 tablet by mouth Every 12 (Twelve) Hours. Indications: VTE Prophylaxis 60 tablet 1     atorvastatin (LIPITOR) 20 MG tablet Take 1 tablet by mouth Daily. 90 tablet 2     dapagliflozin Propanediol (Farxiga) 10 MG tablet Take 10 mg by mouth Daily. 30 tablet 11     losartan (COZAAR) 100 MG tablet Take 1 tablet by mouth Daily.       magnesium oxide (MAG-OX) 400 tablet tablet Take 1 tablet by mouth 2 (Two) Times a Day. 60 each 0     torsemide (DEMADEX) 20 MG tablet Take 1 tablet by mouth Daily. 90 tablet 2      Current Meds:   bumetanide, 2.5 mg, Intravenous, Once  senna-docusate sodium, 2 tablet, Oral, BID      Allergies:  Allergies   Allergen Reactions    Penicillins Hives    Furosemide Hives       Objective     Vital Signs  Temp:  [94.1 °F (34.5 °C)-98.5 °F (36.9 °C)] 97.8 °F (36.6 °C)  Heart Rate:  [] 101  Resp:  [10-20] 10  BP: (104-137)/(47-67) 118/51  Physical Exam:  General Appearance:     Alert, cooperative, in no acute distress, pale   Abdomen:     Normal bowel sounds, no masses, no organomegaly, soft     nontender, nondistended, no guarding, no rebound                 tenderness   Rectal:     Deferred       Results Review:   I reviewed the patient's new clinical results.  I reviewed the patient's new imaging results and agree with the interpretation.    Results from last 7 days   Lab Units 03/21/24  1616 03/21/24  0805   WBC 10*3/mm3 17.03* 13.05*   HEMOGLOBIN g/dL 5.4* 4.0*   HEMATOCRIT % 15.9* 11.6*   PLATELETS 10*3/mm3 392 465*     Results from last 7 days   Lab Units 03/21/24  1616 03/21/24  1335 03/21/24  0805   SODIUM mmol/L 122* 116* 114*   POTASSIUM mmol/L 5.9* 5.5* 6.1*   CHLORIDE mmol/L 91* 86* 84*   CO2 mmol/L 18.0* 18.2* 18.6*   BUN mg/dL 164* 179* 185*   CREATININE mg/dL 2.83* 2.86* 2.93*   CALCIUM mg/dL 7.7* 7.3*  "7.8*   BILIRUBIN mg/dL  --   --  <0.2   ALK PHOS U/L  --   --  68   ALT (SGPT) U/L  --   --  11   AST (SGOT) U/L  --   --  11   GLUCOSE mg/dL 160* 190* 171*     Results from last 7 days   Lab Units 03/21/24  1655 03/21/24  0805   INR  1.48* 1.34*     No results found for: \"LIPASE\"    Radiology:  No orders to display       Assessment & Plan   Assessment:    Anemia   MARNIE   Significantly elevated BUN   Hyperkalemia    Plan:   No evidence of overt GI bleeding at this time  Will assess response to PRBCs  Start protonix gtt  Endoscopy can be considered once stabilized and if within scope of patient's GOC.  Pt and family have expressed to me desire to be DNR, nurse to review with intensivist.      I discussed the patients findings and my recommendations with patient and family.         Juan Velazquez M.D.  [facility]  [address] [place] [address]  Office: [phone]     "

## 2024-03-22 NOTE — NURSING NOTE
2350: BRET Koenig w/ pulm updated on pt c/o nausea. Order to adjust zofran to PRN Q4.    0250: Updated Dr. Sommer w/ eladio r/t pt's current condition including BP, Hgb (increased from 5.4 to 5.6) after 2u PRBCs, and BG. Order to transfuse 2u PRBCs and update GI after 0600.    0645: Dr. Velazquez w/ GI returned page. MD updated on pt's current status including most recent Hgb (5.6) @ 0126 and x2 black tarry stools w/ blood clots. Order to continue Protonix gtt infusing and keep pt NPO and plan for an EGD w/ Dr. Miranda sometime today. No other orders at this time.

## 2024-03-22 NOTE — ANESTHESIA POSTPROCEDURE EVALUATION
Patient: Arline Landaverde    Procedure Summary       Date: 03/22/24 Room / Location:  [facility]    Anesthesia Start: 1125 Anesthesia Stop: 1212    Procedure: ESOPHAGOGASTRODUODENOSCOPY AT BEDSIDE with epi injection, heater probe (Esophagus) Diagnosis:       Melena      (Melena [K92.1])    Surgeons: Gissell Miranda MD Provider: Laura Toledo MD    Anesthesia Type: general ASA Status: 4 - Emergent            Anesthesia Type: general    Vitals  Vitals Value Taken Time   /54 03/22/24 1219   Temp     Pulse 91 03/22/24 1222   Resp     SpO2 100 % 03/22/24 1222   Vitals shown include unfiled device data.        Post Anesthesia Care and Evaluation    Patient location during evaluation: ICU  Patient participation: complete - patient participated  Level of consciousness: responsive to verbal stimuli  Pain management: adequate    Airway patency: patent  Anesthetic complications: No anesthetic complications  PONV Status: none  Cardiovascular status: acceptable  Respiratory status: acceptable  Hydration status: acceptable

## 2024-03-22 NOTE — PROGRESS NOTES
[facility]  Inpatient Progress Note    Reason for Follow Up:  severe anemia    Subjective     Interval History:   Had melanotic stool overnight. Rcvd 3 units prbcs with repeat hb 5.6 - 2 additional units ordered.  Family available via telephone now says that she has had blood in her stool for few days.  She is demented and can provide no history.  She does take a low-dose aspirin at home but no other NSAIDs.    Current Facility-Administered Medications:     acetaminophen (TYLENOL) tablet 650 mg, 650 mg, Oral, Q4H PRN **OR** acetaminophen (TYLENOL) suppository 650 mg, 650 mg, Rectal, Q4H PRN, Kaiden Mckoy MD    aluminum-magnesium hydroxide-simethicone (MAALOX MAX) 400-400-40 MG/5ML suspension 15 mL, 15 mL, Oral, Q6H PRN, Kaiden Mckoy MD    sennosides-docusate (PERICOLACE) 8.6-50 MG per tablet 2 tablet, 2 tablet, Oral, BID **AND** polyethylene glycol (MIRALAX) packet 17 g, 17 g, Oral, Daily PRN **AND** bisacodyl (DULCOLAX) EC tablet 5 mg, 5 mg, Oral, Daily PRN **AND** bisacodyl (DULCOLAX) suppository 10 mg, 10 mg, Rectal, Daily PRN, Kaiden Mckoy MD    bumetanide (BUMEX) injection 2.5 mg, 2.5 mg, Intravenous, Once, Cory Thompson MD    Calcium Replacement - Follow Nurse / BPA Driven Protocol, , Does not apply, PRLeelee MARIN Mark Edwin, MD    Magnesium Standard Dose Replacement - Follow Nurse / BPA Driven Protocol, , Does not apply, PRLeelee MARIN Mark Edwin, MD    nitroglycerin (NITROSTAT) SL tablet 0.4 mg, 0.4 mg, Sublingual, Q5 Min PRN, Kaiden Mckoy MD    ondansetron ODT (ZOFRAN-ODT) disintegrating tablet 4 mg, 4 mg, Oral, Q4H PRN, 4 mg at 03/22/24 0032 **OR** ondansetron (ZOFRAN) injection 4 mg, 4 mg, Intravenous, Q4H PRN, Mariya Chang, APRN    [COMPLETED] pantoprazole (PROTONIX) injection 80 mg, 80 mg, Intravenous, Once, 80 mg at 03/21/24 1748 **AND** pantoprazole (PROTONIX) 40 mg in sodium chloride 0.9 % 100 mL (0.4 mg/mL) MBP, 40 mg, Intravenous,  Continuous, Kaiden Mckoy MD, Last Rate: 20 mL/hr at 03/22/24 0352, 40 mg at 03/22/24 0352    Phosphorus Replacement - Follow Nurse / BPA Driven Protocol, , Does not apply, Leelee MTZ Mark Edwin, MD    Potassium Replacement - Follow Nurse / BPA Driven Protocol, , Does not apply, Leelee MTZ Mark Edwin, MD    sodium chloride 0.9 % infusion, 50 mL/hr, Intravenous, Continuous, Cory Thompson MD, Last Rate: 50 mL/hr at 03/21/24 1822, 50 mL/hr at 03/21/24 1822  Review of Systems:    Positive for melena, negative for vomiting fever chills    Objective     Vital Signs  Temp:  [94.1 °F (34.5 °C)-99.1 °F (37.3 °C)] 98.1 °F (36.7 °C)  Heart Rate:  [] 85  Resp:  [10-20] 12  BP: ()/(34-80) 132/49  Body mass index is 21.29 kg/m².    Intake/Output Summary (Last 24 hours) at 3/22/2024 0636  Last data filed at 3/22/2024 0553  Gross per 24 hour   Intake 1763.42 ml   Output 3190 ml   Net -1426.58 ml     I/O this shift:  In: 1763.4 [I.V.:743; Blood:1020.4]  Out: 950 [Urine:950]     Physical Exam:   General: patient awake, alert and cooperative   Eyes: Normal lids and lashes, no scleral icterus   Neck: supple, normal ROM   Skin: warm and dry, not jaundiced   Pulm:  regular and unlabored   Abdomen: soft, nontender, nondistended   Extremities: no rash or edema   Psychiatric: Confused pleasant   Results Review:     I reviewed the patient's new clinical results.    Results from last 7 days   Lab Units 03/22/24  0129 03/21/24  1616 03/21/24  0805   WBC 10*3/mm3  --  17.03* 13.05*   HEMOGLOBIN g/dL 5.6* 5.4* 4.0*   HEMATOCRIT % 16.7* 15.9* 11.6*   PLATELETS 10*3/mm3  --  392 465*     Results from last 7 days   Lab Units 03/21/24  1616 03/21/24  1335 03/21/24  0805   SODIUM mmol/L 122* 116* 114*   POTASSIUM mmol/L 5.9* 5.5* 6.1*   CHLORIDE mmol/L 91* 86* 84*   CO2 mmol/L 18.0* 18.2* 18.6*   BUN mg/dL 164* 179* 185*   CREATININE mg/dL 2.83* 2.86* 2.93*   CALCIUM mg/dL 7.7* 7.3* 7.8*   BILIRUBIN mg/dL  --   --   "<0.2   ALK PHOS U/L  --   --  68   ALT (SGPT) U/L  --   --  11   AST (SGOT) U/L  --   --  11   GLUCOSE mg/dL 160* 190* 171*     Results from last 7 days   Lab Units 03/21/24  1655 03/21/24  0805   INR  1.48* 1.34*     No results found for: \"LIPASE\"    Radiology:  No orders to display       Assessment & Plan     Active Hospital Problems    Diagnosis     **Severe anemia        Assessment:  Anemia  melena   MARNIE   Significantly elevated BUN   Hyperkalemia      Plan:  Continue pantoprazole gtt  She is receiving her 6 unit of blood-hemodynamically stable.  Continues to have melena.  Plan for bedside ICU this morning for further evaluation.  Discussed with the patient's family and the patient and they are agreeable with proceeding    I discussed the patients findings and my recommendations with patient, family, nursing staff, and Dr Grajeda .            Gissell Miranda M.D.  [facility]  [phone]            "

## 2024-03-22 NOTE — SIGNIFICANT NOTE
03/22/24 1130   OTHER   Discipline physical therapist   Rehab Time/Intention   Session Not Performed other (see comments)  (PT holding today, medical workup ongoing plans for EGD today. Has received multiple units of blood overnight. PT will follow up tomorrow)   Recommendation   PT - Next Appointment 03/23/24

## 2024-03-22 NOTE — CASE MANAGEMENT/SOCIAL WORK
Discharge Planning Assessment  [facility]     Patient Name: Arline Landaverde  MRN: [id number]  Today's Date: 3/22/2024    Admit Date: 3/21/2024    Plan: Home with significnat other and HH   Discharge Needs Assessment       Row Name 03/22/24 1529       Living Environment    People in Home significant other    Current Living Arrangements home    Potentially Unsafe Housing Conditions none    Primary Care Provided by spouse/significant other    Provides Primary Care For no one    Family Caregiver if Needed significant other    Quality of Family Relationships helpful;involved;supportive    Able to Return to Prior Arrangements yes       Resource/Environmental Concerns    Resource/Environmental Concerns none    Transportation Concerns none       Transition Planning    Patient/Family Anticipates Transition to home with family;home with help/services    Patient/Family Anticipated Services at Transition none    Transportation Anticipated family or friend will provide       Discharge Needs Assessment    Readmission Within the Last 30 Days no previous admission in last 30 days    Equipment Currently Used at Home walker, rolling;wheelchair;bath bench    Anticipated Changes Related to Illness none    Equipment Needed After Discharge none    Provided Post Acute Provider List? N/A    Provided Post Acute Provider Quality & Resource List? N/A                   Discharge Plan       Row Name 03/22/24 1529       Plan    Plan Home with significnat other and HH    Patient/Family in Agreement with Plan yes    Plan Comments CCP met with patient and significant-other Rick at bedside. Introduced self and explained role of CCP. Patients’ significant-other confirmed the information on the patients face sheet is accurate. Patients PCP is Sachin Mar. Patient is enrolled into [facility]. Patient lives at home with her significant other Rick. He is her caregiver. Patient has no history of HH or SNF. Patient uses at walker, wheelchair, and shower  chair at home. Family is requesting a  referral with no preference on company. CCP made a referral to [facility] who goes [place]. Patients address is [address] [place] [address]. CCP following.                  Continued Care and Services - Admitted Since 3/21/2024       Home Medical Care       Service Provider Request Status Selected Services Address Phone Fax Patient Preferred    [facility] [place] [facility] P[facility] - Request Sent N/A [id number], [place] [id number] [phone] --                     Demographic Summary       Row Name 03/22/24 1528       General Information    Admission Type inpatient    Arrived From emergency department    Referral Source admission list    Reason for Consult discharge planning    Preferred Language English                   Functional Status       Row Name 03/22/24 1528       Functional Status    Usual Activity Tolerance fair    Current Activity Tolerance fair       Functional Status, IADL    Medications assistive person    Meal Preparation assistive person    Housekeeping completely dependent    Laundry completely dependent    Shopping completely dependent       Mental Status    General Appearance WDL WDL       Mental Status Summary    Recent Changes in Mental Status/Cognitive Functioning no changes       Employment/    Employment Status retired                   Psychosocial    No documentation.                  Abuse/Neglect    No documentation.                  Legal    No documentation.                  Substance Abuse    No documentation.                  Patient Forms    No documentation.

## 2024-03-22 NOTE — PLAN OF CARE
Goal Outcome Evaluation:  Plan of Care Reviewed With: patient, daughter        Progress: improving  Outcome Evaluation: pt AOx2. Low BP at times. Hgb continues to be low 5.6 after 3u PRBCs (1 @ [place] & 2 in the CCU). 2u PRBCs ordered to be tranfused this AM. x1 darky tarry BM w/ blood clots. VSS at this time. Intermittent nausea, PRN zofran administered. pt NPO at this time.

## 2024-03-22 NOTE — SIGNIFICANT NOTE
03/22/24 1258   OTHER   Discipline occupational therapist   Rehab Time/Intention   Session Not Performed other (see comments)  (Per RN. pt continuing to receive medical work up following 5 units of blood overnight and scheduled for EGD 3/22. OT to f.u)   Recommendation   OT - Next Appointment 03/23/24

## 2024-03-22 NOTE — NURSING NOTE
03/22/24 0928   Wound 03/22/24 0928 coccyx Pressure Injury   Placement Date/Time: 03/22/24 0928   Present on Original Admission: Yes  Location: coccyx  Primary Wound Type: Pressure Injury   Pressure Injury Stage DTPI   Dressing Appearance moist drainage   Base non-blanchable;maroon/purple;moist   Periwound excoriated;redness   Periwound Temperature warm   Periwound Skin Turgor soft   Edges open   Wound Length (cm) 3 cm   Wound Width (cm) 2 cm   Wound Surface Area (cm^2) 6 cm^2   Drainage Characteristics/Odor serosanguineous   Drainage Amount scant   Skin Interventions   Pressure Reduction Devices specialty bed utilized  (She is on Progressa bed, but when she is out of this unit  will need LALM)   Pressure Reduction Techniques heels elevated off bed;positioned off wounds;pressure points protected     Wound/Ostomy: Consult received regarding skin issue on Coccyx area. Patient is alert, upon assessment is observed no intact skin with localized area of no morteza purple discoloration, DTI evolving into an open blister POA.  Wound care order and pressure ulcer preventives  measures have been implemented into Epic.   She is on Progressa bed, will need Low air loss mattress for adequate pressure redistribution and pressure relief when is out of this unit.  Is at risk for further skin problems, is completely bedridden, contracted,  impaired mobility and is incontinent, repositioning her every two hours and minimizing any skin contact with urine or stool would be beneficial.  Daughter who is a nurse was at bedside  Bilateral heels remains with intact skin and normal coloration, recommended off-loaded heels at all time, and protective padding is ordered.  Please re-consult for any additional nee

## 2024-03-22 NOTE — PROGRESS NOTES
[facility] Progress Note      Patient Name: Arline Landaverde  : 1950  MRN: [id number]  Primary Care Physician:  Lanie Gomez APRN  Date of admission: 3/21/2024    Subjective     Interval History:     Patient lying in bed  More awake  Denies any chest pain, shortness of palpitations  No abdominal pain  Continues to have dark stools    Urine output 3.1 L    Undergoing further PRBC transfusion    Review of Systems:   As noted above    Objective     Vitals:   Temp:  [94.1 °F (34.5 °C)-99.1 °F (37.3 °C)] 97.4 °F (36.3 °C)  Heart Rate:  [] 86  Resp:  [8-20] 8  BP: ()/(34-80) 136/64    Intake/Output Summary (Last 24 hours) at 3/22/2024 0839  Last data filed at 3/22/2024 0645  Gross per 24 hour   Intake 1854.25 ml   Output 3190 ml   Net -1335.75 ml       Physical Exam:    General Appearance: alert, chronically ill and deconditioned with bilateral temporal wasting   Skin: warm and dry  HEENT: oral mucosa normal, nonicteric sclera  Neck: supple, no JVD  Lungs: Crackles bibasal  Heart: RRR, normal S1 and S2  Abdomen: soft, nontender, nondistended  : no palpable bladder  Extremities: no edema, cyanosis or clubbing  Neuro: Alert    Scheduled Meds:     metoclopramide, 10 mg, Intravenous, Once  senna-docusate sodium, 2 tablet, Oral, BID  sodium chloride, 250 mL, Intravenous, Once      IV Meds:   pantoprazole, 40 mg, Last Rate: 40 mg (24 0352)        Results Reviewed:   I have personally reviewed the results from the time of this admission to 3/22/2024 08:39 EDT     Results from last 7 days   Lab Units 24  0616 24  1616 24  1335 24  0805   SODIUM mmol/L 129* 122* 116* 114*   POTASSIUM mmol/L 5.8* 5.9* 5.5* 6.1*   CHLORIDE mmol/L 100 91* 86* 84*   CO2 mmol/L 17.0* 18.0* 18.2* 18.6*   BUN mg/dL 132* 164* 179* 185*   CREATININE mg/dL 2.56* 2.83* 2.86* 2.93*   CALCIUM mg/dL 7.2* 7.7* 7.3* 7.8*   BILIRUBIN mg/dL  --   --   --  <0.2   ALK PHOS U/L  --   --    --  68   ALT (SGPT) U/L  --   --   --  11   AST (SGOT) U/L  --   --   --  11   GLUCOSE mg/dL 209* 160* 190* 171*       Estimated Creatinine Clearance: 17.1 mL/min (A) (by C-G formula based on SCr of 2.56 mg/dL (H)).                Results from last 7 days   Lab Units 03/22/24  0616 03/22/24  0129 03/21/24  1616 03/21/24  0805   WBC 10*3/mm3 14.88*  --  17.03* 13.05*   HEMOGLOBIN g/dL 7.6* 5.6* 5.4* 4.0*   PLATELETS 10*3/mm3 243  --  392 465*       Results from last 7 days   Lab Units 03/21/24  1655 03/21/24  0805   INR  1.48* 1.34*       Assessment / Plan     ASSESSMENT:    Acute kidney injury baseline creatinine between 0.  0.7 and 0.9 upon admission 2.9.  Likely secondary to hemorrhagic shock and hemodynamic changes while on home dose of losartan affecting renal autoregulation and worsening vasoconstriction from use of torsemide and dapagliflozin complicated with extensive IV contrast  exposure including CT angiogram head ,neck with cerebral perfusion.  Avoid nephrotoxins keep over 65.  Place Huff catheter will obtain urinalysis no images available.      Acute on chronic  hyponatremia with previous history of  SIADH  , aggravated with acute kidney injury following sodium trend , following sodium trend closely     Metabolic acidosis secondary to acute kidney injury.  Continue fluid  resuscitation further recommendation will follow      Severe   azotemia , BUN baseline 27-32 upon admission 185  >  164 > 132 secondary to GI bleed .  Patient alert able to follow simple commands .No acute indication for hemodialysis .Azotemia  gradually improving with medical management     Urinary retention , > 1000 ml , will order huff for closely monitor .  Urine output 3.1 L      Hyperkalemia secondary to acute kidney injury.  Hyperkalemia protocol including insulin, D5, calcium gluconate and bumetanide will follow closely continue hydration.     Acute on chronic normocytic anemia secondary to GI bleed.  Currently n.p.o. PRBC as  needed followed by gastroenterology     Cerebrovascular accident as primary team    PLAN:  Renal function slowly improving with medical management   Bumetanide was held yesterday due to concern of hypotension will order today + NSS 0.9 250 mL bolus  Continue surveillance labs    Updated family member at bedside    Discussed with ICU nursing staff    Thank you for involving us in the care of Arline Landaverde.  Please feel free to call with any questions.    Cory Thompson MD  03/22/24  08:39 EDT    [facility]  [phone]    Please note that portions of this note were completed with a voice recognition program.\

## 2024-03-22 NOTE — ANESTHESIA PREPROCEDURE EVALUATION
Anesthesia Evaluation     no history of anesthetic complications:   NPO Solid Status: > 8 hours  NPO Liquid Status: > 8 hours           Airway   Mallampati: II  Dental    (+) edentulous    Pulmonary    Cardiovascular     ECG reviewed  Rhythm: regular    (+) past MI (Type 2)       Neuro/Psych  (+) CVA (acute stroke eval), dementia  GI/Hepatic/Renal/Endo    (+) GI bleeding upper active bleeding, renal disease- ARF, diabetes mellitus type 2    ROS Comment: SIADH    Musculoskeletal     Abdominal    Substance History      OB/GYN          Other   blood dyscrasia (s/p PRBC 5u) anemia,       Other Comment: hyperkalemia                    Anesthesia Plan    ASA 4 - emergent     general     intravenous induction           CODE STATUS:    Code Status (Patient has no pulse and is not breathing): CPR (Attempt to Resuscitate)  Medical Interventions (Patient has pulse or is breathing): Full Support

## 2024-03-22 NOTE — PROGRESS NOTES
Dr. EMELY Grajeda    [facility] CORONARY CARE        Patient ID:  Name:  Arline Landaverde  MRN:  [id number]  1950  73 y.o.  female            CC/Reason for visit: Severe anemia, hyperkalemia,    Interval hx: Patient is feeling better today.  Less fatigue.  No shortness of breath or chest pain at rest.  Received 5 units of blood and hemoglobin is now finally improving.  Still hyperkalemic but potassium is lower now after nephrology consulted and patient received IV bicarbonate, IV calcium gluconate, IV dextrose and IV insulin.  I discussed the case with GI at the bedside.  They are planning for endoscopic evaluation for her severe iron deficiency anemia.  Chart reviewed.  Discussed with Dr. Mihai Fontanez from emergency room [facility]    ROS: Weakness, fatigue, near syncope.  Negative for fever, palpitations or abdominal pain    I reviewed old medical records.  Past medical history, social history and family history: Unchanged from admission H&P.      Vitals:  Vitals:    03/22/24 0600 03/22/24 0615 03/22/24 0630 03/22/24 0735   BP: 131/55 122/78 132/49 136/64   BP Location:    Left arm   Patient Position:    Lying   Pulse: 85 84 85 86   Resp:  11 12 8   Temp:  98.1 °F (36.7 °C) 98.1 °F (36.7 °C) 97.4 °F (36.3 °C)   TempSrc:  Oral Oral Oral   SpO2: 100% 100% 100% 100%   Weight:       Height:               Body mass index is 21.29 kg/m².    Intake/Output Summary (Last 24 hours) at 3/22/2024 1025  Last data filed at 3/22/2024 0645  Gross per 24 hour   Intake 1854.25 ml   Output 3190 ml   Net -1335.75 ml       Exam:  GEN:  No distress  Alert, oriented x 3.\  Moves all 4 extremities without focal deficits throughout  LUNGS: Clear breath sounds bilat, no use of accessory muscles  CV:  Normal S1S2, without murmur, no edema  ABD:  Non tender, no enlarged liver or masses  Skin: No visible rashes or palpable nodules    Scheduled meds:  Menthol-Zinc Oxide, 1 Application, Topical, Q12H  senna-docusate sodium,  2 tablet, Oral, BID      IV meds:                      pantoprazole, 40 mg, Last Rate: 40 mg (03/22/24 0906)        Data Review:   I reviewed the patient's medications and new clinical results.    COVID19   Date Value Ref Range Status   12/05/2023 Not Detected Not Detected - Ref. Range Final         Lab Results   Component Value Date    CALCIUM 7.2 (L) 03/22/2024    PHOS 4.2 04/19/2023    MG 1.5 (L) 12/11/2023    MG 1.5 (L) 12/10/2023    MG 1.4 (L) 12/09/2023     Results from last 7 days   Lab Units 03/22/24  0616 03/22/24  0129 03/21/24  1655 03/21/24  1616 03/21/24  1335 03/21/24  0805   SODIUM mmol/L 129*  --   --  122* 116* 114*   POTASSIUM mmol/L 5.8*  --   --  5.9* 5.5* 6.1*   CHLORIDE mmol/L 100  --   --  91* 86* 84*   CO2 mmol/L 17.0*  --   --  18.0* 18.2* 18.6*   BUN mg/dL 132*  --   --  164* 179* 185*   CREATININE mg/dL 2.56*  --   --  2.83* 2.86* 2.93*   CALCIUM mg/dL 7.2*  --   --  7.7* 7.3* 7.8*   BILIRUBIN mg/dL  --   --   --   --   --  <0.2   ALK PHOS U/L  --   --   --   --   --  68   ALT (SGPT) U/L  --   --   --   --   --  11   AST (SGOT) U/L  --   --   --   --   --  11   GLUCOSE mg/dL 209*  --   --  160* 190* 171*   WBC 10*3/mm3 14.88*  --   --  17.03*  --  13.05*   HEMOGLOBIN g/dL 7.6* 5.6*  --  5.4*  --  4.0*   PLATELETS 10*3/mm3 243  --   --  392  --  465*   INR   --   --  1.48*  --   --  1.34*             Results from last 7 days   Lab Units 03/21/24  1014 03/21/24  0805   HSTROP T ng/L 89* 107*           I reviewed images of CT angiogram of the head and neck as well as CT cerebral perfusion.  Radiologist interpretation is as follows:  IMPRESSION:  1.No evidence for large vessel occlusion or thrombosis throughout the arteries of the head or neck.  2.There is mild to moderate atherosclerosis of the carotid bulbs and origins of the internal carotid arteries bilaterally, more pronounced on the left. There is associated 30% stenosis at the origin of the left ICA.  3.No evidence for focal  aneurysm.  4.No evidence for arterial dissection throughout the arteries of the neck.  5.No evidence for acute abnormality throughout the head or neck.      ASSESSMENT:   Acute altered mental status  Acute kidney injury  Severe life-threatening anemia  Hyponatremia  Hyperkalemia  Coronary artery disease  Previous stroke  Diabetes mellitus        PLAN:  Patient and all problems new to me.  Patient had been seen by telemedicine neurology in the emergency room at [facility] yesterday and CT angiogram and CT cerebral perfusion or unremarkable.  Perhaps the patient's confusion and altered mental status was due to severe anemia with hypoperfusion of the brain.  Now she has received 5 units of packed cells and after transfusion she is orienting better, fully awake and alert and with no complaints.  Discussed with GI.  They will proceed with endoscopic evaluation to see why she lost so much blood.  Hold all home medications at this time.  Appreciate input from nephrology.  They are managing acute kidney injury and hyperkalemia.  Continue close neurologic and hemodynamic monitoring in the ICU.    Total critical care time 34 minutes    I reviewed the chart and other providers notes and reviewed labs.  Copied text in this note has been reviewed and is accurate as of today      Martin Grajeda MD  3/22/2024

## 2024-03-22 NOTE — PROGRESS NOTES
Clinical Pharmacy Services: Medication History    Arline Landaverde is a 73 y.o. female presenting to [facility] for Severe anemia [D64.9]    She  has a past medical history of Acute CVA (cerebrovascular accident) (07/20/2023), Chronic hyponatremia, Classical migraine without intractable migraine (07/20/2023), Diabetes mellitus, Elevated BUN (12/19/2023), Expressive aphasia (07/20/2023), Former cigarette smoker, Grade II diastolic dysfunction, History of stroke (12/05/2023), Hypoalbuminemia (12/2023), SIADH (syndrome of inappropriate ADH production), Stroke, and Type 2 myocardial infarction (12/18/2023).    Allergies as of 03/21/2024 - Reviewed 03/21/2024   Allergen Reaction Noted    Penicillins Hives 06/10/2014    Furosemide Hives 12/18/2023       Medication information was obtained from: list provided by significant other  Pharmacy and Phone Number:     Prior to Admission Medications       Prescriptions Last Dose Informant Patient Reported? Taking?    acetaminophen (TYLENOL) 325 MG tablet   Yes Yes    Take 2 tablets by mouth Every 6 (Six) Hours As Needed for Mild Pain.    amLODIPine (NORVASC) 5 MG tablet  Pharmacy Yes Yes    Take 1 tablet by mouth Daily.    aspirin 81 MG EC tablet  Self Yes Yes    Take 1 tablet by mouth Daily.    atorvastatin (LIPITOR) 20 MG tablet  Pharmacy No Yes    Take 1 tablet by mouth Daily.    losartan (COZAAR) 50 MG tablet  Pharmacy Yes Yes    Take 1 tablet by mouth Daily.    magnesium oxide (MAG-OX) 400 tablet tablet   No Yes    Take 1 tablet by mouth 2 (Two) Times a Day.    torsemide (DEMADEX) 20 MG tablet  Pharmacy No Yes    Take 1 tablet by mouth Daily.          Medication notes:   No longer taking dapagliflozin or latanoprost. Removed from list. Losartan and aspirin doses updated.    This medication list is complete to the best of my knowledge as of 3/22/2024    Please call if questions.    Gerri Posey, PharmD  3/22/2024 09:26 EDT

## 2024-03-23 ENCOUNTER — APPOINTMENT (OUTPATIENT)
Dept: CT IMAGING | Facility: HOSPITAL | Age: 74
DRG: 377 | End: 2024-03-23
Payer: MEDICARE

## 2024-03-23 LAB
ALBUMIN SERPL-MCNC: 2 G/DL (ref 3.5–5.2)
ALBUMIN/GLOB SERPL: 0.7 G/DL
ALP SERPL-CCNC: 62 U/L (ref 39–117)
ALT SERPL W P-5'-P-CCNC: 10 U/L (ref 1–33)
ANION GAP SERPL CALCULATED.3IONS-SCNC: 10 MMOL/L (ref 5–15)
AST SERPL-CCNC: 13 U/L (ref 1–32)
BASOPHILS # BLD AUTO: 0.08 10*3/MM3 (ref 0–0.2)
BASOPHILS NFR BLD AUTO: 0.6 % (ref 0–1.5)
BH BB BLOOD EXPIRATION DATE: NORMAL
BH BB BLOOD EXPIRATION DATE: NORMAL
BH BB BLOOD TYPE BARCODE: 600
BH BB BLOOD TYPE BARCODE: 600
BH BB DISPENSE STATUS: NORMAL
BH BB DISPENSE STATUS: NORMAL
BH BB PRODUCT CODE: NORMAL
BH BB PRODUCT CODE: NORMAL
BH BB UNIT NUMBER: NORMAL
BH BB UNIT NUMBER: NORMAL
BILIRUB SERPL-MCNC: 0.2 MG/DL (ref 0–1.2)
BUN SERPL-MCNC: 148 MG/DL (ref 8–23)
BUN/CREAT SERPL: 63.8 (ref 7–25)
CALCIUM SPEC-SCNC: 8.2 MG/DL (ref 8.6–10.5)
CHLORIDE SERPL-SCNC: 108 MMOL/L (ref 98–107)
CO2 SERPL-SCNC: 20 MMOL/L (ref 22–29)
CREAT SERPL-MCNC: 2.32 MG/DL (ref 0.57–1)
CROSSMATCH INTERPRETATION: NORMAL
CROSSMATCH INTERPRETATION: NORMAL
DEPRECATED RDW RBC AUTO: 49.7 FL (ref 37–54)
EGFRCR SERPLBLD CKD-EPI 2021: 21.6 ML/MIN/1.73
EOSINOPHIL # BLD AUTO: 0.17 10*3/MM3 (ref 0–0.4)
EOSINOPHIL NFR BLD AUTO: 1.4 % (ref 0.3–6.2)
ERYTHROCYTE [DISTWIDTH] IN BLOOD BY AUTOMATED COUNT: 15 % (ref 12.3–15.4)
GLOBULIN UR ELPH-MCNC: 2.7 GM/DL
GLUCOSE BLDC GLUCOMTR-MCNC: 178 MG/DL (ref 70–130)
GLUCOSE BLDC GLUCOMTR-MCNC: 208 MG/DL (ref 70–130)
GLUCOSE BLDC GLUCOMTR-MCNC: 215 MG/DL (ref 70–130)
GLUCOSE BLDC GLUCOMTR-MCNC: 250 MG/DL (ref 70–130)
GLUCOSE BLDC GLUCOMTR-MCNC: 326 MG/DL (ref 70–130)
GLUCOSE BLDC GLUCOMTR-MCNC: 334 MG/DL (ref 70–130)
GLUCOSE SERPL-MCNC: 192 MG/DL (ref 65–99)
HCT VFR BLD AUTO: 23.7 % (ref 34–46.6)
HGB BLD-MCNC: 8.1 G/DL (ref 12–15.9)
IMM GRANULOCYTES # BLD AUTO: 0.16 10*3/MM3 (ref 0–0.05)
IMM GRANULOCYTES NFR BLD AUTO: 1.3 % (ref 0–0.5)
INR PPP: 1.4 (ref 0.9–1.1)
LYMPHOCYTES # BLD AUTO: 1.12 10*3/MM3 (ref 0.7–3.1)
LYMPHOCYTES NFR BLD AUTO: 8.9 % (ref 19.6–45.3)
MAGNESIUM SERPL-MCNC: 3 MG/DL (ref 1.6–2.4)
MCH RBC QN AUTO: 30.9 PG (ref 26.6–33)
MCHC RBC AUTO-ENTMCNC: 34.2 G/DL (ref 31.5–35.7)
MCV RBC AUTO: 90.5 FL (ref 79–97)
MONOCYTES # BLD AUTO: 0.91 10*3/MM3 (ref 0.1–0.9)
MONOCYTES NFR BLD AUTO: 7.3 % (ref 5–12)
NEUTROPHILS NFR BLD AUTO: 10.11 10*3/MM3 (ref 1.7–7)
NEUTROPHILS NFR BLD AUTO: 80.5 % (ref 42.7–76)
NRBC BLD AUTO-RTO: 0 /100 WBC (ref 0–0.2)
PHOSPHATE SERPL-MCNC: 3.8 MG/DL (ref 2.5–4.5)
PLATELET # BLD AUTO: 240 10*3/MM3 (ref 140–450)
PMV BLD AUTO: 8.4 FL (ref 6–12)
POTASSIUM SERPL-SCNC: 4.1 MMOL/L (ref 3.5–5.2)
PROCALCITONIN SERPL-MCNC: 0.24 NG/ML (ref 0–0.25)
PROT SERPL-MCNC: 4.7 G/DL (ref 6–8.5)
PROTHROMBIN TIME: 17.4 SECONDS (ref 11.7–14.2)
RBC # BLD AUTO: 2.62 10*6/MM3 (ref 3.77–5.28)
SODIUM SERPL-SCNC: 138 MMOL/L (ref 136–145)
UNIT  ABO: NORMAL
UNIT  ABO: NORMAL
UNIT  RH: NORMAL
UNIT  RH: NORMAL
WBC NRBC COR # BLD AUTO: 12.55 10*3/MM3 (ref 3.4–10.8)

## 2024-03-23 PROCEDURE — 80053 COMPREHEN METABOLIC PANEL: CPT | Performed by: INTERNAL MEDICINE

## 2024-03-23 PROCEDURE — 85610 PROTHROMBIN TIME: CPT | Performed by: INTERNAL MEDICINE

## 2024-03-23 PROCEDURE — 74176 CT ABD & PELVIS W/O CONTRAST: CPT

## 2024-03-23 PROCEDURE — 82948 REAGENT STRIP/BLOOD GLUCOSE: CPT

## 2024-03-23 PROCEDURE — 85025 COMPLETE CBC W/AUTO DIFF WBC: CPT | Performed by: INTERNAL MEDICINE

## 2024-03-23 PROCEDURE — 83735 ASSAY OF MAGNESIUM: CPT | Performed by: INTERNAL MEDICINE

## 2024-03-23 PROCEDURE — 97162 PT EVAL MOD COMPLEX 30 MIN: CPT

## 2024-03-23 PROCEDURE — 97530 THERAPEUTIC ACTIVITIES: CPT

## 2024-03-23 PROCEDURE — 99232 SBSQ HOSP IP/OBS MODERATE 35: CPT | Performed by: INTERNAL MEDICINE

## 2024-03-23 PROCEDURE — 84145 PROCALCITONIN (PCT): CPT | Performed by: INTERNAL MEDICINE

## 2024-03-23 PROCEDURE — 84100 ASSAY OF PHOSPHORUS: CPT | Performed by: INTERNAL MEDICINE

## 2024-03-23 RX ORDER — AMLODIPINE BESYLATE 5 MG/1
5 TABLET ORAL
Status: DISCONTINUED | OUTPATIENT
Start: 2024-03-23 | End: 2024-04-04 | Stop reason: HOSPADM

## 2024-03-23 RX ADMIN — PANTOPRAZOLE SODIUM 40 MG: 40 INJECTION, POWDER, LYOPHILIZED, FOR SOLUTION INTRAVENOUS at 17:01

## 2024-03-23 RX ADMIN — AMLODIPINE BESYLATE 5 MG: 5 TABLET ORAL at 18:16

## 2024-03-23 RX ADMIN — PANTOPRAZOLE SODIUM 40 MG: 40 INJECTION, POWDER, LYOPHILIZED, FOR SOLUTION INTRAVENOUS at 10:58

## 2024-03-23 RX ADMIN — Medication 1 APPLICATION: at 20:02

## 2024-03-23 RX ADMIN — Medication 1 APPLICATION: at 14:55

## 2024-03-23 RX ADMIN — PANTOPRAZOLE SODIUM: 40 INJECTION, POWDER, LYOPHILIZED, FOR SOLUTION INTRAVENOUS at 22:00

## 2024-03-23 RX ADMIN — PANTOPRAZOLE SODIUM 40 MG: 40 INJECTION, POWDER, LYOPHILIZED, FOR SOLUTION INTRAVENOUS at 06:07

## 2024-03-23 RX ADMIN — PANTOPRAZOLE SODIUM 40 MG: 40 INJECTION, POWDER, LYOPHILIZED, FOR SOLUTION INTRAVENOUS at 00:41

## 2024-03-23 NOTE — PROGRESS NOTES
"      [facility]         Dr Fragoso Sayied   LOS: 2 days   Patient Care Team:  Lanie Gomez APRN as PCP - General (Family Medicine)  Uri Amor MD as Cardiologist (Cardiology)    Chief Complaint: Acute GI bleed with duodenal ulcer severe esophagitis multiple issues as listed below    Interval History: Currently on Protonix drip resting comfortably.  Hematuria noted.  No further active bleeding reported.  Status post EGD yesterday    REVIEW OF SYSTEMS:   CARDIOVASCULAR: No chest pain, chest pressure or chest discomfort. No palpitations or edema.   RESPIRATORY: No shortness of breath, cough or sputum.   GASTROINTESTINAL: No anorexia, nausea, vomiting or diarrhea. No abdominal pain or blood.   HEMATOLOGIC: No bleeding or bruising.     Ventilator/Non-Invasive Ventilation Settings (From admission, onward)      None              Vital Signs  Temp:  [97.8 °F (36.6 °C)-98.3 °F (36.8 °C)] 98.3 °F (36.8 °C)  Heart Rate:  [73-99] 92  Resp:  [10-23] 14  BP: ()/(37-71) 141/57    Intake/Output Summary (Last 24 hours) at 3/23/2024 0846  Last data filed at 3/23/2024 0415  Gross per 24 hour   Intake 896.34 ml   Output 2500 ml   Net -1603.66 ml     Flowsheet Rows      Flowsheet Row First Filed Value   Admission Height 162.6 cm (64.02\") Documented at 03/21/2024 1700   Admission Weight 54.4 kg (120 lb) Documented at 03/21/2024 1700            Physical Exam:  Patient is examined using the personal protective equipment as per guidelines from infection control for this particular patient as enacted.  Hand hygiene was performed before and after patient interaction.   General Appearance:    Alert, cooperative, in no acute distress.  Following simple commands  ENT Mallampati between 3 and 4 no nasal congestion  Neck midline trachea, no thyromegaly   Lungs:     Clear to auscultation, respirations regular, even and                  unlabored    Heart:    Regular rhythm and normal rate, normal S1 and S2, no            " murmur, no gallop, no rub, no click   Chest Wall:    No abnormalities observed   Abdomen:     Normal bowel sounds, no masses, no organomegaly, soft        nontender, nondistended, no guarding, no rebound                tenderness   Extremities:   Moves all extremities well, no edema, no cyanosis, no             redness  CNS no focal neurological deficits normal sensory exam  Skin no rashes no nodules  Musculoskeletal no cyanosis no clubbing normal range of motion     Results Review:        Results from last 7 days   Lab Units 03/23/24  0516 03/22/24  0616 03/21/24  1616   SODIUM mmol/L 138 129* 122*   POTASSIUM mmol/L 4.1 5.8* 5.9*   CHLORIDE mmol/L 108* 100 91*   CO2 mmol/L 20.0* 17.0* 18.0*   BUN mg/dL 148* 132* 164*   CREATININE mg/dL 2.32* 2.56* 2.83*   GLUCOSE mg/dL 192* 209* 160*   CALCIUM mg/dL 8.2* 7.2* 7.7*     Results from last 7 days   Lab Units 03/21/24  1014 03/21/24  0805   HSTROP T ng/L 89* 107*     Results from last 7 days   Lab Units 03/23/24  0516 03/22/24  2331 03/22/24  1719 03/22/24  1235 03/22/24  0616 03/22/24  0129 03/21/24  1616   WBC 10*3/mm3 12.55*  --   --   --  14.88*  --  17.03*   HEMOGLOBIN g/dL 8.1* 7.7* 7.8*   < > 7.6*   < > 5.4*   HEMATOCRIT % 23.7* 22.4* 22.4*   < > 22.2*   < > 15.9*   PLATELETS 10*3/mm3 240  --   --   --  243  --  392    < > = values in this interval not displayed.     Results from last 7 days   Lab Units 03/23/24  0516 03/21/24  1655 03/21/24  0805   INR  1.40* 1.48* 1.34*   APTT seconds  --   --  25.3         Results from last 7 days   Lab Units 03/23/24  0516   MAGNESIUM mg/dL 3.0*               I reviewed the patient's new clinical results.  I personally viewed and interpreted the patient's chest x-ray.        Medication Review:   Menthol-Zinc Oxide, 1 Application, Topical, Q12H  senna-docusate sodium, 2 tablet, Oral, BID        pantoprazole, 40 mg, Last Rate: 40 mg (03/23/24 0607)        ASSESSMENT:   Acute altered mental status  Duodenal ulcer with a visible  vessel  Severe esophagitis  Acute kidney injury  Severe life-threatening anemia  Hyponatremia  Hematuria  Hyperkalemia  Coronary artery disease  Previous stroke  Diabetes mellitus    PLAN:  Hemodynamically stable with stable hemoglobin.  Continue to monitor closely  Protonix drip per GI.  No signs of overt bleeding  Diet per GI  Creatinine stable and improving nephrology following  Fluid and electrolyte management per nephrology  Consult urology for hematuria.  Blood glucose monitoring per ICU protocol  Discussed with patient and daughter at bedside answered all questions  Keep in the ICU until cleared by GI      Fouzia Desai MD  03/23/24  08:46 EDT

## 2024-03-23 NOTE — PROGRESS NOTES
[facility]  Inpatient Progress Note    Reason for Follow Up: Severe anemia: Severe erosive esophagitis and bleeding duodenal ulcer by endoscopy    Subjective     Interval History:   Hemoglobin 8.1 hematocrit 23.7 discussed with patient and family at bedside.  Reviewed endoscopic findings with the grade D esophagitis and bleeding duodenal ulcer.  Also reviewed possible further intervention by surgery or interventional radiology if bleeding persists,    Current Facility-Administered Medications:     acetaminophen (TYLENOL) tablet 650 mg, 650 mg, Oral, Q4H PRN **OR** acetaminophen (TYLENOL) suppository 650 mg, 650 mg, Rectal, Q4H PRN, Gissell Miranda MD    aluminum-magnesium hydroxide-simethicone (MAALOX MAX) 400-400-40 MG/5ML suspension 15 mL, 15 mL, Oral, Q6H PRN, Gissell Miranda MD    sennosides-docusate (PERICOLACE) 8.6-50 MG per tablet 2 tablet, 2 tablet, Oral, BID **AND** polyethylene glycol (MIRALAX) packet 17 g, 17 g, Oral, Daily PRN **AND** bisacodyl (DULCOLAX) EC tablet 5 mg, 5 mg, Oral, Daily PRN **AND** bisacodyl (DULCOLAX) suppository 10 mg, 10 mg, Rectal, Daily PRN, Gissell Miranda MD    Calcium Replacement - Follow Nurse / BPA Driven Protocol, , Does not apply, Ruth MTZ Lauren C., MD    Magnesium Standard Dose Replacement - Follow Nurse / BPA Driven Protocol, , Does not apply, Ruth MTZ Lauren C., MD    Menthol-Zinc Oxide 1 Application, 1 Application, Topical, Q12H, Gissell Miranda MD, 1 Application at 03/22/24 2015    nitroglycerin (NITROSTAT) SL tablet 0.4 mg, 0.4 mg, Sublingual, Q5 Min PRN, Gissell Miranda MD    ondansetron ODT (ZOFRAN-ODT) disintegrating tablet 4 mg, 4 mg, Oral, Q4H PRN, 4 mg at 03/22/24 0032 **OR** ondansetron (ZOFRAN) injection 4 mg, 4 mg, Intravenous, Q4H PRN, Gissell Miranda MD    [COMPLETED] pantoprazole (PROTONIX) injection 80 mg, 80 mg, Intravenous, Once, 80 mg at 03/21/24 1746 **AND** pantoprazole (PROTONIX) 40 mg in sodium  chloride 0.9 % 100 mL (0.4 mg/mL) MBP, 40 mg, Intravenous, Continuous, Gissell Miranda MD, Last Rate: 20 mL/hr at 03/23/24 0607, 40 mg at 03/23/24 0607    Phosphorus Replacement - Follow Nurse / BPA Driven Protocol, , Does not apply, Ruth MTZ Lauren C., MD    Potassium Replacement - Follow Nurse / BPA Driven Protocol, , Does not apply, Ruth MTZ Lauren C., MD    prochlorperazine (COMPAZINE) injection 2.5 mg, 2.5 mg, Intravenous, Q6H PRN, Gissell Miranda MD, 2.5 mg at 03/22/24 1235  Review of Systems:    Review of systems could not be obtained due to  patient confusion.    Objective     Vital Signs  Temp:  [97.8 °F (36.6 °C)-98.3 °F (36.8 °C)] 98.3 °F (36.8 °C)  Heart Rate:  [73-99] 92  Resp:  [10-23] 14  BP: ()/(37-71) 141/57  Body mass index is 19.97 kg/m².    Intake/Output Summary (Last 24 hours) at 3/23/2024 0837  Last data filed at 3/23/2024 0415  Gross per 24 hour   Intake 896.34 ml   Output 2500 ml   Net -1603.66 ml     No intake/output data recorded.     Physical Exam:   General: patient awake, alert and cooperative   Eyes: Normal lids and lashes, no scleral icterus   Neck: supple, normal ROM   Skin: warm and dry, not jaundiced   Cardiovascular: regular rhythm and rate, no murmurs auscultated   Pulm: clear to auscultation bilaterally, regular and unlabored   Abdomen: soft, nontender, nondistended; normal bowel sounds   Extremities: no rash or edema   Psychiatric: Normal mood and behavior; memory intact     Results Review:     I reviewed the patient's new clinical results.    Results from last 7 days   Lab Units 03/23/24  0516 03/22/24  2331 03/22/24  1719 03/22/24  1235 03/22/24  0616 03/22/24  0129 03/21/24  1616   WBC 10*3/mm3 12.55*  --   --   --  14.88*  --  17.03*   HEMOGLOBIN g/dL 8.1* 7.7* 7.8*   < > 7.6*   < > 5.4*   HEMATOCRIT % 23.7* 22.4* 22.4*   < > 22.2*   < > 15.9*   PLATELETS 10*3/mm3 240  --   --   --  243  --  392    < > = values in this interval not displayed.     Results  "from last 7 days   Lab Units 03/23/24  0516 03/22/24  0616 03/21/24  1616 03/21/24  1335 03/21/24  0805   SODIUM mmol/L 138 129* 122*   < > 114*   POTASSIUM mmol/L 4.1 5.8* 5.9*   < > 6.1*   CHLORIDE mmol/L 108* 100 91*   < > 84*   CO2 mmol/L 20.0* 17.0* 18.0*   < > 18.6*   BUN mg/dL 148* 132* 164*   < > 185*   CREATININE mg/dL 2.32* 2.56* 2.83*   < > 2.93*   CALCIUM mg/dL 8.2* 7.2* 7.7*   < > 7.8*   BILIRUBIN mg/dL 0.2  --   --   --  <0.2   ALK PHOS U/L 62  --   --   --  68   ALT (SGPT) U/L 10  --   --   --  11   AST (SGOT) U/L 13  --   --   --  11   GLUCOSE mg/dL 192* 209* 160*   < > 171*    < > = values in this interval not displayed.     Results from last 7 days   Lab Units 03/23/24  0516 03/21/24  1655 03/21/24  0805   INR  1.40* 1.48* 1.34*     No results found for: \"LIPASE\"    Radiology:  No orders to display       Assessment & Plan     Active Hospital Problems    Diagnosis     **Severe anemia     Melena        Assessment:  Severe anemia  Grade D erosive esophagitis  Duodenal ulcer with active bleeding status post epinephrine injection and cauterization  MARNIE      Plan:  Continue PPI infusion  Continue to monitor H&H  If further evidence of active bleeding may request interventional radiology input  I discussed the patients findings and my recommendations with patient, family, and nursing staff.    Avelino Pereira MD              "

## 2024-03-23 NOTE — SIGNIFICANT NOTE
03/23/24 1402   OTHER   Discipline occupational therapist   Rehab Time/Intention   Session Not Performed patient/family declined evaluation  (Encouraged participation w/ OT, pt admantly declining despite multiple attempts by OT. Educated on importance of early mobility while inpatient--pt continued to refuse. OT will f/u next service date as appropriate.)   Recommendation   OT - Next Appointment 03/25/24

## 2024-03-23 NOTE — PLAN OF CARE
Goal Outcome Evaluation:  Plan of Care Reviewed With: patient, significant other           Outcome Evaluation: Arline Landaverde is a 73 year old female seen for physical therapy treatment session after being admitted for vision loss, and loss of control of bilateral LE. She reports x3 falls within the year . CT reports no acute stroke. Today, pt. performs BM with mod A. Pt. with significant anterior lean requiring CGA-Min A to remain sitting EOB. She performs x10 AP and x10 LAQ, and fatigues so quickly that she returns to supine impulsively. Assisted back to bed with Mod A, encouraging patient to lie on side to protect buttock sore. Pt. reports that lying on her side is uncomfortable, so she rolls over into supine. Attempted to pad bottom with pillow, however pt. does not want that at this time. PT recommending SNF based on limited mobility and safety concerns with returning home.      Anticipated Discharge Disposition (PT): skilled nursing facility

## 2024-03-23 NOTE — THERAPY EVALUATION
Patient Name: Arline Landaverde  : 1950    MRN: [id number]                              Today's Date: 3/23/2024       Admit Date: 3/21/2024    Visit Dx:     ICD-10-CM ICD-9-CM   1. Leena  K92.1 578.1     Patient Active Problem List   Diagnosis    Severe malnutrition    Classical migraine without intractable migraine    Expressive aphasia    Heart failure with preserved ejection fraction, borderline, class II    History of stroke    Elevated troponin    SIADH (syndrome of inappropriate ADH production)    Grade II diastolic dysfunction    Chronic hyponatremia    Hypoalbuminemia    Type 2 myocardial infarction    Elevated BUN    Severe anemia    Melena     Past Medical History:   Diagnosis Date    Acute CVA (cerebrovascular accident) 2023    Chronic hyponatremia     Classical migraine without intractable migraine 2023    Diabetes mellitus     Elevated BUN 2023    Expressive aphasia 2023    Former cigarette smoker     Grade II diastolic dysfunction     History of stroke 2023    Hypoalbuminemia 2023    SIADH (syndrome of inappropriate ADH production)     Stroke     Type 2 myocardial infarction 2023     Past Surgical History:   Procedure Laterality Date    BACK SURGERY      CHOLECYSTECTOMY      HIP HEMIARTHROPLASTY Right 2023    Procedure: HIP HEMIARTHROPLASTY ANTERIOR;  Surgeon: Kumar Bonilla MD;  Location: [facility];  Service: Orthopedics;  Laterality: Right;      General Information       Row Name 24 1258          Physical Therapy Time and Intention    Document Type evaluation  -ER     Mode of Treatment individual therapy;physical therapy  -ER       Row Name 24 1258          General Information    Patient Profile Reviewed yes  -ER     Prior Level of Function mod assist:;bed mobility;all household mobility;community mobility;gait;transfer  Pt. reports that it depends on the day how much assistance she needs  -ER     Existing Precautions/Restrictions fall   3 falls within the past year  -ER     Barriers to Rehab previous functional deficit;medically complex  -ER       Row Name 03/23/24 1258          Living Environment    People in Home significant other  -ER       Row Name 03/23/24 1258          Home Main Entrance    Number of Stairs, Main Entrance two  -ER     Stair Railings, Main Entrance railings safe and in good condition;railings on both sides of stairs  -ER       Row Name 03/23/24 1258          Stairs Within Home, Primary    Stairs, Within Home, Primary N/A  -ER       Row Name 03/23/24 1258          Cognition    Orientation Status (Cognition) oriented x 3  -ER       Row Name 03/23/24 1258          Safety Issues, Functional Mobility    Impairments Affecting Function (Mobility) balance;pain;endurance/activity tolerance;shortness of breath;strength  -ER               User Key  (r) = Recorded By, (t) = Taken By, (c) = Cosigned By      Initials Name Provider Type    ER Eleanor Ayala, PT Physical Therapist                   Mobility       Row Name 03/23/24 1259          Bed Mobility    Bed Mobility bed mobility (all) activities  -ER     All Activities, [place] (Bed Mobility) moderate assist (50% patient effort)  -ER     Assistive Device (Bed Mobility) head of bed elevated  -ER       Row Name 03/23/24 1259          Sit-Stand Transfer    Sit-Stand Maries (Transfers) unable to assess  -ER       Row Name 03/23/24 1259          Gait/Stairs (Locomotion)    Comment, (Gait/Stairs) Pt. quick to fatigue while sitting EOB, unable to assess transfers, pt. unable to stand for several weeks following last fall  -ER               User Key  (r) = Recorded By, (t) = Taken By, (c) = Cosigned By      Initials Name Provider Type    ER Eleanor Ayala, PT Physical Therapist                   Obj/Interventions       Row Name 03/23/24 1259          Range of Motion Comprehensive    Comment, General Range of Motion lacking 8-75 (knee extension-flexion) bilaterally  -ER       Row Name  03/23/24 1259          Strength Comprehensive (MMT)    Comment, General Manual Muscle Testing (MMT) Assessment BLE ~3+/5  -ER       Row Name 03/23/24 1259          Balance    Balance Assessment sitting static balance;sitting dynamic balance  -ER     Static Sitting Balance contact guard  -ER     Dynamic Sitting Balance contact guard;minimal assist  -ER     Position, Sitting Balance unsupported;sitting edge of bed  -ER     Balance Interventions sitting;supported;static;dynamic  -ER       Row Name 03/23/24 1259          Sensory Assessment (Somatosensory)    Sensory Assessment (Somatosensory) sensation intact  -ER               User Key  (r) = Recorded By, (t) = Taken By, (c) = Cosigned By      Initials Name Provider Type    ER Jamie, Eleanor, PT Physical Therapist                   Goals/Plan       Row Name 03/23/24 1303          Bed Mobility Goal 1 (PT)    Activity/Assistive Device (Bed Mobility Goal 1, PT) bed mobility activities, all  -ER     [place] Level/Cues Needed (Bed Mobility Goal 1, PT) minimum assist (75% or more patient effort)  -ER     Time Frame (Bed Mobility Goal 1, PT) 2 weeks  -ER       Row Name 03/23/24 1303          Transfer Goal 1 (PT)    Activity/Assistive Device (Transfer Goal 1, PT) transfers, all  -ER     [place] Level/Cues Needed (Transfer Goal 1, PT) minimum assist (75% or more patient effort)  -ER     Time Frame (Transfer Goal 1, PT) 2 weeks  -ER       Row Name 03/23/24 1303          Gait Training Goal 1 (PT)    Activity/Assistive Device (Gait Training Goal 1, PT) gait (walking locomotion);walker, rolling  -ER     [place] Level (Gait Training Goal 1, PT) minimum assist (75% or more patient effort)  -ER     Distance (Gait Training Goal 1, PT) 20  -ER     Time Frame (Gait Training Goal 1, PT) 2 weeks  -ER       Row Name 03/23/24 1303          Stairs Goal 1 (PT)    Activity/Assistive Device (Stairs Goal 1, PT) stairs, all skills  -ER     [place] Level/Cues Needed (Stairs Goal 1,  PT) minimum assist (75% or more patient effort)  -ER     Number of Stairs (Stairs Goal 1, PT) 2  -ER     Time Frame (Stairs Goal 1, PT) 2 weeks  -ER       Row Name 03/23/24 1303          Therapy Assessment/Plan (PT)    Planned Therapy Interventions (PT) balance training;bed mobility training;gait training;home exercise program;patient/family education;stretching;strengthening;stair training;transfer training  -ER               User Key  (r) = Recorded By, (t) = Taken By, (c) = Cosigned By      Initials Name Provider Type    ER Jamie, Eleanor, PT Physical Therapist                   Clinical Impression       Row Name 03/23/24 1300          Pain    Pretreatment Pain Rating 7/10  -ER     Posttreatment Pain Rating 7/10  -ER     Pain Location - buttock  -ER     Pain Intervention(s) Rest;Repositioned;Ambulation/increased activity  -ER       Row Name 03/23/24 1300          Plan of Care Review    Plan of Care Reviewed With patient;significant other  -ER     Outcome Evaluation Arline Landaverde is a 73 year old female seen for physical therapy treatment session after being admitted for vision loss, and loss of control of bilateral LE. She reports x3 falls within the year . CT reports no acute stroke. Today, pt. performs BM with mod A. Pt. with significant anterior lean requiring CGA-Min A to remain sitting EOB. She performs x10 AP and x10 LAQ, and fatigues so quickly that she returns to supine impulsively. Assisted back to bed with Mod A, encouraging patient to lie on side to protect buttock sore. Pt. reports that lying on her side is uncomfortable, so she rolls over into supine. Attempted to pad bottom with pillow, however pt. does not want that at this time. PT recommending SNF based on limited mobility and safety concerns with returning home.  -ER       Row Name 03/23/24 1300          Therapy Assessment/Plan (PT)    Criteria for Skilled Interventions Met (PT) yes  -ER     Therapy Frequency (PT) 5 times/wk  -ER       Row Name  03/23/24 1300          Positioning and Restraints    Pre-Treatment Position in bed  -ER     Post Treatment Position bed  -ER     In Bed notified nsg;call light within reach;encouraged to call for assist;exit alarm on;with family/caregiver  -ER               User Key  (r) = Recorded By, (t) = Taken By, (c) = Cosigned By      Initials Name Provider Type    ER Eleanor Ayala, EDIS Physical Therapist                   Outcome Measures       Row Name 03/23/24 1304          How much help from another person do you currently need...    Turning from your back to your side while in flat bed without using bedrails? 2  -ER     Moving from lying on back to sitting on the side of a flat bed without bedrails? 2  -ER     Moving to and from a bed to a chair (including a wheelchair)? 2  -ER     Standing up from a chair using your arms (e.g., wheelchair, bedside chair)? 2  -ER     Climbing 3-5 steps with a railing? 1  -ER     To walk in hospital room? 1  -ER     AM-PAC 6 Clicks Score (PT) 10  -ER     Highest Level of Mobility Goal 4 --> Transfer to chair/commode  -ER               User Key  (r) = Recorded By, (t) = Taken By, (c) = Cosigned By      Initials Name Provider Type    ER Eleanor Ayala PT Physical Therapist                                 Physical Therapy Education       Title: PT OT SLP Therapies (In Progress)       Topic: Physical Therapy (In Progress)       Point: Mobility training (In Progress)       Learning Progress Summary             Patient Acceptance, E, NR by ER at 3/23/2024 1304                         Point: Home exercise program (In Progress)       Learning Progress Summary             Patient Acceptance, E, NR by ER at 3/23/2024 1304                         Point: Body mechanics (In Progress)       Learning Progress Summary             Patient Acceptance, E, NR by ER at 3/23/2024 1304                         Point: Precautions (In Progress)       Learning Progress Summary             Patient Acceptance, E, NR by ER  at 3/23/2024 1304                                         User Key       Initials Effective Dates Name Provider Type Discipline    ER 10/15/23 -  Eleanor Ayala, PT Physical Therapist PT                  PT Recommendation and Plan  Planned Therapy Interventions (PT): balance training, bed mobility training, gait training, home exercise program, patient/family education, stretching, strengthening, stair training, transfer training  Plan of Care Reviewed With: patient, significant other  Outcome Evaluation: Arline Landaverde is a 73 year old female seen for physical therapy treatment session after being admitted for vision loss, and loss of control of bilateral LE. She reports x3 falls within the year . CT reports no acute stroke. Today, pt. performs BM with mod A. Pt. with significant anterior lean requiring CGA-Min A to remain sitting EOB. She performs x10 AP and x10 LAQ, and fatigues so quickly that she returns to supine impulsively. Assisted back to bed with Mod A, encouraging patient to lie on side to protect buttock sore. Pt. reports that lying on her side is uncomfortable, so she rolls over into supine. Attempted to pad bottom with pillow, however pt. does not want that at this time. PT recommending SNF based on limited mobility and safety concerns with returning home.     Time Calculation:         PT Charges       Row Name 03/23/24 1304             Time Calculation    Start Time 1103  -ER      Stop Time 1123  -ER      Time Calculation (min) 20 min  -ER      PT Received On 03/23/24  -ER      PT - Next Appointment 03/25/24  -ER      PT Goal Re-Cert Due Date 04/06/24  -ER         Time Calculation- PT    Total Timed Code Minutes- PT 18 minute(s)  -ER         Timed Charges    77715 - PT Therapeutic Activity Minutes 18  -ER         Total Minutes    Timed Charges Total Minutes 18  -ER       Total Minutes 18  -ER                User Key  (r) = Recorded By, (t) = Taken By, (c) = Cosigned By      Initials Name Provider Type     ER Eleanor Ayala, PT Physical Therapist                  Therapy Charges for Today       Code Description Service Date Service Provider Modifiers Qty    36224992819 HC PT THERAPEUTIC ACT EA 15 MIN 3/23/2024 Eleanor Ayala, PT GP 1    54938795663 HC PT EVAL MOD COMPLEXITY 2 3/23/2024 Eleanor Ayala, PT GP 1            PT G-Codes  AM-PAC 6 Clicks Score (PT): 10  PT Discharge Summary  Anticipated Discharge Disposition (PT): skilled nursing facility    Eleanor Ayala, PT  3/23/2024

## 2024-03-23 NOTE — PLAN OF CARE
Goal Outcome Evaluation:  Plan of Care Reviewed With: patient, daughter        Progress: improving  Outcome Evaluation: VVS, no complaints of pain or nausea, Hgb stable, x3 dark tarry BMs, adequate UO but pink tinged. Awaiting AM labs.

## 2024-03-23 NOTE — PROGRESS NOTES
[facility] Progress Note      Patient Name: Arline Landaverde  : 1950  MRN: [id number]  Primary Care Physician:  Lanie Gomez APRN  Date of admission: 3/21/2024    Subjective     Interval History:   Follow-up acute kidney injury    Patient is feeling better, denies any chest pain or shortness of air, no orthopnea or PND, no nausea or vomiting, good urine output.  He has Gamez catheter anchored in place.      Review of Systems:   As noted above    Objective     Vitals:   Temp:  [97.8 °F (36.6 °C)-98.3 °F (36.8 °C)] 98.3 °F (36.8 °C)  Heart Rate:  [73-99] 92  Resp:  [10-23] 14  BP: ()/(37-71) 138/54  Flow (L/min):  [2] 2    Intake/Output Summary (Last 24 hours) at 3/23/2024 1129  Last data filed at 3/23/2024 0415  Gross per 24 hour   Intake 566.34 ml   Output 2500 ml   Net -1933.66 ml       Physical Exam:    General Appearance: Awake, alert, chronically ill, no acute distress  Skin: warm and dry  HEENT: oral mucosa normal, nonicteric sclera, temporal  Neck: supple, no JVD  Lungs: Bilateral rhonchi, breathing effort not labored  Heart: RRR, no rub  Abdomen: soft, nontender, nondistended  : Gamez catheter anchored in place  Extremities: no edema, cyanosis or clubbing  Neuro: Moving all extremities    Scheduled Meds:     Menthol-Zinc Oxide, 1 Application, Topical, Q12H  senna-docusate sodium, 2 tablet, Oral, BID      IV Meds:   pantoprazole, 40 mg, Last Rate: 40 mg (24 1058)        Results Reviewed:   I have personally reviewed the results from the time of this admission to 3/23/2024 11:29 EDT     Results from last 7 days   Lab Units 24  0516 24  0616 24  1616 24  1335 24  0805   SODIUM mmol/L 138 129* 122*   < > 114*   POTASSIUM mmol/L 4.1 5.8* 5.9*   < > 6.1*   CHLORIDE mmol/L 108* 100 91*   < > 84*   CO2 mmol/L 20.0* 17.0* 18.0*   < > 18.6*   BUN mg/dL 148* 132* 164*   < > 185*   CREATININE mg/dL 2.32* 2.56* 2.83*   < > 2.93*   CALCIUM  mg/dL 8.2* 7.2* 7.7*   < > 7.8*   BILIRUBIN mg/dL 0.2  --   --   --  <0.2   ALK PHOS U/L 62  --   --   --  68   ALT (SGPT) U/L 10  --   --   --  11   AST (SGOT) U/L 13  --   --   --  11   GLUCOSE mg/dL 192* 209* 160*   < > 171*    < > = values in this interval not displayed.       Estimated Creatinine Clearance: 17.7 mL/min (A) (by C-G formula based on SCr of 2.32 mg/dL (H)).    Results from last 7 days   Lab Units 03/23/24  0516   MAGNESIUM mg/dL 3.0*   PHOSPHORUS mg/dL 3.8             Results from last 7 days   Lab Units 03/23/24  0516 03/22/24  2331 03/22/24  1719 03/22/24  1235 03/22/24  0616 03/22/24  0129 03/21/24  1616 03/21/24  0805   WBC 10*3/mm3 12.55*  --   --   --  14.88*  --  17.03* 13.05*   HEMOGLOBIN g/dL 8.1* 7.7* 7.8* 8.3* 7.6*   < > 5.4* 4.0*   PLATELETS 10*3/mm3 240  --   --   --  243  --  392 465*    < > = values in this interval not displayed.       Results from last 7 days   Lab Units 03/23/24  0516 03/21/24  1655 03/21/24  0805   INR  1.40* 1.48* 1.34*       Assessment / Plan     ASSESSMENT:  Acute kidney injury, creatinine slowly improving down to 2.32, appears to be euvolemic, electrolyte within acceptable range  Hyponatremia, resolved  Metabolic acidosis improved total CO2 up to 20  Urinary retention currently has Gamez catheter anchored in place and urine output is excellent  Hyperkalemia, resolved  Anemia hemoglobin is 8.1  History of CVA      PLAN:  Continue the same treatment  Surveillance labs      I reviewed the chart and other providers notes, reviewed labs.  I discussed the case with the patient and her family member at the bedside  Copied text in this note has been reviewed and is accurate as of 03/23/24.       Thank you for involving us in the care of Arline Landaverde.  Please feel free to call with any questions.    Thad Roberts MD  03/23/24  11:29 EDT    [facility]  [phone]    Please note that portions of this note were completed with a voice  recognition program.\

## 2024-03-24 LAB
ALBUMIN SERPL-MCNC: 2.2 G/DL (ref 3.5–5.2)
ALBUMIN/GLOB SERPL: 1.1 G/DL
ALP SERPL-CCNC: 63 U/L (ref 39–117)
ALT SERPL W P-5'-P-CCNC: 13 U/L (ref 1–33)
ANION GAP SERPL CALCULATED.3IONS-SCNC: 7.6 MMOL/L (ref 5–15)
AST SERPL-CCNC: 16 U/L (ref 1–32)
BASOPHILS # BLD AUTO: 0.08 10*3/MM3 (ref 0–0.2)
BASOPHILS NFR BLD AUTO: 0.5 % (ref 0–1.5)
BILIRUB SERPL-MCNC: 0.2 MG/DL (ref 0–1.2)
BUN SERPL-MCNC: 100 MG/DL (ref 8–23)
BUN/CREAT SERPL: 56.5 (ref 7–25)
CALCIUM SPEC-SCNC: 7.1 MG/DL (ref 8.6–10.5)
CHLORIDE SERPL-SCNC: 104 MMOL/L (ref 98–107)
CO2 SERPL-SCNC: 20.4 MMOL/L (ref 22–29)
CREAT SERPL-MCNC: 1.77 MG/DL (ref 0.57–1)
DEPRECATED RDW RBC AUTO: 49.8 FL (ref 37–54)
EGFRCR SERPLBLD CKD-EPI 2021: 29.9 ML/MIN/1.73
EOSINOPHIL # BLD AUTO: 1.39 10*3/MM3 (ref 0–0.4)
EOSINOPHIL NFR BLD AUTO: 8.9 % (ref 0.3–6.2)
ERYTHROCYTE [DISTWIDTH] IN BLOOD BY AUTOMATED COUNT: 14.9 % (ref 12.3–15.4)
GLOBULIN UR ELPH-MCNC: 2 GM/DL
GLUCOSE BLDC GLUCOMTR-MCNC: 155 MG/DL (ref 70–130)
GLUCOSE BLDC GLUCOMTR-MCNC: 171 MG/DL (ref 70–130)
GLUCOSE BLDC GLUCOMTR-MCNC: 180 MG/DL (ref 70–130)
GLUCOSE BLDC GLUCOMTR-MCNC: 236 MG/DL (ref 70–130)
GLUCOSE BLDC GLUCOMTR-MCNC: 300 MG/DL (ref 70–130)
GLUCOSE BLDC GLUCOMTR-MCNC: 301 MG/DL (ref 70–130)
GLUCOSE BLDC GLUCOMTR-MCNC: 89 MG/DL (ref 70–130)
GLUCOSE SERPL-MCNC: 133 MG/DL (ref 65–99)
HCT VFR BLD AUTO: 21.1 % (ref 34–46.6)
HGB BLD-MCNC: 7.1 G/DL (ref 12–15.9)
IMM GRANULOCYTES # BLD AUTO: 0.18 10*3/MM3 (ref 0–0.05)
IMM GRANULOCYTES NFR BLD AUTO: 1.1 % (ref 0–0.5)
LYMPHOCYTES # BLD AUTO: 1.72 10*3/MM3 (ref 0.7–3.1)
LYMPHOCYTES NFR BLD AUTO: 11 % (ref 19.6–45.3)
MAGNESIUM SERPL-MCNC: 2.6 MG/DL (ref 1.6–2.4)
MCH RBC QN AUTO: 30.9 PG (ref 26.6–33)
MCHC RBC AUTO-ENTMCNC: 33.6 G/DL (ref 31.5–35.7)
MCV RBC AUTO: 91.7 FL (ref 79–97)
MONOCYTES # BLD AUTO: 1.15 10*3/MM3 (ref 0.1–0.9)
MONOCYTES NFR BLD AUTO: 7.3 % (ref 5–12)
NEUTROPHILS NFR BLD AUTO: 11.16 10*3/MM3 (ref 1.7–7)
NEUTROPHILS NFR BLD AUTO: 71.2 % (ref 42.7–76)
NRBC BLD AUTO-RTO: 0 /100 WBC (ref 0–0.2)
PHOSPHATE SERPL-MCNC: 2.5 MG/DL (ref 2.5–4.5)
PLATELET # BLD AUTO: 212 10*3/MM3 (ref 140–450)
PMV BLD AUTO: 8.5 FL (ref 6–12)
POTASSIUM SERPL-SCNC: 3.8 MMOL/L (ref 3.5–5.2)
PROT SERPL-MCNC: 4.2 G/DL (ref 6–8.5)
RBC # BLD AUTO: 2.3 10*6/MM3 (ref 3.77–5.28)
SODIUM SERPL-SCNC: 132 MMOL/L (ref 136–145)
URATE SERPL-MCNC: 6.2 MG/DL (ref 2.4–5.7)
WBC NRBC COR # BLD AUTO: 15.68 10*3/MM3 (ref 3.4–10.8)

## 2024-03-24 PROCEDURE — 80053 COMPREHEN METABOLIC PANEL: CPT | Performed by: INTERNAL MEDICINE

## 2024-03-24 PROCEDURE — 85025 COMPLETE CBC W/AUTO DIFF WBC: CPT | Performed by: INTERNAL MEDICINE

## 2024-03-24 PROCEDURE — 84100 ASSAY OF PHOSPHORUS: CPT | Performed by: INTERNAL MEDICINE

## 2024-03-24 PROCEDURE — 82948 REAGENT STRIP/BLOOD GLUCOSE: CPT

## 2024-03-24 PROCEDURE — 99232 SBSQ HOSP IP/OBS MODERATE 35: CPT | Performed by: INTERNAL MEDICINE

## 2024-03-24 PROCEDURE — 83735 ASSAY OF MAGNESIUM: CPT | Performed by: INTERNAL MEDICINE

## 2024-03-24 PROCEDURE — 63710000001 INSULIN LISPRO (HUMAN) PER 5 UNITS: Performed by: INTERNAL MEDICINE

## 2024-03-24 PROCEDURE — 84550 ASSAY OF BLOOD/URIC ACID: CPT | Performed by: INTERNAL MEDICINE

## 2024-03-24 RX ORDER — DEXTROSE MONOHYDRATE 25 G/50ML
25 INJECTION, SOLUTION INTRAVENOUS
Status: DISCONTINUED | OUTPATIENT
Start: 2024-03-24 | End: 2024-03-29

## 2024-03-24 RX ORDER — NICOTINE POLACRILEX 4 MG
15 LOZENGE BUCCAL
Status: DISCONTINUED | OUTPATIENT
Start: 2024-03-24 | End: 2024-03-29

## 2024-03-24 RX ORDER — INSULIN LISPRO 100 [IU]/ML
3-14 INJECTION, SOLUTION INTRAVENOUS; SUBCUTANEOUS
Status: DISCONTINUED | OUTPATIENT
Start: 2024-03-24 | End: 2024-03-29

## 2024-03-24 RX ORDER — SUCRALFATE 1 G/1
1 TABLET ORAL
Status: DISCONTINUED | OUTPATIENT
Start: 2024-03-24 | End: 2024-04-04 | Stop reason: HOSPADM

## 2024-03-24 RX ORDER — IBUPROFEN 600 MG/1
1 TABLET ORAL
Status: DISCONTINUED | OUTPATIENT
Start: 2024-03-24 | End: 2024-03-29

## 2024-03-24 RX ADMIN — INSULIN LISPRO 10 UNITS: 100 INJECTION, SOLUTION INTRAVENOUS; SUBCUTANEOUS at 15:00

## 2024-03-24 RX ADMIN — PANTOPRAZOLE SODIUM 40 MG: 40 INJECTION, POWDER, LYOPHILIZED, FOR SOLUTION INTRAVENOUS at 03:56

## 2024-03-24 RX ADMIN — AMLODIPINE BESYLATE 5 MG: 5 TABLET ORAL at 10:01

## 2024-03-24 RX ADMIN — PANTOPRAZOLE SODIUM 40 MG: 40 INJECTION, POWDER, LYOPHILIZED, FOR SOLUTION INTRAVENOUS at 15:02

## 2024-03-24 RX ADMIN — SUCRALFATE 1 G: 1 TABLET ORAL at 15:38

## 2024-03-24 RX ADMIN — INSULIN LISPRO 5 UNITS: 100 INJECTION, SOLUTION INTRAVENOUS; SUBCUTANEOUS at 20:16

## 2024-03-24 RX ADMIN — Medication 1 APPLICATION: at 10:03

## 2024-03-24 RX ADMIN — PANTOPRAZOLE SODIUM: 40 INJECTION, POWDER, LYOPHILIZED, FOR SOLUTION INTRAVENOUS at 20:11

## 2024-03-24 RX ADMIN — SENNOSIDES AND DOCUSATE SODIUM 2 TABLET: 50; 8.6 TABLET ORAL at 15:39

## 2024-03-24 RX ADMIN — SUCRALFATE 1 G: 1 TABLET ORAL at 20:14

## 2024-03-24 RX ADMIN — PANTOPRAZOLE SODIUM 40 MG: 40 INJECTION, POWDER, LYOPHILIZED, FOR SOLUTION INTRAVENOUS at 09:56

## 2024-03-24 RX ADMIN — Medication 1 APPLICATION: at 20:17

## 2024-03-24 NOTE — PLAN OF CARE
Goal Outcome Evaluation: cbi clamped at 8pm 3/23 urine was colorless, now clear yellow. Vss, protonix gtt continues

## 2024-03-24 NOTE — PROGRESS NOTES
[facility], [place], operated by [facility]  Inpatient Progress Note    Reason for Follow Up:  Severe anemia: Severe erosive esophagitis and bleeding duodenal ulcer by endoscopy     Subjective     Interval History:   The patient has no complaints.  According to her nurse the volume of tarry stool has decreased.  Globin is 7.1 today and was 8.1 yesterday.    Current Facility-Administered Medications:     acetaminophen (TYLENOL) tablet 650 mg, 650 mg, Oral, Q4H PRN **OR** acetaminophen (TYLENOL) suppository 650 mg, 650 mg, Rectal, Q4H PRN, Gissell Miranda MD    aluminum-magnesium hydroxide-simethicone (MAALOX MAX) 400-400-40 MG/5ML suspension 15 mL, 15 mL, Oral, Q6H PRN, Gissell Miranda MD    amLODIPine (NORVASC) tablet 5 mg, 5 mg, Oral, Q24H, Fouzia Desai MD, 5 mg at 03/23/24 1816    sennosides-docusate (PERICOLACE) 8.6-50 MG per tablet 2 tablet, 2 tablet, Oral, BID **AND** polyethylene glycol (MIRALAX) packet 17 g, 17 g, Oral, Daily PRN **AND** bisacodyl (DULCOLAX) EC tablet 5 mg, 5 mg, Oral, Daily PRN **AND** bisacodyl (DULCOLAX) suppository 10 mg, 10 mg, Rectal, Daily PRN, Gissell Miranda MD    Calcium Replacement - Follow Nurse / BPA Driven Protocol, , Does not apply, PRRuth MARIN Lauren C., MD    Magnesium Standard Dose Replacement - Follow Nurse / BPA Driven Protocol, , Does not apply, PRNRuth Lauren C., MD    Menthol-Zinc Oxide 1 Application, 1 Application, Topical, Q12H, Gissell Miranda MD, 1 Application at 03/23/24 2002    nitroglycerin (NITROSTAT) SL tablet 0.4 mg, 0.4 mg, Sublingual, Q5 Min PRN, Gissell Miranda MD    ondansetron ODT (ZOFRAN-ODT) disintegrating tablet 4 mg, 4 mg, Oral, Q4H PRN, 4 mg at 03/22/24 0032 **OR** ondansetron (ZOFRAN) injection 4 mg, 4 mg, Intravenous, Q4H PRN, Gissell Miranda MD    [COMPLETED] pantoprazole (PROTONIX) injection 80 mg, 80 mg, Intravenous, Once, 80 mg at 03/21/24 1748 **AND** pantoprazole (PROTONIX) 40 mg in sodium chloride 0.9 % 100 mL (0.4 mg/mL)  MBP, 40 mg, Intravenous, Continuous, Gissell Miranda MD, Last Rate: 20 mL/hr at 03/24/24 0356, 40 mg at 03/24/24 0356    Phosphorus Replacement - Follow Nurse / BPA Driven Protocol, , Does not apply, Ruth MTZ Lauren C., MD    Potassium Replacement - Follow Nurse / BPA Driven Protocol, , Does not apply, Ruth MTZ Lauren C., MD    prochlorperazine (COMPAZINE) injection 2.5 mg, 2.5 mg, Intravenous, Q6H PRNRuth Lauren C., MD, 2.5 mg at 03/22/24 1235  Review of Systems:    The following systems were reviewed and negative;  respiratory, cardiovascular, musculoskeletal, and neurological    Objective     Vital Signs  Temp:  [97.9 °F (36.6 °C)-99.2 °F (37.3 °C)] 98.8 °F (37.1 °C)  Heart Rate:  [67-97] 77  Resp:  [16-18] 18  BP: ()/(37-87) 153/77  Body mass index is 19.97 kg/m².    Intake/Output Summary (Last 24 hours) at 3/24/2024 0837  Last data filed at 3/23/2024 2000  Gross per 24 hour   Intake 600 ml   Output -2050 ml   Net 2650 ml     No intake/output data recorded.     Physical Exam:   General: patient awake, alert and cooperative   Eyes: Normal lids and lashes, no scleral icterus   Neck: supple, normal ROM   Skin: warm and dry, not jaundiced   Cardiovascular: regular rhythm and rate, no murmurs auscultated   Pulm: clear to auscultation bilaterally, regular and unlabored   Abdomen: soft, nontender, nondistended; normal bowel sounds   Extremities: no rash or edema   Psychiatric: Normal mood and behavior; memory intact     Results Review:     I reviewed the patient's new clinical results.    Results from last 7 days   Lab Units 03/24/24  0428 03/23/24  0516 03/22/24  2331 03/22/24  1235 03/22/24  0616   WBC 10*3/mm3 15.68* 12.55*  --   --  14.88*   HEMOGLOBIN g/dL 7.1* 8.1* 7.7*   < > 7.6*   HEMATOCRIT % 21.1* 23.7* 22.4*   < > 22.2*   PLATELETS 10*3/mm3 212 240  --   --  243    < > = values in this interval not displayed.     Results from last 7 days   Lab Units 03/24/24  0428 03/23/24  0516  "03/22/24  0616 03/21/24  1335 03/21/24  0805   SODIUM mmol/L 132* 138 129*   < > 114*   POTASSIUM mmol/L 3.8 4.1 5.8*   < > 6.1*   CHLORIDE mmol/L 104 108* 100   < > 84*   CO2 mmol/L 20.4* 20.0* 17.0*   < > 18.6*   BUN mg/dL 100* 148* 132*   < > 185*   CREATININE mg/dL 1.77* 2.32* 2.56*   < > 2.93*   CALCIUM mg/dL 7.1* 8.2* 7.2*   < > 7.8*   BILIRUBIN mg/dL 0.2 0.2  --   --  <0.2   ALK PHOS U/L 63 62  --   --  68   ALT (SGPT) U/L 13 10  --   --  11   AST (SGOT) U/L 16 13  --   --  11   GLUCOSE mg/dL 133* 192* 209*   < > 171*    < > = values in this interval not displayed.     Results from last 7 days   Lab Units 03/23/24  0516 03/21/24  1655 03/21/24  0805   INR  1.40* 1.48* 1.34*     No results found for: \"LIPASE\"    Radiology:  CT Abdomen Pelvis Without Contrast   Final Result       1.  Acute displaced comminuted right hip periprosthetic acetabular   fracture, as described in detail above. Corresponding asymmetric   enlargement of the right iliacus muscle is most consistent with a   corresponding intramuscular hematoma.   2.  Moderate bilateral hydroureteronephrosis with diffuse high   attenuation throughout the renal collecting systems, which could be due   to residual intravenous contrast from recent contrast-enhanced   examination a few days ago in the setting of poor renal function.   However, given reported gross hematuria, blood products are not   excluded.    3.  Marked bladder distention despite presence of a Gamez catheter.   Correlate with Gamez catheter function.    4.  Diffuse bladder wall thickening and adjacent fat stranding raise   concern for possible cystitis. Correlate with urinalysis. Additionally,   given the presence of adjacent osseous trauma, bladder injury cannot be   entirely excluded.   5.  Relatively diffuse mesenteric fat stranding, most pronounced in the   upper abdomen. Findings could reflect the patient's fluid status.   However, correlate with serum lipase given the more focal " stranding   within the upper abdomen to exclude possible pancreatitis.       This report was finalized on 3/23/2024 5:23 PM by Dr. Myra Hernandez M.D   on Workstation: [id number]              Assessment & Plan     Active Hospital Problems    Diagnosis     **Severe anemia     Melena        Assessment/Recommendations:    Assessment:  Severe anemia  Grade D erosive esophagitis  Duodenal ulcer with active bleeding status post epinephrine injection and cauterization  MARNIE     Plan:  Continue PPI infusion.  Will add Carafate.  Continue to monitor H&H  If further evidence of active bleeding may request interventional radiology input  I will check a lipase level because of the abnormality seen on the CT of the abdomen.    I discussed the patients findings and my recommendations with patient, family, and nursing staff.    Akshat Coe MD

## 2024-03-24 NOTE — PROGRESS NOTES
"      [facility]         Dr Fragoso Sayied   LOS: 3 days   Patient Care Team:  Lanie Gomez APRN as PCP - General (Family Medicine)  Uri Amor MD as Cardiologist (Cardiology)    Chief Complaint: Acute GI bleed with duodenal ulcer severe esophagitis multiple issues as listed below    Interval History: Remains on Precedex drip with no signs of overt bleeding reported.  Denies any complaints this morning.    REVIEW OF SYSTEMS:   CARDIOVASCULAR: No chest pain, chest pressure or chest discomfort. No palpitations or edema.   RESPIRATORY: No shortness of breath, cough or sputum.   GASTROINTESTINAL: No anorexia, nausea, vomiting or diarrhea. No abdominal pain or blood.   HEMATOLOGIC: No bleeding or bruising.     Ventilator/Non-Invasive Ventilation Settings (From admission, onward)      None              Vital Signs  Temp:  [97.9 °F (36.6 °C)-99.2 °F (37.3 °C)] 98.8 °F (37.1 °C)  Heart Rate:  [67-97] 77  Resp:  [16-18] 18  BP: ()/(37-87) 153/77    Intake/Output Summary (Last 24 hours) at 3/24/2024 0857  Last data filed at 3/23/2024 2000  Gross per 24 hour   Intake 600 ml   Output -2050 ml   Net 2650 ml     Flowsheet Rows      Flowsheet Row First Filed Value   Admission Height 162.6 cm (64.02\") Documented at 03/21/2024 1700   Admission Weight 54.4 kg (120 lb) Documented at 03/21/2024 1700            Physical Exam:  Patient is examined using the personal protective equipment as per guidelines from infection control for this particular patient as enacted.  Hand hygiene was performed before and after patient interaction.   General Appearance:    Alert, cooperative, in no acute distress.  Following simple commands  ENT Mallampati between 3 and 4 no nasal congestion  Neck midline trachea, no thyromegaly   Lungs:     Clear to auscultation, respirations regular, even and                  unlabored    Heart:    Regular rhythm and normal rate, normal S1 and S2, no            murmur, no gallop, no rub, no " click   Chest Wall:    No abnormalities observed   Abdomen:     Normal bowel sounds, no masses, no organomegaly, soft        nontender, nondistended, no guarding, no rebound                tenderness   Extremities:   Moves all extremities well, no edema, no cyanosis, no             redness  CNS no focal neurological deficits normal sensory exam  Skin no rashes no nodules  Musculoskeletal no cyanosis no clubbing normal range of motion     Results Review:        Results from last 7 days   Lab Units 03/24/24  0428 03/23/24  0516 03/22/24  0616   SODIUM mmol/L 132* 138 129*   POTASSIUM mmol/L 3.8 4.1 5.8*   CHLORIDE mmol/L 104 108* 100   CO2 mmol/L 20.4* 20.0* 17.0*   BUN mg/dL 100* 148* 132*   CREATININE mg/dL 1.77* 2.32* 2.56*   GLUCOSE mg/dL 133* 192* 209*   CALCIUM mg/dL 7.1* 8.2* 7.2*     Results from last 7 days   Lab Units 03/21/24  1014 03/21/24  0805   HSTROP T ng/L 89* 107*     Results from last 7 days   Lab Units 03/24/24  0428 03/23/24  0516 03/22/24  2331 03/22/24  1235 03/22/24  0616   WBC 10*3/mm3 15.68* 12.55*  --   --  14.88*   HEMOGLOBIN g/dL 7.1* 8.1* 7.7*   < > 7.6*   HEMATOCRIT % 21.1* 23.7* 22.4*   < > 22.2*   PLATELETS 10*3/mm3 212 240  --   --  243    < > = values in this interval not displayed.     Results from last 7 days   Lab Units 03/23/24  0516 03/21/24  1655 03/21/24  0805   INR  1.40* 1.48* 1.34*   APTT seconds  --   --  25.3         Results from last 7 days   Lab Units 03/24/24  0428   MAGNESIUM mg/dL 2.6*               I reviewed the patient's new clinical results.  I personally viewed and interpreted the patient's chest x-ray.        Medication Review:   amLODIPine, 5 mg, Oral, Q24H  Menthol-Zinc Oxide, 1 Application, Topical, Q12H  senna-docusate sodium, 2 tablet, Oral, BID  sucralfate, 1 g, Oral, 4x Daily AC & at Bedtime        pantoprazole, 40 mg, Last Rate: 40 mg (03/24/24 5475)        ASSESSMENT:   Acute altered mental status  Duodenal ulcer with a visible vessel  Severe  esophagitis  Hydronephrosis  Right hip comminuted periprosthetic fracture  Acute kidney injury  Severe life-threatening anemia  Hyponatremia  Hematuria  Hyperkalemia  Coronary artery disease  Previous stroke  Diabetes mellitus    PLAN:  Hemodynamically stable with mild drop in hemoglobin.  No signs of overt bleeding.  No indication for transfusion at this time.  Continue to monitor closely  Protonix drip per GI.  No signs of overt bleeding.  Carafate added.  Lipase added per GI  Urology following.  CT abdomen pelvis report noted.  Plans further for hydronephrosis per urology.  Diet per GI  Creatinine stable and improving nephrology following  Fluid and electrolyte management per nephrology  Consult orthopedic for incidental finding of right hip fracture.  Blood glucose monitoring per ICU protocol  Keep in the ICU until cleared by KARIE Desai MD  03/24/24  08:57 EDT

## 2024-03-24 NOTE — PROGRESS NOTES
[facility] Progress Note      Patient Name: Arline Landaverde  : 1950  MRN: [id number]  Primary Care Physician:  Lanie Gomez APRN  Date of admission: 3/21/2024    Subjective     Interval History:   Follow-up acute kidney injury    No acute overnight events. Patient denies any chest pain or shortness of air, no orthopnea or PND, no nausea or vomiting, good urine output. Patient had a CT abd/pelvis without contrast yesterday which revealed moderate bilateral hydroureteronephrosis with diffuse high  attenuation throughout the renal collecting systems as well as diffuse bladder wall thickening.  She has three-way Gamez catheter with irrigation      Review of Systems:   As noted above    Objective     Vitals:   Temp:  [97.9 °F (36.6 °C)-99.2 °F (37.3 °C)] 98.8 °F (37.1 °C)  Heart Rate:  [67-97] 77  Resp:  [16-18] 18  BP: ()/(37-87) 153/77  Flow (L/min):  [2] 2    Intake/Output Summary (Last 24 hours) at 3/24/2024 0939  Last data filed at 3/23/2024 2000  Gross per 24 hour   Intake 600 ml   Output -2050 ml   Net 2650 ml       Physical Exam:    General Appearance: Awake, alert, chronically ill, no acute distress  Skin: warm and dry  HEENT: oral mucosa normal, nonicteric sclera, temporal  Neck: supple, no JVD  Lungs: Bilateral rhonchi, breathing effort not labored  Heart: RRR, no rub  Abdomen: soft, nontender, nondistended  : Three-way Gamez catheter anchored in place  Extremities: no edema, cyanosis or clubbing  Neuro: Moving all extremities    Scheduled Meds:     amLODIPine, 5 mg, Oral, Q24H  insulin lispro, 3-14 Units, Subcutaneous, 4x Daily AC & at Bedtime  Menthol-Zinc Oxide, 1 Application, Topical, Q12H  senna-docusate sodium, 2 tablet, Oral, BID  sucralfate, 1 g, Oral, 4x Daily AC & at Bedtime      IV Meds:   pantoprazole, 40 mg, Last Rate: 40 mg (24 0356)        Results Reviewed:   I have personally reviewed the results from the time of this admission to 3/24/2024  09:39 EDT     Results from last 7 days   Lab Units 03/24/24  0428 03/23/24  0516 03/22/24  0616 03/21/24  1335 03/21/24  0805   SODIUM mmol/L 132* 138 129*   < > 114*   POTASSIUM mmol/L 3.8 4.1 5.8*   < > 6.1*   CHLORIDE mmol/L 104 108* 100   < > 84*   CO2 mmol/L 20.4* 20.0* 17.0*   < > 18.6*   BUN mg/dL 100* 148* 132*   < > 185*   CREATININE mg/dL 1.77* 2.32* 2.56*   < > 2.93*   CALCIUM mg/dL 7.1* 8.2* 7.2*   < > 7.8*   BILIRUBIN mg/dL 0.2 0.2  --   --  <0.2   ALK PHOS U/L 63 62  --   --  68   ALT (SGPT) U/L 13 10  --   --  11   AST (SGOT) U/L 16 13  --   --  11   GLUCOSE mg/dL 133* 192* 209*   < > 171*    < > = values in this interval not displayed.       Estimated Creatinine Clearance: 23.2 mL/min (A) (by C-G formula based on SCr of 1.77 mg/dL (H)).    Results from last 7 days   Lab Units 03/24/24  0428 03/23/24  0516   MAGNESIUM mg/dL 2.6* 3.0*   PHOSPHORUS mg/dL 2.5 3.8       Results from last 7 days   Lab Units 03/24/24  0428   URIC ACID mg/dL 6.2*       Results from last 7 days   Lab Units 03/24/24  0428 03/23/24  0516 03/22/24  2331 03/22/24  1719 03/22/24  1235 03/22/24  0616 03/22/24  0129 03/21/24  1616 03/21/24  0805   WBC 10*3/mm3 15.68* 12.55*  --   --   --  14.88*  --  17.03* 13.05*   HEMOGLOBIN g/dL 7.1* 8.1* 7.7* 7.8* 8.3* 7.6*   < > 5.4* 4.0*   PLATELETS 10*3/mm3 212 240  --   --   --  243  --  392 465*    < > = values in this interval not displayed.       Results from last 7 days   Lab Units 03/23/24  0516 03/21/24  1655 03/21/24  0805   INR  1.40* 1.48* 1.34*       Assessment / Plan     ASSESSMENT:  Acute kidney injury, creatinine slowly improving down to 1.77 today, appears to be euvolemic. Uric acid 6.2.  Hyponatremia, sodium dropped back down to 132  Metabolic acidosis improved total CO2 up to 20.4  Urinary retention currently has Gamez catheter anchored in place and urine output is excellent  Hyperkalemia, resolved  Anemia- secondary to GI bleeding as well as gross hematuria. Hgb 7.1 g/dl.  Transfusion per primary team.   History of CVA  Bilateral hydronephrosis- Secondary to urinary retention. Gamez catheter anchored in place. Urology is following.       PLAN:  Continue the same treatment  Surveillance labs      I reviewed the chart and other providers notes, reviewed labs.  I discussed the case with the patient and her family member at the bedside  Copied text in this note has been reviewed and is accurate as of 03/24/24.       Thank you for involving us in the care of Arline Landaverde.  Please feel free to call with any questions.    Thad Roberts MD  03/24/24  09:39 EDT    [facility]  [phone]    Please note that portions of this note were completed with a voice recognition program.\

## 2024-03-24 NOTE — CONSULTS
Orthopedic Consult      Patient: Arline Landaverde  YOB: 1950     Date of Admission: 3/21/2024  3:53 PM    Medical Record Number: [id number]    Attending Physician: Martin Grajeda MD    Consulting Physician: Doyle Shine MD    Reason for Consult: Right hip fracture, periprosthetic acetabular fracture    History of Present Illness: 73 y.o. female admitted to [facility], [place] with Severe anemia [D64.9].     The patient was evaluated in the emergency room and was diagnosed with a severe anemia and admitted to the ICU.  Apparently she was admitted with 4 g hemoglobin.    Secondary to the age and multiple medical co morbidities the patient was admitted to the hospitalist.   As I was on call for the emergency room I was consulted for further evaluation and treatment.     The patient was in the usual state of health and reports history of multiple falls.  She apparently fell around March 11 and presented to [facility] emergency department after x-rays she was discharged home.    She is accompanied by her daughter who is present by the bedside and she is giving most of the history.  Her history is significant for a fracture of the right femoral neck for which she underwent a bipolar hip replacement by Dr. Bonilla about 1 year back.    Patient states that she has been mobile enough to use a walker to go to the bathroom.  Otherwise her mobility is limited.  She states that she has had right hip pain since the recent fall.  She has not ambulated over the past 10 days.      Past medical history, Past surgical history, family history, Social history, current medications, home medications Have been reviewed by me.    Past Medical History:   Diagnosis Date    Acute CVA (cerebrovascular accident) 07/20/2023    Chronic hyponatremia     Classical migraine without intractable migraine 07/20/2023    Diabetes mellitus     Elevated BUN 12/19/2023    Expressive aphasia 07/20/2023    Former cigarette  smoker     Grade II diastolic dysfunction     History of stroke 12/05/2023    Hypoalbuminemia 12/2023    SIADH (syndrome of inappropriate ADH production)     Stroke     Type 2 myocardial infarction 12/18/2023     Past Surgical History:   Procedure Laterality Date    BACK SURGERY      CHOLECYSTECTOMY      HIP HEMIARTHROPLASTY Right 4/19/2023    Procedure: HIP HEMIARTHROPLASTY ANTERIOR;  Surgeon: Kumar Bonilla MD;  Location: [facility];  Service: Orthopedics;  Laterality: Right;     Social History     Occupational History    Not on file   Tobacco Use    Smoking status: Former     Current packs/day: 1.00     Types: Cigarettes    Smokeless tobacco: Never   Vaping Use    Vaping status: Never Used   Substance and Sexual Activity    Alcohol use: Never    Drug use: Never    Sexual activity: Defer      Social History     Social History Narrative    Not on file     Family History   Problem Relation Age of Onset    Cancer Mother           Allergies   Allergen Reactions    Penicillins Hives    Furosemide Hives        Home Medications:  Medications Prior to Admission   Medication Sig Dispense Refill Last Dose    acetaminophen (TYLENOL) 325 MG tablet Take 2 tablets by mouth Every 6 (Six) Hours As Needed for Mild Pain.       amLODIPine (NORVASC) 5 MG tablet Take 1 tablet by mouth Daily.       aspirin 81 MG EC tablet Take 1 tablet by mouth Daily.       atorvastatin (LIPITOR) 20 MG tablet Take 1 tablet by mouth Daily. 90 tablet 2     losartan (COZAAR) 50 MG tablet Take 1 tablet by mouth Daily.       magnesium oxide (MAG-OX) 400 tablet tablet Take 1 tablet by mouth 2 (Two) Times a Day. 60 each 0     torsemide (DEMADEX) 20 MG tablet Take 1 tablet by mouth Daily. 90 tablet 2        Current Medications:  Scheduled Meds:amLODIPine, 5 mg, Oral, Q24H  insulin lispro, 3-14 Units, Subcutaneous, 4x Daily AC & at Bedtime  Menthol-Zinc Oxide, 1 Application, Topical, Q12H  senna-docusate sodium, 2 tablet, Oral, BID  sucralfate, 1 g, Oral, 4x  Daily AC & at Bedtime      Continuous Infusions:pantoprazole, 40 mg, Last Rate: 40 mg (03/24/24 0356)      PRN Meds:.  acetaminophen **OR** acetaminophen    aluminum-magnesium hydroxide-simethicone    senna-docusate sodium **AND** polyethylene glycol **AND** bisacodyl **AND** bisacodyl    Calcium Replacement - Follow Nurse / BPA Driven Protocol    dextrose    dextrose    glucagon (human recombinant)    Magnesium Standard Dose Replacement - Follow Nurse / BPA Driven Protocol    nitroglycerin    ondansetron ODT **OR** ondansetron    Phosphorus Replacement - Follow Nurse / BPA Driven Protocol    Potassium Replacement - Follow Nurse / BPA Driven Protocol    prochlorperazine    Review of Systems:   A 12 point system review was reviewed with the patient and from the chart  and is negative except as mentioned in history of present illness.      Physical Exam: 73 y.o. female                    Vitals:    03/24/24 0500 03/24/24 0600 03/24/24 0700 03/24/24 0800   BP: 114/54 115/58 134/62 153/77   BP Location:   Left arm    Patient Position:   Lying    Pulse: 77 67 76 77   Resp:   18    Temp:   98.8 °F (37.1 °C)    TempSrc:   Oral    SpO2: 98% 97% 99% 99%   Weight:       Height:            Gait: Not evaluated.     Mental/HEENT/cardio/skin: The patient's general appearance was well-nourished, well-hydrated, no acute distress.  Orientation was alert and oriented ×3.  The patient's mood was normal.   Pulmonary exam shows normal late exchange, no labored breathing, or shortness of breath.    The  skin exam showed normal temperature and color in the area of examination.    Extremities: Right hip is clinically located, attempted movements of the right hip are painful and restricted    Pulses: Pulses palpable and equal bilaterally    Diagnostic Tests:    Results from last 7 days   Lab Units 03/24/24  0428 03/23/24  0516 03/22/24  2331 03/22/24  1235 03/22/24  0616   WBC 10*3/mm3 15.68* 12.55*  --   --  14.88*   HEMOGLOBIN g/dL 7.1*  "8.1* 7.7*   < > 7.6*   HEMATOCRIT % 21.1* 23.7* 22.4*   < > 22.2*   PLATELETS 10*3/mm3 212 240  --   --  243    < > = values in this interval not displayed.     Results from last 7 days   Lab Units 03/24/24  0428 03/23/24  0516 03/22/24  0616   SODIUM mmol/L 132* 138 129*   POTASSIUM mmol/L 3.8 4.1 5.8*   CHLORIDE mmol/L 104 108* 100   CO2 mmol/L 20.4* 20.0* 17.0*   BUN mg/dL 100* 148* 132*   CREATININE mg/dL 1.77* 2.32* 2.56*   GLUCOSE mg/dL 133* 192* 209*   CALCIUM mg/dL 7.1* 8.2* 7.2*     Results from last 7 days   Lab Units 03/23/24  0516 03/21/24  1655 03/21/24  0805   INR  1.40* 1.48* 1.34*   APTT seconds  --   --  25.3     Lab Results   Component Value Date    URICACID 6.2 (H) 03/24/2024     No results found for: \"CRYSTAL\"  Microbiology Results (last 10 days)       ** No results found for the last 240 hours. **          CT Abdomen Pelvis Without Contrast    Result Date: 3/23/2024   1.  Acute displaced comminuted right hip periprosthetic acetabular fracture, as described in detail above. Corresponding asymmetric enlargement of the right iliacus muscle is most consistent with a corresponding intramuscular hematoma. 2.  Moderate bilateral hydroureteronephrosis with diffuse high attenuation throughout the renal collecting systems, which could be due to residual intravenous contrast from recent contrast-enhanced examination a few days ago in the setting of poor renal function. However, given reported gross hematuria, blood products are not excluded. 3.  Marked bladder distention despite presence of a Gamez catheter. Correlate with Gamez catheter function. 4.  Diffuse bladder wall thickening and adjacent fat stranding raise concern for possible cystitis. Correlate with urinalysis. Additionally, given the presence of adjacent osseous trauma, bladder injury cannot be entirely excluded. 5.  Relatively diffuse mesenteric fat stranding, most pronounced in the upper abdomen. Findings could reflect the patient's fluid " status. However, correlate with serum lipase given the more focal stranding within the upper abdomen to exclude possible pancreatitis.  This report was finalized on 3/23/2024 5:23 PM by Dr. Myra Hernandez M.D on Workstation: [id number]      CT Angiogram Head w AI Analysis of LVO    Result Date: 3/21/2024  1.No evidence for large vessel occlusion or thrombosis throughout the arteries of the head or neck. 2.There is mild to moderate atherosclerosis of the carotid bulbs and origins of the internal carotid arteries bilaterally, more pronounced on the left. There is associated 30% stenosis at the origin of the left ICA. 3.No evidence for focal aneurysm. 4.No evidence for arterial dissection throughout the arteries of the neck. 5.No evidence for acute abnormality throughout the head or neck. Electronically Signed: Hill Fernandes MD  3/21/2024 9:33 AM EDT  Workstation ID: [id number]    CT Angiogram Neck    Result Date: 3/21/2024  1.No evidence for large vessel occlusion or thrombosis throughout the arteries of the head or neck. 2.There is mild to moderate atherosclerosis of the carotid bulbs and origins of the internal carotid arteries bilaterally, more pronounced on the left. There is associated 30% stenosis at the origin of the left ICA. 3.No evidence for focal aneurysm. 4.No evidence for arterial dissection throughout the arteries of the neck. 5.No evidence for acute abnormality throughout the head or neck. Electronically Signed: Hill Fernandes MD  3/21/2024 9:33 AM EDT  Workstation ID: [id number]    CT CEREBRAL PERFUSION WITH & WITHOUT CONTRAST    Result Date: 3/21/2024  1. No focal area of decreased cerebral blood flow (CBF) is seen to suggest an acute infarct in a large vessel territory.  No defects are seen to suggest a core infarct or an area of reversible ischemia.  Electronically Signed: Hill Fernandes MD  3/21/2024 9:24 AM EDT  Workstation ID: [id number]    XR Chest 1 View    Result Date: 3/21/2024  No evidence  for acute cardiopulmonary process. Electronically Signed: Hill Fernandes MD  3/21/2024 8:38 AM EDT  Workstation ID: [id number]    CT Head Without Contrast Stroke Protocol    Result Date: 3/21/2024  Impression: 1. Generalized atrophy with chronic periventricular ischemic changes. 2. Chronic small areas of infarction in the left cerebellar hemisphere and both thalami. 3. No acute intracranial findings Electronically Signed: James Burgos MD  3/21/2024 8:09 AM EDT  Workstation ID: [id number]     The labs, X-ray results for preoperative evaluation have been reviewed by me.    Assessment: Right hip periprosthetic acetabular fracture, history of right bipolar replacement      Severe anemia    Melena      Plan:      CT scan confirms fracture involving the right acetabulum.  Options and alternatives were discussed in detail with the patient and   family.   The patient is indicated for nonoperative management at the present time.     She has low hemoglobin, history of melena and her hemoglobin is 7.1 despite transfusions.    I did discuss the option of open reduction and internal fixation of her acetabular fracture and conversion to a total hip replacement.  This will be a significant surgery for her and there will be significant morbidity and mortality risks with the procedure.    Date: 3/24/2024    Doyle Shine MD     CC: Lanie Gomez APRN; MD Nicci Bailon Ervin H., MD

## 2024-03-25 LAB
ALBUMIN SERPL-MCNC: 2.2 G/DL (ref 3.5–5.2)
ALBUMIN/GLOB SERPL: 1.1 G/DL
ALP SERPL-CCNC: 69 U/L (ref 39–117)
ALT SERPL W P-5'-P-CCNC: 12 U/L (ref 1–33)
ANION GAP SERPL CALCULATED.3IONS-SCNC: 7.8 MMOL/L (ref 5–15)
AST SERPL-CCNC: 16 U/L (ref 1–32)
BASOPHILS # BLD AUTO: 0.09 10*3/MM3 (ref 0–0.2)
BASOPHILS NFR BLD AUTO: 0.6 % (ref 0–1.5)
BH BB BLOOD EXPIRATION DATE: NORMAL
BH BB BLOOD EXPIRATION DATE: NORMAL
BH BB BLOOD TYPE BARCODE: 6200
BH BB BLOOD TYPE BARCODE: 6200
BH BB DISPENSE STATUS: NORMAL
BH BB DISPENSE STATUS: NORMAL
BH BB PRODUCT CODE: NORMAL
BH BB PRODUCT CODE: NORMAL
BH BB UNIT NUMBER: NORMAL
BH BB UNIT NUMBER: NORMAL
BILIRUB SERPL-MCNC: 0.3 MG/DL (ref 0–1.2)
BUN SERPL-MCNC: 83 MG/DL (ref 8–23)
BUN/CREAT SERPL: 57.6 (ref 7–25)
CALCIUM SPEC-SCNC: 7.2 MG/DL (ref 8.6–10.5)
CHLORIDE SERPL-SCNC: 103 MMOL/L (ref 98–107)
CO2 SERPL-SCNC: 19.2 MMOL/L (ref 22–29)
CREAT SERPL-MCNC: 1.44 MG/DL (ref 0.57–1)
CROSSMATCH INTERPRETATION: NORMAL
CROSSMATCH INTERPRETATION: NORMAL
DEPRECATED RDW RBC AUTO: 46 FL (ref 37–54)
EGFRCR SERPLBLD CKD-EPI 2021: 38.2 ML/MIN/1.73
EOSINOPHIL # BLD AUTO: 1.32 10*3/MM3 (ref 0–0.4)
EOSINOPHIL NFR BLD AUTO: 8.5 % (ref 0.3–6.2)
ERYTHROCYTE [DISTWIDTH] IN BLOOD BY AUTOMATED COUNT: 14.4 % (ref 12.3–15.4)
GLOBULIN UR ELPH-MCNC: 2 GM/DL
GLUCOSE BLDC GLUCOMTR-MCNC: 138 MG/DL (ref 70–130)
GLUCOSE BLDC GLUCOMTR-MCNC: 142 MG/DL (ref 70–130)
GLUCOSE BLDC GLUCOMTR-MCNC: 215 MG/DL (ref 70–130)
GLUCOSE BLDC GLUCOMTR-MCNC: 227 MG/DL (ref 70–130)
GLUCOSE BLDC GLUCOMTR-MCNC: 283 MG/DL (ref 70–130)
GLUCOSE SERPL-MCNC: 108 MG/DL (ref 65–99)
HCT VFR BLD AUTO: 21.4 % (ref 34–46.6)
HGB BLD-MCNC: 7.3 G/DL (ref 12–15.9)
IMM GRANULOCYTES # BLD AUTO: 0.13 10*3/MM3 (ref 0–0.05)
IMM GRANULOCYTES NFR BLD AUTO: 0.8 % (ref 0–0.5)
LIPASE SERPL-CCNC: 19 U/L (ref 13–60)
LYMPHOCYTES # BLD AUTO: 1.49 10*3/MM3 (ref 0.7–3.1)
LYMPHOCYTES NFR BLD AUTO: 9.6 % (ref 19.6–45.3)
MAGNESIUM SERPL-MCNC: 2.4 MG/DL (ref 1.6–2.4)
MCH RBC QN AUTO: 30.8 PG (ref 26.6–33)
MCHC RBC AUTO-ENTMCNC: 34.1 G/DL (ref 31.5–35.7)
MCV RBC AUTO: 90.3 FL (ref 79–97)
MONOCYTES # BLD AUTO: 1.08 10*3/MM3 (ref 0.1–0.9)
MONOCYTES NFR BLD AUTO: 7 % (ref 5–12)
NEUTROPHILS NFR BLD AUTO: 11.34 10*3/MM3 (ref 1.7–7)
NEUTROPHILS NFR BLD AUTO: 73.5 % (ref 42.7–76)
NRBC BLD AUTO-RTO: 0 /100 WBC (ref 0–0.2)
PHOSPHATE SERPL-MCNC: 2.5 MG/DL (ref 2.5–4.5)
PLATELET # BLD AUTO: 229 10*3/MM3 (ref 140–450)
PMV BLD AUTO: 8.8 FL (ref 6–12)
POTASSIUM SERPL-SCNC: 3.9 MMOL/L (ref 3.5–5.2)
PROT SERPL-MCNC: 4.2 G/DL (ref 6–8.5)
RBC # BLD AUTO: 2.37 10*6/MM3 (ref 3.77–5.28)
SODIUM SERPL-SCNC: 130 MMOL/L (ref 136–145)
UNIT  ABO: NORMAL
UNIT  ABO: NORMAL
UNIT  RH: NORMAL
UNIT  RH: NORMAL
URATE SERPL-MCNC: 5.3 MG/DL (ref 2.4–5.7)
WBC NRBC COR # BLD AUTO: 15.45 10*3/MM3 (ref 3.4–10.8)

## 2024-03-25 PROCEDURE — 84100 ASSAY OF PHOSPHORUS: CPT | Performed by: INTERNAL MEDICINE

## 2024-03-25 PROCEDURE — 84550 ASSAY OF BLOOD/URIC ACID: CPT | Performed by: INTERNAL MEDICINE

## 2024-03-25 PROCEDURE — 85025 COMPLETE CBC W/AUTO DIFF WBC: CPT | Performed by: INTERNAL MEDICINE

## 2024-03-25 PROCEDURE — 99232 SBSQ HOSP IP/OBS MODERATE 35: CPT | Performed by: INTERNAL MEDICINE

## 2024-03-25 PROCEDURE — 82948 REAGENT STRIP/BLOOD GLUCOSE: CPT

## 2024-03-25 PROCEDURE — 83735 ASSAY OF MAGNESIUM: CPT | Performed by: INTERNAL MEDICINE

## 2024-03-25 PROCEDURE — 83690 ASSAY OF LIPASE: CPT | Performed by: INTERNAL MEDICINE

## 2024-03-25 PROCEDURE — 80053 COMPREHEN METABOLIC PANEL: CPT | Performed by: INTERNAL MEDICINE

## 2024-03-25 PROCEDURE — 63710000001 INSULIN LISPRO (HUMAN) PER 5 UNITS: Performed by: INTERNAL MEDICINE

## 2024-03-25 RX ADMIN — PANTOPRAZOLE SODIUM: 40 INJECTION, POWDER, LYOPHILIZED, FOR SOLUTION INTRAVENOUS at 07:16

## 2024-03-25 RX ADMIN — SUCRALFATE 1 G: 1 TABLET ORAL at 11:30

## 2024-03-25 RX ADMIN — PANTOPRAZOLE SODIUM 40 MG: 40 INJECTION, POWDER, LYOPHILIZED, FOR SOLUTION INTRAVENOUS at 01:55

## 2024-03-25 RX ADMIN — AMLODIPINE BESYLATE 5 MG: 5 TABLET ORAL at 08:04

## 2024-03-25 RX ADMIN — PANTOPRAZOLE SODIUM 40 MG: 40 INJECTION, POWDER, LYOPHILIZED, FOR SOLUTION INTRAVENOUS at 17:15

## 2024-03-25 RX ADMIN — INSULIN LISPRO 8 UNITS: 100 INJECTION, SOLUTION INTRAVENOUS; SUBCUTANEOUS at 16:18

## 2024-03-25 RX ADMIN — Medication 1 APPLICATION: at 21:21

## 2024-03-25 RX ADMIN — INSULIN LISPRO 5 UNITS: 100 INJECTION, SOLUTION INTRAVENOUS; SUBCUTANEOUS at 21:21

## 2024-03-25 RX ADMIN — SUCRALFATE 1 G: 1 TABLET ORAL at 21:21

## 2024-03-25 RX ADMIN — INSULIN LISPRO 5 UNITS: 100 INJECTION, SOLUTION INTRAVENOUS; SUBCUTANEOUS at 11:23

## 2024-03-25 RX ADMIN — PANTOPRAZOLE SODIUM 40 MG: 40 INJECTION, POWDER, LYOPHILIZED, FOR SOLUTION INTRAVENOUS at 11:23

## 2024-03-25 RX ADMIN — Medication 1 APPLICATION: at 08:05

## 2024-03-25 RX ADMIN — SUCRALFATE 1 G: 1 TABLET ORAL at 08:04

## 2024-03-25 RX ADMIN — SUCRALFATE 1 G: 1 TABLET ORAL at 16:18

## 2024-03-25 RX ADMIN — PANTOPRAZOLE SODIUM 40 MG: 40 INJECTION, POWDER, LYOPHILIZED, FOR SOLUTION INTRAVENOUS at 23:35

## 2024-03-25 NOTE — PROGRESS NOTES
[facility]  Inpatient Progress Note    Reason for Follow Up: GI bleed    Subjective     Interval History:   Notes reviewed, GI bleeding from esophagitis and duodenal ulcer.  Hemodynamically stable at this time.  No evidence of bleeding    Current Facility-Administered Medications:     acetaminophen (TYLENOL) tablet 650 mg, 650 mg, Oral, Q4H PRN **OR** acetaminophen (TYLENOL) suppository 650 mg, 650 mg, Rectal, Q4H PRN, Gissell Miranda MD    aluminum-magnesium hydroxide-simethicone (MAALOX MAX) 400-400-40 MG/5ML suspension 15 mL, 15 mL, Oral, Q6H PRN, Gissell Miranda MD    amLODIPine (NORVASC) tablet 5 mg, 5 mg, Oral, Q24H, Fouzia Desai MD, 5 mg at 03/25/24 0804    sennosides-docusate (PERICOLACE) 8.6-50 MG per tablet 2 tablet, 2 tablet, Oral, BID, 2 tablet at 03/24/24 1539 **AND** polyethylene glycol (MIRALAX) packet 17 g, 17 g, Oral, Daily PRN **AND** bisacodyl (DULCOLAX) EC tablet 5 mg, 5 mg, Oral, Daily PRN **AND** bisacodyl (DULCOLAX) suppository 10 mg, 10 mg, Rectal, Daily PRN, Gissell Miranda MD    Calcium Replacement - Follow Nurse / BPA Driven Protocol, , Does not apply, PRN, Gissell Miranda MD    dextrose (D50W) (25 g/50 mL) IV injection 25 g, 25 g, Intravenous, Q15 Min PRN, Fouzia Desai MD    dextrose (GLUTOSE) oral gel 15 g, 15 g, Oral, Q15 Min PRN, Fouzia Desai MD    glucagon (GLUCAGEN) injection 1 mg, 1 mg, Intramuscular, Q15 Min PRN, Fouzia Desai MD    insulin lispro (HUMALOG/ADMELOG) injection 3-14 Units, 3-14 Units, Subcutaneous, 4x Daily AC & at Bedtime, Fouzia Desai MD, 5 Units at 03/25/24 1123    Magnesium Standard Dose Replacement - Follow Nurse / BPA Driven Protocol, , Does not apply, PRN, Gissell Miranda MD    Menthol-Zinc Oxide 1 Application, 1 Application, Topical, Q12H, Gissell Miranda MD, 1 Application at 03/25/24 0805    nitroglycerin (NITROSTAT) SL tablet 0.4 mg, 0.4 mg, Sublingual, Q5 Min PRN, Gissell Miranda MD    ondansetron  ODT (ZOFRAN-ODT) disintegrating tablet 4 mg, 4 mg, Oral, Q4H PRN, 4 mg at 03/22/24 0032 **OR** ondansetron (ZOFRAN) injection 4 mg, 4 mg, Intravenous, Q4H PRN, Gissell Miranda MD    [COMPLETED] pantoprazole (PROTONIX) injection 80 mg, 80 mg, Intravenous, Once, 80 mg at 03/21/24 1748 **AND** pantoprazole (PROTONIX) 40 mg in sodium chloride 0.9 % 100 mL (0.4 mg/mL) MBP, 40 mg, Intravenous, Continuous, Gissell Miranda MD, Last Rate: 20 mL/hr at 03/25/24 1123, 40 mg at 03/25/24 1123    Phosphorus Replacement - Follow Nurse / BPA Driven Protocol, , Does not apply, Ruth MTZ Lauren C., MD    Potassium Replacement - Follow Nurse / BPA Driven Protocol, , Does not apply, Ruth MTZ Lauren C., MD    prochlorperazine (COMPAZINE) injection 2.5 mg, 2.5 mg, Intravenous, Q6H PRN, Gissell Miranda MD, 2.5 mg at 03/22/24 1235    sucralfate (CARAFATE) tablet 1 g, 1 g, Oral, 4x Daily AC & at Bedtime, Akshat Coe MD, 1 g at 03/25/24 1130  Review of Systems:    Is weakness and fatigue all other systems reviewed and negative    Objective     Vital Signs  Temp:  [97.7 °F (36.5 °C)-99 °F (37.2 °C)] 98.2 °F (36.8 °C)  Heart Rate:  [68-82] 78  Resp:  [11-19] 14  BP: (114-140)/(49-84) 123/61  Body mass index is 19.18 kg/m².    Intake/Output Summary (Last 24 hours) at 3/25/2024 1211  Last data filed at 3/25/2024 1157  Gross per 24 hour   Intake 1563.02 ml   Output 845 ml   Net 718.02 ml     I/O this shift:  In: 640 [P.O.:640]  Out: -      Physical Exam:   General: patient awake, alert and cooperative   Eyes: Normal lids and lashes, no scleral icterus   Neck: supple, normal ROM   Skin: warm and dry, not jaundiced   Cardiovascular: regular rhythm and rate, no murmurs auscultated   Pulm: clear to auscultation bilaterally, regular and unlabored   Abdomen: soft, nontender, nondistended; normal bowel sounds   Extremities: no rash or edema   Psychiatric: Normal mood and behavior; memory intact     Results Review:     I reviewed the  patient's new clinical results.    Results from last 7 days   Lab Units 03/25/24  0431 03/24/24  0428 03/23/24  0516   WBC 10*3/mm3 15.45* 15.68* 12.55*   HEMOGLOBIN g/dL 7.3* 7.1* 8.1*   HEMATOCRIT % 21.4* 21.1* 23.7*   PLATELETS 10*3/mm3 229 212 240     Results from last 7 days   Lab Units 03/25/24  0431 03/24/24  0428 03/23/24  0516   SODIUM mmol/L 130* 132* 138   POTASSIUM mmol/L 3.9 3.8 4.1   CHLORIDE mmol/L 103 104 108*   CO2 mmol/L 19.2* 20.4* 20.0*   BUN mg/dL 83* 100* 148*   CREATININE mg/dL 1.44* 1.77* 2.32*   CALCIUM mg/dL 7.2* 7.1* 8.2*   BILIRUBIN mg/dL 0.3 0.2 0.2   ALK PHOS U/L 69 63 62   ALT (SGPT) U/L 12 13 10   AST (SGOT) U/L 16 16 13   GLUCOSE mg/dL 108* 133* 192*     Results from last 7 days   Lab Units 03/23/24  0516 03/21/24  1655 03/21/24  0805   INR  1.40* 1.48* 1.34*     Lab Results   Lab Value Date/Time    LIPASE 19 03/25/2024 0431       Radiology:  CT Abdomen Pelvis Without Contrast   Final Result       1.  Acute displaced comminuted right hip periprosthetic acetabular   fracture, as described in detail above. Corresponding asymmetric   enlargement of the right iliacus muscle is most consistent with a   corresponding intramuscular hematoma.   2.  Moderate bilateral hydroureteronephrosis with diffuse high   attenuation throughout the renal collecting systems, which could be due   to residual intravenous contrast from recent contrast-enhanced   examination a few days ago in the setting of poor renal function.   However, given reported gross hematuria, blood products are not   excluded.    3.  Marked bladder distention despite presence of a Gamez catheter.   Correlate with Gamez catheter function.    4.  Diffuse bladder wall thickening and adjacent fat stranding raise   concern for possible cystitis. Correlate with urinalysis. Additionally,   given the presence of adjacent osseous trauma, bladder injury cannot be   entirely excluded.   5.  Relatively diffuse mesenteric fat stranding, most  pronounced in the   upper abdomen. Findings could reflect the patient's fluid status.   However, correlate with serum lipase given the more focal stranding   within the upper abdomen to exclude possible pancreatitis.       This report was finalized on 3/23/2024 5:23 PM by Dr. Myra Hernandez M.D   on Workstation: [id number]          US Renal Bilateral    (Results Pending)       Assessment & Plan     Active Hospital Problems    Diagnosis     **Severe anemia     Melena      Assessment:  Severe anemia  Grade D erosive esophagitis  Duodenal ulcer with active bleeding status post epinephrine injection and cauterization  MARNIE     Plan:  Continue PPI infusion.  Continue Carafate  Continue to monitor H&H-stable  If further evidence of active bleeding may request interventional radiology input  Lipase normal-no evidence of pancreatitis, CT scan false positive     I discussed the patients findings and my recommendations with patient, family, and nursing staff.    Juan Sánchez MD

## 2024-03-25 NOTE — PROGRESS NOTES
"      [facility]         Dr Fragoso Sayied   LOS: 4 days   Patient Care Team:  Lanie Gomez APRN as PCP - General (Family Medicine)  Uri Amor MD as Cardiologist (Cardiology)    Chief Complaint: Acute GI bleed with duodenal ulcer severe esophagitis multiple issues as listed below    Interval History: On Protonix drip.  No signs of overt bleeding reported.  Decent urine output noted.    REVIEW OF SYSTEMS:   CARDIOVASCULAR: No chest pain, chest pressure or chest discomfort. No palpitations or edema.   RESPIRATORY: No shortness of breath, cough or sputum.   GASTROINTESTINAL: No anorexia, nausea, vomiting or diarrhea. No abdominal pain or blood.   HEMATOLOGIC: No bleeding or bruising.     Ventilator/Non-Invasive Ventilation Settings (From admission, onward)      None              Vital Signs  Temp:  [97.7 °F (36.5 °C)-99 °F (37.2 °C)] 98.3 °F (36.8 °C)  Heart Rate:  [64-82] 78  Resp:  [11-19] 12  BP: (114-148)/(49-84) 134/69    Intake/Output Summary (Last 24 hours) at 3/25/2024 0849  Last data filed at 3/25/2024 0831  Gross per 24 hour   Intake 1163.02 ml   Output 845 ml   Net 318.02 ml     Flowsheet Rows      Flowsheet Row First Filed Value   Admission Height 162.6 cm (64.02\") Documented at 03/21/2024 1700   Admission Weight 54.4 kg (120 lb) Documented at 03/21/2024 1700            Physical Exam:  Patient is examined using the personal protective equipment as per guidelines from infection control for this particular patient as enacted.  Hand hygiene was performed before and after patient interaction.   General Appearance:    Alert, cooperative, in no acute distress.  Following simple commands  ENT Mallampati between 3 and 4 no nasal congestion  Neck midline trachea, no thyromegaly   Lungs:     Clear to auscultation, respirations regular, even and                  unlabored    Heart:    Regular rhythm and normal rate, normal S1 and S2, no            murmur, no gallop, no rub, no click   Chest Wall:  "   No abnormalities observed   Abdomen:     Normal bowel sounds, no masses, no organomegaly, soft        nontender, nondistended, no guarding, no rebound                tenderness   Extremities:   Moves all extremities well, no edema, no cyanosis, no             redness  CNS no focal neurological deficits normal sensory exam  Skin no rashes no nodules  Musculoskeletal no cyanosis no clubbing normal range of motion     Results Review:        Results from last 7 days   Lab Units 03/25/24  0431 03/24/24  0428 03/23/24  0516   SODIUM mmol/L 130* 132* 138   POTASSIUM mmol/L 3.9 3.8 4.1   CHLORIDE mmol/L 103 104 108*   CO2 mmol/L 19.2* 20.4* 20.0*   BUN mg/dL 83* 100* 148*   CREATININE mg/dL 1.44* 1.77* 2.32*   GLUCOSE mg/dL 108* 133* 192*   CALCIUM mg/dL 7.2* 7.1* 8.2*     Results from last 7 days   Lab Units 03/21/24  1014 03/21/24  0805   HSTROP T ng/L 89* 107*     Results from last 7 days   Lab Units 03/25/24  0431 03/24/24  0428 03/23/24  0516   WBC 10*3/mm3 15.45* 15.68* 12.55*   HEMOGLOBIN g/dL 7.3* 7.1* 8.1*   HEMATOCRIT % 21.4* 21.1* 23.7*   PLATELETS 10*3/mm3 229 212 240     Results from last 7 days   Lab Units 03/23/24  0516 03/21/24  1655 03/21/24  0805   INR  1.40* 1.48* 1.34*   APTT seconds  --   --  25.3         Results from last 7 days   Lab Units 03/25/24  0431   MAGNESIUM mg/dL 2.4               I reviewed the patient's new clinical results.  I personally viewed and interpreted the patient's chest x-ray.        Medication Review:   amLODIPine, 5 mg, Oral, Q24H  insulin lispro, 3-14 Units, Subcutaneous, 4x Daily AC & at Bedtime  Menthol-Zinc Oxide, 1 Application, Topical, Q12H  senna-docusate sodium, 2 tablet, Oral, BID  sucralfate, 1 g, Oral, 4x Daily AC & at Bedtime        pantoprazole, 40 mg, Last Rate: 20 mL/hr at 03/25/24 0716        ASSESSMENT:   Acute altered mental status  Duodenal ulcer with a visible vessel  Severe esophagitis  Hydronephrosis  Right hip comminuted periprosthetic fracture  Acute  kidney injury  Severe life-threatening anemia  Hyponatremia  Hematuria  Hyperkalemia  Coronary artery disease  Previous stroke  Diabetes mellitus    PLAN:  Hemodynamically stable with stable hemoglobin.  No signs of overt bleeding.  No indication for transfusion at this time.  Continue to monitor closely  Protonix drip per GI.  No signs of overt bleeding.  Carafate added.  Lipase normal.    Urology following.  CT abdomen pelvis report noted.    Good urine output.  Plans for repeat ultrasound of the kidneys per urology.  Diet per GI  Creatinine stable and improving nephrology following  Fluid and electrolyte management per nephrology  Ortho's recommendation noted.  Extensive surgery needed.  I discussed with patient and family they are agreeable.  Blood glucose monitoring per ICU protocol  Keep in the ICU until cleared by KARIE Desai MD  03/25/24  08:49 EDT

## 2024-03-25 NOTE — PLAN OF CARE
Goal Outcome Evaluation:              Outcome Evaluation: No events overnight. Patient only complains of hip pain with activity. 3-way clamped since dayshift 3/24. Urine continues to be clear yellow.

## 2024-03-25 NOTE — SIGNIFICANT NOTE
03/25/24 1240   OTHER   Discipline occupational therapist   Rehab Time/Intention   Session Not Performed unable to treat, medical status change  (pt found to have a R acetabular fracture, OT will await ortho recommendations before initiating POC/completing eval.)   Recommendation   OT - Next Appointment 03/26/24

## 2024-03-25 NOTE — PROGRESS NOTES
[facility] Progress Note      Patient Name: Arline Landaverde  : 1950  MRN: [id number]  Primary Care Physician:  Lanie Gomez APRN  Date of admission: 3/21/2024    Subjective     Interval History:   Follow-up acute kidney injury    Gamez catheter remains anchored; 845 mL UOP yesterday  Appetite fair; no N/V  No shortness of breath      Review of Systems:   As noted above    Objective     Vitals:   Temp:  [97.7 °F (36.5 °C)-99 °F (37.2 °C)] 97.9 °F (36.6 °C)  Heart Rate:  [68-85] 82  Resp:  [11-19] 14  BP: (114-148)/(49-84) 148/84    Intake/Output Summary (Last 24 hours) at 3/25/2024 1731  Last data filed at 3/25/2024 1157  Gross per 24 hour   Intake 1563.02 ml   Output 845 ml   Net 718.02 ml       Physical Exam:    General Appearance: awake, alert, chronically ill, NAD  Skin: warm and dry  HEENT: oral mucosa normal, nonicteric sclera, temporal  Neck: supple, no JVD  Lungs: bilateral rhonchi, breathing effort not labored  Heart: RRR, no rub  Abdomen: soft, nontender, nondistended, BS +  : Three-way Gamez catheter anchored in place  Extremities: no edema, cyanosis or clubbing  Neuro: moving all extremities    Scheduled Meds:     amLODIPine, 5 mg, Oral, Q24H  insulin lispro, 3-14 Units, Subcutaneous, 4x Daily AC & at Bedtime  Menthol-Zinc Oxide, 1 Application, Topical, Q12H  senna-docusate sodium, 2 tablet, Oral, BID  sucralfate, 1 g, Oral, 4x Daily AC & at Bedtime      IV Meds:   pantoprazole, 40 mg, Last Rate: 40 mg (24 1715)        Results Reviewed:   I have personally reviewed the results from the time of this admission to 3/25/2024 17:31 EDT     Results from last 7 days   Lab Units 24  0431 24  0428 24  0516   SODIUM mmol/L 130* 132* 138   POTASSIUM mmol/L 3.9 3.8 4.1   CHLORIDE mmol/L 103 104 108*   CO2 mmol/L 19.2* 20.4* 20.0*   BUN mg/dL 83* 100* 148*   CREATININE mg/dL 1.44* 1.77* 2.32*   CALCIUM mg/dL 7.2* 7.1* 8.2*   BILIRUBIN mg/dL 0.3 0.2 0.2    ALK PHOS U/L 69 63 62   ALT (SGPT) U/L 12 13 10   AST (SGOT) U/L 16 16 13   GLUCOSE mg/dL 108* 133* 192*       Estimated Creatinine Clearance: 27.4 mL/min (A) (by C-G formula based on SCr of 1.44 mg/dL (H)).    Results from last 7 days   Lab Units 03/25/24  0431 03/24/24  0428 03/23/24  0516   MAGNESIUM mg/dL 2.4 2.6* 3.0*   PHOSPHORUS mg/dL 2.5 2.5 3.8       Results from last 7 days   Lab Units 03/25/24  0431 03/24/24  0428   URIC ACID mg/dL 5.3 6.2*       Results from last 7 days   Lab Units 03/25/24  0431 03/24/24  0428 03/23/24  0516 03/22/24  2331 03/22/24  1719 03/22/24  1235 03/22/24  0616 03/22/24  0129 03/21/24  1616   WBC 10*3/mm3 15.45* 15.68* 12.55*  --   --   --  14.88*  --  17.03*   HEMOGLOBIN g/dL 7.3* 7.1* 8.1* 7.7* 7.8*   < > 7.6*   < > 5.4*   PLATELETS 10*3/mm3 229 212 240  --   --   --  243  --  392    < > = values in this interval not displayed.       Results from last 7 days   Lab Units 03/23/24  0516 03/21/24  1655 03/21/24  0805   INR  1.40* 1.48* 1.34*       Assessment / Plan     ASSESSMENT:  MARNIE, nonoliguric, improving:  multifactorial related to urinary retention and hypotension/hypovolemia with acute blood loss.  Volume status stable; hyponatremia and likely NAGMA  Urinary retention, with chronic urinary retention suspected.  Urology following currently has Gamez catheter anchored in place and urine output is excellent  Anemia- secondary to GI bleeding as well as gross hematuria  History of CVA  Bilateral hydronephrosis- Secondary to urinary retention. 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  Orthopedics following      PLAN:  Encouraged her to eat and drink  Surveillance labs  Discussed with family member at bedside      I reviewed labs and other providers' notes.  Copied text in this note has been reviewed and is accurate as of 03/25/24.       Thank you for involving us in the care of Arline Landaverde.   Please feel free to call with any questions.    Danial Washington MD  03/25/24  17:31 EDT    [facility]  [phone]    Please note that portions of this note were completed with a voice recognition program.\

## 2024-03-25 NOTE — SIGNIFICANT NOTE
03/25/24 5248   OTHER   Discipline physical therapist   Rehab Time/Intention   Session Not Performed unable to treat, medical status change  (pt found to have a R acetabular fracture, PT will await ortho recommendations before continuing PT, discussed with nursing)

## 2024-03-25 NOTE — PROGRESS NOTES
"  [facility] Progress Note    Chief Complaint: No complaints    Patient resting comfortably.  Family member in the room.  Her urine is clear to yellow with no signs of any gross hematuria at this time and her CBI has had a very slow rate.  Her CT scan was reviewed.  She has bilateral hydronephrosis and a distended bladder at the time of her CAT scan which was last night.  Her Gamez catheter seems to be in good position.  She has had good urine output.    Review of Systems:    The following systems were reviewed and negative;  respiratory and cardiovascular          Vital Signs  /58   Pulse 72   Temp 98.9 °F (37.2 °C) (Oral)   Resp 12   Ht 162.6 cm (64.02\")   Wt 50.7 kg (111 lb 12.4 oz)   SpO2 98%   BMI 19.18 kg/m²     Physical Exam:     General Appearance:    Alert, cooperative, NAD   HEENT:    No trauma, pupils reactive, hearing intact   Back:     No CVA tenderness   Lungs:     Respirations unlabored, no wheezing    Heart:    RRR, intact peripheral pulses   Abdomen:   Soft benign   :  Gamez catheter anchored   Extremities:   No edema, no deformity   Lymphatic:   No neck or groin LAD   Skin:   No bleeding, bruising or rashes   Neuro/Psych:   Orientation intact, mood/affect pleasant, no focal findings        Results Review:     I reviewed the patient's new clinical results.  Lab Results (last 24 hours)       Procedure Component Value Units Date/Time    POC Glucose Once [[id number]]  (Abnormal) Collected: 03/25/24 0553    Specimen: Blood Updated: 03/25/24 0556     Glucose 142 mg/dL     Comprehensive Metabolic Panel [[id number]]  (Abnormal) Collected: 03/25/24 0431    Specimen: Blood Updated: 03/25/24 0517     Glucose 108 mg/dL      BUN 83 mg/dL      Creatinine 1.44 mg/dL      Sodium 130 mmol/L      Potassium 3.9 mmol/L      Chloride 103 mmol/L      CO2 19.2 mmol/L      Calcium 7.2 mg/dL      Total Protein 4.2 g/dL      Albumin 2.2 g/dL      ALT (SGPT) 12 U/L      AST (SGOT) 16 U/L      Alkaline " Phosphatase 69 U/L      Total Bilirubin 0.3 mg/dL      Globulin 2.0 gm/dL      A/G Ratio 1.1 g/dL      BUN/Creatinine Ratio 57.6     Anion Gap 7.8 mmol/L      eGFR 38.2 mL/min/1.73     Narrative:      GFR Normal >60  Chronic Kidney Disease <60  Kidney Failure <15    The GFR formula is only valid for adults with stable renal function between ages 18 and 70.    Magnesium [[id number]]  (Normal) Collected: 03/25/24 0431    Specimen: Blood Updated: 03/25/24 0517     Magnesium 2.4 mg/dL     Phosphorus [[id number]]  (Normal) Collected: 03/25/24 0431    Specimen: Blood Updated: 03/25/24 0517     Phosphorus 2.5 mg/dL     Uric Acid [[id number]]  (Normal) Collected: 03/25/24 0431    Specimen: Blood Updated: 03/25/24 0517     Uric Acid 5.3 mg/dL     Lipase [[id number]]  (Normal) Collected: 03/25/24 0431    Specimen: Blood Updated: 03/25/24 0517     Lipase 19 U/L     CBC & Differential [[id number]]  (Abnormal) Collected: 03/25/24 0431    Specimen: Blood Updated: 03/25/24 0456    Narrative:      The following orders were created for panel order CBC & Differential.  Procedure                               Abnormality         Status                     ---------                               -----------         ------                     CBC Auto Differential[[id number]]        Abnormal            Final result                 Please view results for these tests on the individual orders.    CBC Auto Differential [[id number]]  (Abnormal) Collected: 03/25/24 0431    Specimen: Blood Updated: 03/25/24 0456     WBC 15.45 10*3/mm3      RBC 2.37 10*6/mm3      Hemoglobin 7.3 g/dL      Hematocrit 21.4 %      MCV 90.3 fL      MCH 30.8 pg      MCHC 34.1 g/dL      RDW 14.4 %      RDW-SD 46.0 fl      MPV 8.8 fL      Platelets 229 10*3/mm3      Neutrophil % 73.5 %      Lymphocyte % 9.6 %      Monocyte % 7.0 %      Eosinophil % 8.5 %      Basophil % 0.6 %      Immature Grans % 0.8 %      Neutrophils, Absolute 11.34 10*3/mm3      Lymphocytes, Absolute  1.49 10*3/mm3      Monocytes, Absolute 1.08 10*3/mm3      Eosinophils, Absolute 1.32 10*3/mm3      Basophils, Absolute 0.09 10*3/mm3      Immature Grans, Absolute 0.13 10*3/mm3      nRBC 0.0 /100 WBC     POC Glucose Once [[id number]]  (Normal) Collected: 03/24/24 2329    Specimen: Blood Updated: 03/24/24 2330     Glucose 89 mg/dL     POC Glucose Once [[id number]]  (Abnormal) Collected: 03/24/24 2124    Specimen: Blood Updated: 03/24/24 2125     Glucose 171 mg/dL     POC Glucose Once [[id number]]  (Abnormal) Collected: 03/24/24 2002    Specimen: Blood Updated: 03/24/24 2004     Glucose 236 mg/dL     POC Glucose Once [[id number]]  (Abnormal) Collected: 03/24/24 1537    Specimen: Blood Updated: 03/24/24 1552     Glucose 300 mg/dL     POC Glucose Once [[id number]]  (Abnormal) Collected: 03/24/24 1042    Specimen: Blood Updated: 03/24/24 1057     Glucose 301 mg/dL           Imaging Results (Last 24 Hours)       ** No results found for the last 24 hours. **            Medication Review:   I have personally reviewed    Current Facility-Administered Medications:     acetaminophen (TYLENOL) tablet 650 mg, 650 mg, Oral, Q4H PRN **OR** acetaminophen (TYLENOL) suppository 650 mg, 650 mg, Rectal, Q4H PRN, Gissell Miranda MD    aluminum-magnesium hydroxide-simethicone (MAALOX MAX) 400-400-40 MG/5ML suspension 15 mL, 15 mL, Oral, Q6H PRN, Gissell Miranda MD    amLODIPine (NORVASC) tablet 5 mg, 5 mg, Oral, Q24H, Fouzia Desai MD, 5 mg at 03/24/24 1001    sennosides-docusate (PERICOLACE) 8.6-50 MG per tablet 2 tablet, 2 tablet, Oral, BID, 2 tablet at 03/24/24 1539 **AND** polyethylene glycol (MIRALAX) packet 17 g, 17 g, Oral, Daily PRN **AND** bisacodyl (DULCOLAX) EC tablet 5 mg, 5 mg, Oral, Daily PRN **AND** bisacodyl (DULCOLAX) suppository 10 mg, 10 mg, Rectal, Daily PRN, Gissell Miranda MD    Calcium Replacement - Follow Nurse / BPA Driven Protocol, , Does not apply, PRN, Gissell Miranda MD    dextrose (D50W) (25 g/50  mL) IV injection 25 g, 25 g, Intravenous, Q15 Min PRN, Fouzia Desai MD    dextrose (GLUTOSE) oral gel 15 g, 15 g, Oral, Q15 Min PRN, Fouzia Desai MD    glucagon (GLUCAGEN) injection 1 mg, 1 mg, Intramuscular, Q15 Min PRN, Fouzia Desai MD    insulin lispro (HUMALOG/ADMELOG) injection 3-14 Units, 3-14 Units, Subcutaneous, 4x Daily AC & at Bedtime, Fouzia Desai MD, 5 Units at 03/24/24 2016    Magnesium Standard Dose Replacement - Follow Nurse / BPA Driven Protocol, , Does not apply, Ruth MTZ Lauren C., MD    Menthol-Zinc Oxide 1 Application, 1 Application, Topical, Q12H, Gissell Miranda MD, 1 Application at 03/24/24 2017    nitroglycerin (NITROSTAT) SL tablet 0.4 mg, 0.4 mg, Sublingual, Q5 Min PRN, Gissell Miranda MD    ondansetron ODT (ZOFRAN-ODT) disintegrating tablet 4 mg, 4 mg, Oral, Q4H PRN, 4 mg at 03/22/24 0032 **OR** ondansetron (ZOFRAN) injection 4 mg, 4 mg, Intravenous, Q4H PRN, Gissell Miranda MD    [COMPLETED] pantoprazole (PROTONIX) injection 80 mg, 80 mg, Intravenous, Once, 80 mg at 03/21/24 1748 **AND** pantoprazole (PROTONIX) 40 mg in sodium chloride 0.9 % 100 mL (0.4 mg/mL) MBP, 40 mg, Intravenous, Continuous, Gissell Miranda MD, Last Rate: 20 mL/hr at 03/25/24 0716, New Bag at 03/25/24 0716    Phosphorus Replacement - Follow Nurse / BPA Driven Protocol, , Does not apply, Ruth MTZ Lauren C., MD    Potassium Replacement - Follow Nurse / BPA Driven Protocol, , Does not apply, Ruth MTZ Lauren C., MD    prochlorperazine (COMPAZINE) injection 2.5 mg, 2.5 mg, Intravenous, Q6H PRN, Gissell Miranda MD, 2.5 mg at 03/22/24 1235    sucralfate (CARAFATE) tablet 1 g, 1 g, Oral, 4x Daily AC & at Bedtime, Akshat Coe MD, 1 g at 03/24/24 2014    Allergies:    Penicillins and Furosemide    Assessment:    Active Problems:    Severe anemia    Melena       Gross hematuria now resolving with no obvious upper tract pathology aside from hydronephrosis.    Bladder distention with  bilateral hydronephrosis consistent with chronic retention    Plan:    Continue Gamez catheter.    Renal ultrasound tomorrow just to assess this hydronephrosis and determine if it is resolved.  Cystoscopy later..      Chad Moser MD    3/25/2024  07:32 EDT

## 2024-03-26 ENCOUNTER — APPOINTMENT (OUTPATIENT)
Dept: ULTRASOUND IMAGING | Facility: HOSPITAL | Age: 74
DRG: 377 | End: 2024-03-26
Payer: MEDICARE

## 2024-03-26 PROBLEM — E11.9 DM (DIABETES MELLITUS), TYPE 2: Status: ACTIVE | Noted: 2024-03-26

## 2024-03-26 PROBLEM — K26.9 DUODENAL ULCER: Status: ACTIVE | Noted: 2024-03-26

## 2024-03-26 PROBLEM — I25.10 CAD (CORONARY ARTERY DISEASE): Status: ACTIVE | Noted: 2024-03-26

## 2024-03-26 PROBLEM — K21.9 GERD WITHOUT ESOPHAGITIS: Status: ACTIVE | Noted: 2024-03-26

## 2024-03-26 PROBLEM — N17.9 AKI (ACUTE KIDNEY INJURY): Status: ACTIVE | Noted: 2024-03-26

## 2024-03-26 PROBLEM — M97.01XA PERIPROSTHETIC FRACTURE AROUND INTERNAL PROSTHETIC RIGHT HIP JOINT: Status: ACTIVE | Noted: 2024-03-26

## 2024-03-26 LAB
ALBUMIN SERPL-MCNC: 2.4 G/DL (ref 3.5–5.2)
ANION GAP SERPL CALCULATED.3IONS-SCNC: 4.8 MMOL/L (ref 5–15)
ANION GAP SERPL CALCULATED.3IONS-SCNC: 5 MMOL/L (ref 5–15)
BASOPHILS # BLD AUTO: 0.05 10*3/MM3 (ref 0–0.2)
BASOPHILS NFR BLD AUTO: 0.4 % (ref 0–1.5)
BUN SERPL-MCNC: 66 MG/DL (ref 8–23)
BUN SERPL-MCNC: 66 MG/DL (ref 8–23)
BUN/CREAT SERPL: 45.8 (ref 7–25)
BUN/CREAT SERPL: 51.2 (ref 7–25)
CALCIUM SPEC-SCNC: 7 MG/DL (ref 8.6–10.5)
CALCIUM SPEC-SCNC: 7.3 MG/DL (ref 8.6–10.5)
CHLORIDE SERPL-SCNC: 100 MMOL/L (ref 98–107)
CHLORIDE SERPL-SCNC: 105 MMOL/L (ref 98–107)
CO2 SERPL-SCNC: 20 MMOL/L (ref 22–29)
CO2 SERPL-SCNC: 20.2 MMOL/L (ref 22–29)
CREAT SERPL-MCNC: 1.29 MG/DL (ref 0.57–1)
CREAT SERPL-MCNC: 1.44 MG/DL (ref 0.57–1)
DEPRECATED RDW RBC AUTO: 44.9 FL (ref 37–54)
EGFRCR SERPLBLD CKD-EPI 2021: 38.2 ML/MIN/1.73
EGFRCR SERPLBLD CKD-EPI 2021: 43.6 ML/MIN/1.73
EOSINOPHIL # BLD AUTO: 1.14 10*3/MM3 (ref 0–0.4)
EOSINOPHIL NFR BLD AUTO: 8.7 % (ref 0.3–6.2)
ERYTHROCYTE [DISTWIDTH] IN BLOOD BY AUTOMATED COUNT: 13.9 % (ref 12.3–15.4)
GLUCOSE BLDC GLUCOMTR-MCNC: 134 MG/DL (ref 70–130)
GLUCOSE BLDC GLUCOMTR-MCNC: 240 MG/DL (ref 70–130)
GLUCOSE BLDC GLUCOMTR-MCNC: 247 MG/DL (ref 70–130)
GLUCOSE BLDC GLUCOMTR-MCNC: 352 MG/DL (ref 70–130)
GLUCOSE SERPL-MCNC: 111 MG/DL (ref 65–99)
GLUCOSE SERPL-MCNC: 186 MG/DL (ref 65–99)
HBA1C MFR BLD: 5.9 % (ref 4.8–5.6)
HCT VFR BLD AUTO: 20.4 % (ref 34–46.6)
HGB BLD-MCNC: 7 G/DL (ref 12–15.9)
IMM GRANULOCYTES # BLD AUTO: 0.11 10*3/MM3 (ref 0–0.05)
IMM GRANULOCYTES NFR BLD AUTO: 0.8 % (ref 0–0.5)
LYMPHOCYTES # BLD AUTO: 1.14 10*3/MM3 (ref 0.7–3.1)
LYMPHOCYTES NFR BLD AUTO: 8.7 % (ref 19.6–45.3)
MCH RBC QN AUTO: 30.8 PG (ref 26.6–33)
MCHC RBC AUTO-ENTMCNC: 34.3 G/DL (ref 31.5–35.7)
MCV RBC AUTO: 89.9 FL (ref 79–97)
MONOCYTES # BLD AUTO: 1.01 10*3/MM3 (ref 0.1–0.9)
MONOCYTES NFR BLD AUTO: 7.7 % (ref 5–12)
NEUTROPHILS NFR BLD AUTO: 73.7 % (ref 42.7–76)
NEUTROPHILS NFR BLD AUTO: 9.7 10*3/MM3 (ref 1.7–7)
NRBC BLD AUTO-RTO: 0 /100 WBC (ref 0–0.2)
PHOSPHATE SERPL-MCNC: 2.5 MG/DL (ref 2.5–4.5)
PLATELET # BLD AUTO: 243 10*3/MM3 (ref 140–450)
PMV BLD AUTO: 8.9 FL (ref 6–12)
POTASSIUM SERPL-SCNC: 3.9 MMOL/L (ref 3.5–5.2)
POTASSIUM SERPL-SCNC: 4 MMOL/L (ref 3.5–5.2)
RBC # BLD AUTO: 2.27 10*6/MM3 (ref 3.77–5.28)
SODIUM SERPL-SCNC: 125 MMOL/L (ref 136–145)
SODIUM SERPL-SCNC: 130 MMOL/L (ref 136–145)
WBC NRBC COR # BLD AUTO: 13.15 10*3/MM3 (ref 3.4–10.8)

## 2024-03-26 PROCEDURE — 85025 COMPLETE CBC W/AUTO DIFF WBC: CPT | Performed by: INTERNAL MEDICINE

## 2024-03-26 PROCEDURE — 80069 RENAL FUNCTION PANEL: CPT | Performed by: INTERNAL MEDICINE

## 2024-03-26 PROCEDURE — 76775 US EXAM ABDO BACK WALL LIM: CPT

## 2024-03-26 PROCEDURE — 63710000001 INSULIN LISPRO (HUMAN) PER 5 UNITS: Performed by: INTERNAL MEDICINE

## 2024-03-26 PROCEDURE — 80048 BASIC METABOLIC PNL TOTAL CA: CPT | Performed by: INTERNAL MEDICINE

## 2024-03-26 PROCEDURE — 99232 SBSQ HOSP IP/OBS MODERATE 35: CPT | Performed by: INTERNAL MEDICINE

## 2024-03-26 PROCEDURE — 83036 HEMOGLOBIN GLYCOSYLATED A1C: CPT | Performed by: HOSPITALIST

## 2024-03-26 PROCEDURE — 82948 REAGENT STRIP/BLOOD GLUCOSE: CPT

## 2024-03-26 RX ORDER — PANTOPRAZOLE SODIUM 40 MG/10ML
40 INJECTION, POWDER, LYOPHILIZED, FOR SOLUTION INTRAVENOUS EVERY 12 HOURS SCHEDULED
Status: DISCONTINUED | OUTPATIENT
Start: 2024-03-26 | End: 2024-03-31

## 2024-03-26 RX ADMIN — Medication 1 APPLICATION: at 09:19

## 2024-03-26 RX ADMIN — PANTOPRAZOLE SODIUM: 40 INJECTION, POWDER, LYOPHILIZED, FOR SOLUTION INTRAVENOUS at 04:07

## 2024-03-26 RX ADMIN — AMLODIPINE BESYLATE 5 MG: 5 TABLET ORAL at 09:19

## 2024-03-26 RX ADMIN — INSULIN LISPRO 12 UNITS: 100 INJECTION, SOLUTION INTRAVENOUS; SUBCUTANEOUS at 18:21

## 2024-03-26 RX ADMIN — PANTOPRAZOLE SODIUM 40 MG: 40 INJECTION, POWDER, LYOPHILIZED, FOR SOLUTION INTRAVENOUS at 21:12

## 2024-03-26 RX ADMIN — SENNOSIDES AND DOCUSATE SODIUM 2 TABLET: 50; 8.6 TABLET ORAL at 09:18

## 2024-03-26 RX ADMIN — PANTOPRAZOLE SODIUM 40 MG: 40 INJECTION, POWDER, LYOPHILIZED, FOR SOLUTION INTRAVENOUS at 09:15

## 2024-03-26 RX ADMIN — PHENOL 1 SPRAY: 1.5 LIQUID ORAL at 06:17

## 2024-03-26 RX ADMIN — PANTOPRAZOLE SODIUM 40 MG: 40 INJECTION, POWDER, LYOPHILIZED, FOR SOLUTION INTRAVENOUS at 14:24

## 2024-03-26 RX ADMIN — SENNOSIDES AND DOCUSATE SODIUM 2 TABLET: 50; 8.6 TABLET ORAL at 21:12

## 2024-03-26 RX ADMIN — INSULIN LISPRO 5 UNITS: 100 INJECTION, SOLUTION INTRAVENOUS; SUBCUTANEOUS at 12:15

## 2024-03-26 RX ADMIN — SUCRALFATE 1 G: 1 TABLET ORAL at 18:22

## 2024-03-26 RX ADMIN — SUCRALFATE 1 G: 1 TABLET ORAL at 12:16

## 2024-03-26 RX ADMIN — INSULIN LISPRO 5 UNITS: 100 INJECTION, SOLUTION INTRAVENOUS; SUBCUTANEOUS at 21:13

## 2024-03-26 RX ADMIN — SUCRALFATE 1 G: 1 TABLET ORAL at 09:18

## 2024-03-26 RX ADMIN — SUCRALFATE 1 G: 1 TABLET ORAL at 21:12

## 2024-03-26 NOTE — PROGRESS NOTES
Nutrition Services    Patient Name:  Arline Landaverde  YOB: 1950  MRN: [id number]  Admit Date:  3/21/2024  Assessment Date:  03/26/24    Summary: Nutrition Screen completed for skin integrity and low BMI  This is a 72 yo female admitted for anemia due to erosive esophagitis and duodenal ulcer, MARNIE.  She is on a Full liquid diet and tolerated some hot cereal this am for breakfast.   BMI: 16.04, UBW around 120's  Labs: Glu 134-283, Na 130, BUN 83, cr 1.44, alb 2.2, H/H 8.1/23.7  Skin: stage 2 coccyx pressure injury  Pt will drink supplements, prefers chocolate    Patient meets ASPEN/AND criteria for nutrition diagnosis of moderate malnutrition of chronic illness based on: inadequate po intake and NFPE results.    Plan/Recommendation  Good po intake encouraged with all meals  Will send supplements BID to help  meet nutritional needs  RD to follow    CLINICAL NUTRITION ASSESSMENT      Reason for Assessment BMI, Pressure Injury and/or Non-Healing Wound     Diagnosis/Problem   Anemia, MARNIE   Medical/Surgical History Past Medical History:   Diagnosis Date    Acute CVA (cerebrovascular accident) 07/20/2023    Chronic hyponatremia     Classical migraine without intractable migraine 07/20/2023    Diabetes mellitus     Elevated BUN 12/19/2023    Expressive aphasia 07/20/2023    Former cigarette smoker     Grade II diastolic dysfunction     History of stroke 12/05/2023    Hypoalbuminemia 12/2023    SIADH (syndrome of inappropriate ADH production)     Stroke     Type 2 myocardial infarction 12/18/2023       Past Surgical History:   Procedure Laterality Date    BACK SURGERY      CHOLECYSTECTOMY      ENDOSCOPY N/A 3/22/2024    Procedure: ESOPHAGOGASTRODUODENOSCOPY AT BEDSIDE with epi injection, heater probe;  Surgeon: Gissell Miranda MD;  Location: [facility] ENDOSCOPY;  Service: Gastroenterology;  Laterality: N/A;  duodenal ulcer with visible vessel, errosive esophagitis    HIP HEMIARTHROPLASTY Right 4/19/2023     "Procedure: HIP HEMIARTHROPLASTY ANTERIOR;  Surgeon: Kumar Bonilla MD;  Location: [facility];  Service: Orthopedics;  Laterality: Right;        Anthropometrics        Current Height  Current Weight  BMI kg/m2 Height: 162.6 cm (64.02\")  Weight: 42.4 kg (93 lb 7.6 oz) (03/26/24 0600)  Body mass index is 16.04 kg/m².   Adjusted BMI (if applicable)    BMI Category Underweight (18.4 or below)   Ideal Body Weight (IBW) 126   Usual Body Weight (UBW) 120's   Weight Trend Loss, Gain   Weight History Wt Readings from Last 30 Encounters:   03/26/24 0600 42.4 kg (93 lb 7.6 oz)   03/25/24 0433 50.7 kg (111 lb 12.4 oz)   03/23/24 0520 52.8 kg (116 lb 6.5 oz)   03/22/24 0546 56.3 kg (124 lb 1.9 oz)   03/21/24 1700 54.4 kg (120 lb)   03/21/24 0850 54.8 kg (120 lb 12.8 oz)   03/11/24 0524 57.6 kg (127 lb)   01/11/24 0829 58 kg (127 lb 12.8 oz)   12/18/23 1517 55.6 kg (122 lb 9.6 oz)   12/10/23 1425 54.3 kg (119 lb 11.6 oz)   12/09/23 0608 54.3 kg (119 lb 12.8 oz)   12/08/23 0645 55.1 kg (121 lb 6.4 oz)   12/07/23 1542 54.3 kg (119 lb 12.8 oz)   12/07/23 0500 53 kg (116 lb 12.4 oz)   12/06/23 0825 52.6 kg (116 lb)   12/05/23 1141 52.7 kg (116 lb 1.6 oz)   08/07/23 0804 51.3 kg (113 lb)   07/20/23 0853 55.3 kg (122 lb)   07/18/23 1800 55.6 kg (122 lb 9.6 oz)   07/07/23 0836 47.2 kg (104 lb)   05/26/23 0831 47.2 kg (104 lb)   04/28/23 0829 47.2 kg (104 lb)   04/19/23 0443 47.3 kg (104 lb 3.2 oz)   04/19/23 0240 64.9 kg (143 lb)   03/08/23 1304 64.9 kg (143 lb)   04/26/22 1529 64.9 kg (143 lb)   04/13/22 1019 63.5 kg (140 lb)   06/26/14 1559 72.7 kg (160 lb 4 oz)   06/10/14 1429 72.6 kg (160 lb 0.2 oz)      --  Labs       Pertinent Labs    Results from last 7 days   Lab Units 03/25/24  0431 03/24/24  0428 03/23/24  0516   SODIUM mmol/L 130* 132* 138   POTASSIUM mmol/L 3.9 3.8 4.1   CHLORIDE mmol/L 103 104 108*   CO2 mmol/L 19.2* 20.4* 20.0*   BUN mg/dL 83* 100* 148*   CREATININE mg/dL 1.44* 1.77* 2.32*   CALCIUM mg/dL 7.2* 7.1* 8.2* "   BILIRUBIN mg/dL 0.3 0.2 0.2   ALK PHOS U/L 69 63 62   ALT (SGPT) U/L 12 13 10   AST (SGOT) U/L 16 16 13   GLUCOSE mg/dL 108* 133* 192*     Results from last 7 days   Lab Units 03/25/24  0431 03/24/24  0428 03/23/24  0516   MAGNESIUM mg/dL 2.4 2.6* 3.0*   PHOSPHORUS mg/dL 2.5 2.5 3.8   HEMOGLOBIN g/dL 7.3* 7.1* 8.1*   HEMATOCRIT % 21.4* 21.1* 23.7*   WBC 10*3/mm3 15.45* 15.68* 12.55*   ALBUMIN g/dL 2.2* 2.2* 2.0*     Results from last 7 days   Lab Units 03/25/24  0431 03/24/24  0428 03/23/24  0516 03/22/24  0616 03/21/24  1655 03/21/24  1616 03/21/24  0805   INR   --   --  1.40*  --  1.48*  --  1.34*   APTT seconds  --   --   --   --   --   --  25.3   PLATELETS 10*3/mm3 229 212 240 243  --  392 465*     COVID19   Date Value Ref Range Status   12/05/2023 Not Detected Not Detected - Ref. Range Final     Lab Results   Component Value Date    HGBA1C 5.30 07/19/2023          Medications           Scheduled Medications amLODIPine, 5 mg, Oral, Q24H  insulin lispro, 3-14 Units, Subcutaneous, 4x Daily AC & at Bedtime  Menthol-Zinc Oxide, 1 Application, Topical, Q12H  senna-docusate sodium, 2 tablet, Oral, BID  sucralfate, 1 g, Oral, 4x Daily AC & at Bedtime       Infusions pantoprazole, 40 mg, Last Rate: 40 mg (03/26/24 0915)       PRN Medications   acetaminophen **OR** acetaminophen    aluminum-magnesium hydroxide-simethicone    senna-docusate sodium **AND** polyethylene glycol **AND** bisacodyl **AND** bisacodyl    Calcium Replacement - Follow Nurse / BPA Driven Protocol    dextrose    dextrose    glucagon (human recombinant)    Magnesium Standard Dose Replacement - Follow Nurse / BPA Driven Protocol    nitroglycerin    ondansetron ODT **OR** ondansetron    phenol    Phosphorus Replacement - Follow Nurse / BPA Driven Protocol    Potassium Replacement - Follow Nurse / BPA Driven Protocol    prochlorperazine     Physical Findings          General Findings confused, disoriented   Oral/Mouth Cavity tooth or teeth missing    Edema  1+ (trace)   Gastrointestinal fecal incontinence, last bowel movement: 3/24   Skin  pressure injury: stage 2 coccyx   Tubes/Drains/Lines none   NFPE See Malnutrition Severity Assessment, Date Completed: 3/26   --  Malnutrition Severity Assessment      Patient meets criteria for : Moderate (non-severe) Malnutrition  Malnutrition Type (Last 8 Hours)       Malnutrition Severity Assessment       Row Name 03/26/24 1008       Malnutrition Severity Assessment    Malnutrition Type Chronic Disease - Related Malnutrition      Row Name 03/26/24 1008       Insufficient Energy Intake     Insufficient Energy Intake Findings Moderate    Insufficient Energy Intake  <75% of est. energy requirement for > or equal to 1 month      Row Name 03/26/24 1008       Muscle Loss    Temple Region Moderate - slight depression    Clavicle Bone Region Moderate - some protrusion in females, visible in males    Patellar Region Moderate - patella more prominent, less muscle definition around patella    Posterior Calf Region --      Row Name 03/26/24 1008       Fat Loss    Orbital Region  Moderate -  somewhat hollowness, slightly dark circles    Upper Arm Region --      Row Name 03/26/24 1008       Criteria Met (Must meet criteria for severity in at least 2 of these categories: M Wasting, Fat Loss, Fluid, Secondary Signs, Wt. Status, Intake)    Patient meets criteria for  Moderate (non-severe) Malnutrition                       Estimated/Assessed Needs        Current Weight  Weight: 42.4 kg (93 lb 7.6 oz) (03/26/24 0600)       Energy Requirements    Weight for Calculation 42.4 kg   Method for Estimation  35 kcal/kg   EST Needs (kcal/day) 1484       Protein Requirements    Weight for Calculation 42.4 kg   EST Protein Needs (g/kg) 1.2 - 1.5 gm/kg   EST Daily Needs (g/day) 50-63       Fluid Requirements     Method for Estimation 1 mL/kcal    EST Needs (mL/day)      Current Nutrition Orders & Evaluation of Intake       Oral Nutrition     Food  Allergies NKFA   Current PO Diet Diet: Liquid; Full Liquid; Fluid Consistency: Thin (IDDSI 0)   Supplement n/a   PO Evaluation     % PO Intake 50% of breakfast this am    Factors Affecting Intake: altered GI function, decreased appetite, early satiety    --  PES STATEMENT / NUTRITION DIAGNOSIS      Nutrition Dx Problem  Problem: Malnutrition (moderate), Altered GI Function, and Inadequate Oral Intake  Etiology: Medical Diagnosis - anemia    Signs/Symptoms: PO intake, NFPE Results, BMI, and Report/Observation     NUTRITION INTERVENTION / PLAN OF CARE      Intervention Goal(s) Maintain nutrition status, Reduce/improve symptoms, Meet estimated needs, Disease management/therapy, Tolerate PO , Increase intake, Advance diet, Appropriate weight gain, and PO intake goal %: 75+         RD Intervention/Action Interview for preferences, Encourage intake, Continue to monitor, Care plan reviewed, and Recommend/order: ONS   --      Prescription/Orders:       PO Diet       Supplements Boost/Ensure--chocolate      Enteral Nutrition       Parenteral Nutrition    New Prescription Ordered? Yes   --      Monitor/Evaluation Per protocol   Discharge Plan/Needs Pending clinical course   --    RD to follow per protocol.      Electronically signed by:  Nat Jha RD  03/26/24 09:58 EDT

## 2024-03-26 NOTE — PROGRESS NOTES
"      [facility]         Dr Fragoso Sayied   LOS: 5 days   Patient Care Team:  Lanie Gomez APRN as PCP - General (Family Medicine)  Uri Amor MD as Cardiologist (Cardiology)    Chief Complaint: Acute GI bleed with duodenal ulcer severe esophagitis multiple issues as listed below    Interval History: Remains on Protonix drip.  No  overt bleeding reported.    REVIEW OF SYSTEMS:   CARDIOVASCULAR: No chest pain, chest pressure or chest discomfort. No palpitations or edema.   RESPIRATORY: No shortness of breath, cough or sputum.   GASTROINTESTINAL: No anorexia, nausea, vomiting or diarrhea. No abdominal pain or blood.   HEMATOLOGIC: No bleeding or bruising.     Ventilator/Non-Invasive Ventilation Settings (From admission, onward)      None              Vital Signs  Temp:  [97.9 °F (36.6 °C)-99.3 °F (37.4 °C)] 99 °F (37.2 °C)  Heart Rate:  [70-85] 77  Resp:  [14-20] 16  BP: (122-148)/(55-84) 138/69    Intake/Output Summary (Last 24 hours) at 3/26/2024 1004  Last data filed at 3/25/2024 2200  Gross per 24 hour   Intake 1219.37 ml   Output 350 ml   Net 869.37 ml     Flowsheet Rows      Flowsheet Row First Filed Value   Admission Height 162.6 cm (64.02\") Documented at 03/21/2024 1700   Admission Weight 54.4 kg (120 lb) Documented at 03/21/2024 1700            Physical Exam:  Patient is examined using the personal protective equipment as per guidelines from infection control for this particular patient as enacted.  Hand hygiene was performed before and after patient interaction.   General Appearance:    Alert, cooperative, in no acute distress.  Following simple commands  ENT Mallampati between 3 and 4 no nasal congestion  Neck midline trachea, no thyromegaly   Lungs:     Clear to auscultation, respirations regular, even and                  unlabored    Heart:    Regular rhythm and normal rate, normal S1 and S2, no            murmur, no gallop, no rub, no click   Chest Wall:    No abnormalities observed "   Abdomen:     Normal bowel sounds, no masses, no organomegaly, soft        nontender, nondistended, no guarding, no rebound                tenderness   Extremities:   Moves all extremities well, no edema, no cyanosis, no             redness  CNS no focal neurological deficits normal sensory exam  Skin no rashes no nodules  Musculoskeletal no cyanosis no clubbing normal range of motion     Results Review:        Results from last 7 days   Lab Units 03/25/24  0431 03/24/24  0428 03/23/24  0516   SODIUM mmol/L 130* 132* 138   POTASSIUM mmol/L 3.9 3.8 4.1   CHLORIDE mmol/L 103 104 108*   CO2 mmol/L 19.2* 20.4* 20.0*   BUN mg/dL 83* 100* 148*   CREATININE mg/dL 1.44* 1.77* 2.32*   GLUCOSE mg/dL 108* 133* 192*   CALCIUM mg/dL 7.2* 7.1* 8.2*     Results from last 7 days   Lab Units 03/21/24  1014 03/21/24  0805   HSTROP T ng/L 89* 107*     Results from last 7 days   Lab Units 03/25/24  0431 03/24/24  0428 03/23/24  0516   WBC 10*3/mm3 15.45* 15.68* 12.55*   HEMOGLOBIN g/dL 7.3* 7.1* 8.1*   HEMATOCRIT % 21.4* 21.1* 23.7*   PLATELETS 10*3/mm3 229 212 240     Results from last 7 days   Lab Units 03/23/24  0516 03/21/24  1655 03/21/24  0805   INR  1.40* 1.48* 1.34*   APTT seconds  --   --  25.3         Results from last 7 days   Lab Units 03/25/24  0431   MAGNESIUM mg/dL 2.4               I reviewed the patient's new clinical results.  I personally viewed and interpreted the patient's chest x-ray.        Medication Review:   amLODIPine, 5 mg, Oral, Q24H  insulin lispro, 3-14 Units, Subcutaneous, 4x Daily AC & at Bedtime  Menthol-Zinc Oxide, 1 Application, Topical, Q12H  senna-docusate sodium, 2 tablet, Oral, BID  sucralfate, 1 g, Oral, 4x Daily AC & at Bedtime        pantoprazole, 40 mg, Last Rate: 40 mg (03/26/24 0915)        ASSESSMENT:   Acute altered mental status  Duodenal ulcer with a visible vessel  Severe esophagitis  Hydronephrosis  Right hip comminuted periprosthetic fracture  Acute kidney injury  Severe  life-threatening anemia  Hyponatremia  Hematuria  Hyperkalemia  Coronary artery disease  Previous stroke  Diabetes mellitus    PLAN:  Hemodynamically stable with stable hemoglobin.  No signs of overt bleeding.  No indication for transfusion at this time.  Continue to monitor closely  Protonix drip per GI.  Carafate added.  Lipase normal.  CT abdomen false positive for pancreatitis.  Urolo hydronephrosis have resolved on ultrasound kidney.  Good urine output.  Neurology following  Diet per GI  Creatinine stable and improving nephrology following  Fluid and electrolyte management per nephrology  Ortho's recommendation noted.  Extensive surgery needed.  I discussed with patient and family they are agreeable.  Will see what Ortho is final recommendations.  Blood glucose monitoring per ICU protocol  Transfer patient out of the ICU  Hospitalist consult for medical management      Fouzia Desai MD  03/26/24  10:04 EDT

## 2024-03-26 NOTE — PROGRESS NOTES
Name: Arline Landaverde ADMIT: 3/21/2024   : 1950  PCP: Lanie Gomez APRN    MRN: [id number] LOS: 5 days   AGE/SEX: 73 y.o. female  ROOM: [facility]     Subjective   Subjective   No new complaints. Family at bedside asking about orthopedic procedure     Objective   Objective   Vital Signs  Temp:  [97.9 °F (36.6 °C)-99.3 °F (37.4 °C)] 98 °F (36.7 °C)  Heart Rate:  [75-85] 77  Resp:  [16-20] 16  BP: (122-148)/(55-84) 138/69  SpO2:  [94 %-100 %] 98 %  on   ;   Device (Oxygen Therapy): room air  Body mass index is 16.04 kg/m².    Physical Exam  Constitutional:       General: She is not in acute distress.     Appearance: She is ill-appearing. She is not toxic-appearing.   Cardiovascular:      Rate and Rhythm: Normal rate and regular rhythm.   Pulmonary:      Breath sounds: Normal breath sounds. No wheezing or rhonchi.   Abdominal:      Palpations: Abdomen is soft.      Tenderness: There is no abdominal tenderness.   Musculoskeletal:         General: No swelling.   Skin:     General: Skin is warm and dry.     Results Review  I reviewed the patient's new clinical results.  Results from last 7 days   Lab Units 24  0805 24  0431 24  0428 24  0516   WBC 10*3/mm3 13.15* 15.45* 15.68* 12.55*   HEMOGLOBIN g/dL 7.0* 7.3* 7.1* 8.1*   PLATELETS 10*3/mm3 243 229 212 240     Results from last 7 days   Lab Units 24  1141 24  0804 24  0431 24  0428   SODIUM mmol/L 125* 130* 130* 132*   POTASSIUM mmol/L 4.0 3.9 3.9 3.8   CHLORIDE mmol/L 100 105 103 104   CO2 mmol/L 20.2* 20.0* 19.2* 20.4*   BUN mg/dL 66* 66* 83* 100*   CREATININE mg/dL 1.29* 1.44* 1.44* 1.77*   GLUCOSE mg/dL 186* 111* 108* 133*     Lab Results   Component Value Date    ANIONGAP 4.8 (L) 2024     Estimated Creatinine Clearance: 25.6 mL/min (A) (by C-G formula based on SCr of 1.29 mg/dL (H)).   Lab Results   Component Value Date    EGFR 43.6 (L) 2024     Results from last 7 days   Lab Units 24  0804  03/25/24  0431 03/24/24  0428 03/23/24  0516 03/21/24  0805   ALBUMIN g/dL 2.4* 2.2* 2.2* 2.0* 2.8*   BILIRUBIN mg/dL  --  0.3 0.2 0.2 <0.2   ALK PHOS U/L  --  69 63 62 68   AST (SGOT) U/L  --  16 16 13 11   ALT (SGPT) U/L  --  12 13 10 11     Results from last 7 days   Lab Units 03/26/24  1141 03/26/24  0804 03/25/24  0431 03/24/24  0428 03/23/24  0516   CALCIUM mg/dL 7.3* 7.0* 7.2* 7.1* 8.2*   ALBUMIN g/dL  --  2.4* 2.2* 2.2* 2.0*   MAGNESIUM mg/dL  --   --  2.4 2.6* 3.0*   PHOSPHORUS mg/dL  --  2.5 2.5 2.5 3.8     Results from last 7 days   Lab Units 03/23/24  0516   PROCALCITONIN ng/mL 0.24     Glucose   Date/Time Value Ref Range Status   03/26/2024 1115 240 (H) 70 - 130 mg/dL Final   03/26/2024 0714 134 (H) 70 - 130 mg/dL Final   03/25/2024 2023 215 (H) 70 - 130 mg/dL Final   03/25/2024 1609 283 (H) 70 - 130 mg/dL Final   03/25/2024 1049 227 (H) 70 - 130 mg/dL Final   03/25/2024 0750 138 (H) 70 - 130 mg/dL Final   03/25/2024 0553 142 (H) 70 - 130 mg/dL Final       No radiology results for the last day    Scheduled Meds  amLODIPine, 5 mg, Oral, Q24H  insulin lispro, 3-14 Units, Subcutaneous, 4x Daily AC & at Bedtime  Menthol-Zinc Oxide, 1 Application, Topical, Q12H  senna-docusate sodium, 2 tablet, Oral, BID  sucralfate, 1 g, Oral, 4x Daily AC & at Bedtime    Continuous Infusions  pantoprazole, 40 mg, Last Rate: 40 mg (03/26/24 0915)    PRN Meds    acetaminophen **OR** acetaminophen    aluminum-magnesium hydroxide-simethicone    senna-docusate sodium **AND** polyethylene glycol **AND** bisacodyl **AND** bisacodyl    Calcium Replacement - Follow Nurse / BPA Driven Protocol    dextrose    dextrose    glucagon (human recombinant)    Magnesium Standard Dose Replacement - Follow Nurse / BPA Driven Protocol    nitroglycerin    ondansetron ODT **OR** ondansetron    phenol    Phosphorus Replacement - Follow Nurse / BPA Driven Protocol    Potassium Replacement - Follow Nurse / BPA Driven Protocol     prochlorperazine    pantoprazole, 40 mg, Last Rate: 40 mg (03/26/24 0915)    Diet  Diet: Liquid, Diabetic; Full Liquid; Consistent Carbohydrate; Fluid Consistency: Thin (IDDSI 0)    I have personally reviewed:  [x]  Medications  [x]  Laboratory   []  Microbiology   []  Radiology   []  EKG/Telemetry   []  Cardiology/Vascular   []  Pathology   []  Records     Results for orders placed during the hospital encounter of 01/02/24    Adult Transthoracic Echo Limited W/ Cont if Necessary Per Protocol    Interpretation Summary    Left ventricular systolic function is normal. Calculated left ventricular EF = 66.3% Abnormal global longitudinal LV strain (GLS) = -16.5%. Left ventricle strain data was reviewed by the physician. Normal left ventricular cavity size noted. Left ventricular wall thickness is consistent with borderline concentric hypertrophy. Left ventricular diastolic function is consistent with (grade I) impaired relaxation. The myocardium is bright and speckled. Global longitudinal strain is only slightly abnormal, and the pattern is not consistent with amyloidosis.    There is a trivial pericardial effusion.        Assessment/Plan     Active Hospital Problems    Diagnosis  POA    **Severe anemia [D64.9]  Yes    Duodenal ulcer [K26.9]  Unknown    Periprosthetic fracture around internal prosthetic right hip joint [M97.01XA]  Not Applicable    MARNIE (acute kidney injury) [N17.9]  Unknown    CAD (coronary artery disease) [I25.10]  Unknown    DM (diabetes mellitus), type 2 [E11.9]  Unknown    Melena [K92.1]  Unknown    Hyponatremia [E87.1]  Yes    Heart failure with preserved ejection fraction, borderline, class II [I50.30]  Yes      Resolved Hospital Problems   No resolved problems to display.     73 y.o. female transferred from [facility] for anemia, hyperkalemia, hyponatremia. Presented there with altered mental status/weakness, hematuria, dark stools.     Severe anemia  Grade D erosive esophagitis  Duodenal  ulcer with active bleeding  Status post EGD epinephrine and cauterization 3/22/2024  PPI, Carafate  GI following  If further bleeding may need IR  GI feels pancreatitis on CT is false positive    Acute kidney injury  Hyponatremia  Hyperkalemia  Improving  Nephrology following    Urinary retention  Hydronephrosis  Gamez catheter  Urology following-cystoscopy later    Right hip periprosthetic acetabular fracture  Ortho following    DM2  A1c 5.30% 8 months ago (recheck add on)  Continue correctional insulin for now    CAD  History of chronic diastolic heart failure  History of stroke    DVT prophylaxis  SCDs    Discharge  TBD  Expected discharge date/ time has not been documented.    Discussed with patient, family, and nursing staff    Harman Albarran MD  [facility]  03/26/24

## 2024-03-26 NOTE — SIGNIFICANT NOTE
03/26/24 1421   OTHER   Discipline occupational therapist   Rehab Time/Intention   Session Not Performed unable to evaluate, medical status change  (Pt continues to have low Hgb trending down 7.0 this PM, awaiting ortho recs for acetabular fx. OT to f.u next date)   Recommendation   OT - Next Appointment 03/27/24

## 2024-03-26 NOTE — PLAN OF CARE
Problem: Malnutrition  Goal: Improved Nutritional Intake  Outcome: Ongoing, Progressing     Problem: Oral Intake Inadequate  Goal: Improved Oral Intake  Outcome: Ongoing, Progressing   Goal Outcome Evaluation:      Good po intake encouraged  Supplements provided  RD to follow                                         No

## 2024-03-26 NOTE — PROGRESS NOTES
[facility] Progress Note      Patient Name: Arline Landaevrde  : 1950  MRN: [id number]  Primary Care Physician:  Lanie Gomez APRN  Date of admission: 3/21/2024    Subjective     Interval History:   Follow-up acute kidney injury    Breathing is comfortable on room air; no chest pain; appetite mediocre  Reports no bowel movement yesterday or today, although 1 stool recorded  350 mL UOP yesterday        Review of Systems:   As noted above    Objective     Vitals:   Temp:  [97.9 °F (36.6 °C)-99.3 °F (37.4 °C)] 99 °F (37.2 °C)  Heart Rate:  [70-85] 77  Resp:  [14-20] 16  BP: (122-148)/(55-84) 138/69    Intake/Output Summary (Last 24 hours) at 3/26/2024 1028  Last data filed at 3/25/2024 2200  Gross per 24 hour   Intake 1219.37 ml   Output 350 ml   Net 869.37 ml       Physical Exam:    General Appearance: awake, alert, chr ill, NAD  Skin: warm and dry  HEENT: oral mucosa normal, nonicteric sclera  Neck: supple, no JVD  Lungs: bilateral rhonchi, breathing effort not labored  Heart: RRR, no rub  Abdomen: soft, nontender, nondistended, BS +  : Gamez catheter anchored  Extremities: no edema, cyanosis or clubbing  Neuro: moving all extremities    Scheduled Meds:     amLODIPine, 5 mg, Oral, Q24H  insulin lispro, 3-14 Units, Subcutaneous, 4x Daily AC & at Bedtime  Menthol-Zinc Oxide, 1 Application, Topical, Q12H  senna-docusate sodium, 2 tablet, Oral, BID  sucralfate, 1 g, Oral, 4x Daily AC & at Bedtime      IV Meds:   pantoprazole, 40 mg, Last Rate: 40 mg (24 0915)        Results Reviewed:   I have personally reviewed the results from the time of this admission to 3/26/2024 10:28 EDT     Results from last 7 days   Lab Units 24  0431 24  0428 24  0516   SODIUM mmol/L 130* 132* 138   POTASSIUM mmol/L 3.9 3.8 4.1   CHLORIDE mmol/L 103 104 108*   CO2 mmol/L 19.2* 20.4* 20.0*   BUN mg/dL 83* 100* 148*   CREATININE mg/dL 1.44* 1.77* 2.32*   CALCIUM mg/dL 7.2* 7.1* 8.2*    BILIRUBIN mg/dL 0.3 0.2 0.2   ALK PHOS U/L 69 63 62   ALT (SGPT) U/L 12 13 10   AST (SGOT) U/L 16 16 13   GLUCOSE mg/dL 108* 133* 192*       Estimated Creatinine Clearance: 22.9 mL/min (A) (by C-G formula based on SCr of 1.44 mg/dL (H)).    Results from last 7 days   Lab Units 03/25/24  0431 03/24/24  0428 03/23/24  0516   MAGNESIUM mg/dL 2.4 2.6* 3.0*   PHOSPHORUS mg/dL 2.5 2.5 3.8       Results from last 7 days   Lab Units 03/25/24  0431 03/24/24  0428   URIC ACID mg/dL 5.3 6.2*       Results from last 7 days   Lab Units 03/25/24  0431 03/24/24  0428 03/23/24  0516 03/22/24  2331 03/22/24  1719 03/22/24  1235 03/22/24  0616 03/22/24  0129 03/21/24  1616   WBC 10*3/mm3 15.45* 15.68* 12.55*  --   --   --  14.88*  --  17.03*   HEMOGLOBIN g/dL 7.3* 7.1* 8.1* 7.7* 7.8*   < > 7.6*   < > 5.4*   PLATELETS 10*3/mm3 229 212 240  --   --   --  243  --  392    < > = values in this interval not displayed.       Results from last 7 days   Lab Units 03/23/24  0516 03/21/24  1655 03/21/24  0805   INR  1.40* 1.48* 1.34*       Assessment / Plan     ASSESSMENT:  MARNIE, nonoliguric, improving as of yesterday:  multifactorial related to urinary retention and hypotension/hypovolemia with acute blood loss.  Volume status stable; hyponatremia and likely NAGMA  Urinary retention, with chronic urinary retention suspected.  Urology following currently has Gamez catheter   Anemia- secondary to GI bleeding as well as gross hematuria  History of CVA  Bilateral hydronephrosis, due to urinary retention. 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  Orthopedics following      PLAN:  Encouraged her to eat and drink  Follow-up labs from this morning  Discussed with family member at bedside      I reviewed labs and other providers' notes.  Copied text in this note has been reviewed and is accurate as of 03/26/24.       Thank you for involving us in the care of Arline Landaverde.   Please feel free to call with any questions.    Danial Washington MD  03/26/24  10:28 EDT    [facility]  [phone]    Please note that portions of this note were completed with a voice recognition program.\

## 2024-03-26 NOTE — PLAN OF CARE
Pt alert and oriented times 3. VS stable. Tolerating full liquid diet. No Bms today. Gastrology notified of HH results, no new orders received. Family and pt updated on plan of care and educated, understanding verbalized.

## 2024-03-26 NOTE — PROGRESS NOTES
[facility]  Inpatient Progress Note    Reason for Follow Up: GI bleed    Subjective     Interval History:   No further bleeding    Current Facility-Administered Medications:     acetaminophen (TYLENOL) tablet 650 mg, 650 mg, Oral, Q4H PRN **OR** acetaminophen (TYLENOL) suppository 650 mg, 650 mg, Rectal, Q4H PRN, Fouzia Desai MD    aluminum-magnesium hydroxide-simethicone (MAALOX MAX) 400-400-40 MG/5ML suspension 15 mL, 15 mL, Oral, Q6H PRN, Fouzia Desai MD    amLODIPine (NORVASC) tablet 5 mg, 5 mg, Oral, Q24H, Fouzia Desai MD, 5 mg at 03/26/24 0919    sennosides-docusate (PERICOLACE) 8.6-50 MG per tablet 2 tablet, 2 tablet, Oral, BID, 2 tablet at 03/26/24 0918 **AND** polyethylene glycol (MIRALAX) packet 17 g, 17 g, Oral, Daily PRN **AND** bisacodyl (DULCOLAX) EC tablet 5 mg, 5 mg, Oral, Daily PRN **AND** bisacodyl (DULCOLAX) suppository 10 mg, 10 mg, Rectal, Daily PRN, Fouzia Desai MD    Calcium Replacement - Follow Nurse / BPA Driven Protocol, , Does not apply, PRN, Fouzia Desai MD    dextrose (D50W) (25 g/50 mL) IV injection 25 g, 25 g, Intravenous, Q15 Min PRN, Fouzia Desai MD    dextrose (GLUTOSE) oral gel 15 g, 15 g, Oral, Q15 Min PRN, Fouzia Desai MD    glucagon (GLUCAGEN) injection 1 mg, 1 mg, Intramuscular, Q15 Min PRN, Fouzia Desai MD    insulin lispro (HUMALOG/ADMELOG) injection 3-14 Units, 3-14 Units, Subcutaneous, 4x Daily AC & at Bedtime, Fouzia Desai MD, 5 Units at 03/25/24 2121    Magnesium Standard Dose Replacement - Follow Nurse / BPA Driven Protocol, , Does not apply, PRN, Fouzia Desai MD    Menthol-Zinc Oxide 1 Application, 1 Application, Topical, Q12H, Fouzia Desai MD, 1 Application at 03/26/24 0919    nitroglycerin (NITROSTAT) SL tablet 0.4 mg, 0.4 mg, Sublingual, Q5 Min PRN, Fouzia Desai MD    ondansetron ODT (ZOFRAN-ODT) disintegrating tablet 4 mg, 4 mg, Oral, Q4H PRN, 4 mg at 03/22/24 0032 **OR** ondansetron (ZOFRAN) injection 4  mg, 4 mg, Intravenous, Q4H PRN, Fouzia Desai MD    [COMPLETED] pantoprazole (PROTONIX) injection 80 mg, 80 mg, Intravenous, Once, 80 mg at 03/21/24 1748 **AND** pantoprazole (PROTONIX) 40 mg in sodium chloride 0.9 % 100 mL (0.4 mg/mL) MBP, 40 mg, Intravenous, Continuous, Fouzia Desai MD, Last Rate: 20 mL/hr at 03/26/24 0915, 40 mg at 03/26/24 0915    phenol (CHLORASEPTIC) 1.4 % liquid 1 spray, 1 spray, Mouth/Throat, Q2H PRN, Judi Bray APRN, 1 spray at 03/26/24 0617    Phosphorus Replacement - Follow Nurse / BPA Driven Protocol, , Does not apply, PRN, Fouzia Desai MD    Potassium Replacement - Follow Nurse / BPA Driven Protocol, , Does not apply, PRN, Fouzia Desai MD    prochlorperazine (COMPAZINE) injection 2.5 mg, 2.5 mg, Intravenous, Q6H PRN, Fouzia Desai MD, 2.5 mg at 03/22/24 1235    sucralfate (CARAFATE) tablet 1 g, 1 g, Oral, 4x Daily AC & at Bedtime, Fouzia Desai MD, 1 g at 03/26/24 0918  Review of Systems:    Is weakness and fatigue all other systems reviewed and negative    Objective     Vital Signs  Temp:  [97.9 °F (36.6 °C)-99.3 °F (37.4 °C)] 99 °F (37.2 °C)  Heart Rate:  [70-85] 77  Resp:  [14-20] 16  BP: (122-148)/(55-84) 138/69  Body mass index is 16.04 kg/m².    Intake/Output Summary (Last 24 hours) at 3/26/2024 0933  Last data filed at 3/25/2024 2200  Gross per 24 hour   Intake 1219.37 ml   Output 350 ml   Net 869.37 ml     No intake/output data recorded.     Physical Exam:   General: patient awake, alert and cooperative   Eyes: Normal lids and lashes, no scleral icterus   Neck: supple, normal ROM   Skin: warm and dry, not jaundiced   Cardiovascular: regular rhythm and rate, no murmurs auscultated   Pulm: clear to auscultation bilaterally, regular and unlabored   Abdomen: soft, nontender, nondistended; normal bowel sounds   Extremities: no rash or edema   Psychiatric: Normal mood and behavior; memory intact     Results Review:     I reviewed the patient's new clinical  results.    Results from last 7 days   Lab Units 03/25/24  0431 03/24/24  0428 03/23/24  0516   WBC 10*3/mm3 15.45* 15.68* 12.55*   HEMOGLOBIN g/dL 7.3* 7.1* 8.1*   HEMATOCRIT % 21.4* 21.1* 23.7*   PLATELETS 10*3/mm3 229 212 240     Results from last 7 days   Lab Units 03/25/24  0431 03/24/24  0428 03/23/24  0516   SODIUM mmol/L 130* 132* 138   POTASSIUM mmol/L 3.9 3.8 4.1   CHLORIDE mmol/L 103 104 108*   CO2 mmol/L 19.2* 20.4* 20.0*   BUN mg/dL 83* 100* 148*   CREATININE mg/dL 1.44* 1.77* 2.32*   CALCIUM mg/dL 7.2* 7.1* 8.2*   BILIRUBIN mg/dL 0.3 0.2 0.2   ALK PHOS U/L 69 63 62   ALT (SGPT) U/L 12 13 10   AST (SGOT) U/L 16 16 13   GLUCOSE mg/dL 108* 133* 192*     Results from last 7 days   Lab Units 03/23/24  0516 03/21/24  1655 03/21/24  0805   INR  1.40* 1.48* 1.34*     Lab Results   Lab Value Date/Time    LIPASE 19 03/25/2024 0431       Radiology:  US Renal Bilateral   Final Result      CT Abdomen Pelvis Without Contrast   Final Result       1.  Acute displaced comminuted right hip periprosthetic acetabular   fracture, as described in detail above. Corresponding asymmetric   enlargement of the right iliacus muscle is most consistent with a   corresponding intramuscular hematoma.   2.  Moderate bilateral hydroureteronephrosis with diffuse high   attenuation throughout the renal collecting systems, which could be due   to residual intravenous contrast from recent contrast-enhanced   examination a few days ago in the setting of poor renal function.   However, given reported gross hematuria, blood products are not   excluded.    3.  Marked bladder distention despite presence of a Gamez catheter.   Correlate with Gamez catheter function.    4.  Diffuse bladder wall thickening and adjacent fat stranding raise   concern for possible cystitis. Correlate with urinalysis. Additionally,   given the presence of adjacent osseous trauma, bladder injury cannot be   entirely excluded.   5.  Relatively diffuse mesenteric fat  stranding, most pronounced in the   upper abdomen. Findings could reflect the patient's fluid status.   However, correlate with serum lipase given the more focal stranding   within the upper abdomen to exclude possible pancreatitis.       This report was finalized on 3/23/2024 5:23 PM by Dr. Myra Hernandez M.D   on Workstation: [id number]              Assessment & Plan     Active Hospital Problems    Diagnosis     **Severe anemia     Melena      Assessment:  Severe anemia  Grade D erosive esophagitis  Duodenal ulcer with active bleeding status post epinephrine injection and cauterization  MARNIE     Plan:  Continue PPI infusion.  Continue Carafate  Continue to monitor H&H-stable  If further evidence of active bleeding may request interventional radiology input  Lipase normal-no evidence of pancreatitis, CT scan false positive  GI okay with transfer out of the unit, we will follow    I discussed the patients findings and my recommendations with patient and nursing staff.    Juan Sánchez MD

## 2024-03-26 NOTE — SIGNIFICANT NOTE
03/26/24 1602   OTHER   Discipline physical therapist   Rehab Time/Intention   Session Not Performed unable to treat, medical status change  (pt with low Hgb 7.0 this afternoon, also still waiting on ortho recs regarding acetabular fracture, PT will follow up tomorrow)   Recommendation   PT - Next Appointment 03/27/24

## 2024-03-26 NOTE — PLAN OF CARE
Goal Outcome Evaluation:              Outcome Evaluation: No events overnight. Patient slept. Complaining of sore throat this AM.

## 2024-03-27 LAB
ABO GROUP BLD: NORMAL
ALBUMIN SERPL-MCNC: 2.3 G/DL (ref 3.5–5.2)
ANION GAP SERPL CALCULATED.3IONS-SCNC: 5.8 MMOL/L (ref 5–15)
BASOPHILS # BLD AUTO: 0.04 10*3/MM3 (ref 0–0.2)
BASOPHILS NFR BLD AUTO: 0.3 % (ref 0–1.5)
BLD GP AB SCN SERPL QL: NEGATIVE
BUN SERPL-MCNC: 69 MG/DL (ref 8–23)
BUN/CREAT SERPL: 52.3 (ref 7–25)
CALCIUM SPEC-SCNC: 7.1 MG/DL (ref 8.6–10.5)
CHLORIDE SERPL-SCNC: 101 MMOL/L (ref 98–107)
CO2 SERPL-SCNC: 19.2 MMOL/L (ref 22–29)
CREAT SERPL-MCNC: 1.32 MG/DL (ref 0.57–1)
DEPRECATED RDW RBC AUTO: 44.2 FL (ref 37–54)
EGFRCR SERPLBLD CKD-EPI 2021: 42.5 ML/MIN/1.73
EOSINOPHIL # BLD AUTO: 0.93 10*3/MM3 (ref 0–0.4)
EOSINOPHIL NFR BLD AUTO: 6.6 % (ref 0.3–6.2)
ERYTHROCYTE [DISTWIDTH] IN BLOOD BY AUTOMATED COUNT: 13.8 % (ref 12.3–15.4)
GLUCOSE BLDC GLUCOMTR-MCNC: 150 MG/DL (ref 70–130)
GLUCOSE BLDC GLUCOMTR-MCNC: 201 MG/DL (ref 70–130)
GLUCOSE BLDC GLUCOMTR-MCNC: 239 MG/DL (ref 70–130)
GLUCOSE BLDC GLUCOMTR-MCNC: 246 MG/DL (ref 70–130)
GLUCOSE SERPL-MCNC: 122 MG/DL (ref 65–99)
HCT VFR BLD AUTO: 19 % (ref 34–46.6)
HCT VFR BLD AUTO: 21.8 % (ref 34–46.6)
HCT VFR BLD AUTO: 23 % (ref 34–46.6)
HGB BLD-MCNC: 6.4 G/DL (ref 12–15.9)
HGB BLD-MCNC: 7.3 G/DL (ref 12–15.9)
HGB BLD-MCNC: 7.9 G/DL (ref 12–15.9)
IMM GRANULOCYTES # BLD AUTO: 0.22 10*3/MM3 (ref 0–0.05)
IMM GRANULOCYTES NFR BLD AUTO: 1.6 % (ref 0–0.5)
LYMPHOCYTES # BLD AUTO: 1.19 10*3/MM3 (ref 0.7–3.1)
LYMPHOCYTES NFR BLD AUTO: 8.4 % (ref 19.6–45.3)
MCH RBC QN AUTO: 30 PG (ref 26.6–33)
MCHC RBC AUTO-ENTMCNC: 33.7 G/DL (ref 31.5–35.7)
MCV RBC AUTO: 89.2 FL (ref 79–97)
MONOCYTES # BLD AUTO: 1.15 10*3/MM3 (ref 0.1–0.9)
MONOCYTES NFR BLD AUTO: 8.1 % (ref 5–12)
NEUTROPHILS NFR BLD AUTO: 10.62 10*3/MM3 (ref 1.7–7)
NEUTROPHILS NFR BLD AUTO: 75 % (ref 42.7–76)
NRBC BLD AUTO-RTO: 0 /100 WBC (ref 0–0.2)
OSMOLALITY UR: 332 MOSM/KG
PHOSPHATE SERPL-MCNC: 2.5 MG/DL (ref 2.5–4.5)
PLATELET # BLD AUTO: 261 10*3/MM3 (ref 140–450)
PMV BLD AUTO: 8.7 FL (ref 6–12)
POTASSIUM SERPL-SCNC: 4 MMOL/L (ref 3.5–5.2)
RBC # BLD AUTO: 2.13 10*6/MM3 (ref 3.77–5.28)
RH BLD: POSITIVE
SODIUM SERPL-SCNC: 126 MMOL/L (ref 136–145)
SODIUM UR-SCNC: 34 MMOL/L
T&S EXPIRATION DATE: NORMAL
URATE SERPL-MCNC: 4.9 MG/DL (ref 2.4–5.7)
WBC NRBC COR # BLD AUTO: 14.15 10*3/MM3 (ref 3.4–10.8)

## 2024-03-27 PROCEDURE — P9016 RBC LEUKOCYTES REDUCED: HCPCS

## 2024-03-27 PROCEDURE — 82948 REAGENT STRIP/BLOOD GLUCOSE: CPT

## 2024-03-27 PROCEDURE — 83935 ASSAY OF URINE OSMOLALITY: CPT | Performed by: INTERNAL MEDICINE

## 2024-03-27 PROCEDURE — 84550 ASSAY OF BLOOD/URIC ACID: CPT | Performed by: INTERNAL MEDICINE

## 2024-03-27 PROCEDURE — 94799 UNLISTED PULMONARY SVC/PX: CPT

## 2024-03-27 PROCEDURE — 85018 HEMOGLOBIN: CPT | Performed by: HOSPITALIST

## 2024-03-27 PROCEDURE — 86923 COMPATIBILITY TEST ELECTRIC: CPT

## 2024-03-27 PROCEDURE — 85025 COMPLETE CBC W/AUTO DIFF WBC: CPT | Performed by: INTERNAL MEDICINE

## 2024-03-27 PROCEDURE — 86900 BLOOD TYPING SEROLOGIC ABO: CPT | Performed by: HOSPITALIST

## 2024-03-27 PROCEDURE — 86901 BLOOD TYPING SEROLOGIC RH(D): CPT | Performed by: HOSPITALIST

## 2024-03-27 PROCEDURE — 86900 BLOOD TYPING SEROLOGIC ABO: CPT

## 2024-03-27 PROCEDURE — 94761 N-INVAS EAR/PLS OXIMETRY MLT: CPT

## 2024-03-27 PROCEDURE — 63710000001 INSULIN LISPRO (HUMAN) PER 5 UNITS: Performed by: INTERNAL MEDICINE

## 2024-03-27 PROCEDURE — 86850 RBC ANTIBODY SCREEN: CPT | Performed by: HOSPITALIST

## 2024-03-27 PROCEDURE — 99232 SBSQ HOSP IP/OBS MODERATE 35: CPT | Performed by: INTERNAL MEDICINE

## 2024-03-27 PROCEDURE — 84300 ASSAY OF URINE SODIUM: CPT | Performed by: INTERNAL MEDICINE

## 2024-03-27 PROCEDURE — 80069 RENAL FUNCTION PANEL: CPT | Performed by: INTERNAL MEDICINE

## 2024-03-27 PROCEDURE — 85014 HEMATOCRIT: CPT | Performed by: HOSPITALIST

## 2024-03-27 PROCEDURE — 36430 TRANSFUSION BLD/BLD COMPNT: CPT

## 2024-03-27 RX ADMIN — PANTOPRAZOLE SODIUM 40 MG: 40 INJECTION, POWDER, LYOPHILIZED, FOR SOLUTION INTRAVENOUS at 09:01

## 2024-03-27 RX ADMIN — INSULIN LISPRO 5 UNITS: 100 INJECTION, SOLUTION INTRAVENOUS; SUBCUTANEOUS at 21:30

## 2024-03-27 RX ADMIN — INSULIN LISPRO 5 UNITS: 100 INJECTION, SOLUTION INTRAVENOUS; SUBCUTANEOUS at 13:12

## 2024-03-27 RX ADMIN — Medication 1 APPLICATION: at 21:31

## 2024-03-27 RX ADMIN — SENNOSIDES AND DOCUSATE SODIUM 2 TABLET: 50; 8.6 TABLET ORAL at 08:58

## 2024-03-27 RX ADMIN — PANTOPRAZOLE SODIUM 40 MG: 40 INJECTION, POWDER, LYOPHILIZED, FOR SOLUTION INTRAVENOUS at 21:31

## 2024-03-27 RX ADMIN — Medication 1 APPLICATION: at 09:02

## 2024-03-27 RX ADMIN — INSULIN LISPRO 3 UNITS: 100 INJECTION, SOLUTION INTRAVENOUS; SUBCUTANEOUS at 08:57

## 2024-03-27 RX ADMIN — SUCRALFATE 1 G: 1 TABLET ORAL at 17:21

## 2024-03-27 RX ADMIN — Medication 1 APPLICATION: at 04:32

## 2024-03-27 RX ADMIN — SUCRALFATE 1 G: 1 TABLET ORAL at 13:12

## 2024-03-27 RX ADMIN — SUCRALFATE 1 G: 1 TABLET ORAL at 08:46

## 2024-03-27 RX ADMIN — INSULIN LISPRO 5 UNITS: 100 INJECTION, SOLUTION INTRAVENOUS; SUBCUTANEOUS at 17:20

## 2024-03-27 RX ADMIN — SUCRALFATE 1 G: 1 TABLET ORAL at 21:30

## 2024-03-27 RX ADMIN — SENNOSIDES AND DOCUSATE SODIUM 2 TABLET: 50; 8.6 TABLET ORAL at 21:30

## 2024-03-27 RX ADMIN — PHENOL 1 SPRAY: 1.5 LIQUID ORAL at 02:27

## 2024-03-27 RX ADMIN — AMLODIPINE BESYLATE 5 MG: 5 TABLET ORAL at 08:58

## 2024-03-27 NOTE — SIGNIFICANT NOTE
03/27/24 4533   OTHER   Discipline occupational therapist   Rehab Time/Intention   Session Not Performed unable to evaluate, medical status change  (Hgb 6.4, awaiting blood, continue to await ortho recs for acetabular fracture, OT will follow up next date)   Recommendation   OT - Next Appointment 03/28/24

## 2024-03-27 NOTE — SIGNIFICANT NOTE
03/27/24 1124   OTHER   Discipline physical therapist   Rehab Time/Intention   Session Not Performed unable to treat, medical status change  (drop in Hgb to 6.4, blood ordered, still awaiting decision regarding surgery for acetabular fracture, PT will follow up for pt status tomorrow)   Recommendation   PT - Next Appointment 03/28/24

## 2024-03-27 NOTE — PROGRESS NOTES
[facility] Progress Note      Patient Name: Arline Landaverde  : 1950  MRN: [id number]  Primary Care Physician:  Lanie Gomez APRN  Date of admission: 3/21/2024    Subjective     Interval History:   Follow-up acute kidney injury    Breathing is comfortable on room air; no chest pain; appetite poor  Drinking 5 to 6 glasses of water every day in addition to multiple Boost supplements  UOP not being recorded  Had BM earlier this morning, but she does not recall what color of the stools were  PRBCs ordered for today with hemoglobin down to 6.4        Review of Systems:   As noted above    Objective     Vitals:   Temp:  [98.3 °F (36.8 °C)-98.8 °F (37.1 °C)] 98.6 °F (37 °C)  Heart Rate:  [73-87] 86  Resp:  [16-22] 22  BP: (108-145)/(61-90) 142/71    Intake/Output Summary (Last 24 hours) at 3/27/2024 1121  Last data filed at 3/26/2024 1800  Gross per 24 hour   Intake 680 ml   Output --   Net 680 ml       Physical Exam:    General: awake, alert, chr ill, NAD; flat affect; seems reluctant to answer questions  Skin: warm and dry  HEENT: oral mucosa normal, nonicteric sclera  Neck: supple, no JVD  Lungs: Crackles in flanks, breathing not labored on RA   Heart: RR, tachycardic, no rub  Abdomen: soft, nontender, nondistended, BS +  : Gamez catheter anchored  Extremities: no edema, cyanosis or clubbing  Neuro: moving all extremities    Scheduled Meds:     amLODIPine, 5 mg, Oral, Q24H  insulin lispro, 3-14 Units, Subcutaneous, 4x Daily AC & at Bedtime  Menthol-Zinc Oxide, 1 Application, Topical, Q12H  pantoprazole, 40 mg, Intravenous, Q12H  senna-docusate sodium, 2 tablet, Oral, BID  sucralfate, 1 g, Oral, 4x Daily AC & at Bedtime      IV Meds:          Results Reviewed:   I have personally reviewed the results from the time of this admission to 3/27/2024 11:21 EDT     Results from last 7 days   Lab Units 24  0549 24  1141 24  0804 24  0431 24  0428  03/23/24  0516   SODIUM mmol/L 126* 125* 130* 130* 132* 138   POTASSIUM mmol/L 4.0 4.0 3.9 3.9 3.8 4.1   CHLORIDE mmol/L 101 100 105 103 104 108*   CO2 mmol/L 19.2* 20.2* 20.0* 19.2* 20.4* 20.0*   BUN mg/dL 69* 66* 66* 83* 100* 148*   CREATININE mg/dL 1.32* 1.29* 1.44* 1.44* 1.77* 2.32*   CALCIUM mg/dL 7.1* 7.3* 7.0* 7.2* 7.1* 8.2*   BILIRUBIN mg/dL  --   --   --  0.3 0.2 0.2   ALK PHOS U/L  --   --   --  69 63 62   ALT (SGPT) U/L  --   --   --  12 13 10   AST (SGOT) U/L  --   --   --  16 16 13   GLUCOSE mg/dL 122* 186* 111* 108* 133* 192*       Estimated Creatinine Clearance: 32.3 mL/min (A) (by C-G formula based on SCr of 1.32 mg/dL (H)).    Results from last 7 days   Lab Units 03/27/24  0549 03/26/24  0804 03/25/24 0431 03/24/24 0428 03/23/24  0516   MAGNESIUM mg/dL  --   --  2.4 2.6* 3.0*   PHOSPHORUS mg/dL 2.5 2.5 2.5 2.5 3.8       Results from last 7 days   Lab Units 03/25/24  0431 03/24/24  0428   URIC ACID mg/dL 5.3 6.2*       Results from last 7 days   Lab Units 03/27/24  0549 03/26/24  0805 03/25/24  0431 03/24/24  0428 03/23/24  0516   WBC 10*3/mm3 14.15* 13.15* 15.45* 15.68* 12.55*   HEMOGLOBIN g/dL 6.4* 7.0* 7.3* 7.1* 8.1*   PLATELETS 10*3/mm3 261 243 229 212 240       Results from last 7 days   Lab Units 03/23/24  0516 03/21/24  1655 03/21/24  0805   INR  1.40* 1.48* 1.34*       Assessment / Plan     ASSESSMENT:  MARNIE, nonoliguric, stabilizing and unchanged vs yesterday:  multifactorial related to urinary retention and hypotension/hypovolemia with acute blood loss.  Volume status stable.  Hyponatremia probably from polydipsia and poor solute intake, but acute blood loss will also trigger ADH release.  Likely NAGMA  Urinary retention, with chronic urinary retention suspected.  Urology following; currently has Gamez catheter   Anemia, worsening; GI bleed (erosive esophagitis and duodenal ulcer) and gross hematuria  History of CVA  Bilateral hydronephrosis, due to urinary retention. 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  Orthopedics following      PLAN:  PRBCs today  Urine osmolality, urine sodium, and serum uric acid  Add modest fluid restriction of 1200 mL/day; hopefully diet can be advanced to solid foods soon  Discussed with family member at bedside      I reviewed labs and other providers' notes.  Copied text in this note has been reviewed and is accurate as of 03/27/24.       Thank you for involving us in the care of Arline Landaverde.  Please feel free to call with any questions.    Danial Washington MD  03/27/24  11:21 EDT    [facility]  [phone]    Please note that portions of this note were completed with a voice recognition program.\

## 2024-03-27 NOTE — PROGRESS NOTES
[facility]  Inpatient Progress Note    Reason for Follow Up: GI bleed    Subjective     Interval History:   No bleeding in the last few days    Current Facility-Administered Medications:     acetaminophen (TYLENOL) tablet 650 mg, 650 mg, Oral, Q4H PRN **OR** acetaminophen (TYLENOL) suppository 650 mg, 650 mg, Rectal, Q4H PRN, Fouzia Desai MD    aluminum-magnesium hydroxide-simethicone (MAALOX MAX) 400-400-40 MG/5ML suspension 15 mL, 15 mL, Oral, Q6H PRN, Fouzia Desai MD    amLODIPine (NORVASC) tablet 5 mg, 5 mg, Oral, Q24H, Fouzia Desai MD, 5 mg at 03/27/24 0858    sennosides-docusate (PERICOLACE) 8.6-50 MG per tablet 2 tablet, 2 tablet, Oral, BID, 2 tablet at 03/27/24 0858 **AND** polyethylene glycol (MIRALAX) packet 17 g, 17 g, Oral, Daily PRN **AND** bisacodyl (DULCOLAX) EC tablet 5 mg, 5 mg, Oral, Daily PRN **AND** bisacodyl (DULCOLAX) suppository 10 mg, 10 mg, Rectal, Daily PRN, Fouzia Desai MD    Calcium Replacement - Follow Nurse / BPA Driven Protocol, , Does not apply, PRN, Fouzia Desai MD    dextrose (D50W) (25 g/50 mL) IV injection 25 g, 25 g, Intravenous, Q15 Min PRN, Fouzia Desai MD    dextrose (GLUTOSE) oral gel 15 g, 15 g, Oral, Q15 Min PRN, Fouzia Desai MD    glucagon (GLUCAGEN) injection 1 mg, 1 mg, Intramuscular, Q15 Min PRN, Fouzia Desai MD    insulin lispro (HUMALOG/ADMELOG) injection 3-14 Units, 3-14 Units, Subcutaneous, 4x Daily AC & at Bedtime, Fouzia Desai MD, 5 Units at 03/27/24 1312    Magnesium Standard Dose Replacement - Follow Nurse / BPA Driven Protocol, , Does not apply, PRN, Fouzia Desai MD    Menthol-Zinc Oxide 1 Application, 1 Application, Topical, Q12H, Fouzia Desai MD, 1 Application at 03/27/24 0902    nitroglycerin (NITROSTAT) SL tablet 0.4 mg, 0.4 mg, Sublingual, Q5 Min PRN, Fouzia Desai MD    ondansetron ODT (ZOFRAN-ODT) disintegrating tablet 4 mg, 4 mg, Oral, Q4H PRN, 4 mg at 03/22/24 0032 **OR** ondansetron (ZOFRAN)  injection 4 mg, 4 mg, Intravenous, Q4H PRN, Fouzia Desai MD    pantoprazole (PROTONIX) injection 40 mg, 40 mg, Intravenous, Q12H, Gissell Miranda MD, 40 mg at 03/27/24 0901    phenol (CHLORASEPTIC) 1.4 % liquid 1 spray, 1 spray, Mouth/Throat, Q2H PRN, Judi Bray APRN, 1 spray at 03/27/24 0227    Phosphorus Replacement - Follow Nurse / BPA Driven Protocol, , Does not apply, PRN, Fouzia Desai MD    Potassium Replacement - Follow Nurse / BPA Driven Protocol, , Does not apply, PRGiselle MARIN Tausif, MD    prochlorperazine (COMPAZINE) injection 2.5 mg, 2.5 mg, Intravenous, Q6H PRN, Fouzia Desai MD, 2.5 mg at 03/22/24 1235    sucralfate (CARAFATE) tablet 1 g, 1 g, Oral, 4x Daily AC & at Bedtime, Fouzia Desai MD, 1 g at 03/27/24 1312  Review of Systems:    There is weakness and fatigue all other systems reviewed and negative    Objective     Vital Signs  Temp:  [98.3 °F (36.8 °C)-98.8 °F (37.1 °C)] 98.6 °F (37 °C)  Heart Rate:  [73-93] 84  Resp:  [16-22] 20  BP: ()/(53-73) 127/53  Body mass index is 20.73 kg/m².    Intake/Output Summary (Last 24 hours) at 3/27/2024 1354  Last data filed at 3/26/2024 1800  Gross per 24 hour   Intake 430 ml   Output --   Net 430 ml     No intake/output data recorded.     Physical Exam:   General: patient awake, alert and cooperative   Eyes: Normal lids and lashes, no scleral icterus   Neck: supple, normal ROM   Skin: warm and dry, not jaundiced   Cardiovascular: regular rhythm and rate, no murmurs auscultated   Pulm: clear to auscultation bilaterally, regular and unlabored   Abdomen: soft, nontender, nondistended; normal bowel sounds   Extremities: no rash or edema   Psychiatric: Normal mood and behavior; memory intact     Results Review:     I reviewed the patient's new clinical results.    Results from last 7 days   Lab Units 03/27/24  0549 03/26/24  0805 03/25/24  0431   WBC 10*3/mm3 14.15* 13.15* 15.45*   HEMOGLOBIN g/dL 6.4* 7.0* 7.3*   HEMATOCRIT % 19.0* 20.4*  21.4*   PLATELETS 10*3/mm3 261 243 229     Results from last 7 days   Lab Units 03/27/24  0549 03/26/24  1141 03/26/24  0804 03/25/24  0431 03/24/24  0428 03/23/24  0516   SODIUM mmol/L 126* 125* 130* 130* 132* 138   POTASSIUM mmol/L 4.0 4.0 3.9 3.9 3.8 4.1   CHLORIDE mmol/L 101 100 105 103 104 108*   CO2 mmol/L 19.2* 20.2* 20.0* 19.2* 20.4* 20.0*   BUN mg/dL 69* 66* 66* 83* 100* 148*   CREATININE mg/dL 1.32* 1.29* 1.44* 1.44* 1.77* 2.32*   CALCIUM mg/dL 7.1* 7.3* 7.0* 7.2* 7.1* 8.2*   BILIRUBIN mg/dL  --   --   --  0.3 0.2 0.2   ALK PHOS U/L  --   --   --  69 63 62   ALT (SGPT) U/L  --   --   --  12 13 10   AST (SGOT) U/L  --   --   --  16 16 13   GLUCOSE mg/dL 122* 186* 111* 108* 133* 192*     Results from last 7 days   Lab Units 03/23/24  0516 03/21/24  1655 03/21/24  0805   INR  1.40* 1.48* 1.34*     Lab Results   Lab Value Date/Time    LIPASE 19 03/25/2024 0431       Radiology:  US Renal Bilateral   Final Result      CT Abdomen Pelvis Without Contrast   Final Result       1.  Acute displaced comminuted right hip periprosthetic acetabular   fracture, as described in detail above. Corresponding asymmetric   enlargement of the right iliacus muscle is most consistent with a   corresponding intramuscular hematoma.   2.  Moderate bilateral hydroureteronephrosis with diffuse high   attenuation throughout the renal collecting systems, which could be due   to residual intravenous contrast from recent contrast-enhanced   examination a few days ago in the setting of poor renal function.   However, given reported gross hematuria, blood products are not   excluded.    3.  Marked bladder distention despite presence of a Gamez catheter.   Correlate with Gamez catheter function.    4.  Diffuse bladder wall thickening and adjacent fat stranding raise   concern for possible cystitis. Correlate with urinalysis. Additionally,   given the presence of adjacent osseous trauma, bladder injury cannot be   entirely excluded.   5.   Relatively diffuse mesenteric fat stranding, most pronounced in the   upper abdomen. Findings could reflect the patient's fluid status.   However, correlate with serum lipase given the more focal stranding   within the upper abdomen to exclude possible pancreatitis.       This report was finalized on 3/23/2024 5:23 PM by Dr. Myra Hernandez M.D   on Workstation: [id number]              Assessment & Plan     Active Hospital Problems    Diagnosis     **Severe anemia     Duodenal ulcer     Periprosthetic fracture around internal prosthetic right hip joint     MARNIE (acute kidney injury)     CAD (coronary artery disease)     DM (diabetes mellitus), type 2     Melena     Hyponatremia     Heart failure with preserved ejection fraction, borderline, class II      Assessment:  Severe anemia  Grade D erosive esophagitis  Duodenal ulcer with active bleeding status post epinephrine injection and cauterization  MARNIE     Plan:  Continue PPI infusion.  Continue Carafate  Continue to monitor H&H-down a bit today, please transfuse 1 unit per primary team.  No evidence of active bleeding at this time.  If further evidence of active bleeding may request interventional radiology input  Lipase normal-no evidence of pancreatitis, CT scan false positive  GI okay with transfer out of the unit, we will follow     I discussed the patients findings and my recommendations with patient and nursing staff.    Juan Sánchez MD

## 2024-03-27 NOTE — PROGRESS NOTES
Name: Arline Landaverde ADMIT: 3/21/2024   : 1950  PCP: Lanie Gomez APRN    MRN: [id number] LOS: 6 days   AGE/SEX: 73 y.o. female  ROOM: [facility]     Subjective   Subjective   No new complaints.     Objective   Objective   Vital Signs  Temp:  [98.3 °F (36.8 °C)-98.8 °F (37.1 °C)] 98.6 °F (37 °C)  Heart Rate:  [78-93] 84  Resp:  [16-22] 20  BP: ()/(53-73) 127/53  SpO2:  [98 %-100 %] 100 %  on   ;   Device (Oxygen Therapy): room air  Body mass index is 20.73 kg/m².    Physical Exam  Constitutional:       General: She is not in acute distress.     Appearance: She is ill-appearing. She is not toxic-appearing.   Cardiovascular:      Rate and Rhythm: Normal rate and regular rhythm.   Pulmonary:      Breath sounds: Normal breath sounds. No wheezing or rhonchi.   Abdominal:      Palpations: Abdomen is soft.      Tenderness: There is no abdominal tenderness.   Musculoskeletal:         General: No swelling.   Skin:     General: Skin is warm and dry.     Results Review  I reviewed the patient's new clinical results.  Results from last 7 days   Lab Units 24  0549 24  0805 24  0431 24  0428   WBC 10*3/mm3 14.15* 13.15* 15.45* 15.68*   HEMOGLOBIN g/dL 6.4* 7.0* 7.3* 7.1*   PLATELETS 10*3/mm3 261 243 229 212     Results from last 7 days   Lab Units 24  0549 24  1141 24  0804 24  0431   SODIUM mmol/L 126* 125* 130* 130*   POTASSIUM mmol/L 4.0 4.0 3.9 3.9   CHLORIDE mmol/L 101 100 105 103   CO2 mmol/L 19.2* 20.2* 20.0* 19.2*   BUN mg/dL 69* 66* 66* 83*   CREATININE mg/dL 1.32* 1.29* 1.44* 1.44*   GLUCOSE mg/dL 122* 186* 111* 108*     Lab Results   Component Value Date    ANIONGAP 5.8 2024     Estimated Creatinine Clearance: 32.3 mL/min (A) (by C-G formula based on SCr of 1.32 mg/dL (H)).   Lab Results   Component Value Date    EGFR 42.5 (L) 2024     Results from last 7 days   Lab Units 24  0549 24  0804 24  0431 24  0429  03/23/24  0516 03/21/24  0805   ALBUMIN g/dL 2.3* 2.4* 2.2* 2.2* 2.0* 2.8*   BILIRUBIN mg/dL  --   --  0.3 0.2 0.2 <0.2   ALK PHOS U/L  --   --  69 63 62 68   AST (SGOT) U/L  --   --  16 16 13 11   ALT (SGPT) U/L  --   --  12 13 10 11     Results from last 7 days   Lab Units 03/27/24  0549 03/26/24  1141 03/26/24  0804 03/25/24  0431 03/24/24  0428 03/23/24  0516   CALCIUM mg/dL 7.1* 7.3* 7.0* 7.2* 7.1* 8.2*   ALBUMIN g/dL 2.3*  --  2.4* 2.2* 2.2* 2.0*   MAGNESIUM mg/dL  --   --   --  2.4 2.6* 3.0*   PHOSPHORUS mg/dL 2.5  --  2.5 2.5 2.5 3.8     Results from last 7 days   Lab Units 03/23/24  0516   PROCALCITONIN ng/mL 0.24     Hemoglobin A1C   Date/Time Value Ref Range Status   03/26/2024 1644 5.90 (H) 4.80 - 5.60 % Final     Glucose   Date/Time Value Ref Range Status   03/27/2024 1143 239 (H) 70 - 130 mg/dL Final   03/27/2024 0752 150 (H) 70 - 130 mg/dL Final   03/26/2024 2107 247 (H) 70 - 130 mg/dL Final   03/26/2024 1813 352 (H) 70 - 130 mg/dL Final   03/26/2024 1115 240 (H) 70 - 130 mg/dL Final   03/26/2024 0714 134 (H) 70 - 130 mg/dL Final   03/25/2024 2023 215 (H) 70 - 130 mg/dL Final       No radiology results for the last day    Scheduled Meds  amLODIPine, 5 mg, Oral, Q24H  insulin lispro, 3-14 Units, Subcutaneous, 4x Daily AC & at Bedtime  Menthol-Zinc Oxide, 1 Application, Topical, Q12H  pantoprazole, 40 mg, Intravenous, Q12H  senna-docusate sodium, 2 tablet, Oral, BID  sucralfate, 1 g, Oral, 4x Daily AC & at Bedtime    Continuous Infusions     PRN Meds    acetaminophen **OR** acetaminophen    aluminum-magnesium hydroxide-simethicone    senna-docusate sodium **AND** polyethylene glycol **AND** bisacodyl **AND** bisacodyl    Calcium Replacement - Follow Nurse / BPA Driven Protocol    dextrose    dextrose    glucagon (human recombinant)    Magnesium Standard Dose Replacement - Follow Nurse / BPA Driven Protocol    nitroglycerin    ondansetron ODT **OR** ondansetron    phenol    Phosphorus Replacement -  Follow Nurse / BPA Driven Protocol    Potassium Replacement - Follow Nurse / BPA Driven Protocol    prochlorperazine       Diet  Diet: Liquid, Diabetic, Fluid Restriction (240 mL/tray); Full Liquid; Consistent Carbohydrate; Other (Specify mL/day) (1200); Fluid Consistency: Thin (IDDSI 0)    I have personally reviewed:  [x]  Medications  [x]  Laboratory   []  Microbiology   []  Radiology   []  EKG/Telemetry   []  Cardiology/Vascular   []  Pathology   []  Records     Results for orders placed during the hospital encounter of 01/02/24    Adult Transthoracic Echo Limited W/ Cont if Necessary Per Protocol    Interpretation Summary    Left ventricular systolic function is normal. Calculated left ventricular EF = 66.3% Abnormal global longitudinal LV strain (GLS) = -16.5%. Left ventricle strain data was reviewed by the physician. Normal left ventricular cavity size noted. Left ventricular wall thickness is consistent with borderline concentric hypertrophy. Left ventricular diastolic function is consistent with (grade I) impaired relaxation. The myocardium is bright and speckled. Global longitudinal strain is only slightly abnormal, and the pattern is not consistent with amyloidosis.    There is a trivial pericardial effusion.        Assessment/Plan     Active Hospital Problems    Diagnosis  POA    **Severe anemia [D64.9]  Yes    Duodenal ulcer [K26.9]  Unknown    Periprosthetic fracture around internal prosthetic right hip joint [M97.01XA]  Not Applicable    MARNIE (acute kidney injury) [N17.9]  Unknown    CAD (coronary artery disease) [I25.10]  Unknown    DM (diabetes mellitus), type 2 [E11.9]  Unknown    Melena [K92.1]  Unknown    Hyponatremia [E87.1]  Yes    Heart failure with preserved ejection fraction, borderline, class II [I50.30]  Yes      Resolved Hospital Problems   No resolved problems to display.     73 y.o. female transferred from [facility] for anemia, hyperkalemia, hyponatremia. Presented there with  altered mental status/weakness, hematuria, dark stools.     Severe anemia  Grade D erosive esophagitis  Duodenal ulcer with active bleeding  Status post EGD epinephrine and cauterization 3/22/2024  PPI, Carafate  GI following  If further bleeding may need IR  GI feels pancreatitis on CT is false positive  Hemoglobin lower today transfusion ordered will monitor serially. Active bleeding not suspected    Acute kidney injury  Hyponatremia  Hyperkalemia  Nephrology following    Urinary retention  Hydronephrosis  Gamez catheter  Urology following-cystoscopy later    Right hip periprosthetic acetabular fracture  Ortho following-will require conversion to total hip arthroplasty and also fixation of acetabular fracture. High risk    DM2  A1c 5.30% 8 months ago, recheck 5.90%  Continue correctional insulin for now    CAD  History of chronic diastolic heart failure  History of stroke    DVT prophylaxis  SCDs    Discharge  TBD  Expected discharge date/ time has not been documented.    Discussed with patient and family    Harman Albarran MD  [facility]  03/27/24

## 2024-03-27 NOTE — PROGRESS NOTES
"      [facility]         Dr Fragoso Sayied   LOS: 6 days   Patient Care Team:  Lanie Gomez APRN as PCP - General (Family Medicine)  Uri Amor MD as Cardiologist (Cardiology)    Chief Complaint: Acute GI bleed with duodenal ulcer severe esophagitis multiple issues as listed below    Interval History: No signs of overt bleeding.  No overnight issues.  Tolerating p.o. diet well.    REVIEW OF SYSTEMS:   CARDIOVASCULAR: No chest pain, chest pressure or chest discomfort. No palpitations or edema.   RESPIRATORY: No shortness of breath, cough or sputum.   GASTROINTESTINAL: No anorexia, nausea, vomiting or diarrhea. No abdominal pain or blood.   HEMATOLOGIC: No bleeding or bruising.     Ventilator/Non-Invasive Ventilation Settings (From admission, onward)      None              Vital Signs  Temp:  [98 °F (36.7 °C)-98.8 °F (37.1 °C)] 98.6 °F (37 °C)  Heart Rate:  [73-87] 86  Resp:  [16-22] 22  BP: (108-145)/(60-90) 142/70    Intake/Output Summary (Last 24 hours) at 3/27/2024 0843  Last data filed at 3/26/2024 1800  Gross per 24 hour   Intake 680 ml   Output --   Net 680 ml     Flowsheet Rows      Flowsheet Row First Filed Value   Admission Height 162.6 cm (64.02\") Documented at 03/21/2024 1700   Admission Weight 54.4 kg (120 lb) Documented at 03/21/2024 1700            Physical Exam:  Patient is examined using the personal protective equipment as per guidelines from infection control for this particular patient as enacted.  Hand hygiene was performed before and after patient interaction.   General Appearance:    Alert, cooperative, in no acute distress.  Following simple commands  ENT Mallampati between 3 and 4 no nasal congestion  Neck midline trachea, no thyromegaly   Lungs:     Clear to auscultation, respirations regular, even and                  unlabored    Heart:    Regular rhythm and normal rate, normal S1 and S2, no            murmur, no gallop, no rub, no click   Chest Wall:    No abnormalities " observed   Abdomen:     Normal bowel sounds, no masses, no organomegaly, soft        nontender, nondistended, no guarding, no rebound                tenderness   Extremities:   Moves all extremities well, no edema, no cyanosis, no             redness  CNS no focal neurological deficits normal sensory exam  Skin no rashes no nodules  Musculoskeletal no cyanosis no clubbing normal range of motion     Results Review:        Results from last 7 days   Lab Units 03/27/24  0549 03/26/24  1141 03/26/24  0804   SODIUM mmol/L 126* 125* 130*   POTASSIUM mmol/L 4.0 4.0 3.9   CHLORIDE mmol/L 101 100 105   CO2 mmol/L 19.2* 20.2* 20.0*   BUN mg/dL 69* 66* 66*   CREATININE mg/dL 1.32* 1.29* 1.44*   GLUCOSE mg/dL 122* 186* 111*   CALCIUM mg/dL 7.1* 7.3* 7.0*     Results from last 7 days   Lab Units 03/21/24  1014 03/21/24  0805   HSTROP T ng/L 89* 107*     Results from last 7 days   Lab Units 03/27/24  0549 03/26/24  0805 03/25/24  0431   WBC 10*3/mm3 14.15* 13.15* 15.45*   HEMOGLOBIN g/dL 6.4* 7.0* 7.3*   HEMATOCRIT % 19.0* 20.4* 21.4*   PLATELETS 10*3/mm3 261 243 229     Results from last 7 days   Lab Units 03/23/24  0516 03/21/24  1655 03/21/24  0805   INR  1.40* 1.48* 1.34*   APTT seconds  --   --  25.3         Results from last 7 days   Lab Units 03/25/24  0431   MAGNESIUM mg/dL 2.4               I reviewed the patient's new clinical results.  I personally viewed and interpreted the patient's chest x-ray.        Medication Review:   amLODIPine, 5 mg, Oral, Q24H  insulin lispro, 3-14 Units, Subcutaneous, 4x Daily AC & at Bedtime  Menthol-Zinc Oxide, 1 Application, Topical, Q12H  pantoprazole, 40 mg, Intravenous, Q12H  senna-docusate sodium, 2 tablet, Oral, BID  sucralfate, 1 g, Oral, 4x Daily AC & at Bedtime               ASSESSMENT:   Acute altered mental status  Duodenal ulcer with a visible vessel  Severe esophagitis  Hydronephrosis  Right hip comminuted periprosthetic fracture  Acute kidney injury  Severe life-threatening  anemia  Hyponatremia  Hematuria  Hyperkalemia  Coronary artery disease  Previous stroke  Diabetes mellitus    PLAN:  Remains hemodynamically stable with drop in hemoglobin.  Transfuse 1 unit PRBC.  Continue to monitor closely  Patient transition to IV Protonix.  Lipase normal.  CT abdomen false positive for pancreatitis.   hydronephrosis have resolved on ultrasound kidney.  Good urine output.     Diet per GI tolerating well  Creatinine stable and improving nephrology following  Fluid and electrolyte management per nephrology  Ortho's recommendation noted.  Extensive surgery needed.  I discussed with patient and family they are agreeable.  Will notify orthopedic regarding patient and family's desire to proceed with surgery.  Blood glucose monitoring per ICU protocol  Patient on telemetry  Hospitalist currently following for medical management  No acute pulmonary or critical care issues.  We will sign off        Fouzia Desai MD  03/27/24  08:43 EDT

## 2024-03-27 NOTE — NURSING NOTE
Wound/Ostomy: We see the patient for follow-up of the existing lesion in the Coccyx area, upon assessment we did not see worsening of the lesion, the same wound care order is left.  Please re-consult for any additional needs.

## 2024-03-27 NOTE — PLAN OF CARE
Goal Outcome Evaluation:              Outcome Evaluation: No events or issues overnight. Patient slept through the night. No complaints of pain other than sore throat. No BMs.

## 2024-03-27 NOTE — PROGRESS NOTES
"Nutrition Services    Patient Name:  Arline Landaverde  YOB: 1950  MRN: [id number]  Admit Date:  3/21/2024  Assessment Date:  03/27/24    Summary: Follow up   Visited pt this am, Pt was sleeping but did speak to family member.   Weight today 120lb BMI 20.73.   Labs Na 126, glu 239, bun 69, cr 1.32  Po <75%  Was drinking lots of water, now is on 1200cc fluid restriction.  Skin: lesion to coccyx area    Plan/Recommendation  Will continue to encourage good po intake with all meals  Supplements BID ordered  RD to follow    CLINICAL NUTRITION ASSESSMENT      Reason for Assessment Follow-up Protocol     Diagnosis/Problem   Anemia, MARNIE, acetabular fracture    Medical/Surgical History Past Medical History:   Diagnosis Date    Acute CVA (cerebrovascular accident) 07/20/2023    Chronic hyponatremia     Classical migraine without intractable migraine 07/20/2023    Diabetes mellitus     Elevated BUN 12/19/2023    Expressive aphasia 07/20/2023    Former cigarette smoker     Grade II diastolic dysfunction     History of stroke 12/05/2023    Hypoalbuminemia 12/2023    SIADH (syndrome of inappropriate ADH production)     Stroke     Type 2 myocardial infarction 12/18/2023       Past Surgical History:   Procedure Laterality Date    BACK SURGERY      CHOLECYSTECTOMY      ENDOSCOPY N/A 3/22/2024    Procedure: ESOPHAGOGASTRODUODENOSCOPY AT BEDSIDE with epi injection, heater probe;  Surgeon: Gissell Miranda MD;  Location: [facility] ENDOSCOPY;  Service: Gastroenterology;  Laterality: N/A;  duodenal ulcer with visible vessel, errosive esophagitis    HIP HEMIARTHROPLASTY Right 4/19/2023    Procedure: HIP HEMIARTHROPLASTY ANTERIOR;  Surgeon: Kumar Bonilla MD;  Location: [facility] OR;  Service: Orthopedics;  Laterality: Right;        Anthropometrics        Current Height  Current Weight  BMI kg/m2 Height: 162.6 cm (64.02\")  Weight: 54.8 kg (120 lb 13 oz) (03/27/24 0505)  Body mass index is 20.73 kg/m².   Adjusted BMI (if " applicable)    BMI Category Underweight (18.4 or below)   Ideal Body Weight (IBW) 126   Usual Body Weight (UBW) 120's   Weight Trend Loss, Gain   Weight History Wt Readings from Last 30 Encounters:   03/27/24 0505 54.8 kg (120 lb 13 oz)   03/26/24 0600 42.4 kg (93 lb 7.6 oz)   03/25/24 0433 50.7 kg (111 lb 12.4 oz)   03/23/24 0520 52.8 kg (116 lb 6.5 oz)   03/22/24 0546 56.3 kg (124 lb 1.9 oz)   03/21/24 1700 54.4 kg (120 lb)   03/21/24 0850 54.8 kg (120 lb 12.8 oz)   03/11/24 0524 57.6 kg (127 lb)   01/11/24 0829 58 kg (127 lb 12.8 oz)   12/18/23 1517 55.6 kg (122 lb 9.6 oz)   12/10/23 1425 54.3 kg (119 lb 11.6 oz)   12/09/23 0608 54.3 kg (119 lb 12.8 oz)   12/08/23 0645 55.1 kg (121 lb 6.4 oz)   12/07/23 1542 54.3 kg (119 lb 12.8 oz)   12/07/23 0500 53 kg (116 lb 12.4 oz)   12/06/23 0825 52.6 kg (116 lb)   12/05/23 1141 52.7 kg (116 lb 1.6 oz)   08/07/23 0804 51.3 kg (113 lb)   07/20/23 0853 55.3 kg (122 lb)   07/18/23 1800 55.6 kg (122 lb 9.6 oz)   07/07/23 0836 47.2 kg (104 lb)   05/26/23 0831 47.2 kg (104 lb)   04/28/23 0829 47.2 kg (104 lb)   04/19/23 0443 47.3 kg (104 lb 3.2 oz)   04/19/23 0240 64.9 kg (143 lb)   03/08/23 1304 64.9 kg (143 lb)   04/26/22 1529 64.9 kg (143 lb)   04/13/22 1019 63.5 kg (140 lb)   06/26/14 1559 72.7 kg (160 lb 4 oz)   06/10/14 1429 72.6 kg (160 lb 0.2 oz)      --  Labs       Pertinent Labs    Results from last 7 days   Lab Units 03/27/24  0549 03/26/24  1141 03/26/24  0804 03/25/24  0431 03/24/24  0428 03/23/24  0516   SODIUM mmol/L 126* 125* 130* 130* 132* 138   POTASSIUM mmol/L 4.0 4.0 3.9 3.9 3.8 4.1   CHLORIDE mmol/L 101 100 105 103 104 108*   CO2 mmol/L 19.2* 20.2* 20.0* 19.2* 20.4* 20.0*   BUN mg/dL 69* 66* 66* 83* 100* 148*   CREATININE mg/dL 1.32* 1.29* 1.44* 1.44* 1.77* 2.32*   CALCIUM mg/dL 7.1* 7.3* 7.0* 7.2* 7.1* 8.2*   BILIRUBIN mg/dL  --   --   --  0.3 0.2 0.2   ALK PHOS U/L  --   --   --  69 63 62   ALT (SGPT) U/L  --   --   --  12 13 10   AST (SGOT) U/L  --   --    --  16 16 13   GLUCOSE mg/dL 122* 186* 111* 108* 133* 192*     Results from last 7 days   Lab Units 03/27/24  0549 03/26/24  0804 03/25/24  0431 03/24/24  0428 03/23/24  0516   MAGNESIUM mg/dL  --   --  2.4 2.6* 3.0*   PHOSPHORUS mg/dL 2.5   < > 2.5 2.5 3.8   HEMOGLOBIN g/dL 6.4*   < > 7.3* 7.1* 8.1*   HEMATOCRIT % 19.0*   < > 21.4* 21.1* 23.7*   WBC 10*3/mm3 14.15*   < > 15.45* 15.68* 12.55*   ALBUMIN g/dL 2.3*   < > 2.2* 2.2* 2.0*    < > = values in this interval not displayed.     Results from last 7 days   Lab Units 03/27/24  0549 03/26/24  0805 03/25/24  0431 03/24/24  0428 03/23/24  0516 03/22/24  0616 03/21/24  1655 03/21/24  1616 03/21/24  0805   INR   --   --   --   --  1.40*  --  1.48*  --  1.34*   APTT seconds  --   --   --   --   --   --   --   --  25.3   PLATELETS 10*3/mm3 261 243 229 212 240   < >  --    < > 465*    < > = values in this interval not displayed.     COVID19   Date Value Ref Range Status   12/05/2023 Not Detected Not Detected - Ref. Range Final     Lab Results   Component Value Date    HGBA1C 5.90 (H) 03/26/2024          Medications           Scheduled Medications amLODIPine, 5 mg, Oral, Q24H  insulin lispro, 3-14 Units, Subcutaneous, 4x Daily AC & at Bedtime  Menthol-Zinc Oxide, 1 Application, Topical, Q12H  pantoprazole, 40 mg, Intravenous, Q12H  senna-docusate sodium, 2 tablet, Oral, BID  sucralfate, 1 g, Oral, 4x Daily AC & at Bedtime       Infusions       PRN Medications   acetaminophen **OR** acetaminophen    aluminum-magnesium hydroxide-simethicone    senna-docusate sodium **AND** polyethylene glycol **AND** bisacodyl **AND** bisacodyl    Calcium Replacement - Follow Nurse / BPA Driven Protocol    dextrose    dextrose    glucagon (human recombinant)    Magnesium Standard Dose Replacement - Follow Nurse / BPA Driven Protocol    nitroglycerin    ondansetron ODT **OR** ondansetron    phenol    Phosphorus Replacement - Follow Nurse / BPA Driven Protocol    Potassium Replacement -  Follow Nurse / BPA Driven Protocol    prochlorperazine     Physical Findings          General Findings confused, disoriented   Oral/Mouth Cavity tooth or teeth missing   Edema  1+ (trace)   Gastrointestinal fecal incontinence, last bowel movement: 3/24   Skin  pressure injury: stage 2 coccyx lesion   Tubes/Drains/Lines none   NFPE See Malnutrition Severity Assessment, Date Completed: 3/26   --  Malnutrition Severity Assessment      Patient meets criteria for : Moderate (non-severe) Malnutrition           Estimated/Assessed Needs        Current Weight  Weight: 54.8 kg (120 lb 13 oz) (03/27/24 0505)       Energy Requirements    Weight for Calculation 42.4 kg   Method for Estimation  35 kcal/kg   EST Needs (kcal/day) 1484       Protein Requirements    Weight for Calculation 42.4 kg   EST Protein Needs (g/kg) 1.2 - 1.5 gm/kg   EST Daily Needs (g/day) 50-63       Fluid Requirements     Method for Estimation 1 mL/kcal    EST Needs (mL/day)      Current Nutrition Orders & Evaluation of Intake       Oral Nutrition     Food Allergies NKFA   Current PO Diet Diet: Liquid, Diabetic, Fluid Restriction (240 mL/tray); Full Liquid; Consistent Carbohydrate; Other (Specify mL/day) (1200); Fluid Consistency: Thin (IDDSI 0)   Supplement Boost Glucose Control   PO Evaluation     % PO Intake <75%     Factors Affecting Intake: altered GI function, decreased appetite, early satiety    --  PES STATEMENT / NUTRITION DIAGNOSIS      Nutrition Dx Problem  Problem: Malnutrition (moderate), Altered GI Function, and Inadequate Oral Intake  Etiology: Medical Diagnosis - anemia    Signs/Symptoms: PO intake, NFPE Results, BMI, and Report/Observation     NUTRITION INTERVENTION / PLAN OF CARE      Intervention Goal(s) Maintain nutrition status, Reduce/improve symptoms, Meet estimated needs, Disease management/therapy, Tolerate PO , Increase intake, Advance diet, Appropriate weight gain, and PO intake goal %: 75+         RD Intervention/Action Interview  for preferences, Encourage intake, Continue to monitor, Care plan reviewed, and Recommend/order: ONS   --      Prescription/Orders:       PO Diet       Supplements Boost/Ensure--chocolate      Enteral Nutrition       Parenteral Nutrition    New Prescription Ordered? Yes   --      Monitor/Evaluation Per protocol   Discharge Plan/Needs Pending clinical course   --    RD to follow per protocol.      Electronically signed by:  Nat Jha RD  03/27/24 11:52 EDT

## 2024-03-27 NOTE — PROGRESS NOTES
The patient has been transferred back to the ICU after decreased hemoglobin.    I did receive a call regarding the patient's family's request that they would like to consider surgery.    I have requested to speak to the intensivist.  She is likely to require a CT arthrogram and possible embolization if there is a bleeding source into the pelvis.    She will require a conversion to a total hip arthroplasty and also fixation of her acetabular fracture.  This is a significant surgery associated with high risk for morbidity and mortality.  We will make appropriate decisions with the patient and family after discussion with the team.

## 2024-03-28 PROBLEM — E44.0 MODERATE MALNUTRITION: Status: ACTIVE | Noted: 2024-03-28

## 2024-03-28 LAB
ALBUMIN SERPL-MCNC: 2 G/DL (ref 3.5–5.2)
ANION GAP SERPL CALCULATED.3IONS-SCNC: 7.8 MMOL/L (ref 5–15)
BASOPHILS # BLD AUTO: 0.04 10*3/MM3 (ref 0–0.2)
BASOPHILS NFR BLD AUTO: 0.4 % (ref 0–1.5)
BH BB BLOOD EXPIRATION DATE: NORMAL
BH BB BLOOD TYPE BARCODE: 6200
BH BB DISPENSE STATUS: NORMAL
BH BB PRODUCT CODE: NORMAL
BH BB UNIT NUMBER: NORMAL
BUN SERPL-MCNC: 64 MG/DL (ref 8–23)
BUN/CREAT SERPL: 51.6 (ref 7–25)
CALCIUM SPEC-SCNC: 7.3 MG/DL (ref 8.6–10.5)
CHLORIDE SERPL-SCNC: 102 MMOL/L (ref 98–107)
CO2 SERPL-SCNC: 17.2 MMOL/L (ref 22–29)
CREAT SERPL-MCNC: 1.24 MG/DL (ref 0.57–1)
CROSSMATCH INTERPRETATION: NORMAL
DEPRECATED RDW RBC AUTO: 43.8 FL (ref 37–54)
EGFRCR SERPLBLD CKD-EPI 2021: 45.8 ML/MIN/1.73
EOSINOPHIL # BLD AUTO: 0.78 10*3/MM3 (ref 0–0.4)
EOSINOPHIL NFR BLD AUTO: 7.1 % (ref 0.3–6.2)
ERYTHROCYTE [DISTWIDTH] IN BLOOD BY AUTOMATED COUNT: 14.2 % (ref 12.3–15.4)
GLUCOSE BLDC GLUCOMTR-MCNC: 118 MG/DL (ref 70–130)
GLUCOSE BLDC GLUCOMTR-MCNC: 163 MG/DL (ref 70–130)
GLUCOSE BLDC GLUCOMTR-MCNC: 286 MG/DL (ref 70–130)
GLUCOSE SERPL-MCNC: 140 MG/DL (ref 65–99)
HCT VFR BLD AUTO: 22.7 % (ref 34–46.6)
HCT VFR BLD AUTO: 22.9 % (ref 34–46.6)
HGB BLD-MCNC: 7.6 G/DL (ref 12–15.9)
HGB BLD-MCNC: 7.8 G/DL (ref 12–15.9)
IMM GRANULOCYTES # BLD AUTO: 0.07 10*3/MM3 (ref 0–0.05)
IMM GRANULOCYTES NFR BLD AUTO: 0.6 % (ref 0–0.5)
LYMPHOCYTES # BLD AUTO: 1.15 10*3/MM3 (ref 0.7–3.1)
LYMPHOCYTES NFR BLD AUTO: 10.5 % (ref 19.6–45.3)
MCH RBC QN AUTO: 29.1 PG (ref 26.6–33)
MCHC RBC AUTO-ENTMCNC: 33.5 G/DL (ref 31.5–35.7)
MCV RBC AUTO: 87 FL (ref 79–97)
MONOCYTES # BLD AUTO: 1.01 10*3/MM3 (ref 0.1–0.9)
MONOCYTES NFR BLD AUTO: 9.2 % (ref 5–12)
NEUTROPHILS NFR BLD AUTO: 7.93 10*3/MM3 (ref 1.7–7)
NEUTROPHILS NFR BLD AUTO: 72.2 % (ref 42.7–76)
NRBC BLD AUTO-RTO: 0 /100 WBC (ref 0–0.2)
PHOSPHATE SERPL-MCNC: 2.3 MG/DL (ref 2.5–4.5)
PLATELET # BLD AUTO: 271 10*3/MM3 (ref 140–450)
PMV BLD AUTO: 8.6 FL (ref 6–12)
POTASSIUM SERPL-SCNC: 4.2 MMOL/L (ref 3.5–5.2)
RBC # BLD AUTO: 2.61 10*6/MM3 (ref 3.77–5.28)
SODIUM SERPL-SCNC: 127 MMOL/L (ref 136–145)
UNIT  ABO: NORMAL
UNIT  RH: NORMAL
WBC NRBC COR # BLD AUTO: 10.98 10*3/MM3 (ref 3.4–10.8)

## 2024-03-28 PROCEDURE — 99232 SBSQ HOSP IP/OBS MODERATE 35: CPT | Performed by: INTERNAL MEDICINE

## 2024-03-28 PROCEDURE — 82948 REAGENT STRIP/BLOOD GLUCOSE: CPT

## 2024-03-28 PROCEDURE — 85018 HEMOGLOBIN: CPT | Performed by: HOSPITALIST

## 2024-03-28 PROCEDURE — 63710000001 INSULIN LISPRO (HUMAN) PER 5 UNITS: Performed by: INTERNAL MEDICINE

## 2024-03-28 PROCEDURE — 80069 RENAL FUNCTION PANEL: CPT | Performed by: INTERNAL MEDICINE

## 2024-03-28 PROCEDURE — 25810000003 SODIUM CHLORIDE 0.9 % SOLUTION: Performed by: INTERNAL MEDICINE

## 2024-03-28 PROCEDURE — 85025 COMPLETE CBC W/AUTO DIFF WBC: CPT | Performed by: INTERNAL MEDICINE

## 2024-03-28 PROCEDURE — 85014 HEMATOCRIT: CPT | Performed by: HOSPITALIST

## 2024-03-28 RX ADMIN — PANTOPRAZOLE SODIUM 40 MG: 40 INJECTION, POWDER, LYOPHILIZED, FOR SOLUTION INTRAVENOUS at 21:24

## 2024-03-28 RX ADMIN — INSULIN LISPRO 8 UNITS: 100 INJECTION, SOLUTION INTRAVENOUS; SUBCUTANEOUS at 11:46

## 2024-03-28 RX ADMIN — SUCRALFATE 1 G: 1 TABLET ORAL at 17:51

## 2024-03-28 RX ADMIN — Medication 1 APPLICATION: at 09:34

## 2024-03-28 RX ADMIN — SUCRALFATE 1 G: 1 TABLET ORAL at 11:46

## 2024-03-28 RX ADMIN — SUCRALFATE 1 G: 1 TABLET ORAL at 09:19

## 2024-03-28 RX ADMIN — SUCRALFATE 1 G: 1 TABLET ORAL at 21:24

## 2024-03-28 RX ADMIN — SODIUM PHOSPHATE, MONOBASIC, MONOHYDRATE AND SODIUM PHOSPHATE, DIBASIC, ANHYDROUS 15 MMOL: 142; 276 INJECTION, SOLUTION INTRAVENOUS at 13:06

## 2024-03-28 RX ADMIN — AMLODIPINE BESYLATE 5 MG: 5 TABLET ORAL at 09:19

## 2024-03-28 RX ADMIN — SENNOSIDES AND DOCUSATE SODIUM 2 TABLET: 50; 8.6 TABLET ORAL at 09:18

## 2024-03-28 RX ADMIN — Medication 15 G: at 21:24

## 2024-03-28 RX ADMIN — INSULIN LISPRO 3 UNITS: 100 INJECTION, SOLUTION INTRAVENOUS; SUBCUTANEOUS at 19:04

## 2024-03-28 RX ADMIN — PANTOPRAZOLE SODIUM 40 MG: 40 INJECTION, POWDER, LYOPHILIZED, FOR SOLUTION INTRAVENOUS at 09:19

## 2024-03-28 RX ADMIN — Medication 1 APPLICATION: at 21:25

## 2024-03-28 RX ADMIN — Medication 15 G: at 13:06

## 2024-03-28 NOTE — SIGNIFICANT NOTE
03/28/24 0946   OTHER   Discipline occupational therapist   Rehab Time/Intention   Session Not Performed other (see comments)  (Per ortho, plans possible high risk surgery for hip fracture. Will sign off and await new orders when medically appropriate.)

## 2024-03-28 NOTE — PROGRESS NOTES
[facility] Progress Note      Patient Name: Arline Landaverde  : 1950  MRN: [id number]  Primary Care Physician:  Lanie Gomez APRN  Date of admission: 3/21/2024    Subjective     Interval History:   Follow-up acute kidney injury  Tarry stools continues; more PRBCs given yesterday  No abdominal pain; appetite still mediocre; on liquid diet      Review of Systems:   As noted above    Objective     Vitals:   Temp:  [97.9 °F (36.6 °C)-99.7 °F (37.6 °C)] 99.7 °F (37.6 °C)  Heart Rate:  [79-89] 84  Resp:  [20-23] 23  BP: (123-153)/() 147/63    Intake/Output Summary (Last 24 hours) at 3/28/2024 1048  Last data filed at 3/28/2024 0001  Gross per 24 hour   Intake 486.25 ml   Output 850 ml   Net -363.75 ml       Physical Exam:    General: awake, alert, chr ill, NAD; flat affect; seems reluctant to answer questions  Skin: warm and dry  HEENT: oral mucosa normal, nonicteric sclera  Neck: supple, no JVD  Lungs: Crackles in flanks, breathing not labored on RA   Heart: RRR, no rub  Abdomen: soft, nontender, nondistended, BS +  : Gamez catheter anchored  Extremities: no edema, cyanosis or clubbing  Neuro: moving all extremities    Scheduled Meds:     amLODIPine, 5 mg, Oral, Q24H  insulin lispro, 3-14 Units, Subcutaneous, 4x Daily AC & at Bedtime  Menthol-Zinc Oxide, 1 Application, Topical, Q12H  pantoprazole, 40 mg, Intravenous, Q12H  senna-docusate sodium, 2 tablet, Oral, BID  sucralfate, 1 g, Oral, 4x Daily AC & at Bedtime      IV Meds:          Results Reviewed:   I have personally reviewed the results from the time of this admission to 3/28/2024 10:48 EDT     Results from last 7 days   Lab Units 24  0618 24  0549 24  1141 24  0804 24  0431 24  0428 24  0516   SODIUM mmol/L 127* 126* 125*   < > 130* 132* 138   POTASSIUM mmol/L 4.2 4.0 4.0   < > 3.9 3.8 4.1   CHLORIDE mmol/L 102 101 100   < > 103 104 108*   CO2 mmol/L 17.2* 19.2* 20.2*   < >  19.2* 20.4* 20.0*   BUN mg/dL 64* 69* 66*   < > 83* 100* 148*   CREATININE mg/dL 1.24* 1.32* 1.29*   < > 1.44* 1.77* 2.32*   CALCIUM mg/dL 7.3* 7.1* 7.3*   < > 7.2* 7.1* 8.2*   BILIRUBIN mg/dL  --   --   --   --  0.3 0.2 0.2   ALK PHOS U/L  --   --   --   --  69 63 62   ALT (SGPT) U/L  --   --   --   --  12 13 10   AST (SGOT) U/L  --   --   --   --  16 16 13   GLUCOSE mg/dL 140* 122* 186*   < > 108* 133* 192*    < > = values in this interval not displayed.       Estimated Creatinine Clearance: 34.1 mL/min (A) (by C-G formula based on SCr of 1.24 mg/dL (H)).    Results from last 7 days   Lab Units 03/28/24  0618 03/27/24  0549 03/26/24  0804 03/25/24  0431 03/24/24  0428 03/23/24  0516   MAGNESIUM mg/dL  --   --   --  2.4 2.6* 3.0*   PHOSPHORUS mg/dL 2.3* 2.5 2.5 2.5 2.5 3.8       Results from last 7 days   Lab Units 03/27/24  0549 03/25/24  0431 03/24/24  0428   URIC ACID mg/dL 4.9 5.3 6.2*       Results from last 7 days   Lab Units 03/28/24  0618 03/27/24  2249 03/27/24  1624 03/27/24  0549 03/26/24  0805 03/25/24  0431 03/24/24  0428   WBC 10*3/mm3 10.98*  --   --  14.15* 13.15* 15.45* 15.68*   HEMOGLOBIN g/dL 7.6* 7.3* 7.9* 6.4* 7.0* 7.3* 7.1*   PLATELETS 10*3/mm3 271  --   --  261 243 229 212       Results from last 7 days   Lab Units 03/23/24  0516 03/21/24  1655   INR  1.40* 1.48*       Assessment / Plan     ASSESSMENT:  MARNIE, nonoliguric, stabilizing and unchanged vs yesterday:  multifactorial related to urinary retention and hypotension/hypovolemia with acute blood loss.  Volume status stable.  Hyponatremia probably from polydipsia and poor solute intake, but acute blood loss will also trigger ADH release.  Likely NAGMA; low phosphorus from poor nutrition  Urinary retention, with chronic urinary retention suspected.  Urology following; currently has Gamez catheter   Anemia, worsening; GI bleed (erosive esophagitis and duodenal ulcer) and gross hematuria  History of CVA  Bilateral hydronephrosis, due to  urinary retention. 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  Orthopedics following      PLAN:  Sodium phosphate IV  Add UreNa  Continue fluid restriction of 1200 mL/day; hopefully diet can be advanced to solid foods soon      I reviewed labs and other providers' notes.  Copied text in this note has been reviewed and is accurate as of 03/28/24.       Thank you for involving us in the care of Arline Landaverde.  Please feel free to call with any questions.    Danial Washington MD  03/28/24  10:48 EDT    [facility]  [phone]    Please note that portions of this note were completed with a voice recognition program.\

## 2024-03-28 NOTE — PLAN OF CARE
Goal Outcome Evaluation:              Outcome Evaluation: VSS, room air. No complaint of pain overnight.  HGB stable following 1u PRBC previous shift, awaiting recheck now.  Very large BM overnight, tarry with foul odor.  Good output to indwelling cath, no indication of hematuria.  No complaint of pain. Plan for possible hip revision when stable.  No changes to plan of care overnight.  No discharge plan in place at this time.

## 2024-03-28 NOTE — PLAN OF CARE
Goal Outcome Evaluation:  Plan of Care Reviewed With: patient              Replaced pt's phosphorus. Pt is NPO as of 0000 for possible rescope on 3/29/24. Gamez was removed.

## 2024-03-28 NOTE — SIGNIFICANT NOTE
03/28/24 1438   OTHER   Discipline physical therapist   Therapy Assessment/Plan (PT)   Criteria for Skilled Interventions Met (PT) does not meet criteria for skilled intervention  (Spoke with MINESH Lau- in agreement to complete PT orders and await new Orthopedic PT orders when patient is medically appropriate.)

## 2024-03-28 NOTE — PROGRESS NOTES
[facility]  Inpatient Progress Note    Reason for Follow Up: GI bleed duodenal ulcer    Subjective     Interval History:   Melena returned    Current Facility-Administered Medications:     acetaminophen (TYLENOL) tablet 650 mg, 650 mg, Oral, Q4H PRN **OR** acetaminophen (TYLENOL) suppository 650 mg, 650 mg, Rectal, Q4H PRN, Fouzia Desai MD    aluminum-magnesium hydroxide-simethicone (MAALOX MAX) 400-400-40 MG/5ML suspension 15 mL, 15 mL, Oral, Q6H PRN, Fouzia Desai MD    amLODIPine (NORVASC) tablet 5 mg, 5 mg, Oral, Q24H, Fouzia Desai MD, 5 mg at 03/28/24 0919    sennosides-docusate (PERICOLACE) 8.6-50 MG per tablet 2 tablet, 2 tablet, Oral, BID, 2 tablet at 03/28/24 0918 **AND** polyethylene glycol (MIRALAX) packet 17 g, 17 g, Oral, Daily PRN **AND** bisacodyl (DULCOLAX) EC tablet 5 mg, 5 mg, Oral, Daily PRN **AND** bisacodyl (DULCOLAX) suppository 10 mg, 10 mg, Rectal, Daily PRN, Fouzia Desai MD    Calcium Replacement - Follow Nurse / BPA Driven Protocol, , Does not apply, PRN, Fouzia Desai MD    dextrose (D50W) (25 g/50 mL) IV injection 25 g, 25 g, Intravenous, Q15 Min PRN, Fouzia Desai MD    dextrose (GLUTOSE) oral gel 15 g, 15 g, Oral, Q15 Min PRN, Fouzia Desai MD    glucagon (GLUCAGEN) injection 1 mg, 1 mg, Intramuscular, Q15 Min PRN, Fouzia Desai MD    insulin lispro (HUMALOG/ADMELOG) injection 3-14 Units, 3-14 Units, Subcutaneous, 4x Daily AC & at Bedtime, Fouzia Desai MD, 8 Units at 03/28/24 1146    Magnesium Standard Dose Replacement - Follow Nurse / BPA Driven Protocol, , Does not apply, PRN, Fouzia Desai MD    Menthol-Zinc Oxide 1 Application, 1 Application, Topical, Q12H, Fouzia Desai MD, 1 Application at 03/28/24 0934    nitroglycerin (NITROSTAT) SL tablet 0.4 mg, 0.4 mg, Sublingual, Q5 Min PRN, Fouzia Desai MD    ondansetron ODT (ZOFRAN-ODT) disintegrating tablet 4 mg, 4 mg, Oral, Q4H PRN, 4 mg at 03/22/24 0032 **OR** ondansetron (ZOFRAN)  injection 4 mg, 4 mg, Intravenous, Q4H PRN, Fouzia Desai MD    pantoprazole (PROTONIX) injection 40 mg, 40 mg, Intravenous, Q12H, Gissell Miranda MD, 40 mg at 03/28/24 0919    phenol (CHLORASEPTIC) 1.4 % liquid 1 spray, 1 spray, Mouth/Throat, Q2H PRN, Judi Bray APRN, 1 spray at 03/27/24 0227    Phosphorus Replacement - Follow Nurse / BPA Driven Protocol, , Does not apply, PRN, Fouzia Desai MD    Potassium Replacement - Follow Nurse / BPA Driven Protocol, , Does not apply, PRGiselle MARIN Tausif, MD    prochlorperazine (COMPAZINE) injection 2.5 mg, 2.5 mg, Intravenous, Q6H PRN, Fouzia Desai MD, 2.5 mg at 03/22/24 1235    sodium phosphates 15 mmol in 250 mL 0.9% sodium chloride IVPB, 15 mmol, Intravenous, Once, Danial Washington MD, 15 mmol at 03/28/24 1306    sucralfate (CARAFATE) tablet 1 g, 1 g, Oral, 4x Daily AC & at Bedtime, Fouzia Desai MD, 1 g at 03/28/24 1146    Urea (URE-NA) packet 15 g, 15 g, Oral, BID, Danial Washington MD, 15 g at 03/28/24 1306  Review of Systems:    There is weakness of fatigue all other systems reviewed and    Objective     Vital Signs  Temp:  [97.9 °F (36.6 °C)-99.7 °F (37.6 °C)] 99.7 °F (37.6 °C)  Heart Rate:  [79-89] 84  Resp:  [20-23] 23  BP: (135-153)/() 150/64  Body mass index is 20.5 kg/m².    Intake/Output Summary (Last 24 hours) at 3/28/2024 1314  Last data filed at 3/28/2024 0800  Gross per 24 hour   Intake 586.25 ml   Output 850 ml   Net -263.75 ml     I/O this shift:  In: 100 [P.O.:100]  Out: -      Physical Exam:   General: patient awake, alert and cooperative   Eyes: Normal lids and lashes, no scleral icterus   Neck: supple, normal ROM   Skin: warm and dry, not jaundiced   Cardiovascular: regular rhythm and rate, no murmurs auscultated   Pulm: clear to auscultation bilaterally, regular and unlabored   Abdomen: soft, nontender, nondistended; normal bowel sounds   Extremities: no rash or edema   Psychiatric: Normal mood and behavior;  memory intact     Results Review:     I reviewed the patient's new clinical results.    Results from last 7 days   Lab Units 03/28/24  0618 03/27/24  2249 03/27/24  1624 03/27/24  0549 03/26/24  0805   WBC 10*3/mm3 10.98*  --   --  14.15* 13.15*   HEMOGLOBIN g/dL 7.6* 7.3* 7.9* 6.4* 7.0*   HEMATOCRIT % 22.7* 21.8* 23.0* 19.0* 20.4*   PLATELETS 10*3/mm3 271  --   --  261 243     Results from last 7 days   Lab Units 03/28/24  0618 03/27/24  0549 03/26/24  1141 03/26/24  0804 03/25/24  0431 03/24/24  0428 03/23/24  0516   SODIUM mmol/L 127* 126* 125*   < > 130* 132* 138   POTASSIUM mmol/L 4.2 4.0 4.0   < > 3.9 3.8 4.1   CHLORIDE mmol/L 102 101 100   < > 103 104 108*   CO2 mmol/L 17.2* 19.2* 20.2*   < > 19.2* 20.4* 20.0*   BUN mg/dL 64* 69* 66*   < > 83* 100* 148*   CREATININE mg/dL 1.24* 1.32* 1.29*   < > 1.44* 1.77* 2.32*   CALCIUM mg/dL 7.3* 7.1* 7.3*   < > 7.2* 7.1* 8.2*   BILIRUBIN mg/dL  --   --   --   --  0.3 0.2 0.2   ALK PHOS U/L  --   --   --   --  69 63 62   ALT (SGPT) U/L  --   --   --   --  12 13 10   AST (SGOT) U/L  --   --   --   --  16 16 13   GLUCOSE mg/dL 140* 122* 186*   < > 108* 133* 192*    < > = values in this interval not displayed.     Results from last 7 days   Lab Units 03/23/24  0516 03/21/24  1655   INR  1.40* 1.48*     Lab Results   Lab Value Date/Time    LIPASE 19 03/25/2024 0431       Radiology:  US Renal Bilateral   Final Result      CT Abdomen Pelvis Without Contrast   Final Result       1.  Acute displaced comminuted right hip periprosthetic acetabular   fracture, as described in detail above. Corresponding asymmetric   enlargement of the right iliacus muscle is most consistent with a   corresponding intramuscular hematoma.   2.  Moderate bilateral hydroureteronephrosis with diffuse high   attenuation throughout the renal collecting systems, which could be due   to residual intravenous contrast from recent contrast-enhanced   examination a few days ago in the setting of poor renal  function.   However, given reported gross hematuria, blood products are not   excluded.    3.  Marked bladder distention despite presence of a Gamez catheter.   Correlate with Gamez catheter function.    4.  Diffuse bladder wall thickening and adjacent fat stranding raise   concern for possible cystitis. Correlate with urinalysis. Additionally,   given the presence of adjacent osseous trauma, bladder injury cannot be   entirely excluded.   5.  Relatively diffuse mesenteric fat stranding, most pronounced in the   upper abdomen. Findings could reflect the patient's fluid status.   However, correlate with serum lipase given the more focal stranding   within the upper abdomen to exclude possible pancreatitis.       This report was finalized on 3/23/2024 5:23 PM by Dr. Myra Hernandez M.D   on Workstation: [id number]              Assessment & Plan     Active Hospital Problems    Diagnosis     **Severe anemia     Moderate malnutrition     Duodenal ulcer     Periprosthetic fracture around internal prosthetic right hip joint     MARNIE (acute kidney injury)     CAD (coronary artery disease)     DM (diabetes mellitus), type 2     Melena     Hyponatremia     Heart failure with preserved ejection fraction, borderline, class II      Assessment:  Severe anemia  Grade D erosive esophagitis  Duodenal ulcer with active bleeding status post epinephrine injection and cauterization  MARNIE     Plan:  Continue PPI infusion.  Continue Carafate  Overnight she had melena, hemoglobin dropped, 1 unit given.  I will make her n.p.o. after midnight, I may need to rescope her tomorrow versus call IR, will depend on if she has continued bleeding or drop in hemoglobin       I discussed the patients findings and my recommendations with patient and nursing staff.    Juan Sánchez MD

## 2024-03-28 NOTE — PLAN OF CARE
Problem: Adult Inpatient Plan of Care  Goal: Plan of Care Review  Outcome: Ongoing, Progressing  Goal: Patient-Specific Goal (Individualized)  Outcome: Ongoing, Progressing  Goal: Absence of Hospital-Acquired Illness or Injury  Outcome: Ongoing, Progressing  Intervention: Identify and Manage Fall Risk  Recent Flowsheet Documentation  Taken 3/27/2024 0800 by Racheal Roberts RN  Safety Promotion/Fall Prevention: activity supervised  Intervention: Prevent Skin Injury  Recent Flowsheet Documentation  Taken 3/27/2024 0800 by Racheal Roberts RN  Body Position:   turned   left  Intervention: Prevent and Manage VTE (Venous Thromboembolism) Risk  Recent Flowsheet Documentation  Taken 3/27/2024 0800 by Racheal Roberts RN  Activity Management: bedrest  VTE Prevention/Management:   bilateral   sequential compression devices on  Intervention: Prevent Infection  Recent Flowsheet Documentation  Taken 3/27/2024 0800 by Racheal Roberts RN  Infection Prevention:   environmental surveillance performed   hand hygiene promoted   rest/sleep promoted   single patient room provided  Goal: Optimal Comfort and Wellbeing  Outcome: Ongoing, Progressing  Intervention: Provide Person-Centered Care  Recent Flowsheet Documentation  Taken 3/27/2024 0800 by Racheal Roberts RN  Trust Relationship/Rapport:   care explained   emotional support provided   questions answered   questions encouraged  Goal: Readiness for Transition of Care  Outcome: Ongoing, Progressing     Problem: Fall Injury Risk  Goal: Absence of Fall and Fall-Related Injury  Outcome: Ongoing, Progressing  Intervention: Identify and Manage Contributors  Recent Flowsheet Documentation  Taken 3/27/2024 0800 by Racheal Roberts RN  Medication Review/Management: medications reviewed  Intervention: Promote Injury-Free Environment  Recent Flowsheet Documentation  Taken 3/27/2024 0800 by Racheal Roberts RN  Safety Promotion/Fall Prevention: activity supervised     Problem: Skin Injury Risk  Increased  Goal: Skin Health and Integrity  Outcome: Ongoing, Progressing  Intervention: Optimize Skin Protection  Recent Flowsheet Documentation  Taken 3/27/2024 0800 by Racheal Roberts RN  Head of Bed (HOB) Positioning: HOB elevated     Problem: Impaired Wound Healing  Goal: Optimal Wound Healing  Outcome: Ongoing, Progressing  Intervention: Promote Wound Healing  Recent Flowsheet Documentation  Taken 3/27/2024 0800 by Racheal Roberts RN  Activity Management: bedrest     Problem: Chest Pain  Goal: Resolution of Chest Pain Symptoms  Outcome: Ongoing, Progressing     Problem: Adjustment to Illness (Gastrointestinal Bleeding)  Goal: Optimal Coping with Acute Illness  Outcome: Ongoing, Progressing     Problem: Bleeding (Gastrointestinal Bleeding)  Goal: Hemostasis  Outcome: Ongoing, Progressing     Problem: Anemia  Goal: Anemia Symptom Improvement  Outcome: Ongoing, Progressing  Intervention: Monitor and Manage Anemia  Recent Flowsheet Documentation  Taken 3/27/2024 0800 by Racheal Roberts RN  Safety Promotion/Fall Prevention: activity supervised     Problem: Bleeding (Orthopaedic Fracture)  Goal: Absence of Bleeding  Outcome: Ongoing, Progressing     Problem: Embolism (Orthopaedic Fracture)  Goal: Absence of Embolism Signs and Symptoms  Outcome: Ongoing, Progressing  Intervention: Prevent or Manage Embolism Risk  Recent Flowsheet Documentation  Taken 3/27/2024 0800 by Racheal Roberts RN  VTE Prevention/Management:   bilateral   sequential compression devices on     Problem: Fracture Stabilization and Management (Orthopaedic Fracture)  Goal: Fracture Stability  Outcome: Ongoing, Progressing     Problem: Functional Ability Impaired (Orthopaedic Fracture)  Goal: Optimal Functional Ability  Outcome: Ongoing, Progressing  Intervention: Optimize Functional Ability  Recent Flowsheet Documentation  Taken 3/27/2024 0800 by Racheal Roberts RN  Activity Management: bedrest     Problem: Infection (Orthopaedic Fracture)  Goal: Absence  of Infection Signs and Symptoms  Outcome: Ongoing, Progressing  Intervention: Prevent or Manage Infection  Recent Flowsheet Documentation  Taken 3/27/2024 0800 by Racheal Roberts RN  Infection Prevention:   environmental surveillance performed   hand hygiene promoted   rest/sleep promoted   single patient room provided     Problem: Neurovascular Compromise (Orthopaedic Fracture)  Goal: Effective Tissue Perfusion  Outcome: Ongoing, Progressing     Problem: Pain (Orthopaedic Fracture)  Goal: Acceptable Pain Control  Outcome: Ongoing, Progressing     Problem: Respiratory Compromise (Orthopaedic Fracture)  Goal: Effective Oxygenation and Ventilation  Outcome: Ongoing, Progressing  Intervention: Promote Airway Secretion Clearance  Recent Flowsheet Documentation  Taken 3/27/2024 0800 by Racheal Roberts RN  Cough And Deep Breathing: done with encouragement     Problem: Malnutrition  Goal: Improved Nutritional Intake  Outcome: Ongoing, Progressing     Problem: Oral Intake Inadequate  Goal: Improved Oral Intake  Outcome: Ongoing, Progressing   Goal Outcome Evaluation:   AAO LANDON left side weakly. To be transfused 1 unit RBC. Did not eat breakfast    1200 RBC infusing after starting new IV. Uo qs huff. Family at bedsode/    Lunch served ate 100%    Dinner served ate 100%    Family at bedside. Denies c/o pain

## 2024-03-28 NOTE — PROGRESS NOTES
"  [facility] Progress Note    Chief Complaint:  none    Didn't recognize me- didn't realize she had a huff either  Talked rn and planning vt this evening    Review of Systems:    Review of systems could not be obtained due to  patient confusion.          Vital Signs  /70 (BP Location: Right arm, Patient Position: Lying)   Pulse 84   Temp 97.7 °F (36.5 °C)   Resp 23   Ht 162.6 cm (64.02\")   Wt 54.2 kg (119 lb 7.8 oz)   SpO2 100%   BMI 20.50 kg/m²     Physical Exam:     General Appearance:    Alert, cooperative, NAD   HEENT:    No trauma, pupils reactive, hearing intact   Back:     No CVA tenderness   Lungs:     Respirations unlabored, no wheezing    Heart:    RRR, intact peripheral pulses   Abdomen:     Soft and benign   :    def   Extremities:   No edema, no deformity   Lymphatic:   No neck or groin LAD   Skin:   No bleeding, bruising or rashes   Neuro/Psych:   Orientation intact, mood/affect pleasant, no focal findings        Results Review:     I reviewed the patient's new clinical results.  Lab Results (last 24 hours)       Procedure Component Value Units Date/Time    POC Glucose Once [[id number]]  (Abnormal) Collected: 03/28/24 1853    Specimen: Blood Updated: 03/28/24 1854     Glucose 163 mg/dL     Hemoglobin & Hematocrit, Blood [[id number]]  (Abnormal) Collected: 03/28/24 1647    Specimen: Blood Updated: 03/28/24 1653     Hemoglobin 7.8 g/dL      Hematocrit 22.9 %     POC Glucose Once [[id number]]  (Abnormal) Collected: 03/28/24 1036    Specimen: Blood Updated: 03/28/24 1037     Glucose 286 mg/dL     Renal Function Panel [[id number]]  (Abnormal) Collected: 03/28/24 0618    Specimen: Blood Updated: 03/28/24 0702     Glucose 140 mg/dL      BUN 64 mg/dL      Creatinine 1.24 mg/dL      Sodium 127 mmol/L      Potassium 4.2 mmol/L      Chloride 102 mmol/L      CO2 17.2 mmol/L      Calcium 7.3 mg/dL      Albumin 2.0 g/dL      Phosphorus 2.3 mg/dL      Anion Gap 7.8 mmol/L      BUN/Creatinine Ratio 51.6 "     eGFR 45.8 mL/min/1.73     Narrative:      GFR Normal >60  Chronic Kidney Disease <60  Kidney Failure <15    The GFR formula is only valid for adults with stable renal function between ages 18 and 70.    CBC & Differential [[id number]]  (Abnormal) Collected: 03/28/24 0618    Specimen: Blood Updated: 03/28/24 0644    Narrative:      The following orders were created for panel order CBC & Differential.  Procedure                               Abnormality         Status                     ---------                               -----------         ------                     CBC Auto Differential[[id number]]        Abnormal            Final result                 Please view results for these tests on the individual orders.    CBC Auto Differential [[id number]]  (Abnormal) Collected: 03/28/24 0618    Specimen: Blood Updated: 03/28/24 0644     WBC 10.98 10*3/mm3      RBC 2.61 10*6/mm3      Hemoglobin 7.6 g/dL      Hematocrit 22.7 %      MCV 87.0 fL      MCH 29.1 pg      MCHC 33.5 g/dL      RDW 14.2 %      RDW-SD 43.8 fl      MPV 8.6 fL      Platelets 271 10*3/mm3      Neutrophil % 72.2 %      Lymphocyte % 10.5 %      Monocyte % 9.2 %      Eosinophil % 7.1 %      Basophil % 0.4 %      Immature Grans % 0.6 %      Neutrophils, Absolute 7.93 10*3/mm3      Lymphocytes, Absolute 1.15 10*3/mm3      Monocytes, Absolute 1.01 10*3/mm3      Eosinophils, Absolute 0.78 10*3/mm3      Basophils, Absolute 0.04 10*3/mm3      Immature Grans, Absolute 0.07 10*3/mm3      nRBC 0.0 /100 WBC     Hemoglobin & Hematocrit, Blood [[id number]]  (Abnormal) Collected: 03/27/24 2249    Specimen: Blood Updated: 03/27/24 2303     Hemoglobin 7.3 g/dL      Hematocrit 21.8 %     POC Glucose Once [[id number]]  (Abnormal) Collected: 03/27/24 2125    Specimen: Blood Updated: 03/27/24 2127     Glucose 246 mg/dL           Imaging Results (Last 24 Hours)       ** No results found for the last 24 hours. **            Medication Review:   I have personally  reviewed    Current Facility-Administered Medications:     acetaminophen (TYLENOL) tablet 650 mg, 650 mg, Oral, Q4H PRN **OR** acetaminophen (TYLENOL) suppository 650 mg, 650 mg, Rectal, Q4H PRN, Fouzia Desai MD    aluminum-magnesium hydroxide-simethicone (MAALOX MAX) 400-400-40 MG/5ML suspension 15 mL, 15 mL, Oral, Q6H PRN, Fouzia Desai MD    amLODIPine (NORVASC) tablet 5 mg, 5 mg, Oral, Q24H, Fouzia Desai MD, 5 mg at 03/28/24 0919    sennosides-docusate (PERICOLACE) 8.6-50 MG per tablet 2 tablet, 2 tablet, Oral, BID, 2 tablet at 03/28/24 0918 **AND** polyethylene glycol (MIRALAX) packet 17 g, 17 g, Oral, Daily PRN **AND** bisacodyl (DULCOLAX) EC tablet 5 mg, 5 mg, Oral, Daily PRN **AND** bisacodyl (DULCOLAX) suppository 10 mg, 10 mg, Rectal, Daily PRN, Fouzia Desai MD    Calcium Replacement - Follow Nurse / BPA Driven Protocol, , Does not apply, PRN, Fouzia Desai MD    dextrose (D50W) (25 g/50 mL) IV injection 25 g, 25 g, Intravenous, Q15 Min PRN, Fouzia Desai MD    dextrose (GLUTOSE) oral gel 15 g, 15 g, Oral, Q15 Min PRN, Fouzia Desai MD    glucagon (GLUCAGEN) injection 1 mg, 1 mg, Intramuscular, Q15 Min PRN, Fouzia Desai MD    insulin lispro (HUMALOG/ADMELOG) injection 3-14 Units, 3-14 Units, Subcutaneous, 4x Daily AC & at Bedtime, Fouzia Desai MD, 3 Units at 03/28/24 1904    Magnesium Standard Dose Replacement - Follow Nurse / BPA Driven Protocol, , Does not apply, PRN, Fouzia Desai MD    Menthol-Zinc Oxide 1 Application, 1 Application, Topical, Q12H, Fouzia Desai MD, 1 Application at 03/28/24 0934    nitroglycerin (NITROSTAT) SL tablet 0.4 mg, 0.4 mg, Sublingual, Q5 Min PRN, Fouzia Desai MD    ondansetron ODT (ZOFRAN-ODT) disintegrating tablet 4 mg, 4 mg, Oral, Q4H PRN, 4 mg at 03/22/24 0032 **OR** ondansetron (ZOFRAN) injection 4 mg, 4 mg, Intravenous, Q4H PRN, Fouzia Desai MD    pantoprazole (PROTONIX) injection 40 mg, 40 mg, Intravenous, Q12H, Gissell Miranda MD,  40 mg at 03/28/24 0919    phenol (CHLORASEPTIC) 1.4 % liquid 1 spray, 1 spray, Mouth/Throat, Q2H PRN, Judi Bray APRN, 1 spray at 03/27/24 0227    Phosphorus Replacement - Follow Nurse / BPA Driven Protocol, , Does not apply, PRN, Fouzia Desai MD    Potassium Replacement - Follow Nurse / BPA Driven Protocol, , Does not apply, PRN, Fouzia Desai MD    prochlorperazine (COMPAZINE) injection 2.5 mg, 2.5 mg, Intravenous, Q6H PRN, Fouzia Desai MD, 2.5 mg at 03/22/24 1235    sucralfate (CARAFATE) tablet 1 g, 1 g, Oral, 4x Daily AC & at Bedtime, Fouzia Desai MD, 1 g at 03/28/24 1751    Urea (URE-NA) packet 15 g, 15 g, Oral, BID, Danial Washington MD, 15 g at 03/28/24 1306    Allergies:    Penicillins and Furosemide    Assessment:    Active Problems:    Severe anemia    Heart failure with preserved ejection fraction, borderline, class II    Hyponatremia    Melena    Duodenal ulcer    Periprosthetic fracture around internal prosthetic right hip joint    MARNIE (acute kidney injury)    CAD (coronary artery disease)    DM (diabetes mellitus), type 2    Moderate malnutrition       Urinay retention    Plan:    VOiding trial this evening       Chad Moser MD    3/28/2024  19:14 EDT

## 2024-03-28 NOTE — PROGRESS NOTES
Name: Arline Landaverde ADMIT: 3/21/2024   : 1950  PCP: Lanie Gomez APRN    MRN: [id number] LOS: 7 days   AGE/SEX: 73 y.o. female  ROOM: [facility]     Subjective   Subjective   No new complaints.     Objective   Objective   Vital Signs  Temp:  [97.9 °F (36.6 °C)-99.7 °F (37.6 °C)] 99.7 °F (37.6 °C)  Heart Rate:  [79-93] 84  Resp:  [20-23] 23  BP: ()/() 153/101  SpO2:  [98 %-100 %] 100 %  on   ;   Device (Oxygen Therapy): room air  Body mass index is 20.5 kg/m².    Physical Exam  Constitutional:       General: She is not in acute distress.     Appearance: She is ill-appearing. She is not toxic-appearing.   Cardiovascular:      Rate and Rhythm: Normal rate and regular rhythm.   Pulmonary:      Breath sounds: Normal breath sounds. No wheezing or rhonchi.   Abdominal:      Palpations: Abdomen is soft.      Tenderness: There is no abdominal tenderness.   Musculoskeletal:         General: No swelling.   Skin:     General: Skin is warm and dry.     Results Review  I reviewed the patient's new clinical results.  Results from last 7 days   Lab Units 24  0618 24  2249 24  1624 24  0549 24  0805 24  0431   WBC 10*3/mm3 10.98*  --   --  14.15* 13.15* 15.45*   HEMOGLOBIN g/dL 7.6* 7.3* 7.9* 6.4* 7.0* 7.3*   PLATELETS 10*3/mm3 271  --   --  261 243 229     Results from last 7 days   Lab Units 24  0618 24  0549 24  1141 24  0804   SODIUM mmol/L 127* 126* 125* 130*   POTASSIUM mmol/L 4.2 4.0 4.0 3.9   CHLORIDE mmol/L 102 101 100 105   CO2 mmol/L 17.2* 19.2* 20.2* 20.0*   BUN mg/dL 64* 69* 66* 66*   CREATININE mg/dL 1.24* 1.32* 1.29* 1.44*   GLUCOSE mg/dL 140* 122* 186* 111*     Lab Results   Component Value Date    ANIONGAP 7.8 2024     Estimated Creatinine Clearance: 34.1 mL/min (A) (by C-G formula based on SCr of 1.24 mg/dL (H)).   Lab Results   Component Value Date    EGFR 45.8 (L) 2024     Results from last 7 days   Lab Units  03/28/24  0618 03/27/24  0549 03/26/24  0804 03/25/24  0431 03/24/24  0428 03/23/24  0516   ALBUMIN g/dL 2.0* 2.3* 2.4* 2.2* 2.2* 2.0*   BILIRUBIN mg/dL  --   --   --  0.3 0.2 0.2   ALK PHOS U/L  --   --   --  69 63 62   AST (SGOT) U/L  --   --   --  16 16 13   ALT (SGPT) U/L  --   --   --  12 13 10     Results from last 7 days   Lab Units 03/28/24  0618 03/27/24  0549 03/26/24  1141 03/26/24  0804 03/25/24  0431 03/24/24  0428 03/23/24  0516   CALCIUM mg/dL 7.3* 7.1* 7.3* 7.0* 7.2* 7.1* 8.2*   ALBUMIN g/dL 2.0* 2.3*  --  2.4* 2.2* 2.2* 2.0*   MAGNESIUM mg/dL  --   --   --   --  2.4 2.6* 3.0*   PHOSPHORUS mg/dL 2.3* 2.5  --  2.5 2.5 2.5 3.8     Results from last 7 days   Lab Units 03/23/24  0516   PROCALCITONIN ng/mL 0.24     Hemoglobin A1C   Date/Time Value Ref Range Status   03/26/2024 1644 5.90 (H) 4.80 - 5.60 % Final     Glucose   Date/Time Value Ref Range Status   03/27/2024 2125 246 (H) 70 - 130 mg/dL Final   03/27/2024 1631 201 (H) 70 - 130 mg/dL Final   03/27/2024 1143 239 (H) 70 - 130 mg/dL Final   03/27/2024 0752 150 (H) 70 - 130 mg/dL Final   03/26/2024 2107 247 (H) 70 - 130 mg/dL Final   03/26/2024 1813 352 (H) 70 - 130 mg/dL Final   03/26/2024 1115 240 (H) 70 - 130 mg/dL Final       No radiology results for the last day    Scheduled Meds  amLODIPine, 5 mg, Oral, Q24H  insulin lispro, 3-14 Units, Subcutaneous, 4x Daily AC & at Bedtime  Menthol-Zinc Oxide, 1 Application, Topical, Q12H  pantoprazole, 40 mg, Intravenous, Q12H  senna-docusate sodium, 2 tablet, Oral, BID  sucralfate, 1 g, Oral, 4x Daily AC & at Bedtime    Continuous Infusions     PRN Meds    acetaminophen **OR** acetaminophen    aluminum-magnesium hydroxide-simethicone    senna-docusate sodium **AND** polyethylene glycol **AND** bisacodyl **AND** bisacodyl    Calcium Replacement - Follow Nurse / BPA Driven Protocol    dextrose    dextrose    glucagon (human recombinant)    Magnesium Standard Dose Replacement - Follow Nurse / BPA Driven  Protocol    nitroglycerin    ondansetron ODT **OR** ondansetron    phenol    Phosphorus Replacement - Follow Nurse / BPA Driven Protocol    Potassium Replacement - Follow Nurse / BPA Driven Protocol    prochlorperazine       Diet  Diet: Liquid, Diabetic, Fluid Restriction (240 mL/tray); Full Liquid; Consistent Carbohydrate; Other (Specify mL/day) (1200); Fluid Consistency: Thin (IDDSI 0)    I have personally reviewed:  [x]  Medications  [x]  Laboratory   []  Microbiology   []  Radiology   []  EKG/Telemetry   []  Cardiology/Vascular   []  Pathology   []  Records     Results for orders placed during the hospital encounter of 01/02/24    Adult Transthoracic Echo Limited W/ Cont if Necessary Per Protocol    Interpretation Summary    Left ventricular systolic function is normal. Calculated left ventricular EF = 66.3% Abnormal global longitudinal LV strain (GLS) = -16.5%. Left ventricle strain data was reviewed by the physician. Normal left ventricular cavity size noted. Left ventricular wall thickness is consistent with borderline concentric hypertrophy. Left ventricular diastolic function is consistent with (grade I) impaired relaxation. The myocardium is bright and speckled. Global longitudinal strain is only slightly abnormal, and the pattern is not consistent with amyloidosis.    There is a trivial pericardial effusion.        Assessment/Plan     Active Hospital Problems    Diagnosis  POA    **Severe anemia [D64.9]  Yes    Duodenal ulcer [K26.9]  Unknown    Periprosthetic fracture around internal prosthetic right hip joint [M97.01XA]  Not Applicable    MARNIE (acute kidney injury) [N17.9]  Unknown    CAD (coronary artery disease) [I25.10]  Unknown    DM (diabetes mellitus), type 2 [E11.9]  Unknown    Melena [K92.1]  Unknown    Hyponatremia [E87.1]  Yes    Heart failure with preserved ejection fraction, borderline, class II [I50.30]  Yes      Resolved Hospital Problems   No resolved problems to display.     73 y.o.  female transferred from [facility] for anemia, hyperkalemia, hyponatremia. Presented there with altered mental status/weakness, hematuria, dark stools.     Severe anemia  Grade D erosive esophagitis  Duodenal ulcer with active bleeding  Status post EGD epinephrine and cauterization 3/22/2024  PPI, Carafate  GI following  If further bleeding may need IR  GI feels pancreatitis on CT is false positive  Hemoglobin improved some after 1 unit PRBC transfusion yesterday (5 total). RN note: large tarry BM overnight. continue to monitor Hgb serially    Acute kidney injury  Hyponatremia  Hyperkalemia  Nephrology following    Urinary retention  Hydronephrosis  Gamez catheter  Urology following-cystoscopy later    Right hip periprosthetic acetabular fracture  Ortho following-will require conversion to total hip arthroplasty and also fixation of acetabular fracture. High risk    DM2  A1c 5.30% 8 months ago, recheck 5.90%  Continue correctional insulin for now    CAD  History of chronic diastolic heart failure  History of stroke    DVT prophylaxis  SCDs    Discharge  TBD  Expected discharge date/ time has not been documented.    Discussed with patient and family    Harman Albarran MD  [facility]  03/28/24

## 2024-03-29 LAB
ALBUMIN SERPL-MCNC: 2.2 G/DL (ref 3.5–5.2)
ANION GAP SERPL CALCULATED.3IONS-SCNC: 9.3 MMOL/L (ref 5–15)
BASOPHILS # BLD AUTO: 0.06 10*3/MM3 (ref 0–0.2)
BASOPHILS NFR BLD AUTO: 0.6 % (ref 0–1.5)
BUN SERPL-MCNC: 69 MG/DL (ref 8–23)
BUN/CREAT SERPL: 52.3 (ref 7–25)
CALCIUM SPEC-SCNC: 7.6 MG/DL (ref 8.6–10.5)
CHLORIDE SERPL-SCNC: 100 MMOL/L (ref 98–107)
CO2 SERPL-SCNC: 18.7 MMOL/L (ref 22–29)
CREAT SERPL-MCNC: 1.32 MG/DL (ref 0.57–1)
DEPRECATED RDW RBC AUTO: 45.9 FL (ref 37–54)
EGFRCR SERPLBLD CKD-EPI 2021: 42.5 ML/MIN/1.73
EOSINOPHIL # BLD AUTO: 0.8 10*3/MM3 (ref 0–0.4)
EOSINOPHIL NFR BLD AUTO: 8.5 % (ref 0.3–6.2)
ERYTHROCYTE [DISTWIDTH] IN BLOOD BY AUTOMATED COUNT: 14.4 % (ref 12.3–15.4)
GLUCOSE BLDC GLUCOMTR-MCNC: 118 MG/DL (ref 70–130)
GLUCOSE BLDC GLUCOMTR-MCNC: 237 MG/DL (ref 70–130)
GLUCOSE SERPL-MCNC: 105 MG/DL (ref 65–99)
HCT VFR BLD AUTO: 22.7 % (ref 34–46.6)
HCT VFR BLD AUTO: 23 % (ref 34–46.6)
HCT VFR BLD AUTO: 23 % (ref 34–46.6)
HCT VFR BLD AUTO: 23.9 % (ref 34–46.6)
HGB BLD-MCNC: 7.8 G/DL (ref 12–15.9)
HGB BLD-MCNC: 8 G/DL (ref 12–15.9)
IMM GRANULOCYTES # BLD AUTO: 0.07 10*3/MM3 (ref 0–0.05)
IMM GRANULOCYTES NFR BLD AUTO: 0.7 % (ref 0–0.5)
LYMPHOCYTES # BLD AUTO: 1.46 10*3/MM3 (ref 0.7–3.1)
LYMPHOCYTES NFR BLD AUTO: 15.5 % (ref 19.6–45.3)
MCH RBC QN AUTO: 30.7 PG (ref 26.6–33)
MCHC RBC AUTO-ENTMCNC: 34.8 G/DL (ref 31.5–35.7)
MCV RBC AUTO: 88.1 FL (ref 79–97)
MONOCYTES # BLD AUTO: 0.95 10*3/MM3 (ref 0.1–0.9)
MONOCYTES NFR BLD AUTO: 10.1 % (ref 5–12)
NEUTROPHILS NFR BLD AUTO: 6.1 10*3/MM3 (ref 1.7–7)
NEUTROPHILS NFR BLD AUTO: 64.6 % (ref 42.7–76)
NRBC BLD AUTO-RTO: 0 /100 WBC (ref 0–0.2)
PHOSPHATE SERPL-MCNC: 3 MG/DL (ref 2.5–4.5)
PLATELET # BLD AUTO: 278 10*3/MM3 (ref 140–450)
PMV BLD AUTO: 8.8 FL (ref 6–12)
POTASSIUM SERPL-SCNC: 4.1 MMOL/L (ref 3.5–5.2)
RBC # BLD AUTO: 2.61 10*6/MM3 (ref 3.77–5.28)
SODIUM SERPL-SCNC: 128 MMOL/L (ref 136–145)
WBC NRBC COR # BLD AUTO: 9.44 10*3/MM3 (ref 3.4–10.8)

## 2024-03-29 PROCEDURE — 85025 COMPLETE CBC W/AUTO DIFF WBC: CPT | Performed by: INTERNAL MEDICINE

## 2024-03-29 PROCEDURE — 82948 REAGENT STRIP/BLOOD GLUCOSE: CPT

## 2024-03-29 PROCEDURE — 99232 SBSQ HOSP IP/OBS MODERATE 35: CPT | Performed by: INTERNAL MEDICINE

## 2024-03-29 PROCEDURE — 80069 RENAL FUNCTION PANEL: CPT | Performed by: INTERNAL MEDICINE

## 2024-03-29 PROCEDURE — 85014 HEMATOCRIT: CPT | Performed by: HOSPITALIST

## 2024-03-29 PROCEDURE — 63710000001 INSULIN LISPRO (HUMAN) PER 5 UNITS: Performed by: HOSPITALIST

## 2024-03-29 PROCEDURE — 85018 HEMOGLOBIN: CPT | Performed by: HOSPITALIST

## 2024-03-29 PROCEDURE — 25810000003 SODIUM CHLORIDE 0.9 % SOLUTION: Performed by: INTERNAL MEDICINE

## 2024-03-29 RX ORDER — DEXTROSE MONOHYDRATE 25 G/50ML
25 INJECTION, SOLUTION INTRAVENOUS
Status: DISCONTINUED | OUTPATIENT
Start: 2024-03-29 | End: 2024-04-04 | Stop reason: HOSPADM

## 2024-03-29 RX ORDER — IBUPROFEN 600 MG/1
1 TABLET ORAL
Status: DISCONTINUED | OUTPATIENT
Start: 2024-03-29 | End: 2024-04-04 | Stop reason: HOSPADM

## 2024-03-29 RX ORDER — SODIUM CHLORIDE 9 MG/ML
75 INJECTION, SOLUTION INTRAVENOUS CONTINUOUS
Status: DISCONTINUED | OUTPATIENT
Start: 2024-03-29 | End: 2024-03-30

## 2024-03-29 RX ORDER — INSULIN LISPRO 100 [IU]/ML
2-7 INJECTION, SOLUTION INTRAVENOUS; SUBCUTANEOUS
Status: DISCONTINUED | OUTPATIENT
Start: 2024-03-29 | End: 2024-04-04 | Stop reason: HOSPADM

## 2024-03-29 RX ORDER — NICOTINE POLACRILEX 4 MG
15 LOZENGE BUCCAL
Status: DISCONTINUED | OUTPATIENT
Start: 2024-03-29 | End: 2024-04-04 | Stop reason: HOSPADM

## 2024-03-29 RX ADMIN — SUCRALFATE 1 G: 1 TABLET ORAL at 08:24

## 2024-03-29 RX ADMIN — PANTOPRAZOLE SODIUM 40 MG: 40 INJECTION, POWDER, LYOPHILIZED, FOR SOLUTION INTRAVENOUS at 20:11

## 2024-03-29 RX ADMIN — SUCRALFATE 1 G: 1 TABLET ORAL at 20:11

## 2024-03-29 RX ADMIN — INSULIN LISPRO 3 UNITS: 100 INJECTION, SOLUTION INTRAVENOUS; SUBCUTANEOUS at 20:19

## 2024-03-29 RX ADMIN — PANTOPRAZOLE SODIUM 40 MG: 40 INJECTION, POWDER, LYOPHILIZED, FOR SOLUTION INTRAVENOUS at 08:24

## 2024-03-29 RX ADMIN — SODIUM CHLORIDE 75 ML/HR: 9 INJECTION, SOLUTION INTRAVENOUS at 18:29

## 2024-03-29 RX ADMIN — SUCRALFATE 1 G: 1 TABLET ORAL at 11:54

## 2024-03-29 RX ADMIN — Medication 1 APPLICATION: at 08:24

## 2024-03-29 RX ADMIN — Medication 1 APPLICATION: at 20:11

## 2024-03-29 RX ADMIN — AMLODIPINE BESYLATE 5 MG: 5 TABLET ORAL at 08:24

## 2024-03-29 RX ADMIN — SUCRALFATE 1 G: 1 TABLET ORAL at 17:10

## 2024-03-29 NOTE — PROGRESS NOTES
[facility]  Inpatient Progress Note    Reason for Follow Up: GI bleed    Subjective     Interval History:   Had a bit of melena on Wednesday with a 1 g drop in hemoglobin, hemoglobin stable after transfusion and no further GI bleeding Thursday or Friday so far    Current Facility-Administered Medications:     acetaminophen (TYLENOL) tablet 650 mg, 650 mg, Oral, Q4H PRN **OR** acetaminophen (TYLENOL) suppository 650 mg, 650 mg, Rectal, Q4H PRN, Fouzia Desai MD    aluminum-magnesium hydroxide-simethicone (MAALOX MAX) 400-400-40 MG/5ML suspension 15 mL, 15 mL, Oral, Q6H PRN, Fouzia Desai MD    amLODIPine (NORVASC) tablet 5 mg, 5 mg, Oral, Q24H, Fouzia Desai MD, 5 mg at 03/29/24 0824    sennosides-docusate (PERICOLACE) 8.6-50 MG per tablet 2 tablet, 2 tablet, Oral, BID, 2 tablet at 03/28/24 0918 **AND** polyethylene glycol (MIRALAX) packet 17 g, 17 g, Oral, Daily PRN **AND** bisacodyl (DULCOLAX) EC tablet 5 mg, 5 mg, Oral, Daily PRN **AND** bisacodyl (DULCOLAX) suppository 10 mg, 10 mg, Rectal, Daily PRN, Fouzia Desai MD    Calcium Replacement - Follow Nurse / BPA Driven Protocol, , Does not apply, PRN, Fouzia Desai MD    dextrose (D50W) (25 g/50 mL) IV injection 25 g, 25 g, Intravenous, Q15 Min PRN, Harman Albarran MD    dextrose (GLUTOSE) oral gel 15 g, 15 g, Oral, Q15 Min PRN, Harman Albarran MD    glucagon (GLUCAGEN) injection 1 mg, 1 mg, Intramuscular, Q15 Min PRN, Harman Albarran MD    insulin lispro (HUMALOG/ADMELOG) injection 2-7 Units, 2-7 Units, Subcutaneous, 4x Daily AC & at Bedtime, Harman Albarran MD    Magnesium Standard Dose Replacement - Follow Nurse / BPA Driven Protocol, , Does not apply, PRN, Fouzia Desai MD    Menthol-Zinc Oxide 1 Application, 1 Application, Topical, Q12H, Fouzia Desai MD, 1 Application at 03/29/24 0824    nitroglycerin (NITROSTAT) SL tablet 0.4 mg, 0.4 mg, Sublingual, Q5 Min PRN, Fouzia Desai MD    ondansetron ODT (ZOFRAN-ODT)  disintegrating tablet 4 mg, 4 mg, Oral, Q4H PRN, 4 mg at 03/22/24 0032 **OR** ondansetron (ZOFRAN) injection 4 mg, 4 mg, Intravenous, Q4H PRN, Fouzia Desai MD    pantoprazole (PROTONIX) injection 40 mg, 40 mg, Intravenous, Q12H, Gissell Miranda MD, 40 mg at 03/29/24 0824    phenol (CHLORASEPTIC) 1.4 % liquid 1 spray, 1 spray, Mouth/Throat, Q2H PRN, Judi Bray APRN, 1 spray at 03/27/24 0227    Phosphorus Replacement - Follow Nurse / BPA Driven Protocol, , Does not apply, PRN, Fouzia Desai MD    Potassium Replacement - Follow Nurse / BPA Driven Protocol, , Does not apply, PRTANIA, Fouzia Desai MD    prochlorperazine (COMPAZINE) injection 2.5 mg, 2.5 mg, Intravenous, Q6H PRN, Fouzia Desai MD, 2.5 mg at 03/22/24 1235    sucralfate (CARAFATE) tablet 1 g, 1 g, Oral, 4x Daily AC & at Bedtime, Fouzia Desai MD, 1 g at 03/29/24 1154    Urea (URE-NA) packet 15 g, 15 g, Oral, BID, Danial Washington MD, 15 g at 03/28/24 2124  Review of Systems:    There is weakness and fatigue all other systems reviewed and negative    Objective     Vital Signs  Temp:  [97.6 °F (36.4 °C)-99.6 °F (37.6 °C)] 98.4 °F (36.9 °C)  Heart Rate:  [73-84] 73  Resp:  [20] 20  BP: (131-162)/(64-78) 134/64  Body mass index is 20.42 kg/m².    Intake/Output Summary (Last 24 hours) at 3/29/2024 1337  Last data filed at 3/29/2024 0000  Gross per 24 hour   Intake 100 ml   Output 1900 ml   Net -1800 ml     No intake/output data recorded.     Physical Exam:   General: patient awake, alert and cooperative   Eyes: Normal lids and lashes, no scleral icterus   Neck: supple, normal ROM   Skin: warm and dry, not jaundiced   Cardiovascular: regular rhythm and rate, no murmurs auscultated   Pulm: clear to auscultation bilaterally, regular and unlabored   Abdomen: soft, nontender, nondistended; normal bowel sounds   Extremities: no rash or edema   Psychiatric: Normal mood and behavior; memory intact     Results Review:     I reviewed the patient's  new clinical results.    Results from last 7 days   Lab Units 03/29/24  0639 03/28/24  2345 03/28/24  1647 03/28/24  0618 03/27/24  1624 03/27/24  0549   WBC 10*3/mm3 9.44  --   --  10.98*  --  14.15*   HEMOGLOBIN g/dL 8.0*  8.0* 7.8* 7.8* 7.6*   < > 6.4*   HEMATOCRIT % 23.0*  23.0* 22.7* 22.9* 22.7*   < > 19.0*   PLATELETS 10*3/mm3 278  --   --  271  --  261    < > = values in this interval not displayed.     Results from last 7 days   Lab Units 03/29/24  0639 03/28/24  0618 03/27/24  0549 03/26/24  0804 03/25/24  0431 03/24/24  0428 03/23/24  0516   SODIUM mmol/L 128* 127* 126*   < > 130* 132* 138   POTASSIUM mmol/L 4.1 4.2 4.0   < > 3.9 3.8 4.1   CHLORIDE mmol/L 100 102 101   < > 103 104 108*   CO2 mmol/L 18.7* 17.2* 19.2*   < > 19.2* 20.4* 20.0*   BUN mg/dL 69* 64* 69*   < > 83* 100* 148*   CREATININE mg/dL 1.32* 1.24* 1.32*   < > 1.44* 1.77* 2.32*   CALCIUM mg/dL 7.6* 7.3* 7.1*   < > 7.2* 7.1* 8.2*   BILIRUBIN mg/dL  --   --   --   --  0.3 0.2 0.2   ALK PHOS U/L  --   --   --   --  69 63 62   ALT (SGPT) U/L  --   --   --   --  12 13 10   AST (SGOT) U/L  --   --   --   --  16 16 13   GLUCOSE mg/dL 105* 140* 122*   < > 108* 133* 192*    < > = values in this interval not displayed.     Results from last 7 days   Lab Units 03/23/24  0516   INR  1.40*     Lab Results   Lab Value Date/Time    LIPASE 19 03/25/2024 0431       Radiology:  US Renal Bilateral   Final Result      CT Abdomen Pelvis Without Contrast   Final Result       1.  Acute displaced comminuted right hip periprosthetic acetabular   fracture, as described in detail above. Corresponding asymmetric   enlargement of the right iliacus muscle is most consistent with a   corresponding intramuscular hematoma.   2.  Moderate bilateral hydroureteronephrosis with diffuse high   attenuation throughout the renal collecting systems, which could be due   to residual intravenous contrast from recent contrast-enhanced   examination a few days ago in the setting of  poor renal function.   However, given reported gross hematuria, blood products are not   excluded.    3.  Marked bladder distention despite presence of a Gamez catheter.   Correlate with Gamez catheter function.    4.  Diffuse bladder wall thickening and adjacent fat stranding raise   concern for possible cystitis. Correlate with urinalysis. Additionally,   given the presence of adjacent osseous trauma, bladder injury cannot be   entirely excluded.   5.  Relatively diffuse mesenteric fat stranding, most pronounced in the   upper abdomen. Findings could reflect the patient's fluid status.   However, correlate with serum lipase given the more focal stranding   within the upper abdomen to exclude possible pancreatitis.       This report was finalized on 3/23/2024 5:23 PM by Dr. Myra Hernandez M.D   on Workstation: [id number]              Assessment & Plan     Active Hospital Problems    Diagnosis     **Severe anemia     Moderate malnutrition     Duodenal ulcer     Periprosthetic fracture around internal prosthetic right hip joint     MARNIE (acute kidney injury)     CAD (coronary artery disease)     DM (diabetes mellitus), type 2     Melena     Hyponatremia     Heart failure with preserved ejection fraction, borderline, class II      Assessment:  Severe anemia  Grade D erosive esophagitis  Duodenal ulcer with active bleeding status post epinephrine injection and cauterization-this was last week  MARNIE     Plan:  Continue PPI infusion.  Continue Carafate  No further melena since Wednesday  I will make her n.p.o. after midnight again,   may need to rescope her tomorrow versus call IR, will depend on if she has continued bleeding or drop in hemoglobin.  Since the hemoglobin of 6.4 yesterday morning, it has been steady at 8.0 after transfusion     I discussed the patients findings and my recommendations with patient and nursing staff.    Juan Sánchez MD

## 2024-03-29 NOTE — PROGRESS NOTES
[facility] Progress Note      Patient Name: Arline Landaverde  : 1950  MRN: [id number]  Primary Care Physician:  Lanie Gomez APRN  Date of admission: 3/21/2024    Subjective     Interval History:   Follow-up acute kidney injury  Tarry stools continues; no abdominal pain  Breathing is comfortable on room air    Review of Systems:   As noted above    Objective     Vitals:   Temp:  [97.6 °F (36.4 °C)-99.6 °F (37.6 °C)] 98.4 °F (36.9 °C)  Heart Rate:  [78-84] 80  Resp:  [20] 20  BP: (131-162)/(63-77) 162/77    Intake/Output Summary (Last 24 hours) at 3/29/2024 0811  Last data filed at 3/29/2024 0000  Gross per 24 hour   Intake 100 ml   Output 1900 ml   Net -1800 ml       Physical Exam:    General: awake, alert, chr ill, NAD; flat affect; seems reluctant to answer questions  Skin: warm and dry  HEENT: oral mucosa normal, nonicteric sclera  Neck: supple, no JVD  Lungs: Crackles in flanks, breathing not labored on RA   Heart: RRR, no rub  Abdomen: soft, nontender, nondistended, BS +  : External catheter  Extremities: no edema, cyanosis or clubbing  Neuro: moving all extremities    Scheduled Meds:     amLODIPine, 5 mg, Oral, Q24H  insulin lispro, 3-14 Units, Subcutaneous, 4x Daily AC & at Bedtime  Menthol-Zinc Oxide, 1 Application, Topical, Q12H  pantoprazole, 40 mg, Intravenous, Q12H  senna-docusate sodium, 2 tablet, Oral, BID  sucralfate, 1 g, Oral, 4x Daily AC & at Bedtime  Urea, 15 g, Oral, BID      IV Meds:          Results Reviewed:   I have personally reviewed the results from the time of this admission to 3/29/2024 08:11 EDT     Results from last 7 days   Lab Units 24  0639 24  0618 24  0549 24  0804 24  0431 24  0428 24  0516   SODIUM mmol/L 128* 127* 126*   < > 130* 132* 138   POTASSIUM mmol/L 4.1 4.2 4.0   < > 3.9 3.8 4.1   CHLORIDE mmol/L 100 102 101   < > 103 104 108*   CO2 mmol/L 18.7* 17.2* 19.2*   < > 19.2* 20.4* 20.0*   BUN  mg/dL 69* 64* 69*   < > 83* 100* 148*   CREATININE mg/dL 1.32* 1.24* 1.32*   < > 1.44* 1.77* 2.32*   CALCIUM mg/dL 7.6* 7.3* 7.1*   < > 7.2* 7.1* 8.2*   BILIRUBIN mg/dL  --   --   --   --  0.3 0.2 0.2   ALK PHOS U/L  --   --   --   --  69 63 62   ALT (SGPT) U/L  --   --   --   --  12 13 10   AST (SGOT) U/L  --   --   --   --  16 16 13   GLUCOSE mg/dL 105* 140* 122*   < > 108* 133* 192*    < > = values in this interval not displayed.       Estimated Creatinine Clearance: 31.9 mL/min (A) (by C-G formula based on SCr of 1.32 mg/dL (H)).    Results from last 7 days   Lab Units 03/29/24  0639 03/28/24  0618 03/27/24  0549 03/26/24  0804 03/25/24  0431 03/24/24  0428 03/23/24  0516   MAGNESIUM mg/dL  --   --   --   --  2.4 2.6* 3.0*   PHOSPHORUS mg/dL 3.0 2.3* 2.5   < > 2.5 2.5 3.8    < > = values in this interval not displayed.       Results from last 7 days   Lab Units 03/27/24  0549 03/25/24  0431 03/24/24  0428   URIC ACID mg/dL 4.9 5.3 6.2*       Results from last 7 days   Lab Units 03/29/24  0639 03/28/24  2345 03/28/24  1647 03/28/24  0618 03/27/24  2249 03/27/24  1624 03/27/24  0549 03/26/24  0805 03/25/24  0431   WBC 10*3/mm3 9.44  --   --  10.98*  --   --  14.15* 13.15* 15.45*   HEMOGLOBIN g/dL 8.0*  8.0* 7.8* 7.8* 7.6* 7.3*   < > 6.4* 7.0* 7.3*   PLATELETS 10*3/mm3 278  --   --  271  --   --  261 243 229    < > = values in this interval not displayed.       Results from last 7 days   Lab Units 03/23/24  0516   INR  1.40*       Assessment / Plan     ASSESSMENT:  MARNIE, nonoliguric, stabilizing and unchanged vs yesterday:  multifactorial related to urinary retention and hypotension/hypovolemia with acute blood loss.  Volume status stable; likely NAGMA; previously low phosphorus from poor nutrition, stable after replacement  Hyponatremia with poor solute intake; acute blood loss will also trigger ADH release.  Initial urine studies consistent with SIADH with urine sodium 75 and urine osmolality 338.  More recent  (3/27) urine studies, though, showed urine sodium just 34, possibility of hypovolemia  Anemia, worsening; GI bleed (erosive esophagitis and duodenal ulcer) and gross hematuria  History of CVA  Bilateral hydronephrosis, due to urinary retention.   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  Orthopedics following      PLAN:  1.  Given NPO state and urine that now appears sodium-avid, will give 1 L of saline  2.  GI considering repeat endoscopy  3.  Bladder scan each shift  4.  Check uric acid      I reviewed labs and other providers' notes.  Copied text in this note has been reviewed and is accurate as of 03/29/24.       Thank you for involving us in the care of Arline Landaverde.  Please feel free to call with any questions.    Danial Washington MD  03/29/24  08:11 EDT    [facility]  [phone]    Please note that portions of this note were completed with a voice recognition program.\

## 2024-03-29 NOTE — PLAN OF CARE
Goal Outcome Evaluation:  Plan of Care Reviewed With: patient        Progress: improving  Outcome Evaluation: VSS, room air.  HGB remains stable, improving slightly over previous shift.  NSR on monitor. Indwelling cath removed previous shift, patient unable to spontaniously void, with 6 hour bladder scan yielding >500ml.  Straight cath completed at midnight with 550ml output.  Recheck this AM at 0515 with <300ml, patient still unable to void.  Oriented x4, able to communicate needs.  Overall goal to optimize patient for surgical hip revision following outpatient fall.  No changes to plan of care overnight, no discharge plan in place.

## 2024-03-29 NOTE — PLAN OF CARE
Goal Outcome Evaluation:  Plan of Care Reviewed With: patient        Progress: improving     Remains in ccu 1 stool sark but not tarry and no active bleeding.  Unable to void on own.  850cc out when straight cathed. No complaints of discomfort.

## 2024-03-29 NOTE — PROGRESS NOTES
Name: Arline Landaverde ADMIT: 3/21/2024   : 1950  PCP: Lanie Gomez APRN    MRN: [id number] LOS: 8 days   AGE/SEX: 73 y.o. female  ROOM: [facility]     Subjective   Subjective   No new complaints. No further tarry bowel movements     Objective   Objective   Vital Signs  Temp:  [97.6 °F (36.4 °C)-99.6 °F (37.6 °C)] 98.4 °F (36.9 °C)  Heart Rate:  [73-84] 73  Resp:  [20] 20  BP: (131-162)/(64-78) 134/64  SpO2:  [98 %-100 %] 98 %  on   ;   Device (Oxygen Therapy): room air  Body mass index is 20.42 kg/m².    Physical Exam  Constitutional:       General: She is not in acute distress.     Appearance: She is ill-appearing. She is not toxic-appearing.   Cardiovascular:      Rate and Rhythm: Normal rate and regular rhythm.   Pulmonary:      Breath sounds: Normal breath sounds. No wheezing or rhonchi.   Abdominal:      Palpations: Abdomen is soft.      Tenderness: There is no abdominal tenderness.   Musculoskeletal:         General: No swelling.   Skin:     General: Skin is warm and dry.     Results Review  I reviewed the patient's new clinical results.  Results from last 7 days   Lab Units 24  0639 24  2345 24  1647 24  0618 24  1624 24  0549 24  0805   WBC 10*3/mm3 9.44  --   --  10.98*  --  14.15* 13.15*   HEMOGLOBIN g/dL 8.0*  8.0* 7.8* 7.8* 7.6*   < > 6.4* 7.0*   PLATELETS 10*3/mm3 278  --   --  271  --  261 243    < > = values in this interval not displayed.     Results from last 7 days   Lab Units 24  0639 24  0618 24  0549 24  1141   SODIUM mmol/L 128* 127* 126* 125*   POTASSIUM mmol/L 4.1 4.2 4.0 4.0   CHLORIDE mmol/L 100 102 101 100   CO2 mmol/L 18.7* 17.2* 19.2* 20.2*   BUN mg/dL 69* 64* 69* 66*   CREATININE mg/dL 1.32* 1.24* 1.32* 1.29*   GLUCOSE mg/dL 105* 140* 122* 186*     Lab Results   Component Value Date    ANIONGAP 9.3 2024     Estimated Creatinine Clearance: 31.9 mL/min (A) (by C-G formula based on SCr of 1.32 mg/dL (H)).    Lab Results   Component Value Date    EGFR 42.5 (L) 03/29/2024     Results from last 7 days   Lab Units 03/29/24  0639 03/28/24  0618 03/27/24  0549 03/26/24  0804 03/25/24  0431 03/24/24  0428 03/23/24  0516   ALBUMIN g/dL 2.2* 2.0* 2.3* 2.4* 2.2* 2.2* 2.0*   BILIRUBIN mg/dL  --   --   --   --  0.3 0.2 0.2   ALK PHOS U/L  --   --   --   --  69 63 62   AST (SGOT) U/L  --   --   --   --  16 16 13   ALT (SGPT) U/L  --   --   --   --  12 13 10     Results from last 7 days   Lab Units 03/29/24  0639 03/28/24  0618 03/27/24  0549 03/26/24  1141 03/26/24  0804 03/25/24  0431 03/24/24  0428 03/23/24  0516   CALCIUM mg/dL 7.6* 7.3* 7.1* 7.3* 7.0* 7.2* 7.1* 8.2*   ALBUMIN g/dL 2.2* 2.0* 2.3*  --  2.4* 2.2* 2.2* 2.0*   MAGNESIUM mg/dL  --   --   --   --   --  2.4 2.6* 3.0*   PHOSPHORUS mg/dL 3.0 2.3* 2.5  --  2.5 2.5 2.5 3.8     Results from last 7 days   Lab Units 03/23/24  0516   PROCALCITONIN ng/mL 0.24     Hemoglobin A1C   Date/Time Value Ref Range Status   03/26/2024 1644 5.90 (H) 4.80 - 5.60 % Final     Glucose   Date/Time Value Ref Range Status   03/29/2024 1149 118 70 - 130 mg/dL Final   03/28/2024 2101 118 70 - 130 mg/dL Final   03/28/2024 1853 163 (H) 70 - 130 mg/dL Final   03/28/2024 1036 286 (H) 70 - 130 mg/dL Final   03/27/2024 2125 246 (H) 70 - 130 mg/dL Final   03/27/2024 1631 201 (H) 70 - 130 mg/dL Final   03/27/2024 1143 239 (H) 70 - 130 mg/dL Final       No radiology results for the last day    Scheduled Meds  amLODIPine, 5 mg, Oral, Q24H  insulin lispro, 3-14 Units, Subcutaneous, 4x Daily AC & at Bedtime  Menthol-Zinc Oxide, 1 Application, Topical, Q12H  pantoprazole, 40 mg, Intravenous, Q12H  senna-docusate sodium, 2 tablet, Oral, BID  sucralfate, 1 g, Oral, 4x Daily AC & at Bedtime  Urea, 15 g, Oral, BID    Continuous Infusions     PRN Meds    acetaminophen **OR** acetaminophen    aluminum-magnesium hydroxide-simethicone    senna-docusate sodium **AND** polyethylene glycol **AND** bisacodyl **AND**  bisacodyl    Calcium Replacement - Follow Nurse / BPA Driven Protocol    dextrose    dextrose    glucagon (human recombinant)    Magnesium Standard Dose Replacement - Follow Nurse / BPA Driven Protocol    nitroglycerin    ondansetron ODT **OR** ondansetron    phenol    Phosphorus Replacement - Follow Nurse / BPA Driven Protocol    Potassium Replacement - Follow Nurse / BPA Driven Protocol    prochlorperazine       Diet  NPO Diet NPO Type: Sips with Meds    I have personally reviewed:  [x]  Medications  [x]  Laboratory   []  Microbiology   []  Radiology   []  EKG/Telemetry   []  Cardiology/Vascular   []  Pathology   []  Records     Results for orders placed during the hospital encounter of 01/02/24    Adult Transthoracic Echo Limited W/ Cont if Necessary Per Protocol    Interpretation Summary    Left ventricular systolic function is normal. Calculated left ventricular EF = 66.3% Abnormal global longitudinal LV strain (GLS) = -16.5%. Left ventricle strain data was reviewed by the physician. Normal left ventricular cavity size noted. Left ventricular wall thickness is consistent with borderline concentric hypertrophy. Left ventricular diastolic function is consistent with (grade I) impaired relaxation. The myocardium is bright and speckled. Global longitudinal strain is only slightly abnormal, and the pattern is not consistent with amyloidosis.    There is a trivial pericardial effusion.        Assessment/Plan     Active Hospital Problems    Diagnosis  POA    **Severe anemia [D64.9]  Yes    Moderate malnutrition [E44.0]  Yes    Duodenal ulcer [K26.9]  Unknown    Periprosthetic fracture around internal prosthetic right hip joint [M97.01XA]  Not Applicable    MARNIE (acute kidney injury) [N17.9]  Unknown    CAD (coronary artery disease) [I25.10]  Unknown    DM (diabetes mellitus), type 2 [E11.9]  Unknown    Melena [K92.1]  Unknown    Hyponatremia [E87.1]  Yes    Heart failure with preserved ejection fraction, borderline,  class II [I50.30]  Yes      Resolved Hospital Problems   No resolved problems to display.     73 y.o. female transferred from [facility] for anemia, hyperkalemia, hyponatremia. Presented there with altered mental status/weakness, hematuria, dark stools.     Severe anemia  Grade D erosive esophagitis  Duodenal ulcer with active bleeding  GI feels pancreatitis on CT is false positive  Status post EGD epinephrine and cauterization 3/22/2024  PPI, Carafate  Hemoglobin seems to be stable over the last 24 hours (has been transfused 5 units total most recently 3/27/2024).   Continue to monitor serially, rescope versus IR per GI    Acute kidney injury  Hyponatremia  Hyperkalemia  Nephrology following    Urinary retention  Hydronephrosis  Gamez removed now has external catheter  Urology following-cystoscopy later    Right hip periprosthetic acetabular fracture  Ortho following-will require conversion to total hip arthroplasty and also fixation of acetabular fracture when optimized. High risk    DM2  A1c 5.30% 8 months ago, recheck 5.90%  Continue correctional insulin for now but back off to low-dose algorithm Monitor for hypoglycemia    CAD  History of chronic diastolic heart failure  History of stroke    DVT prophylaxis  SCDs    Discharge  TBD  Expected discharge date/ time has not been documented.    Discussed with patient and family    Harman Albarran MD  [facility]  03/29/24

## 2024-03-30 LAB
ALBUMIN SERPL-MCNC: 2.1 G/DL (ref 3.5–5.2)
ANION GAP SERPL CALCULATED.3IONS-SCNC: 6 MMOL/L (ref 5–15)
BASOPHILS # BLD AUTO: 0.03 10*3/MM3 (ref 0–0.2)
BASOPHILS NFR BLD AUTO: 0.4 % (ref 0–1.5)
BUN SERPL-MCNC: 66 MG/DL (ref 8–23)
BUN/CREAT SERPL: 47.1 (ref 7–25)
CALCIUM SPEC-SCNC: 7.1 MG/DL (ref 8.6–10.5)
CHLORIDE SERPL-SCNC: 103 MMOL/L (ref 98–107)
CO2 SERPL-SCNC: 18 MMOL/L (ref 22–29)
CREAT SERPL-MCNC: 1.4 MG/DL (ref 0.57–1)
DEPRECATED RDW RBC AUTO: 46.3 FL (ref 37–54)
EGFRCR SERPLBLD CKD-EPI 2021: 39.6 ML/MIN/1.73
EOSINOPHIL # BLD AUTO: 0.54 10*3/MM3 (ref 0–0.4)
EOSINOPHIL NFR BLD AUTO: 6.7 % (ref 0.3–6.2)
ERYTHROCYTE [DISTWIDTH] IN BLOOD BY AUTOMATED COUNT: 14.3 % (ref 12.3–15.4)
GLUCOSE BLDC GLUCOMTR-MCNC: 123 MG/DL (ref 70–130)
GLUCOSE BLDC GLUCOMTR-MCNC: 138 MG/DL (ref 70–130)
GLUCOSE BLDC GLUCOMTR-MCNC: 208 MG/DL (ref 70–130)
GLUCOSE BLDC GLUCOMTR-MCNC: 235 MG/DL (ref 70–130)
GLUCOSE BLDC GLUCOMTR-MCNC: 260 MG/DL (ref 70–130)
GLUCOSE SERPL-MCNC: 114 MG/DL (ref 65–99)
HCT VFR BLD AUTO: 21.8 % (ref 34–46.6)
HCT VFR BLD AUTO: 23.3 % (ref 34–46.6)
HCT VFR BLD AUTO: 23.6 % (ref 34–46.6)
HGB BLD-MCNC: 7.5 G/DL (ref 12–15.9)
HGB BLD-MCNC: 7.9 G/DL (ref 12–15.9)
HGB BLD-MCNC: 8 G/DL (ref 12–15.9)
IMM GRANULOCYTES # BLD AUTO: 0.03 10*3/MM3 (ref 0–0.05)
IMM GRANULOCYTES NFR BLD AUTO: 0.4 % (ref 0–0.5)
LYMPHOCYTES # BLD AUTO: 1.05 10*3/MM3 (ref 0.7–3.1)
LYMPHOCYTES NFR BLD AUTO: 13.1 % (ref 19.6–45.3)
MAGNESIUM SERPL-MCNC: 1.4 MG/DL (ref 1.6–2.4)
MCH RBC QN AUTO: 31 PG (ref 26.6–33)
MCHC RBC AUTO-ENTMCNC: 34.4 G/DL (ref 31.5–35.7)
MCV RBC AUTO: 90.1 FL (ref 79–97)
MONOCYTES # BLD AUTO: 0.91 10*3/MM3 (ref 0.1–0.9)
MONOCYTES NFR BLD AUTO: 11.3 % (ref 5–12)
NEUTROPHILS NFR BLD AUTO: 5.46 10*3/MM3 (ref 1.7–7)
NEUTROPHILS NFR BLD AUTO: 68.1 % (ref 42.7–76)
NRBC BLD AUTO-RTO: 0 /100 WBC (ref 0–0.2)
PHOSPHATE SERPL-MCNC: 3.1 MG/DL (ref 2.5–4.5)
PLATELET # BLD AUTO: 304 10*3/MM3 (ref 140–450)
PMV BLD AUTO: 8.8 FL (ref 6–12)
POTASSIUM SERPL-SCNC: 4.5 MMOL/L (ref 3.5–5.2)
RBC # BLD AUTO: 2.42 10*6/MM3 (ref 3.77–5.28)
SODIUM SERPL-SCNC: 127 MMOL/L (ref 136–145)
URATE SERPL-MCNC: 5.2 MG/DL (ref 2.4–5.7)
WBC NRBC COR # BLD AUTO: 8.02 10*3/MM3 (ref 3.4–10.8)

## 2024-03-30 PROCEDURE — 99232 SBSQ HOSP IP/OBS MODERATE 35: CPT | Performed by: INTERNAL MEDICINE

## 2024-03-30 PROCEDURE — 84550 ASSAY OF BLOOD/URIC ACID: CPT | Performed by: INTERNAL MEDICINE

## 2024-03-30 PROCEDURE — 25010000002 MAGNESIUM SULFATE 2 GM/50ML SOLUTION: Performed by: INTERNAL MEDICINE

## 2024-03-30 PROCEDURE — 85018 HEMOGLOBIN: CPT | Performed by: HOSPITALIST

## 2024-03-30 PROCEDURE — 25810000003 SODIUM CHLORIDE 0.9 % SOLUTION: Performed by: INTERNAL MEDICINE

## 2024-03-30 PROCEDURE — 63710000001 INSULIN LISPRO (HUMAN) PER 5 UNITS: Performed by: HOSPITALIST

## 2024-03-30 PROCEDURE — 82948 REAGENT STRIP/BLOOD GLUCOSE: CPT

## 2024-03-30 PROCEDURE — 80069 RENAL FUNCTION PANEL: CPT | Performed by: INTERNAL MEDICINE

## 2024-03-30 PROCEDURE — 85014 HEMATOCRIT: CPT | Performed by: HOSPITALIST

## 2024-03-30 PROCEDURE — 85025 COMPLETE CBC W/AUTO DIFF WBC: CPT | Performed by: INTERNAL MEDICINE

## 2024-03-30 PROCEDURE — 83735 ASSAY OF MAGNESIUM: CPT | Performed by: INTERNAL MEDICINE

## 2024-03-30 RX ORDER — SODIUM CHLORIDE 1 G/1
1 TABLET ORAL
Status: DISCONTINUED | OUTPATIENT
Start: 2024-03-30 | End: 2024-04-04 | Stop reason: HOSPADM

## 2024-03-30 RX ORDER — HYDROCODONE BITARTRATE AND ACETAMINOPHEN 5; 325 MG/1; MG/1
1 TABLET ORAL EVERY 6 HOURS PRN
Status: DISCONTINUED | OUTPATIENT
Start: 2024-03-30 | End: 2024-03-31

## 2024-03-30 RX ORDER — MAGNESIUM SULFATE HEPTAHYDRATE 40 MG/ML
2 INJECTION, SOLUTION INTRAVENOUS
Status: DISPENSED | OUTPATIENT
Start: 2024-03-30 | End: 2024-03-30

## 2024-03-30 RX ADMIN — SENNOSIDES AND DOCUSATE SODIUM 2 TABLET: 50; 8.6 TABLET ORAL at 20:01

## 2024-03-30 RX ADMIN — PANTOPRAZOLE SODIUM 40 MG: 40 INJECTION, POWDER, LYOPHILIZED, FOR SOLUTION INTRAVENOUS at 10:01

## 2024-03-30 RX ADMIN — SUCRALFATE 1 G: 1 TABLET ORAL at 10:01

## 2024-03-30 RX ADMIN — HYDROCODONE BITARTRATE AND ACETAMINOPHEN 1 TABLET: 5; 325 TABLET ORAL at 20:18

## 2024-03-30 RX ADMIN — ACETAMINOPHEN 325MG 650 MG: 325 TABLET ORAL at 16:40

## 2024-03-30 RX ADMIN — ACETAMINOPHEN 325MG 650 MG: 325 TABLET ORAL at 01:31

## 2024-03-30 RX ADMIN — SUCRALFATE 1 G: 1 TABLET ORAL at 16:40

## 2024-03-30 RX ADMIN — Medication 1 APPLICATION: at 20:25

## 2024-03-30 RX ADMIN — INSULIN LISPRO 4 UNITS: 100 INJECTION, SOLUTION INTRAVENOUS; SUBCUTANEOUS at 20:18

## 2024-03-30 RX ADMIN — INSULIN LISPRO 3 UNITS: 100 INJECTION, SOLUTION INTRAVENOUS; SUBCUTANEOUS at 16:37

## 2024-03-30 RX ADMIN — SUCRALFATE 1 G: 1 TABLET ORAL at 11:29

## 2024-03-30 RX ADMIN — PANTOPRAZOLE SODIUM 40 MG: 40 INJECTION, POWDER, LYOPHILIZED, FOR SOLUTION INTRAVENOUS at 20:01

## 2024-03-30 RX ADMIN — AMLODIPINE BESYLATE 5 MG: 5 TABLET ORAL at 10:01

## 2024-03-30 RX ADMIN — Medication 1 APPLICATION: at 10:02

## 2024-03-30 RX ADMIN — SUCRALFATE 1 G: 1 TABLET ORAL at 20:01

## 2024-03-30 RX ADMIN — SODIUM CHLORIDE 75 ML/HR: 9 INJECTION, SOLUTION INTRAVENOUS at 06:10

## 2024-03-30 RX ADMIN — MAGNESIUM SULFATE HEPTAHYDRATE 2 G: 40 INJECTION, SOLUTION INTRAVENOUS at 12:35

## 2024-03-30 RX ADMIN — MAGNESIUM SULFATE HEPTAHYDRATE 2 G: 40 INJECTION, SOLUTION INTRAVENOUS at 10:01

## 2024-03-30 RX ADMIN — SODIUM CHLORIDE TAB 1 GM 1 G: 1 TAB at 17:07

## 2024-03-30 RX ADMIN — INSULIN LISPRO 3 UNITS: 100 INJECTION, SOLUTION INTRAVENOUS; SUBCUTANEOUS at 11:29

## 2024-03-30 NOTE — PROGRESS NOTES
[facility] Progress Note      Patient Name: Arline Landaverde  : 1950  MRN: [id number]  Primary Care Physician:  Lanie Gomez APRN  Date of admission: 3/21/2024    Subjective     Interval History:   F/u MARNIE     Review of Systems:   I/O 876/2200   Denies dyspnea   Denies excess fluid intake     Objective     Vitals:   Temp:  [98.1 °F (36.7 °C)-98.4 °F (36.9 °C)] 98.3 °F (36.8 °C)  Heart Rate:  [68-90] 73  Resp:  [16] 16  BP: (126-159)/(61-86) 144/68    Intake/Output Summary (Last 24 hours) at 3/30/2024 1403  Last data filed at 3/30/2024 1202  Gross per 24 hour   Intake 1116.25 ml   Output 3100 ml   Net -1983.75 ml       Physical Exam:    General Appearance: frail ill appearing WF no distress  Neck: supple, no JVD  Lungs: CTA bilat no rales  Heart: RRR, normal S1 and S2  Abdomen: soft, nontender, nondistended  Extremities: no edema, cyanosis or clubbing       Scheduled Meds:     amLODIPine, 5 mg, Oral, Q24H  insulin lispro, 2-7 Units, Subcutaneous, 4x Daily AC & at Bedtime  magnesium sulfate, 2 g, Intravenous, Q2H  Menthol-Zinc Oxide, 1 Application, Topical, Q12H  pantoprazole, 40 mg, Intravenous, Q12H  senna-docusate sodium, 2 tablet, Oral, BID  sucralfate, 1 g, Oral, 4x Daily AC & at Bedtime  Urea, 15 g, Oral, BID      IV Meds:        Results Reviewed:   I have personally reviewed the results from the time of this admission to 3/30/2024 14:03 EDT     Results from last 7 days   Lab Units 24  0607 24  0639 24  0618 24  0804 24  0431 24  0428   SODIUM mmol/L 127* 128* 127*   < > 130* 132*   POTASSIUM mmol/L 4.5 4.1 4.2   < > 3.9 3.8   CHLORIDE mmol/L 103 100 102   < > 103 104   CO2 mmol/L 18.0* 18.7* 17.2*   < > 19.2* 20.4*   BUN mg/dL 66* 69* 64*   < > 83* 100*   CREATININE mg/dL 1.40* 1.32* 1.24*   < > 1.44* 1.77*   CALCIUM mg/dL 7.1* 7.6* 7.3*   < > 7.2* 7.1*   BILIRUBIN mg/dL  --   --   --   --  0.3 0.2   ALK PHOS U/L  --   --   --   --  69 63    ALT (SGPT) U/L  --   --   --   --  12 13   AST (SGOT) U/L  --   --   --   --  16 16   GLUCOSE mg/dL 114* 105* 140*   < > 108* 133*    < > = values in this interval not displayed.     Estimated Creatinine Clearance: 30.1 mL/min (A) (by C-G formula based on SCr of 1.4 mg/dL (H)).  Results from last 7 days   Lab Units 03/30/24  0607 03/29/24  0639 03/28/24  0618 03/26/24  0804 03/25/24  0431 03/24/24  0428   MAGNESIUM mg/dL 1.4*  --   --   --  2.4 2.6*   PHOSPHORUS mg/dL 3.1 3.0 2.3*   < > 2.5 2.5    < > = values in this interval not displayed.     Results from last 7 days   Lab Units 03/30/24  0607 03/27/24  0549 03/25/24  0431 03/24/24  0428   URIC ACID mg/dL 5.2 4.9 5.3 6.2*     Results from last 7 days   Lab Units 03/30/24  0717 03/29/24  2353 03/29/24  1553 03/29/24  0639 03/28/24  2345 03/28/24  1647 03/28/24  0618 03/27/24  1624 03/27/24  0549 03/26/24  0805   WBC 10*3/mm3 8.02  --   --  9.44  --   --  10.98*  --  14.15* 13.15*   HEMOGLOBIN g/dL 7.5* 8.0* 8.0* 8.0*  8.0* 7.8*   < > 7.6*   < > 6.4* 7.0*   PLATELETS 10*3/mm3 304  --   --  278  --   --  271  --  261 243    < > = values in this interval not displayed.           Assessment / Plan     ASSESSMENT:  MARNIE, nonoliguric, Cr stagnant but stable 1.2 to 1.4 range.  Etiol multifactorial, related to urinary retention and hypotension/hypovolemia with acute blood loss.  Euvolemic, s/p IVF yesterday.  Likely NAGMA with stable bicarb 18.  Mg low  Hyponatremia with poor solute intake; acute blood loss will also trigger ADH release.  Initial urine studies consistent with SIADH with urine sodium 75 and urine osmolality 338.  More recent (3/27) urine studies, though, showed urine sodium just 34, possibility of hypovolemia.  Na ~ same 127, on urea packets.  Not on fluid restriction but intake does not appear grossly excessive  Anemia, worsening; GI bleed (erosive esophagitis and duodenal ulcer) and gross hematuria; hgb down 7.5  History of CVA  Bilateral  hydronephrosis, due to urinary retention.   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  Orthopedics following  HTN - BP controlled    PLAN:  Replace Mg  Continue urea packets for now  Monitor Na w/o fluid restriction       Avelino Benson MD  03/30/24  14:03 EDT    [facility]  [phone]

## 2024-03-30 NOTE — PROGRESS NOTES
Name: Arline Landaverde ADMIT: 3/21/2024   : 1950  PCP: Lanie Gomez APRN    MRN: [id number] LOS: 9 days   AGE/SEX: 73 y.o. female  ROOM: [facility]     Subjective   Subjective   No new complaints. Right hip pain. UR so huff placed and had some transient hematuria that has cleared     Objective   Objective   Vital Signs  Temp:  [98.1 °F (36.7 °C)-98.4 °F (36.9 °C)] 98.3 °F (36.8 °C)  Heart Rate:  [68-90] 73  Resp:  [16] 16  BP: (126-159)/(59-86) 144/68  SpO2:  [96 %-100 %] 96 %  on   ;   Device (Oxygen Therapy): room air  Body mass index is 20.42 kg/m².    Physical Exam  Constitutional:       General: She is not in acute distress.     Appearance: She is ill-appearing. She is not toxic-appearing.   Cardiovascular:      Rate and Rhythm: Normal rate and regular rhythm.   Pulmonary:      Breath sounds: Normal breath sounds. No wheezing or rhonchi.   Abdominal:      Palpations: Abdomen is soft.      Tenderness: There is no abdominal tenderness.   Musculoskeletal:         General: No swelling.   Skin:     General: Skin is warm and dry.     Results Review  I reviewed the patient's new clinical results.  Results from last 7 days   Lab Units 24  0717 24  2353 24  1553 24  0639 24  1647 24  0618 24  1624 24  0549   WBC 10*3/mm3 8.02  --   --  9.44  --  10.98*  --  14.15*   HEMOGLOBIN g/dL 7.5* 8.0* 8.0* 8.0*  8.0*   < > 7.6*   < > 6.4*   PLATELETS 10*3/mm3 304  --   --  278  --  271  --  261    < > = values in this interval not displayed.     Results from last 7 days   Lab Units 24  0607 24  0639 24  0618 24  0549   SODIUM mmol/L 127* 128* 127* 126*   POTASSIUM mmol/L 4.5 4.1 4.2 4.0   CHLORIDE mmol/L 103 100 102 101   CO2 mmol/L 18.0* 18.7* 17.2* 19.2*   BUN mg/dL 66* 69* 64* 69*   CREATININE mg/dL 1.40* 1.32* 1.24* 1.32*   GLUCOSE mg/dL 114* 105* 140* 122*     Lab Results   Component Value Date    ANIONGAP 6.0 2024     Estimated  Creatinine Clearance: 30.1 mL/min (A) (by C-G formula based on SCr of 1.4 mg/dL (H)).   Lab Results   Component Value Date    EGFR 39.6 (L) 03/30/2024     Results from last 7 days   Lab Units 03/30/24  0607 03/29/24  0639 03/28/24  0618 03/27/24  0549 03/26/24  0804 03/25/24  0431 03/24/24  0428   ALBUMIN g/dL 2.1* 2.2* 2.0* 2.3*   < > 2.2* 2.2*   BILIRUBIN mg/dL  --   --   --   --   --  0.3 0.2   ALK PHOS U/L  --   --   --   --   --  69 63   AST (SGOT) U/L  --   --   --   --   --  16 16   ALT (SGPT) U/L  --   --   --   --   --  12 13    < > = values in this interval not displayed.     Results from last 7 days   Lab Units 03/30/24  0607 03/29/24  0639 03/28/24  0618 03/27/24  0549 03/26/24  0804 03/25/24  0431 03/24/24  0428   CALCIUM mg/dL 7.1* 7.6* 7.3* 7.1*   < > 7.2* 7.1*   ALBUMIN g/dL 2.1* 2.2* 2.0* 2.3*   < > 2.2* 2.2*   MAGNESIUM mg/dL 1.4*  --   --   --   --  2.4 2.6*   PHOSPHORUS mg/dL 3.1 3.0 2.3* 2.5   < > 2.5 2.5    < > = values in this interval not displayed.           Glucose   Date/Time Value Ref Range Status   03/30/2024 1104 235 (H) 70 - 130 mg/dL Final   03/30/2024 0708 123 70 - 130 mg/dL Final   03/30/2024 0613 138 (H) 70 - 130 mg/dL Final   03/29/2024 2010 237 (H) 70 - 130 mg/dL Final   03/29/2024 1149 118 70 - 130 mg/dL Final   03/28/2024 2101 118 70 - 130 mg/dL Final   03/28/2024 1853 163 (H) 70 - 130 mg/dL Final       No radiology results for the last day    Scheduled Meds  amLODIPine, 5 mg, Oral, Q24H  insulin lispro, 2-7 Units, Subcutaneous, 4x Daily AC & at Bedtime  magnesium sulfate, 2 g, Intravenous, Q2H  Menthol-Zinc Oxide, 1 Application, Topical, Q12H  pantoprazole, 40 mg, Intravenous, Q12H  senna-docusate sodium, 2 tablet, Oral, BID  sucralfate, 1 g, Oral, 4x Daily AC & at Bedtime  Urea, 15 g, Oral, BID    Continuous Infusions     PRN Meds    acetaminophen **OR** acetaminophen    aluminum-magnesium hydroxide-simethicone    senna-docusate sodium **AND** polyethylene glycol **AND**  bisacodyl **AND** bisacodyl    Calcium Replacement - Follow Nurse / BPA Driven Protocol    dextrose    dextrose    glucagon (human recombinant)    Magnesium Standard Dose Replacement - Follow Nurse / BPA Driven Protocol    nitroglycerin    ondansetron ODT **OR** ondansetron    phenol    Phosphorus Replacement - Follow Nurse / BPA Driven Protocol    Potassium Replacement - Follow Nurse / BPA Driven Protocol    prochlorperazine       Diet  Diet: Regular/House, Diabetic, Gastrointestinal; Consistent Carbohydrate; Fiber-Restricted; Fluid Consistency: Thin (IDDSI 0)    I have personally reviewed:  [x]  Medications  [x]  Laboratory   []  Microbiology   []  Radiology   [x]  EKG/Telemetry   []  Cardiology/Vascular   []  Pathology   []  Records     Results for orders placed during the hospital encounter of 01/02/24    Adult Transthoracic Echo Limited W/ Cont if Necessary Per Protocol    Interpretation Summary    Left ventricular systolic function is normal. Calculated left ventricular EF = 66.3% Abnormal global longitudinal LV strain (GLS) = -16.5%. Left ventricle strain data was reviewed by the physician. Normal left ventricular cavity size noted. Left ventricular wall thickness is consistent with borderline concentric hypertrophy. Left ventricular diastolic function is consistent with (grade I) impaired relaxation. The myocardium is bright and speckled. Global longitudinal strain is only slightly abnormal, and the pattern is not consistent with amyloidosis.    There is a trivial pericardial effusion.        Assessment/Plan     Active Hospital Problems    Diagnosis  POA    **Severe anemia [D64.9]  Yes    Moderate malnutrition [E44.0]  Yes    Duodenal ulcer [K26.9]  Unknown    Periprosthetic fracture around internal prosthetic right hip joint [M97.01XA]  Not Applicable    MARNIE (acute kidney injury) [N17.9]  Unknown    CAD (coronary artery disease) [I25.10]  Unknown    DM (diabetes mellitus), type 2 [E11.9]  Unknown    Melena  [K92.1]  Unknown    Hyponatremia [E87.1]  Yes    Heart failure with preserved ejection fraction, borderline, class II [I50.30]  Yes      Resolved Hospital Problems   No resolved problems to display.     73 y.o. female transferred from [facility] for anemia, hyperkalemia, hyponatremia. Presented there with altered mental status/weakness, hematuria, dark stools.     Severe anemia  Grade D erosive esophagitis  Duodenal ulcer with active bleeding  GI feels pancreatitis on CT is false positive  Status post EGD epinephrine and cauterization 3/22/2024  PPI, Carafate  Hemoglobin stable no definite rebleed.   Continue to monitor serially    Acute kidney injury  Hyponatremia  Hyperkalemia  Nephrology following    Urinary retention  Hydronephrosis  Gamez replaced for UR yesterday  VT once ambulatory or cysto with VT outpatient 1-2 weeks    Right hip periprosthetic acetabular fracture  Would require conversion to total hip arthroplasty and also fixation of acetabular fracture when optimized. High risk. Ortho following  add lortab for pain    DM2  A1c 5.30% 8 months ago, recheck 5.90%  had backed off to low dose correctional- no hypoglycemia    CAD  History of chronic diastolic heart failure  History of stroke    DVT prophylaxis  SCDs    Discharge  TBD  Expected discharge date/ time has not been documented.    Discussed with patient, family, and nursing staff    Harman Albarran MD  [facility]  03/30/24

## 2024-03-30 NOTE — PLAN OF CARE
Per GI okay for diet. No scope needed at this time. No tarry stools overnight, and none currently.   Overnight, patient had blood in huff catheter. RN updated Uro, however, huff currently ro/yellow/cloudy. Had clot/sediment in line when RN emptied. Uro aware--asked to page if bleeding occurs. Complete Voiding trial once ambulatory. Otherwise, keep huff (discharge with), and follow up with uro 1-2 weeks upon discharge.  [facility] updated about patient. [facility] to reach out to ortho in regards to plan for hip replacement, in next few days, if feasible. Patient stated ortho came by and said no surgery at this time. However, RN did not see ortho.     Patient alert and oriented. Patient tolerating diet. No BM during shift. Urine yellow/cloudy. MD made aware. No concerns for infection. Mag replaced. Urea packet replaced for salt tabs as patient kept refusing the packets d/t taste.    Problem: Bleeding (Gastrointestinal Bleeding)  Goal: Hemostasis  Outcome: Ongoing, Progressing     Problem: Anemia  Goal: Anemia Symptom Improvement  Intervention: Monitor and Manage Anemia  Recent Flowsheet Documentation  Taken 3/30/2024 1200 by Zakiya William RN  Safety Promotion/Fall Prevention:   safety round/check completed   fall prevention program maintained   clutter free environment maintained   assistive device/personal items within reach   room organization consistent  Taken 3/30/2024 1000 by Zakiya William RN  Safety Promotion/Fall Prevention:   safety round/check completed   fall prevention program maintained   clutter free environment maintained   assistive device/personal items within reach   room organization consistent  Taken 3/30/2024 0800 by Zakiya William RN  Safety Promotion/Fall Prevention:   safety round/check completed   fall prevention program maintained   clutter free environment maintained   assistive device/personal items within reach   room organization consistent   Goal Outcome Evaluation:

## 2024-03-30 NOTE — PROGRESS NOTES
CC: I am uncomfortable    Patient resting in bed, NAD  Wants to be turned  Denies bladder pain  Soft abdomen   Huff draining yellow urine, denies pain from this  Was unable to urinate and had huff re-anchored yesterday  States she isn't ambulatory     WBC 8  Hgb 7.5  Cr 1.4    Plan:  Recommend keeping huff until ambulatory, may need to keep upon discharge  Plan on VT once ambulatory or Cystoscopy and VT with [facility] as outpatient in 1-2 weeks, [phone] with Dr. ANA Carrasco    Please call if additional care is needed

## 2024-03-30 NOTE — PROGRESS NOTES
[facility]  Inpatient Progress Note    Reason for Follow Up: Upper GI bleed secondary to duodenal ulcer    Subjective     Interval History:   1 dark stool overnight-not tarry per nursing.  She had hematuria last night.  She denies abdominal pain or nausea    Current Facility-Administered Medications:     acetaminophen (TYLENOL) tablet 650 mg, 650 mg, Oral, Q4H PRN, 650 mg at 03/30/24 0131 **OR** acetaminophen (TYLENOL) suppository 650 mg, 650 mg, Rectal, Q4H PRN, Fouzia Desai MD    aluminum-magnesium hydroxide-simethicone (MAALOX MAX) 400-400-40 MG/5ML suspension 15 mL, 15 mL, Oral, Q6H PRN, Fouzia Desai MD    amLODIPine (NORVASC) tablet 5 mg, 5 mg, Oral, Q24H, Fouzia Desai MD, 5 mg at 03/29/24 0824    sennosides-docusate (PERICOLACE) 8.6-50 MG per tablet 2 tablet, 2 tablet, Oral, BID, 2 tablet at 03/28/24 0918 **AND** polyethylene glycol (MIRALAX) packet 17 g, 17 g, Oral, Daily PRN **AND** bisacodyl (DULCOLAX) EC tablet 5 mg, 5 mg, Oral, Daily PRN **AND** bisacodyl (DULCOLAX) suppository 10 mg, 10 mg, Rectal, Daily PRN, Fouzia Desai MD    Calcium Replacement - Follow Nurse / BPA Driven Protocol, , Does not apply, PRN, Fouzia Desai MD    dextrose (D50W) (25 g/50 mL) IV injection 25 g, 25 g, Intravenous, Q15 Min PRN, Harman Albarran MD    dextrose (GLUTOSE) oral gel 15 g, 15 g, Oral, Q15 Min PRN, Harman Albarran MD    glucagon (GLUCAGEN) injection 1 mg, 1 mg, Intramuscular, Q15 Min PRN, Harmna Albarran MD    insulin lispro (HUMALOG/ADMELOG) injection 2-7 Units, 2-7 Units, Subcutaneous, 4x Daily AC & at Bedtime, Harman Albarran MD, 3 Units at 03/29/24 2019    Magnesium Standard Dose Replacement - Follow Nurse / BPA Driven Protocol, , Does not apply, PRN, Fouzia Desai MD    magnesium sulfate 2g/50 mL (PREMIX) infusion, 2 g, Intravenous, Q2H, Praful Sommer MD    Menthol-Zinc Oxide 1 Application, 1 Application, Topical, Q12H, Fouzia Desai MD, 1 Application at 03/29/24  2011    nitroglycerin (NITROSTAT) SL tablet 0.4 mg, 0.4 mg, Sublingual, Q5 Min PRN, Fouzia Desai MD    ondansetron ODT (ZOFRAN-ODT) disintegrating tablet 4 mg, 4 mg, Oral, Q4H PRN, 4 mg at 03/22/24 0032 **OR** ondansetron (ZOFRAN) injection 4 mg, 4 mg, Intravenous, Q4H PRN, Fouzia Desai MD    pantoprazole (PROTONIX) injection 40 mg, 40 mg, Intravenous, Q12H, Gissell Miranda MD, 40 mg at 03/29/24 2011    phenol (CHLORASEPTIC) 1.4 % liquid 1 spray, 1 spray, Mouth/Throat, Q2H PRN, Judi Bray APRN, 1 spray at 03/27/24 0227    Phosphorus Replacement - Follow Nurse / BPA Driven Protocol, , Does not apply, PRN, Fouzia Desai MD    Potassium Replacement - Follow Nurse / BPA Driven Protocol, , Does not apply, PRNGiselle Tausif, MD    prochlorperazine (COMPAZINE) injection 2.5 mg, 2.5 mg, Intravenous, Q6H PRN, Fouzia Desai MD, 2.5 mg at 03/22/24 1235    sucralfate (CARAFATE) tablet 1 g, 1 g, Oral, 4x Daily AC & at Bedtime, Fouzia Desai MD, 1 g at 03/29/24 2011    Urea (URE-NA) packet 15 g, 15 g, Oral, BID, Danial Washington MD, 15 g at 03/28/24 2124  Review of Systems:    The following systems were reviewed and negative;  constitution and gastrointestinal    Objective     Vital Signs  Temp:  [98.1 °F (36.7 °C)-98.4 °F (36.9 °C)] 98.4 °F (36.9 °C)  Heart Rate:  [68-90] 68  Resp:  [16-18] 16  BP: (126-159)/(59-86) 130/64  Body mass index is 20.42 kg/m².    Intake/Output Summary (Last 24 hours) at 3/30/2024 0975  Last data filed at 3/30/2024 0610  Gross per 24 hour   Intake 876.25 ml   Output 2200 ml   Net -1323.75 ml     No intake/output data recorded.     Physical Exam:   General: patient awake, alert and cooperative   Eyes: Normal lids and lashes, no scleral icterus   Neck: supple, normal ROM   Skin: warm and dry, not jaundiced   Cardiovascular: regular rhythm and rate   Pulm: regular and unlabored   Abdomen: soft, nontender, nondistended; normal bowel sounds   Extremities: no rash or  edema       Results Review:     I reviewed the patient's new clinical results.    Results from last 7 days   Lab Units 03/30/24  0717 03/29/24  2353 03/29/24  1553 03/29/24  0639 03/28/24  1647 03/28/24  0618   WBC 10*3/mm3 8.02  --   --  9.44  --  10.98*   HEMOGLOBIN g/dL 7.5* 8.0* 8.0* 8.0*  8.0*   < > 7.6*   HEMATOCRIT % 21.8* 23.6* 23.9* 23.0*  23.0*   < > 22.7*   PLATELETS 10*3/mm3 304  --   --  278  --  271    < > = values in this interval not displayed.     Results from last 7 days   Lab Units 03/30/24  0607 03/29/24  0639 03/28/24  0618 03/26/24  0804 03/25/24  0431 03/24/24  0428   SODIUM mmol/L 127* 128* 127*   < > 130* 132*   POTASSIUM mmol/L 4.5 4.1 4.2   < > 3.9 3.8   CHLORIDE mmol/L 103 100 102   < > 103 104   CO2 mmol/L 18.0* 18.7* 17.2*   < > 19.2* 20.4*   BUN mg/dL 66* 69* 64*   < > 83* 100*   CREATININE mg/dL 1.40* 1.32* 1.24*   < > 1.44* 1.77*   CALCIUM mg/dL 7.1* 7.6* 7.3*   < > 7.2* 7.1*   BILIRUBIN mg/dL  --   --   --   --  0.3 0.2   ALK PHOS U/L  --   --   --   --  69 63   ALT (SGPT) U/L  --   --   --   --  12 13   AST (SGOT) U/L  --   --   --   --  16 16   GLUCOSE mg/dL 114* 105* 140*   < > 108* 133*    < > = values in this interval not displayed.         Lab Results   Lab Value Date/Time    LIPASE 19 03/25/2024 0431       Radiology:  US Renal Bilateral   Final Result      CT Abdomen Pelvis Without Contrast   Final Result       1.  Acute displaced comminuted right hip periprosthetic acetabular   fracture, as described in detail above. Corresponding asymmetric   enlargement of the right iliacus muscle is most consistent with a   corresponding intramuscular hematoma.   2.  Moderate bilateral hydroureteronephrosis with diffuse high   attenuation throughout the renal collecting systems, which could be due   to residual intravenous contrast from recent contrast-enhanced   examination a few days ago in the setting of poor renal function.   However, given reported gross hematuria, blood products  are not   excluded.    3.  Marked bladder distention despite presence of a Gamez catheter.   Correlate with Gamez catheter function.    4.  Diffuse bladder wall thickening and adjacent fat stranding raise   concern for possible cystitis. Correlate with urinalysis. Additionally,   given the presence of adjacent osseous trauma, bladder injury cannot be   entirely excluded.   5.  Relatively diffuse mesenteric fat stranding, most pronounced in the   upper abdomen. Findings could reflect the patient's fluid status.   However, correlate with serum lipase given the more focal stranding   within the upper abdomen to exclude possible pancreatitis.       This report was finalized on 3/23/2024 5:23 PM by Dr. Myra Hernandez M.D   on Workstation: [id number]              Assessment & Plan     Active Hospital Problems    Diagnosis     **Severe anemia     Moderate malnutrition     Duodenal ulcer     Periprosthetic fracture around internal prosthetic right hip joint     MARNIE (acute kidney injury)     CAD (coronary artery disease)     DM (diabetes mellitus), type 2     Melena     Hyponatremia     Heart failure with preserved ejection fraction, borderline, class II        Assessment:  Severe anemia  Grade D erosive esophagitis  Duodenal ulcer with active bleeding status post epinephrine injection and cauterization on 3/22  MARNIE      Plan:  Hemoglobin relatively stable-no definitive rebleed.  BUN has been elevated but unchanged essentially relative to her creatinine.  Continue to follow H&H and transfuse as needed.  Last transfused on 3/27.  Hemoglobin down slightly but had significant hematuria per nursing overnight.  Continue twice daily PPI, Carafate  Okay to resume previous diet    I discussed the patients findings and my recommendations with patient and nursing staff.            Gissell Miranda M.D.  [facility]  [phone]

## 2024-03-31 LAB
ALBUMIN SERPL-MCNC: 2.3 G/DL (ref 3.5–5.2)
ANION GAP SERPL CALCULATED.3IONS-SCNC: 7 MMOL/L (ref 5–15)
BASOPHILS # BLD AUTO: 0.04 10*3/MM3 (ref 0–0.2)
BASOPHILS NFR BLD AUTO: 0.6 % (ref 0–1.5)
BH BB BLOOD EXPIRATION DATE: NORMAL
BH BB BLOOD TYPE BARCODE: 6200
BH BB DISPENSE STATUS: NORMAL
BH BB PRODUCT CODE: NORMAL
BH BB UNIT NUMBER: NORMAL
BUN SERPL-MCNC: 66 MG/DL (ref 8–23)
BUN/CREAT SERPL: 52.4 (ref 7–25)
CALCIUM SPEC-SCNC: 7.2 MG/DL (ref 8.6–10.5)
CHLORIDE SERPL-SCNC: 102 MMOL/L (ref 98–107)
CO2 SERPL-SCNC: 18 MMOL/L (ref 22–29)
CREAT SERPL-MCNC: 1.26 MG/DL (ref 0.57–1)
CROSSMATCH INTERPRETATION: NORMAL
DEPRECATED RDW RBC AUTO: 46 FL (ref 37–54)
EGFRCR SERPLBLD CKD-EPI 2021: 44.9 ML/MIN/1.73
EOSINOPHIL # BLD AUTO: 0.49 10*3/MM3 (ref 0–0.4)
EOSINOPHIL NFR BLD AUTO: 7.3 % (ref 0.3–6.2)
ERYTHROCYTE [DISTWIDTH] IN BLOOD BY AUTOMATED COUNT: 14.4 % (ref 12.3–15.4)
GLUCOSE BLDC GLUCOMTR-MCNC: 135 MG/DL (ref 70–130)
GLUCOSE BLDC GLUCOMTR-MCNC: 141 MG/DL (ref 70–130)
GLUCOSE BLDC GLUCOMTR-MCNC: 150 MG/DL (ref 70–130)
GLUCOSE BLDC GLUCOMTR-MCNC: 252 MG/DL (ref 70–130)
GLUCOSE BLDC GLUCOMTR-MCNC: 256 MG/DL (ref 70–130)
GLUCOSE SERPL-MCNC: 122 MG/DL (ref 65–99)
HCT VFR BLD AUTO: 20.7 % (ref 34–46.6)
HCT VFR BLD AUTO: 22.2 % (ref 34–46.6)
HGB BLD-MCNC: 7 G/DL (ref 12–15.9)
HGB BLD-MCNC: 7.6 G/DL (ref 12–15.9)
IMM GRANULOCYTES # BLD AUTO: 0.02 10*3/MM3 (ref 0–0.05)
IMM GRANULOCYTES NFR BLD AUTO: 0.3 % (ref 0–0.5)
LYMPHOCYTES # BLD AUTO: 1.07 10*3/MM3 (ref 0.7–3.1)
LYMPHOCYTES NFR BLD AUTO: 15.9 % (ref 19.6–45.3)
MAGNESIUM SERPL-MCNC: 2.5 MG/DL (ref 1.6–2.4)
MCH RBC QN AUTO: 30.8 PG (ref 26.6–33)
MCHC RBC AUTO-ENTMCNC: 34.2 G/DL (ref 31.5–35.7)
MCV RBC AUTO: 89.9 FL (ref 79–97)
MONOCYTES # BLD AUTO: 0.82 10*3/MM3 (ref 0.1–0.9)
MONOCYTES NFR BLD AUTO: 12.1 % (ref 5–12)
NEUTROPHILS NFR BLD AUTO: 4.31 10*3/MM3 (ref 1.7–7)
NEUTROPHILS NFR BLD AUTO: 63.8 % (ref 42.7–76)
NRBC BLD AUTO-RTO: 0 /100 WBC (ref 0–0.2)
PHOSPHATE SERPL-MCNC: 2.9 MG/DL (ref 2.5–4.5)
PLATELET # BLD AUTO: 314 10*3/MM3 (ref 140–450)
PMV BLD AUTO: 8.6 FL (ref 6–12)
POTASSIUM SERPL-SCNC: 4.5 MMOL/L (ref 3.5–5.2)
RBC # BLD AUTO: 2.47 10*6/MM3 (ref 3.77–5.28)
SODIUM SERPL-SCNC: 127 MMOL/L (ref 136–145)
UNIT  ABO: NORMAL
UNIT  RH: NORMAL
WBC NRBC COR # BLD AUTO: 6.75 10*3/MM3 (ref 3.4–10.8)

## 2024-03-31 PROCEDURE — 85025 COMPLETE CBC W/AUTO DIFF WBC: CPT | Performed by: INTERNAL MEDICINE

## 2024-03-31 PROCEDURE — 63710000001 INSULIN LISPRO (HUMAN) PER 5 UNITS: Performed by: HOSPITALIST

## 2024-03-31 PROCEDURE — 83735 ASSAY OF MAGNESIUM: CPT | Performed by: INTERNAL MEDICINE

## 2024-03-31 PROCEDURE — 99232 SBSQ HOSP IP/OBS MODERATE 35: CPT | Performed by: INTERNAL MEDICINE

## 2024-03-31 PROCEDURE — 85014 HEMATOCRIT: CPT | Performed by: HOSPITALIST

## 2024-03-31 PROCEDURE — 80069 RENAL FUNCTION PANEL: CPT | Performed by: INTERNAL MEDICINE

## 2024-03-31 PROCEDURE — 85018 HEMOGLOBIN: CPT | Performed by: HOSPITALIST

## 2024-03-31 PROCEDURE — 82948 REAGENT STRIP/BLOOD GLUCOSE: CPT

## 2024-03-31 RX ORDER — PANTOPRAZOLE SODIUM 40 MG/1
40 TABLET, DELAYED RELEASE ORAL
Status: DISCONTINUED | OUTPATIENT
Start: 2024-03-31 | End: 2024-04-04 | Stop reason: HOSPADM

## 2024-03-31 RX ORDER — HYDROCODONE BITARTRATE AND ACETAMINOPHEN 7.5; 325 MG/1; MG/1
1 TABLET ORAL EVERY 4 HOURS PRN
Status: DISCONTINUED | OUTPATIENT
Start: 2024-03-31 | End: 2024-04-04 | Stop reason: HOSPADM

## 2024-03-31 RX ADMIN — Medication 1 APPLICATION: at 08:17

## 2024-03-31 RX ADMIN — SUCRALFATE 1 G: 1 TABLET ORAL at 06:51

## 2024-03-31 RX ADMIN — HYDROCODONE BITARTRATE AND ACETAMINOPHEN 1 TABLET: 5; 325 TABLET ORAL at 14:01

## 2024-03-31 RX ADMIN — HYDROCODONE BITARTRATE AND ACETAMINOPHEN 1 TABLET: 7.5; 325 TABLET ORAL at 20:35

## 2024-03-31 RX ADMIN — SUCRALFATE 1 G: 1 TABLET ORAL at 17:04

## 2024-03-31 RX ADMIN — SENNOSIDES AND DOCUSATE SODIUM 2 TABLET: 50; 8.6 TABLET ORAL at 20:35

## 2024-03-31 RX ADMIN — SODIUM CHLORIDE TAB 1 GM 1 G: 1 TAB at 17:04

## 2024-03-31 RX ADMIN — INSULIN LISPRO 4 UNITS: 100 INJECTION, SOLUTION INTRAVENOUS; SUBCUTANEOUS at 11:06

## 2024-03-31 RX ADMIN — Medication 1 APPLICATION: at 20:36

## 2024-03-31 RX ADMIN — SODIUM CHLORIDE TAB 1 GM 1 G: 1 TAB at 08:15

## 2024-03-31 RX ADMIN — PANTOPRAZOLE SODIUM 40 MG: 40 INJECTION, POWDER, LYOPHILIZED, FOR SOLUTION INTRAVENOUS at 08:15

## 2024-03-31 RX ADMIN — SODIUM CHLORIDE TAB 1 GM 1 G: 1 TAB at 11:06

## 2024-03-31 RX ADMIN — PANTOPRAZOLE SODIUM 40 MG: 40 TABLET, DELAYED RELEASE ORAL at 17:04

## 2024-03-31 RX ADMIN — SUCRALFATE 1 G: 1 TABLET ORAL at 11:06

## 2024-03-31 RX ADMIN — AMLODIPINE BESYLATE 5 MG: 5 TABLET ORAL at 08:15

## 2024-03-31 RX ADMIN — SUCRALFATE 1 G: 1 TABLET ORAL at 20:35

## 2024-03-31 RX ADMIN — INSULIN LISPRO 4 UNITS: 100 INJECTION, SOLUTION INTRAVENOUS; SUBCUTANEOUS at 20:35

## 2024-03-31 RX ADMIN — HYDROCODONE BITARTRATE AND ACETAMINOPHEN 1 TABLET: 5; 325 TABLET ORAL at 08:22

## 2024-03-31 NOTE — PROGRESS NOTES
[facility] Progress Note      Patient Name: Arline Landaverde  : 1950  MRN: [id number]  Primary Care Physician:  Lanie Gomez APRN  Date of admission: 3/21/2024    Subjective     Interval History:   F/u MARNIE hypoNa     Review of Systems:   sleeping    Objective     Vitals:   Temp:  [98.3 °F (36.8 °C)-99.1 °F (37.3 °C)] 98.3 °F (36.8 °C)  Heart Rate:  [70-89] 81  Resp:  [16-18] 16  BP: (122-150)/(61-91) 150/91    Intake/Output Summary (Last 24 hours) at 3/31/2024 1019  Last data filed at 3/31/2024 0741  Gross per 24 hour   Intake 1414 ml   Output 1400 ml   Net 14 ml       Physical Exam:    General Appearance: frail ill appearing WF sleeping comfortably, I did not wake her    Scheduled Meds:     amLODIPine, 5 mg, Oral, Q24H  insulin lispro, 2-7 Units, Subcutaneous, 4x Daily AC & at Bedtime  Menthol-Zinc Oxide, 1 Application, Topical, Q12H  pantoprazole, 40 mg, Oral, BID AC  senna-docusate sodium, 2 tablet, Oral, BID  sodium chloride, 1 g, Oral, TID With Meals  sucralfate, 1 g, Oral, 4x Daily AC & at Bedtime      IV Meds:        Results Reviewed:   I have personally reviewed the results from the time of this admission to 3/31/2024 10:19 EDT     Results from last 7 days   Lab Units 24  0555 24  0607 24  0639 24  0804 24  0431   SODIUM mmol/L 127* 127* 128*   < > 130*   POTASSIUM mmol/L 4.5 4.5 4.1   < > 3.9   CHLORIDE mmol/L 102 103 100   < > 103   CO2 mmol/L 18.0* 18.0* 18.7*   < > 19.2*   BUN mg/dL 66* 66* 69*   < > 83*   CREATININE mg/dL 1.26* 1.40* 1.32*   < > 1.44*   CALCIUM mg/dL 7.2* 7.1* 7.6*   < > 7.2*   BILIRUBIN mg/dL  --   --   --   --  0.3   ALK PHOS U/L  --   --   --   --  69   ALT (SGPT) U/L  --   --   --   --  12   AST (SGOT) U/L  --   --   --   --  16   GLUCOSE mg/dL 122* 114* 105*   < > 108*    < > = values in this interval not displayed.     Estimated Creatinine Clearance: 33.9 mL/min (A) (by C-G formula based on SCr of 1.26 mg/dL  (H)).  Results from last 7 days   Lab Units 03/31/24  0555 03/31/24  0032 03/30/24  0607 03/29/24  0639 03/26/24  0804 03/25/24  0431   MAGNESIUM mg/dL  --  2.5* 1.4*  --   --  2.4   PHOSPHORUS mg/dL 2.9  --  3.1 3.0   < > 2.5    < > = values in this interval not displayed.     Results from last 7 days   Lab Units 03/30/24  0607 03/27/24  0549 03/25/24  0431   URIC ACID mg/dL 5.2 4.9 5.3     Results from last 7 days   Lab Units 03/31/24  0555 03/31/24  0032 03/30/24  1613 03/30/24  0717 03/29/24  2353 03/29/24  1553 03/29/24  0639 03/28/24  1647 03/28/24  0618 03/27/24  1624 03/27/24  0549   WBC 10*3/mm3 6.75  --   --  8.02  --   --  9.44  --  10.98*  --  14.15*   HEMOGLOBIN g/dL 7.6* 7.0* 7.9* 7.5* 8.0*   < > 8.0*  8.0*   < > 7.6*   < > 6.4*   PLATELETS 10*3/mm3 314  --   --  304  --   --  278  --  271  --  261    < > = values in this interval not displayed.           Assessment / Plan     ASSESSMENT:  MARNIE, nonoliguric, Cr stagnant but stable 1.2 to 1.4 range.  Etiol multifactorial, related to urinary retention and hypotension/hypovolemia with acute blood loss.  Euvolemic, off IVF.  Likely NAGMA with stable bicarb 18.  Mg repleted  Hyponatremia with poor solute intake; acute blood loss will also trigger ADH release.  Initial urine studies consistent with SIADH with urine sodium 75 and urine osmolality 338.  More recent (3/27) urine studies, though, showed urine sodium just 34, possibility of hypovolemia, but Na did not improve with IVF trial.  Na ~ same 127, switched urea packets to salt tabs due to tolerance issues  Anemia, hgb stagnant low to mid 7s; GI bleed (erosive esophagitis and duodenal ulcer) and gross hematuria, latter resolved  History of CVA  Bilateral hydronephrosis, due to urinary retention.  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  Orthopedics following  HTN - BP controlled but watch for climb  with salt tabs.  On norvasc 5mg     PLAN:  Continue salt tabs 1g TID  Won't fluid restrict as intake does not appear grossly excessive    Prognosis appears poor in general and further goals of care discussions may be needed .  WIll d/w LHA.  D/W RN and family at bedside        Avelino Benson MD  03/31/24  10:19 EDT    [facility]  [phone]

## 2024-03-31 NOTE — PROGRESS NOTES
[facility]  Inpatient Progress Note    Reason for Follow Up:  Upper GI bleed secondary to duodenal ulcer     Subjective     Interval History:   No bowel movement overnight.  There has been no evidence of rebleeding.  She denies abdominal pain.  She is not a picky eater but she is tolerating p.o.  She has no complaints    Current Facility-Administered Medications:     acetaminophen (TYLENOL) tablet 650 mg, 650 mg, Oral, Q4H PRN, 650 mg at 03/30/24 1640 **OR** acetaminophen (TYLENOL) suppository 650 mg, 650 mg, Rectal, Q4H PRN, Fouzia Desai MD    aluminum-magnesium hydroxide-simethicone (MAALOX MAX) 400-400-40 MG/5ML suspension 15 mL, 15 mL, Oral, Q6H PRN, Fouzia Desai MD    amLODIPine (NORVASC) tablet 5 mg, 5 mg, Oral, Q24H, Fouzia Desai MD, 5 mg at 03/30/24 1001    sennosides-docusate (PERICOLACE) 8.6-50 MG per tablet 2 tablet, 2 tablet, Oral, BID, 2 tablet at 03/30/24 2001 **AND** polyethylene glycol (MIRALAX) packet 17 g, 17 g, Oral, Daily PRN **AND** bisacodyl (DULCOLAX) EC tablet 5 mg, 5 mg, Oral, Daily PRN **AND** bisacodyl (DULCOLAX) suppository 10 mg, 10 mg, Rectal, Daily PRN, Fouzia Desai MD    Calcium Replacement - Follow Nurse / BPA Driven Protocol, , Does not apply, PRN, Fouzia Desai MD    dextrose (D50W) (25 g/50 mL) IV injection 25 g, 25 g, Intravenous, Q15 Min PRN, Harman Albarran MD    dextrose (GLUTOSE) oral gel 15 g, 15 g, Oral, Q15 Min PRN, Harman Albarran MD    glucagon (GLUCAGEN) injection 1 mg, 1 mg, Intramuscular, Q15 Min PRN, Harman Albarran MD    HYDROcodone-acetaminophen (NORCO) 5-325 MG per tablet 1 tablet, 1 tablet, Oral, Q6H PRN, Harman Albarran MD, 1 tablet at 03/30/24 2018    insulin lispro (HUMALOG/ADMELOG) injection 2-7 Units, 2-7 Units, Subcutaneous, 4x Daily AC & at Bedtime, Harman Albarran MD, 4 Units at 03/30/24 2018    Magnesium Standard Dose Replacement - Follow Nurse / BPA Driven Protocol, , Does not apply, PRN, Fouzia Desai,  MD    Menthol-Zinc Oxide 1 Application, 1 Application, Topical, Q12H, Fouzia Desai MD, 1 Application at 03/30/24 2025    nitroglycerin (NITROSTAT) SL tablet 0.4 mg, 0.4 mg, Sublingual, Q5 Min PRN, Fozuia Desai MD    ondansetron ODT (ZOFRAN-ODT) disintegrating tablet 4 mg, 4 mg, Oral, Q4H PRN, 4 mg at 03/22/24 0032 **OR** ondansetron (ZOFRAN) injection 4 mg, 4 mg, Intravenous, Q4H PRN, Fouzia Desai MD    pantoprazole (PROTONIX) injection 40 mg, 40 mg, Intravenous, Q12H, Gissell Miranda MD, 40 mg at 03/30/24 2001    phenol (CHLORASEPTIC) 1.4 % liquid 1 spray, 1 spray, Mouth/Throat, Q2H PRN, Judi Bray APRN, 1 spray at 03/27/24 0227    Phosphorus Replacement - Follow Nurse / BPA Driven Protocol, , Does not apply, PRN, Fouzia Desai MD    Potassium Replacement - Follow Nurse / BPA Driven Protocol, , Does not apply, Giselle MTZ Tausif, MD    prochlorperazine (COMPAZINE) injection 2.5 mg, 2.5 mg, Intravenous, Q6H PRN, Fouzia Desai MD, 2.5 mg at 03/22/24 1235    sodium chloride tablet 1 g, 1 g, Oral, TID With Meals, Avelino Benson MD, 1 g at 03/30/24 1707    sucralfate (CARAFATE) tablet 1 g, 1 g, Oral, 4x Daily AC & at Bedtime, Fouzia Desai MD, 1 g at 03/31/24 0651  Review of Systems:    The following systems were reviewed and negative;  constitution and gastrointestinal    Objective     Vital Signs  Temp:  [98.3 °F (36.8 °C)-99.1 °F (37.3 °C)] 99.1 °F (37.3 °C)  Heart Rate:  [70-89] 70  Resp:  [16-18] 18  BP: (122-150)/(61-88) 143/68  Body mass index is 20.76 kg/m².    Intake/Output Summary (Last 24 hours) at 3/31/2024 0737  Last data filed at 3/30/2024 1727  Gross per 24 hour   Intake 1054 ml   Output 1400 ml   Net -346 ml     No intake/output data recorded.     Physical Exam:   General: patient awake, alert and cooperative   Eyes: Normal lids and lashes, no scleral icterus   Neck: supple, normal ROM   Skin: warm and dry, not jaundiced   Cardiovascular: regular rhythm and rate, no  murmurs auscultated   Pulm: clear to auscultation bilaterally, regular and unlabored   Abdomen: soft, nontender, nondistended; normal bowel sounds   Extremities: no rash or edema   Psychiatric: Normal mood and behavior; memory intact     Results Review:     I reviewed the patient's new clinical results.    Results from last 7 days   Lab Units 03/31/24  0555 03/31/24  0032 03/30/24  1613 03/30/24  0717 03/29/24  1553 03/29/24  0639   WBC 10*3/mm3 6.75  --   --  8.02  --  9.44   HEMOGLOBIN g/dL 7.6* 7.0* 7.9* 7.5*   < > 8.0*  8.0*   HEMATOCRIT % 22.2* 20.7* 23.3* 21.8*   < > 23.0*  23.0*   PLATELETS 10*3/mm3 314  --   --  304  --  278    < > = values in this interval not displayed.     Results from last 7 days   Lab Units 03/31/24  0555 03/30/24  0607 03/29/24  0639 03/26/24  0804 03/25/24  0431   SODIUM mmol/L 127* 127* 128*   < > 130*   POTASSIUM mmol/L 4.5 4.5 4.1   < > 3.9   CHLORIDE mmol/L 102 103 100   < > 103   CO2 mmol/L 18.0* 18.0* 18.7*   < > 19.2*   BUN mg/dL 66* 66* 69*   < > 83*   CREATININE mg/dL 1.26* 1.40* 1.32*   < > 1.44*   CALCIUM mg/dL 7.2* 7.1* 7.6*   < > 7.2*   BILIRUBIN mg/dL  --   --   --   --  0.3   ALK PHOS U/L  --   --   --   --  69   ALT (SGPT) U/L  --   --   --   --  12   AST (SGOT) U/L  --   --   --   --  16   GLUCOSE mg/dL 122* 114* 105*   < > 108*    < > = values in this interval not displayed.         Lab Results   Lab Value Date/Time    LIPASE 19 03/25/2024 0431       Radiology:  US Renal Bilateral   Final Result      CT Abdomen Pelvis Without Contrast   Final Result       1.  Acute displaced comminuted right hip periprosthetic acetabular   fracture, as described in detail above. Corresponding asymmetric   enlargement of the right iliacus muscle is most consistent with a   corresponding intramuscular hematoma.   2.  Moderate bilateral hydroureteronephrosis with diffuse high   attenuation throughout the renal collecting systems, which could be due   to residual intravenous contrast  from recent contrast-enhanced   examination a few days ago in the setting of poor renal function.   However, given reported gross hematuria, blood products are not   excluded.    3.  Marked bladder distention despite presence of a Gamez catheter.   Correlate with Gamez catheter function.    4.  Diffuse bladder wall thickening and adjacent fat stranding raise   concern for possible cystitis. Correlate with urinalysis. Additionally,   given the presence of adjacent osseous trauma, bladder injury cannot be   entirely excluded.   5.  Relatively diffuse mesenteric fat stranding, most pronounced in the   upper abdomen. Findings could reflect the patient's fluid status.   However, correlate with serum lipase given the more focal stranding   within the upper abdomen to exclude possible pancreatitis.       This report was finalized on 3/23/2024 5:23 PM by Dr. Myra Hernandez M.D   on Workstation: [id number]              Assessment & Plan     Active Hospital Problems    Diagnosis     **Severe anemia     Moderate malnutrition     Duodenal ulcer     Periprosthetic fracture around internal prosthetic right hip joint     MARNIE (acute kidney injury)     CAD (coronary artery disease)     DM (diabetes mellitus), type 2     Melena     Hyponatremia     Heart failure with preserved ejection fraction, borderline, class II        Assessment:  Severe anemia  Grade D erosive esophagitis  Duodenal ulcer with active bleeding status post epinephrine injection and cauterization on 3/22  MARNIE      Plan:  Hemoglobin stable-continue to follow. no evidence of rebleeding at this time.  Last transfused 3/27.  Continue regular diet  Continue twice daily PPI, Carafate  Check H. pylori stool antigen  Counseled patient to avoid NSAIDs.    I discussed the patients findings and my recommendations with patient, family, and nursing staff.            Gissell Miranda M.D.  [facility]  [phone]

## 2024-03-31 NOTE — PLAN OF CARE
Goal Outcome Evaluation:  Plan of Care Reviewed With: patient              VSS, c/o pain in right hip , has been refusing to be turned, refusing SCD's , no evidence of bleeding , no BM this evening , urine is yellow and cloudy, HBG 7.0, HCT 30, has been chronic low this admission , Adequate urine output.

## 2024-03-31 NOTE — PROGRESS NOTES
Name: Arline Landaverde ADMIT: 3/21/2024   : 1950  PCP: Lanie Gomez APRN    MRN: [id number] LOS: 10 days   AGE/SEX: 73 y.o. female  ROOM: [facility]     Subjective   Subjective   Appeared to be sleeping and did not awaken. Family at bedside states she has a lot of pain in her hip. Hydrocodone/acetaminophen added yesterday and that helped.     Objective   Objective   Vital Signs  Temp:  [98.3 °F (36.8 °C)-99.1 °F (37.3 °C)] 98.3 °F (36.8 °C)  Heart Rate:  [70-89] 82  Resp:  [16-18] 16  BP: (122-150)/(61-91) 140/65  SpO2:  [96 %-98 %] 98 %  on   ;   Device (Oxygen Therapy): room air  Body mass index is 20.76 kg/m².    Physical Exam  Constitutional:       General: She is not in acute distress.     Appearance: She is ill-appearing. She is not toxic-appearing.   Cardiovascular:      Rate and Rhythm: Normal rate and regular rhythm.   Pulmonary:      Breath sounds: Normal breath sounds. No wheezing or rhonchi.   Abdominal:      Palpations: Abdomen is soft.      Tenderness: There is no abdominal tenderness.   Musculoskeletal:         General: No swelling.   Skin:     General: Skin is warm and dry.     Results Review  I reviewed the patient's new clinical results.  Results from last 7 days   Lab Units 24  0555 24  0032 24  1613 24  0717 24  1553 24  0639 24  1647 24  0618   WBC 10*3/mm3 6.75  --   --  8.02  --  9.44  --  10.98*   HEMOGLOBIN g/dL 7.6* 7.0* 7.9* 7.5*   < > 8.0*  8.0*   < > 7.6*   PLATELETS 10*3/mm3 314  --   --  304  --  278  --  271    < > = values in this interval not displayed.     Results from last 7 days   Lab Units 24  0555 24  0607 24  0639 24  0618   SODIUM mmol/L 127* 127* 128* 127*   POTASSIUM mmol/L 4.5 4.5 4.1 4.2   CHLORIDE mmol/L 102 103 100 102   CO2 mmol/L 18.0* 18.0* 18.7* 17.2*   BUN mg/dL 66* 66* 69* 64*   CREATININE mg/dL 1.26* 1.40* 1.32* 1.24*   GLUCOSE mg/dL 122* 114* 105* 140*     Lab Results   Component  Value Date    ANIONGAP 7.0 03/31/2024     Estimated Creatinine Clearance: 33.9 mL/min (A) (by C-G formula based on SCr of 1.26 mg/dL (H)).   Lab Results   Component Value Date    EGFR 44.9 (L) 03/31/2024     Results from last 7 days   Lab Units 03/31/24  0555 03/30/24  0607 03/29/24  0639 03/28/24  0618 03/26/24  0804 03/25/24  0431   ALBUMIN g/dL 2.3* 2.1* 2.2* 2.0*   < > 2.2*   BILIRUBIN mg/dL  --   --   --   --   --  0.3   ALK PHOS U/L  --   --   --   --   --  69   AST (SGOT) U/L  --   --   --   --   --  16   ALT (SGPT) U/L  --   --   --   --   --  12    < > = values in this interval not displayed.     Results from last 7 days   Lab Units 03/31/24  0555 03/31/24  0032 03/30/24  0607 03/29/24  0639 03/28/24  0618 03/26/24  0804 03/25/24  0431   CALCIUM mg/dL 7.2*  --  7.1* 7.6* 7.3*   < > 7.2*   ALBUMIN g/dL 2.3*  --  2.1* 2.2* 2.0*   < > 2.2*   MAGNESIUM mg/dL  --  2.5* 1.4*  --   --   --  2.4   PHOSPHORUS mg/dL 2.9  --  3.1 3.0 2.3*   < > 2.5    < > = values in this interval not displayed.           Glucose   Date/Time Value Ref Range Status   03/31/2024 1042 252 (H) 70 - 130 mg/dL Final   03/31/2024 0725 150 (H) 70 - 130 mg/dL Final   03/31/2024 0549 141 (H) 70 - 130 mg/dL Final   03/30/2024 1958 260 (H) 70 - 130 mg/dL Final   03/30/2024 1609 208 (H) 70 - 130 mg/dL Final   03/30/2024 1104 235 (H) 70 - 130 mg/dL Final   03/30/2024 0708 123 70 - 130 mg/dL Final       No radiology results for the last day    Scheduled Meds  amLODIPine, 5 mg, Oral, Q24H  insulin lispro, 2-7 Units, Subcutaneous, 4x Daily AC & at Bedtime  Menthol-Zinc Oxide, 1 Application, Topical, Q12H  pantoprazole, 40 mg, Oral, BID AC  senna-docusate sodium, 2 tablet, Oral, BID  sodium chloride, 1 g, Oral, TID With Meals  sucralfate, 1 g, Oral, 4x Daily AC & at Bedtime    Continuous Infusions     PRN Meds    acetaminophen **OR** acetaminophen    aluminum-magnesium hydroxide-simethicone    senna-docusate sodium **AND** polyethylene glycol **AND**  bisacodyl **AND** bisacodyl    Calcium Replacement - Follow Nurse / BPA Driven Protocol    dextrose    dextrose    glucagon (human recombinant)    HYDROcodone-acetaminophen    Magnesium Standard Dose Replacement - Follow Nurse / BPA Driven Protocol    nitroglycerin    ondansetron ODT **OR** ondansetron    phenol    Phosphorus Replacement - Follow Nurse / BPA Driven Protocol    Potassium Replacement - Follow Nurse / BPA Driven Protocol    prochlorperazine       Diet  Diet: Regular/House, Diabetic, Gastrointestinal; Consistent Carbohydrate; Fiber-Restricted; Fluid Consistency: Thin (IDDSI 0)    I have personally reviewed:  [x]  Medications  [x]  Laboratory   []  Microbiology   []  Radiology   [x]  EKG/Telemetry   []  Cardiology/Vascular   []  Pathology   []  Records     Results for orders placed during the hospital encounter of 01/02/24    Adult Transthoracic Echo Limited W/ Cont if Necessary Per Protocol    Interpretation Summary    Left ventricular systolic function is normal. Calculated left ventricular EF = 66.3% Abnormal global longitudinal LV strain (GLS) = -16.5%. Left ventricle strain data was reviewed by the physician. Normal left ventricular cavity size noted. Left ventricular wall thickness is consistent with borderline concentric hypertrophy. Left ventricular diastolic function is consistent with (grade I) impaired relaxation. The myocardium is bright and speckled. Global longitudinal strain is only slightly abnormal, and the pattern is not consistent with amyloidosis.    There is a trivial pericardial effusion.        Assessment/Plan     Active Hospital Problems    Diagnosis  POA    **Severe anemia [D64.9]  Yes    Moderate malnutrition [E44.0]  Yes    Duodenal ulcer [K26.9]  Unknown    Periprosthetic fracture around internal prosthetic right hip joint [M97.01XA]  Not Applicable    MARNIE (acute kidney injury) [N17.9]  Unknown    CAD (coronary artery disease) [I25.10]  Unknown    DM (diabetes mellitus), type  2 [E11.9]  Unknown    Melena [K92.1]  Unknown    Hyponatremia [E87.1]  Yes    Heart failure with preserved ejection fraction, borderline, class II [I50.30]  Yes      Resolved Hospital Problems   No resolved problems to display.     73 y.o. female transferred from [facility] for anemia, hyperkalemia, hyponatremia. Presented there with altered mental status/weakness, hematuria, dark stools.     Severe anemia  Grade D erosive esophagitis  Duodenal ulcer with active bleeding  GI feels pancreatitis on CT is false positive  Status post EGD epinephrine and cauterization 3/22/2024  PPI, Carafate  Hemoglobin seems to be stable no definite rebleed.   Continue to monitor serially    Acute kidney injury  Hyponatremia  Hyperkalemia  Nephrology following: Salt tablets    Urinary retention  Hydronephrosis  Agmez replaced for UR 3/29/2024  VT once ambulatory or cysto with VT outpatient 1-2 weeks    Right hip periprosthetic acetabular fracture  Would require conversion to total hip arthroplasty and also fixation of acetabular fracture when optimized. High risk. Ortho following  lortab for pain    DM2  A1c 5.30% 8 months ago, recheck 5.90%  had backed off to low dose correctional- no hypoglycemia    CAD  History of chronic diastolic heart failure  History of stroke    DVT prophylaxis  SCDs    Discharge  TBD  Expected discharge date/ time has not been documented.    Discussed with family and nursing staff and Dr. Benson-agree prognosis is poor. Discussed with family at bedside multiple medical problems and goals of care and prognosis. Also discussed comfort care if surgery remains not an option. Will ask palliative care to see tomorrow.    Harman Albarran MD  [facility]  03/31/24

## 2024-03-31 NOTE — PLAN OF CARE
Goal Outcome Evaluation:   Pt's hgb remains stable.  Protonix converted to PO BID.  Pain from R hip fx poorly managed, and pt remains extremely weak. Pt to have palliative care consult tomorrow to discuss goals of care.

## 2024-04-01 ENCOUNTER — APPOINTMENT (OUTPATIENT)
Dept: CARDIOLOGY | Facility: HOSPITAL | Age: 74
DRG: 377 | End: 2024-04-01
Payer: MEDICARE

## 2024-04-01 LAB
ALBUMIN SERPL-MCNC: 2.2 G/DL (ref 3.5–5.2)
ANION GAP SERPL CALCULATED.3IONS-SCNC: 7 MMOL/L (ref 5–15)
BASOPHILS # BLD AUTO: 0.08 10*3/MM3 (ref 0–0.2)
BASOPHILS NFR BLD AUTO: 1.2 % (ref 0–1.5)
BH CV LOWER VASCULAR LEFT COMMON FEMORAL AUGMENT: NORMAL
BH CV LOWER VASCULAR LEFT COMMON FEMORAL COMPETENT: NORMAL
BH CV LOWER VASCULAR LEFT COMMON FEMORAL COMPRESS: NORMAL
BH CV LOWER VASCULAR LEFT COMMON FEMORAL PHASIC: NORMAL
BH CV LOWER VASCULAR LEFT COMMON FEMORAL SPONT: NORMAL
BH CV LOWER VASCULAR RIGHT COMMON FEMORAL AUGMENT: NORMAL
BH CV LOWER VASCULAR RIGHT COMMON FEMORAL COMPETENT: NORMAL
BH CV LOWER VASCULAR RIGHT COMMON FEMORAL COMPRESS: NORMAL
BH CV LOWER VASCULAR RIGHT COMMON FEMORAL PHASIC: NORMAL
BH CV LOWER VASCULAR RIGHT COMMON FEMORAL SPONT: NORMAL
BH CV LOWER VASCULAR RIGHT DISTAL FEMORAL COMPRESS: NORMAL
BH CV LOWER VASCULAR RIGHT GASTRONEMIUS COMPRESS: NORMAL
BH CV LOWER VASCULAR RIGHT GREATER SAPH AK COMPRESS: NORMAL
BH CV LOWER VASCULAR RIGHT GREATER SAPH BK COMPRESS: NORMAL
BH CV LOWER VASCULAR RIGHT LESSER SAPH COMPRESS: NORMAL
BH CV LOWER VASCULAR RIGHT MID FEMORAL AUGMENT: NORMAL
BH CV LOWER VASCULAR RIGHT MID FEMORAL COMPETENT: NORMAL
BH CV LOWER VASCULAR RIGHT MID FEMORAL COMPRESS: NORMAL
BH CV LOWER VASCULAR RIGHT MID FEMORAL PHASIC: NORMAL
BH CV LOWER VASCULAR RIGHT MID FEMORAL SPONT: NORMAL
BH CV LOWER VASCULAR RIGHT PERONEAL COMPRESS: NORMAL
BH CV LOWER VASCULAR RIGHT POPLITEAL AUGMENT: NORMAL
BH CV LOWER VASCULAR RIGHT POPLITEAL COMPETENT: NORMAL
BH CV LOWER VASCULAR RIGHT POPLITEAL COMPRESS: NORMAL
BH CV LOWER VASCULAR RIGHT POPLITEAL PHASIC: NORMAL
BH CV LOWER VASCULAR RIGHT POPLITEAL SPONT: NORMAL
BH CV LOWER VASCULAR RIGHT POSTERIOR TIBIAL COMPRESS: NORMAL
BH CV LOWER VASCULAR RIGHT PROFUNDA FEMORAL COMPRESS: NORMAL
BH CV LOWER VASCULAR RIGHT PROXIMAL FEMORAL COMPRESS: NORMAL
BH CV LOWER VASCULAR RIGHT SAPHENOFEMORAL JUNCTION COMPRESS: NORMAL
BUN SERPL-MCNC: 62 MG/DL (ref 8–23)
BUN/CREAT SERPL: 47.3 (ref 7–25)
CALCIUM SPEC-SCNC: 7.4 MG/DL (ref 8.6–10.5)
CHLORIDE SERPL-SCNC: 101 MMOL/L (ref 98–107)
CO2 SERPL-SCNC: 19 MMOL/L (ref 22–29)
CREAT SERPL-MCNC: 1.31 MG/DL (ref 0.57–1)
DEPRECATED RDW RBC AUTO: 45 FL (ref 37–54)
EGFRCR SERPLBLD CKD-EPI 2021: 42.8 ML/MIN/1.73
EOSINOPHIL # BLD AUTO: 0.44 10*3/MM3 (ref 0–0.4)
EOSINOPHIL NFR BLD AUTO: 6.4 % (ref 0.3–6.2)
ERYTHROCYTE [DISTWIDTH] IN BLOOD BY AUTOMATED COUNT: 13.9 % (ref 12.3–15.4)
GLUCOSE BLDC GLUCOMTR-MCNC: 117 MG/DL (ref 70–130)
GLUCOSE BLDC GLUCOMTR-MCNC: 123 MG/DL (ref 70–130)
GLUCOSE BLDC GLUCOMTR-MCNC: 153 MG/DL (ref 70–130)
GLUCOSE BLDC GLUCOMTR-MCNC: 200 MG/DL (ref 70–130)
GLUCOSE BLDC GLUCOMTR-MCNC: 213 MG/DL (ref 70–130)
GLUCOSE SERPL-MCNC: 100 MG/DL (ref 65–99)
HCT VFR BLD AUTO: 21.8 % (ref 34–46.6)
HGB BLD-MCNC: 7.3 G/DL (ref 12–15.9)
IMM GRANULOCYTES # BLD AUTO: 0.04 10*3/MM3 (ref 0–0.05)
IMM GRANULOCYTES NFR BLD AUTO: 0.6 % (ref 0–0.5)
LYMPHOCYTES # BLD AUTO: 1.18 10*3/MM3 (ref 0.7–3.1)
LYMPHOCYTES NFR BLD AUTO: 17.1 % (ref 19.6–45.3)
MCH RBC QN AUTO: 29.9 PG (ref 26.6–33)
MCHC RBC AUTO-ENTMCNC: 33.5 G/DL (ref 31.5–35.7)
MCV RBC AUTO: 89.3 FL (ref 79–97)
MONOCYTES # BLD AUTO: 0.77 10*3/MM3 (ref 0.1–0.9)
MONOCYTES NFR BLD AUTO: 11.2 % (ref 5–12)
NEUTROPHILS NFR BLD AUTO: 4.38 10*3/MM3 (ref 1.7–7)
NEUTROPHILS NFR BLD AUTO: 63.5 % (ref 42.7–76)
NRBC BLD AUTO-RTO: 0 /100 WBC (ref 0–0.2)
PHOSPHATE SERPL-MCNC: 3.4 MG/DL (ref 2.5–4.5)
PLATELET # BLD AUTO: 330 10*3/MM3 (ref 140–450)
PMV BLD AUTO: 9 FL (ref 6–12)
POTASSIUM SERPL-SCNC: 4.9 MMOL/L (ref 3.5–5.2)
RBC # BLD AUTO: 2.44 10*6/MM3 (ref 3.77–5.28)
SODIUM SERPL-SCNC: 127 MMOL/L (ref 136–145)
WBC NRBC COR # BLD AUTO: 6.89 10*3/MM3 (ref 3.4–10.8)

## 2024-04-01 PROCEDURE — 85025 COMPLETE CBC W/AUTO DIFF WBC: CPT | Performed by: INTERNAL MEDICINE

## 2024-04-01 PROCEDURE — 80069 RENAL FUNCTION PANEL: CPT | Performed by: INTERNAL MEDICINE

## 2024-04-01 PROCEDURE — 93971 EXTREMITY STUDY: CPT

## 2024-04-01 PROCEDURE — 99232 SBSQ HOSP IP/OBS MODERATE 35: CPT | Performed by: INTERNAL MEDICINE

## 2024-04-01 PROCEDURE — 93971 EXTREMITY STUDY: CPT | Performed by: SURGERY

## 2024-04-01 PROCEDURE — 82948 REAGENT STRIP/BLOOD GLUCOSE: CPT

## 2024-04-01 PROCEDURE — 63710000001 INSULIN LISPRO (HUMAN) PER 5 UNITS: Performed by: HOSPITALIST

## 2024-04-01 RX ADMIN — SODIUM CHLORIDE TAB 1 GM 1 G: 1 TAB at 12:14

## 2024-04-01 RX ADMIN — INSULIN LISPRO 2 UNITS: 100 INJECTION, SOLUTION INTRAVENOUS; SUBCUTANEOUS at 12:14

## 2024-04-01 RX ADMIN — SODIUM CHLORIDE TAB 1 GM 1 G: 1 TAB at 08:26

## 2024-04-01 RX ADMIN — Medication 1 APPLICATION: at 22:12

## 2024-04-01 RX ADMIN — SENNOSIDES AND DOCUSATE SODIUM 2 TABLET: 50; 8.6 TABLET ORAL at 21:51

## 2024-04-01 RX ADMIN — SODIUM CHLORIDE TAB 1 GM 1 G: 1 TAB at 18:28

## 2024-04-01 RX ADMIN — PANTOPRAZOLE SODIUM 40 MG: 40 TABLET, DELAYED RELEASE ORAL at 06:39

## 2024-04-01 RX ADMIN — INSULIN LISPRO 2 UNITS: 100 INJECTION, SOLUTION INTRAVENOUS; SUBCUTANEOUS at 18:28

## 2024-04-01 RX ADMIN — HYDROCODONE BITARTRATE AND ACETAMINOPHEN 1 TABLET: 7.5; 325 TABLET ORAL at 08:38

## 2024-04-01 RX ADMIN — SUCRALFATE 1 G: 1 TABLET ORAL at 06:39

## 2024-04-01 RX ADMIN — SUCRALFATE 1 G: 1 TABLET ORAL at 18:28

## 2024-04-01 RX ADMIN — INSULIN LISPRO 3 UNITS: 100 INJECTION, SOLUTION INTRAVENOUS; SUBCUTANEOUS at 21:51

## 2024-04-01 RX ADMIN — Medication 1 APPLICATION: at 08:26

## 2024-04-01 RX ADMIN — HYDROCODONE BITARTRATE AND ACETAMINOPHEN 1 TABLET: 7.5; 325 TABLET ORAL at 01:35

## 2024-04-01 RX ADMIN — HYDROCODONE BITARTRATE AND ACETAMINOPHEN 1 TABLET: 7.5; 325 TABLET ORAL at 21:51

## 2024-04-01 RX ADMIN — HYDROCODONE BITARTRATE AND ACETAMINOPHEN 1 TABLET: 7.5; 325 TABLET ORAL at 15:21

## 2024-04-01 RX ADMIN — SUCRALFATE 1 G: 1 TABLET ORAL at 21:51

## 2024-04-01 RX ADMIN — PANTOPRAZOLE SODIUM 40 MG: 40 TABLET, DELAYED RELEASE ORAL at 18:28

## 2024-04-01 RX ADMIN — SUCRALFATE 1 G: 1 TABLET ORAL at 12:14

## 2024-04-01 RX ADMIN — AMLODIPINE BESYLATE 5 MG: 5 TABLET ORAL at 08:26

## 2024-04-01 RX ADMIN — SENNOSIDES AND DOCUSATE SODIUM 2 TABLET: 50; 8.6 TABLET ORAL at 08:26

## 2024-04-01 NOTE — PROGRESS NOTES
"Nutrition Services    Patient Name:  Arline Landaverde  YOB: 1950  MRN: [id number]  Admit Date:  3/21/2024  Assessment Date:  04/01/24    Summary: Follow up   Visited pt this am, Family was feeding her some breakfast..   Weight  125lb BMI 21.52.   Labs Na 127, glu 100, Bun 62, cr 1.31  Po intake 0-100%, drinking supplements  Remains on 1200cc fluid restriction.  Skin: coccyx area stage 1    Plan/Recommendation  Will continue to encourage good po intake with all meals  Supplements BID ordered  RD to follow    CLINICAL NUTRITION ASSESSMENT      Reason for Assessment Follow-up Protocol     Diagnosis/Problem   Anemia, MARNIE, acetabular fracture , hyponatremia   Medical/Surgical History Past Medical History:   Diagnosis Date    Acute CVA (cerebrovascular accident) 07/20/2023    Chronic hyponatremia     Classical migraine without intractable migraine 07/20/2023    Diabetes mellitus     Elevated BUN 12/19/2023    Expressive aphasia 07/20/2023    Former cigarette smoker     Grade II diastolic dysfunction     History of stroke 12/05/2023    Hypoalbuminemia 12/2023    SIADH (syndrome of inappropriate ADH production)     Stroke     Type 2 myocardial infarction 12/18/2023       Past Surgical History:   Procedure Laterality Date    BACK SURGERY      CHOLECYSTECTOMY      ENDOSCOPY N/A 3/22/2024    Procedure: ESOPHAGOGASTRODUODENOSCOPY AT BEDSIDE with epi injection, heater probe;  Surgeon: Gissell Miranda MD;  Location: [facility] ENDOSCOPY;  Service: Gastroenterology;  Laterality: N/A;  duodenal ulcer with visible vessel, errosive esophagitis    HIP HEMIARTHROPLASTY Right 4/19/2023    Procedure: HIP HEMIARTHROPLASTY ANTERIOR;  Surgeon: Kumar Bonilla MD;  Location: [facility] OR;  Service: Orthopedics;  Laterality: Right;        Anthropometrics        Current Height  Current Weight  BMI kg/m2 Height: 162.6 cm (64.02\")  Weight: 56.9 kg (125 lb 7.1 oz) (04/01/24 0600)  Body mass index is 21.52 kg/m².   Adjusted BMI (if " applicable)    BMI Category Normal/Healthy (18.4 - 24.9)   Ideal Body Weight (IBW) 126   Usual Body Weight (UBW) 120's   Weight Trend Loss, Gain   Weight History Wt Readings from Last 30 Encounters:   04/01/24 0600 56.9 kg (125 lb 7.1 oz)   03/31/24 0500 54.9 kg (121 lb 0.5 oz)   03/29/24 0600 54 kg (119 lb 0.8 oz)   03/28/24 0453 54.2 kg (119 lb 7.8 oz)   03/27/24 0505 54.8 kg (120 lb 13 oz)   03/26/24 0600 42.4 kg (93 lb 7.6 oz)   03/25/24 0433 50.7 kg (111 lb 12.4 oz)   03/23/24 0520 52.8 kg (116 lb 6.5 oz)   03/22/24 0546 56.3 kg (124 lb 1.9 oz)   03/21/24 1700 54.4 kg (120 lb)   03/21/24 0850 54.8 kg (120 lb 12.8 oz)   03/11/24 0524 57.6 kg (127 lb)   01/11/24 0829 58 kg (127 lb 12.8 oz)   12/18/23 1517 55.6 kg (122 lb 9.6 oz)   12/10/23 1425 54.3 kg (119 lb 11.6 oz)   12/09/23 0608 54.3 kg (119 lb 12.8 oz)   12/08/23 0645 55.1 kg (121 lb 6.4 oz)   12/07/23 1542 54.3 kg (119 lb 12.8 oz)   12/07/23 0500 53 kg (116 lb 12.4 oz)   12/06/23 0825 52.6 kg (116 lb)   12/05/23 1141 52.7 kg (116 lb 1.6 oz)   08/07/23 0804 51.3 kg (113 lb)   07/20/23 0853 55.3 kg (122 lb)   07/18/23 1800 55.6 kg (122 lb 9.6 oz)   07/07/23 0836 47.2 kg (104 lb)   05/26/23 0831 47.2 kg (104 lb)   04/28/23 0829 47.2 kg (104 lb)   04/19/23 0443 47.3 kg (104 lb 3.2 oz)   04/19/23 0240 64.9 kg (143 lb)   03/08/23 1304 64.9 kg (143 lb)   04/26/22 1529 64.9 kg (143 lb)   04/13/22 1019 63.5 kg (140 lb)   06/26/14 1559 72.7 kg (160 lb 4 oz)   06/10/14 1429 72.6 kg (160 lb 0.2 oz)      --  Labs       Pertinent Labs    Results from last 7 days   Lab Units 04/01/24  0540 03/31/24  0555 03/30/24  0607   SODIUM mmol/L 127* 127* 127*   POTASSIUM mmol/L 4.9 4.5 4.5   CHLORIDE mmol/L 101 102 103   CO2 mmol/L 19.0* 18.0* 18.0*   BUN mg/dL 62* 66* 66*   CREATININE mg/dL 1.31* 1.26* 1.40*   CALCIUM mg/dL 7.4* 7.2* 7.1*   GLUCOSE mg/dL 100* 122* 114*     Results from last 7 days   Lab Units 04/01/24  0540 03/31/24  0555 03/31/24  0032 03/30/24  0717  03/30/24  0607   MAGNESIUM mg/dL  --   --  2.5*  --  1.4*   PHOSPHORUS mg/dL 3.4   < >  --   --  3.1   HEMOGLOBIN g/dL 7.3*   < > 7.0*   < >  --    HEMATOCRIT % 21.8*   < > 20.7*   < >  --    WBC 10*3/mm3 6.89   < >  --    < >  --    ALBUMIN g/dL 2.2*   < >  --   --  2.1*    < > = values in this interval not displayed.     Results from last 7 days   Lab Units 04/01/24  0540 03/31/24  0555 03/30/24  0717 03/29/24  0639 03/28/24  0618   PLATELETS 10*3/mm3 330 314 304 278 271     COVID19   Date Value Ref Range Status   12/05/2023 Not Detected Not Detected - Ref. Range Final     Lab Results   Component Value Date    HGBA1C 5.90 (H) 03/26/2024          Medications           Scheduled Medications amLODIPine, 5 mg, Oral, Q24H  insulin lispro, 2-7 Units, Subcutaneous, 4x Daily AC & at Bedtime  Menthol-Zinc Oxide, 1 Application, Topical, Q12H  pantoprazole, 40 mg, Oral, BID AC  senna-docusate sodium, 2 tablet, Oral, BID  sodium chloride, 1 g, Oral, TID With Meals  sucralfate, 1 g, Oral, 4x Daily AC & at Bedtime       Infusions       PRN Medications   acetaminophen **OR** acetaminophen    aluminum-magnesium hydroxide-simethicone    senna-docusate sodium **AND** polyethylene glycol **AND** bisacodyl **AND** bisacodyl    Calcium Replacement - Follow Nurse / BPA Driven Protocol    dextrose    dextrose    glucagon (human recombinant)    HYDROcodone-acetaminophen    Magnesium Standard Dose Replacement - Follow Nurse / BPA Driven Protocol    nitroglycerin    ondansetron ODT **OR** ondansetron    phenol    Phosphorus Replacement - Follow Nurse / BPA Driven Protocol    Potassium Replacement - Follow Nurse / BPA Driven Protocol    prochlorperazine     Physical Findings          General Findings confused, disoriented, flat affect, room air   Oral/Mouth Cavity tooth or teeth missing   Edema  1+ (trace)   Gastrointestinal fecal incontinence, last bowel movement: 3/24   Skin  pressure injury: stage 1 coccyx   Tubes/Drains/Lines none    NFPE See Malnutrition Severity Assessment, Date Completed: 3/26   --  Malnutrition Severity Assessment      Patient meets criteria for : Moderate (non-severe) Malnutrition           Estimated/Assessed Needs        Current Weight  Weight: 56.9 kg (125 lb 7.1 oz) (04/01/24 0600)       Energy Requirements    Weight for Calculation 42.4 kg   Method for Estimation  35 kcal/kg   EST Needs (kcal/day) 1484       Protein Requirements    Weight for Calculation 42.4 kg   EST Protein Needs (g/kg) 1.2 - 1.5 gm/kg   EST Daily Needs (g/day) 50-63       Fluid Requirements     Method for Estimation 1 mL/kcal    EST Needs (mL/day)      Current Nutrition Orders & Evaluation of Intake       Oral Nutrition     Food Allergies NKFA   Current PO Diet Diet: Regular/House, Diabetic, Gastrointestinal; Consistent Carbohydrate; Fiber-Restricted; Fluid Consistency: Thin (IDDSI 0)   Supplement Boost Glucose Control   PO Evaluation     % PO Intake <75% (0-100%)    Factors Affecting Intake: altered GI function, decreased appetite, early satiety    --  PES STATEMENT / NUTRITION DIAGNOSIS      Nutrition Dx Problem  Problem: Malnutrition (moderate), Altered GI Function, and Inadequate Oral Intake  Etiology: Medical Diagnosis - anemia    Signs/Symptoms: PO intake, NFPE Results, BMI, and Report/Observation     NUTRITION INTERVENTION / PLAN OF CARE      Intervention Goal(s) Maintain nutrition status, Reduce/improve symptoms, Meet estimated needs, Disease management/therapy, Tolerate PO , Increase intake, Advance diet, Appropriate weight gain, and PO intake goal %: 75+         RD Intervention/Action Interview for preferences, Encourage intake, Continue to monitor, Care plan reviewed, and Recommend/order: ONS   --      Prescription/Orders:       PO Diet       Supplements Boost/Ensure--chocolate      Enteral Nutrition       Parenteral Nutrition    New Prescription Ordered? Yes   --      Monitor/Evaluation Per protocol   Discharge Plan/Needs Pending  clinical course   --    RD to follow per protocol.      Electronically signed by:  Nat Jha RD  04/01/24 10:35 EDT

## 2024-04-01 NOTE — PLAN OF CARE
Goal Outcome Evaluation:  Plan of Care Reviewed With: patient   Care plan still in progress

## 2024-04-01 NOTE — NURSING NOTE
Call place to FARZAD Timmons  with [facility] , pt has been Bed bound , refusing turning , and c/o right knee and calf pain , asked if she would want a bilateral doppler study. Orders placed routine , pain med's given

## 2024-04-01 NOTE — PROGRESS NOTES
Name: Arline Landaverde ADMIT: 3/21/2024   : 1950  PCP: Lanie Gomez APRN    MRN: [id number] LOS: 11 days   AGE/SEX: 73 y.o. female  ROOM: [facility]     Subjective   Subjective   Right lower leg pain today.     Objective   Objective   Vital Signs  Temp:  [98.1 °F (36.7 °C)-98.5 °F (36.9 °C)] 98.4 °F (36.9 °C)  Heart Rate:  [72-91] 91  Resp:  [13-18] 13  BP: (135-156)/(75-95) 156/77  SpO2:  [95 %-100 %] 97 %  on  Flow (L/min):  [2] 2;   Device (Oxygen Therapy): room air  Body mass index is 21.52 kg/m².    Physical Exam  Constitutional:       General: She is not in acute distress.     Appearance: She is ill-appearing. She is not toxic-appearing.   Cardiovascular:      Rate and Rhythm: Normal rate and regular rhythm.   Pulmonary:      Breath sounds: Normal breath sounds. No wheezing or rhonchi.   Abdominal:      Palpations: Abdomen is soft.      Tenderness: There is no abdominal tenderness.   Musculoskeletal:         General: No swelling.   Skin:     General: Skin is warm and dry.     Results Review  I reviewed the patient's new clinical results.  Results from last 7 days   Lab Units 24  0540 24  0555 24  0032 24  1613 24  0717 24  1553 24  0639   WBC 10*3/mm3 6.89 6.75  --   --  8.02  --  9.44   HEMOGLOBIN g/dL 7.3* 7.6* 7.0* 7.9* 7.5*   < > 8.0*  8.0*   PLATELETS 10*3/mm3 330 314  --   --  304  --  278    < > = values in this interval not displayed.     Results from last 7 days   Lab Units 24  0540 24  0555 24  0607 24  0639   SODIUM mmol/L 127* 127* 127* 128*   POTASSIUM mmol/L 4.9 4.5 4.5 4.1   CHLORIDE mmol/L 101 102 103 100   CO2 mmol/L 19.0* 18.0* 18.0* 18.7*   BUN mg/dL 62* 66* 66* 69*   CREATININE mg/dL 1.31* 1.26* 1.40* 1.32*   GLUCOSE mg/dL 100* 122* 114* 105*     Lab Results   Component Value Date    ANIONGAP 7.0 2024     Estimated Creatinine Clearance: 33.8 mL/min (A) (by C-G formula based on SCr of 1.31 mg/dL (H)).   Lab  Results   Component Value Date    EGFR 42.8 (L) 04/01/2024     Results from last 7 days   Lab Units 04/01/24  0540 03/31/24  0555 03/30/24  0607 03/29/24  0639   ALBUMIN g/dL 2.2* 2.3* 2.1* 2.2*     Results from last 7 days   Lab Units 04/01/24  0540 03/31/24  0555 03/31/24  0032 03/30/24  0607 03/29/24  0639   CALCIUM mg/dL 7.4* 7.2*  --  7.1* 7.6*   ALBUMIN g/dL 2.2* 2.3*  --  2.1* 2.2*   MAGNESIUM mg/dL  --   --  2.5* 1.4*  --    PHOSPHORUS mg/dL 3.4 2.9  --  3.1 3.0           Glucose   Date/Time Value Ref Range Status   04/01/2024 1136 213 (H) 70 - 130 mg/dL Final   04/01/2024 0729 117 70 - 130 mg/dL Final   04/01/2024 0605 123 70 - 130 mg/dL Final   03/31/2024 1957 256 (H) 70 - 130 mg/dL Final   03/31/2024 1541 135 (H) 70 - 130 mg/dL Final   03/31/2024 1042 252 (H) 70 - 130 mg/dL Final   03/31/2024 0725 150 (H) 70 - 130 mg/dL Final       No radiology results for the last day    Scheduled Meds  amLODIPine, 5 mg, Oral, Q24H  insulin lispro, 2-7 Units, Subcutaneous, 4x Daily AC & at Bedtime  Menthol-Zinc Oxide, 1 Application, Topical, Q12H  pantoprazole, 40 mg, Oral, BID AC  senna-docusate sodium, 2 tablet, Oral, BID  sodium chloride, 1 g, Oral, TID With Meals  sucralfate, 1 g, Oral, 4x Daily AC & at Bedtime    Continuous Infusions     PRN Meds    acetaminophen **OR** acetaminophen    aluminum-magnesium hydroxide-simethicone    senna-docusate sodium **AND** polyethylene glycol **AND** bisacodyl **AND** bisacodyl    Calcium Replacement - Follow Nurse / BPA Driven Protocol    dextrose    dextrose    glucagon (human recombinant)    HYDROcodone-acetaminophen    Magnesium Standard Dose Replacement - Follow Nurse / BPA Driven Protocol    nitroglycerin    ondansetron ODT **OR** ondansetron    phenol    Phosphorus Replacement - Follow Nurse / BPA Driven Protocol    Potassium Replacement - Follow Nurse / BPA Driven Protocol    prochlorperazine       Diet  Diet: Regular/House, Diabetic, Gastrointestinal; Consistent  Carbohydrate; Fiber-Restricted; Fluid Consistency: Thin (IDDSI 0)    I have personally reviewed:  [x]  Medications  [x]  Laboratory   []  Microbiology   []  Radiology   [x]  EKG/Telemetry   []  Cardiology/Vascular   []  Pathology   []  Records     Results for orders placed during the hospital encounter of 01/02/24    Adult Transthoracic Echo Limited W/ Cont if Necessary Per Protocol    Interpretation Summary    Left ventricular systolic function is normal. Calculated left ventricular EF = 66.3% Abnormal global longitudinal LV strain (GLS) = -16.5%. Left ventricle strain data was reviewed by the physician. Normal left ventricular cavity size noted. Left ventricular wall thickness is consistent with borderline concentric hypertrophy. Left ventricular diastolic function is consistent with (grade I) impaired relaxation. The myocardium is bright and speckled. Global longitudinal strain is only slightly abnormal, and the pattern is not consistent with amyloidosis.    There is a trivial pericardial effusion.        Assessment/Plan     Active Hospital Problems    Diagnosis  POA    **Severe anemia [D64.9]  Yes    Moderate malnutrition [E44.0]  Yes    Duodenal ulcer [K26.9]  Unknown    Periprosthetic fracture around internal prosthetic right hip joint [M97.01XA]  Not Applicable    MARNIE (acute kidney injury) [N17.9]  Unknown    CAD (coronary artery disease) [I25.10]  Unknown    DM (diabetes mellitus), type 2 [E11.9]  Unknown    Melena [K92.1]  Unknown    Hyponatremia [E87.1]  Yes    Heart failure with preserved ejection fraction, borderline, class II [I50.30]  Yes      Resolved Hospital Problems   No resolved problems to display.     73 y.o. female transferred from [facility] for anemia, hyperkalemia, hyponatremia. Presented there with altered mental status/weakness, hematuria, dark stools.     04/01/24   Right lower extremity duplex ordered due to pain in lower leg-normal    Severe anemia  Grade D erosive  esophagitis  Duodenal ulcer with active bleeding  GI feels pancreatitis on CT is false positive  Status post EGD epinephrine and cauterization 3/22/2024  PPI, Carafate  Hemoglobin low but seems to be stable no definite rebleed.   Continue to monitor     Acute kidney injury  Hyponatremia  Hyperkalemia  Nephrology following: Salt tablets    Urinary retention  Hydronephrosis  Gamez replaced for UR 3/29/2024  VT once ambulatory or cysto with VT outpatient 1-2 weeks    Right hip periprosthetic acetabular fracture  Would require conversion to total hip arthroplasty and also fixation of acetabular fracture when optimized. High risk. Ortho following-not a surgical candidate at this stage may need to go to SNF for a few weeks and then reassess for surgery  lortab for pain    DM2  A1c 5.30% 8 months ago, recheck 5.90%  had backed off to low dose correctional- no hypoglycemia    CAD  History of chronic diastolic heart failure  History of stroke    DVT prophylaxis  SCDs    Discharge  TBD  Expected discharge date/ time has not been documented.    Discussed with patient, family, and nursing staff. Discussed with patient goals of care and consideration of comfort/palliative care and she was not interested though family later contacted RN and would like to discuss with palliative care-scheduled tomorrow 9 AM    Harman Albarran MD  [facility]  04/01/24

## 2024-04-01 NOTE — PROGRESS NOTES
[facility]  Inpatient Progress Note    Reason for Follow Up:  Upper GI bleed secondary to duodenal ulcer     Subjective     Interval History:   She has no complaints though seems to be confused.  Her hemoglobin is 7.3 and fairly stable.  Her sodium is 127.  She last had an EGD 3/22/2024 when grade D esophagitis and a bleeding duodenal ulcer with visible vessel was treated.      Current Facility-Administered Medications:     acetaminophen (TYLENOL) tablet 650 mg, 650 mg, Oral, Q4H PRN, 650 mg at 03/30/24 1640 **OR** acetaminophen (TYLENOL) suppository 650 mg, 650 mg, Rectal, Q4H PRN, Fouzia Desai MD    aluminum-magnesium hydroxide-simethicone (MAALOX MAX) 400-400-40 MG/5ML suspension 15 mL, 15 mL, Oral, Q6H PRN, Fouzia Desai MD    amLODIPine (NORVASC) tablet 5 mg, 5 mg, Oral, Q24H, Fouzia Desai MD, 5 mg at 04/01/24 0826    sennosides-docusate (PERICOLACE) 8.6-50 MG per tablet 2 tablet, 2 tablet, Oral, BID, 2 tablet at 04/01/24 0826 **AND** polyethylene glycol (MIRALAX) packet 17 g, 17 g, Oral, Daily PRN **AND** bisacodyl (DULCOLAX) EC tablet 5 mg, 5 mg, Oral, Daily PRN **AND** bisacodyl (DULCOLAX) suppository 10 mg, 10 mg, Rectal, Daily PRN, Fouzia Desai MD    Calcium Replacement - Follow Nurse / BPA Driven Protocol, , Does not apply, PRN, Fouzia Desai MD    dextrose (D50W) (25 g/50 mL) IV injection 25 g, 25 g, Intravenous, Q15 Min PRN, Harman Albarran MD    dextrose (GLUTOSE) oral gel 15 g, 15 g, Oral, Q15 Min PRN, Harman Albarran MD    glucagon (GLUCAGEN) injection 1 mg, 1 mg, Intramuscular, Q15 Min PRN, Harman Albarran MD    HYDROcodone-acetaminophen (NORCO) 7.5-325 MG per tablet 1 tablet, 1 tablet, Oral, Q4H PRN, Harman Albarran MD, 1 tablet at 04/01/24 0838    insulin lispro (HUMALOG/ADMELOG) injection 2-7 Units, 2-7 Units, Subcutaneous, 4x Daily AC & at Bedtime, Harman Albarran MD, 4 Units at 03/31/24 2035    Magnesium Standard Dose Replacement - Follow Nurse /  BPA Driven Protocol, , Does not apply, PRN, Fouzia Desai MD    Menthol-Zinc Oxide 1 Application, 1 Application, Topical, Q12H, Fouzia Desai MD, 1 Application at 04/01/24 0826    nitroglycerin (NITROSTAT) SL tablet 0.4 mg, 0.4 mg, Sublingual, Q5 Min PRN, Fouzia Desai MD    ondansetron ODT (ZOFRAN-ODT) disintegrating tablet 4 mg, 4 mg, Oral, Q4H PRN, 4 mg at 03/22/24 0032 **OR** ondansetron (ZOFRAN) injection 4 mg, 4 mg, Intravenous, Q4H PRN, Fouzia Desai MD    pantoprazole (PROTONIX) EC tablet 40 mg, 40 mg, Oral, BID AC, Gissell Miranda MD, 40 mg at 04/01/24 0639    phenol (CHLORASEPTIC) 1.4 % liquid 1 spray, 1 spray, Mouth/Throat, Q2H PRN, Judi Bray APRN, 1 spray at 03/27/24 0227    Phosphorus Replacement - Follow Nurse / BPA Driven Protocol, , Does not apply, PRN, Fouzia Desai MD    Potassium Replacement - Follow Nurse / BPA Driven Protocol, , Does not apply, PRGiselle MARIN Tausif, MD    prochlorperazine (COMPAZINE) injection 2.5 mg, 2.5 mg, Intravenous, Q6H PRN, Fouzia Desai MD, 2.5 mg at 03/22/24 1235    sodium chloride tablet 1 g, 1 g, Oral, TID With Meals, Avelino Benson MD, 1 g at 04/01/24 0826    sucralfate (CARAFATE) tablet 1 g, 1 g, Oral, 4x Daily AC & at Bedtime, Fouzia Desai MD, 1 g at 04/01/24 0639  Review of Systems:    The following systems were reviewed and negative;  respiratory, cardiovascular, and musculoskeletal    Objective     Vital Signs  Temp:  [98.1 °F (36.7 °C)-98.5 °F (36.9 °C)] 98.1 °F (36.7 °C)  Heart Rate:  [72-89] 89  Resp:  [13-18] 13  BP: (135-153)/(65-95) 148/78  Body mass index is 21.52 kg/m².    Intake/Output Summary (Last 24 hours) at 4/1/2024 0931  Last data filed at 3/31/2024 1800  Gross per 24 hour   Intake --   Output 900 ml   Net -900 ml     No intake/output data recorded.     Physical Exam:   General: patient awake, alert and cooperative   Eyes: Normal lids and lashes, no scleral icterus   Neck: supple, normal ROM   Skin: warm and  dry, not jaundiced   Cardiovascular: regular rhythm and rate, no murmurs auscultated   Pulm: clear to auscultation bilaterally, regular and unlabored   Abdomen: soft, nontender, nondistended; normal bowel sounds   Extremities: no rash or edema   Psychiatric: Normal mood and behavior; memory intact     Results Review:     I reviewed the patient's new clinical results.    Results from last 7 days   Lab Units 04/01/24  0540 03/31/24  0555 03/31/24  0032 03/30/24  1613 03/30/24  0717   WBC 10*3/mm3 6.89 6.75  --   --  8.02   HEMOGLOBIN g/dL 7.3* 7.6* 7.0*   < > 7.5*   HEMATOCRIT % 21.8* 22.2* 20.7*   < > 21.8*   PLATELETS 10*3/mm3 330 314  --   --  304    < > = values in this interval not displayed.     Results from last 7 days   Lab Units 04/01/24  0540 03/31/24  0555 03/30/24  0607   SODIUM mmol/L 127* 127* 127*   POTASSIUM mmol/L 4.9 4.5 4.5   CHLORIDE mmol/L 101 102 103   CO2 mmol/L 19.0* 18.0* 18.0*   BUN mg/dL 62* 66* 66*   CREATININE mg/dL 1.31* 1.26* 1.40*   CALCIUM mg/dL 7.4* 7.2* 7.1*   GLUCOSE mg/dL 100* 122* 114*         Lab Results   Lab Value Date/Time    LIPASE 19 03/25/2024 0431       Radiology:  US Renal Bilateral   Final Result      CT Abdomen Pelvis Without Contrast   Final Result       1.  Acute displaced comminuted right hip periprosthetic acetabular   fracture, as described in detail above. Corresponding asymmetric   enlargement of the right iliacus muscle is most consistent with a   corresponding intramuscular hematoma.   2.  Moderate bilateral hydroureteronephrosis with diffuse high   attenuation throughout the renal collecting systems, which could be due   to residual intravenous contrast from recent contrast-enhanced   examination a few days ago in the setting of poor renal function.   However, given reported gross hematuria, blood products are not   excluded.    3.  Marked bladder distention despite presence of a Gamez catheter.   Correlate with Gamez catheter function.    4.  Diffuse bladder  wall thickening and adjacent fat stranding raise   concern for possible cystitis. Correlate with urinalysis. Additionally,   given the presence of adjacent osseous trauma, bladder injury cannot be   entirely excluded.   5.  Relatively diffuse mesenteric fat stranding, most pronounced in the   upper abdomen. Findings could reflect the patient's fluid status.   However, correlate with serum lipase given the more focal stranding   within the upper abdomen to exclude possible pancreatitis.       This report was finalized on 3/23/2024 5:23 PM by Dr. Myra Hernandez M.D   on Workstation: [id number]              Assessment & Plan     Active Hospital Problems    Diagnosis     **Severe anemia     Moderate malnutrition     Duodenal ulcer     Periprosthetic fracture around internal prosthetic right hip joint     MARNIE (acute kidney injury)     CAD (coronary artery disease)     DM (diabetes mellitus), type 2     Melena     Hyponatremia     Heart failure with preserved ejection fraction, borderline, class II        Assessment/Recommendations:    Assessment:  Severe anemia  Grade D erosive esophagitis  Duodenal ulcer with active bleeding status post epinephrine injection and cauterization on 3/22  MARNIE        Plan:  Hemoglobin stable-continue to follow. no evidence of rebleeding at this time.  Last transfused 3/27.  Continue regular diet  Continue twice daily PPI, Carafate  Check H. pylori stool antigen  Counseled patient to avoid NSAIDs.    I discussed the patients findings and my recommendations with patient and family.    Akshat Coe MD

## 2024-04-01 NOTE — NURSING NOTE
Palliative care consult received and chart reviewed. Called and spoke with patient's significant other Sachin by phone. He states patient has two daughters and he is not sure if patient will be receptive to goals of care conversation at this point, but he is going to reach out to patient's daughters to see if they are interested. I gave him my contact info and encouraged him to reach out if family meeting/goals of care conversation would be helpful.     Consult was then cancelled by Dr. Albarran. However since I already made contact with family will remain available if requested by them. Discussed with MINESH Lau as well.     13:23 ADDENDUM Notified by RN that family has requested goals of care conversation for 4/2 @9am

## 2024-04-01 NOTE — PLAN OF CARE
"Goal Outcome Evaluation:  Plan of Care Reviewed With: patient           Outcome Evaluation: A&Ox 3, VSS, room air, Hbg low but stable , no evidence of any active bleeding , NSR , F/c in place for urine retention , 650 u/o ,clody with mucus like sediment , adequate amounts, c/o pain RLE, has been refusing to turn , protective of leg , pt states that her \" skin hurts , yel in pain when calf tounched , new order for doppler study  of RLE. patient and family were aware of Pallitive consult and is willing to speak with pallitive team.                               "

## 2024-04-01 NOTE — PROGRESS NOTES
I received a message from the nursing staff today stating that the patient has been cleared for surgery from the GI perspective.    This patient has been admitted to the ICU with severe anemia following a duodenal ulcer with active bleeding.    I did examine the patient on Saturday, March 30 and I have discussed with her  the options regarding management of her right hip periprosthetic acetabular fracture with displacement.  This will require management with surgical intervention for open reduction and complex revision surgery.  With her chronic hyponatremia, failure to thrive, multiple medical comorbidities and severe anemia she is not a surgical candidate at this stage.  I did discuss this with the patient and her .    I will reassess once she is transferred to the floor.  She may have to be transferred to a subacute rehab facility for about 3 weeks and then brought back subsequently to manage the revision hip surgery.

## 2024-04-01 NOTE — PROGRESS NOTES
[facility] Progress Note      Patient Name: Arline Landaverde  : 1950  MRN: [id number]  Primary Care Physician:  Lanie Gomez APRN  Date of admission: 3/21/2024    Subjective     Interval History:   F/u MARNIE hypoNa    Review of Systems:   Denies dyspnea or nausea  She was not agreeable to palliative care consultation    Objective     Vitals:   Temp:  [98.1 °F (36.7 °C)-98.5 °F (36.9 °C)] 98.4 °F (36.9 °C)  Heart Rate:  [72-89] 89  Resp:  [13-18] 13  BP: (135-153)/(65-95) 148/78  Flow (L/min):  [2] 2    Intake/Output Summary (Last 24 hours) at 2024 0947  Last data filed at 2024 0900  Gross per 24 hour   Intake 350 ml   Output 1300 ml   Net -950 ml       Physical Exam:    General Appearance: frail WF no distress alert  Neck: supple, no JVD  Lungs: CTA bilat no rales  Heart: RRR, normal S1 and S2  Abdomen: soft, nontender, nondistended  Extremities: no edema, cyanosis or clubbing      Scheduled Meds:     amLODIPine, 5 mg, Oral, Q24H  insulin lispro, 2-7 Units, Subcutaneous, 4x Daily AC & at Bedtime  Menthol-Zinc Oxide, 1 Application, Topical, Q12H  pantoprazole, 40 mg, Oral, BID AC  senna-docusate sodium, 2 tablet, Oral, BID  sodium chloride, 1 g, Oral, TID With Meals  sucralfate, 1 g, Oral, 4x Daily AC & at Bedtime      IV Meds:        Results Reviewed:   I have personally reviewed the results from the time of this admission to 2024 09:47 EDT     Results from last 7 days   Lab Units 24  0540 24  0555 24  0607   SODIUM mmol/L 127* 127* 127*   POTASSIUM mmol/L 4.9 4.5 4.5   CHLORIDE mmol/L 101 102 103   CO2 mmol/L 19.0* 18.0* 18.0*   BUN mg/dL 62* 66* 66*   CREATININE mg/dL 1.31* 1.26* 1.40*   CALCIUM mg/dL 7.4* 7.2* 7.1*   GLUCOSE mg/dL 100* 122* 114*     Estimated Creatinine Clearance: 33.8 mL/min (A) (by C-G formula based on SCr of 1.31 mg/dL (H)).  Results from last 7 days   Lab Units 24  0540 24  0555 24  0032 24  0607    MAGNESIUM mg/dL  --   --  2.5* 1.4*   PHOSPHORUS mg/dL 3.4 2.9  --  3.1     Results from last 7 days   Lab Units 03/30/24  0607 03/27/24  0549   URIC ACID mg/dL 5.2 4.9     Results from last 7 days   Lab Units 04/01/24  0540 03/31/24  0555 03/31/24  0032 03/30/24  1613 03/30/24  0717 03/29/24  1553 03/29/24  0639 03/28/24  1647 03/28/24  0618   WBC 10*3/mm3 6.89 6.75  --   --  8.02  --  9.44  --  10.98*   HEMOGLOBIN g/dL 7.3* 7.6* 7.0* 7.9* 7.5*   < > 8.0*  8.0*   < > 7.6*   PLATELETS 10*3/mm3 330 314  --   --  304  --  278  --  271    < > = values in this interval not displayed.           Assessment / Plan     ASSESSMENT:  MARNIE, nonoliguric, Cr stagnant but stable 1.2 to 1.4 range.  Etiol multifactorial, related to urinary retention and hypotension/hypovolemia with acute blood loss.  Euvolemic, off IVF.  Likely NAGMA with stable bicarb 18 - 19.  Mg repleted  Hyponatremia with poor solute intake; acute blood loss will also trigger ADH release.  Initial urine studies consistent with SIADH with urine sodium 75 and urine osmolality 338.  More recent (3/27) urine studies, though, showed urine sodium just 34, possibility of hypovolemia, but Na did not improve with IVF trial.  Na  same 127, switched urea packets to salt tabs due to tolerance issues  Anemia, hgb stagnant low to mid 7s; GI bleed (erosive esophagitis and duodenal ulcer) and gross hematuria, latter resolved  History of CVA  Bilateral hydronephrosis, due to urinary retention.  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  Orthopedics following  HTN - BP bit higher with salt tabs but acceptable, on norvasc 5mg     PLAN:  Continue salt tabs as is  Prognosis is poor but patient not amenable to palliative care eval   D/W Dr Albarran yesterday, RN today, plan is for ortho re-eval       Avelino Benson MD  04/01/24  09:47 EDT    [facility]  [phone]

## 2024-04-01 NOTE — PLAN OF CARE
Goal Outcome Evaluation:  Plan of Care Reviewed With: patient  Pt was cleared by GI for surgery on fractured hip. Ortho said pt was not a candidate at this time and could be reevaluated following rehab. Pt refused rehab. S/O asked for a pallative consult @ 0900 on 04/02/24. Pt and family then stated they wanted a second opinion before making any decisions.

## 2024-04-02 ENCOUNTER — APPOINTMENT (OUTPATIENT)
Dept: GENERAL RADIOLOGY | Facility: HOSPITAL | Age: 74
DRG: 377 | End: 2024-04-02
Payer: MEDICARE

## 2024-04-02 ENCOUNTER — TELEPHONE (OUTPATIENT)
Dept: ORTHOPEDIC SURGERY | Facility: CLINIC | Age: 74
End: 2024-04-02
Payer: MEDICARE

## 2024-04-02 LAB
ABO GROUP BLD: NORMAL
ALBUMIN SERPL-MCNC: 2.4 G/DL (ref 3.5–5.2)
ANION GAP SERPL CALCULATED.3IONS-SCNC: 6 MMOL/L (ref 5–15)
BASOPHILS # BLD AUTO: 0.05 10*3/MM3 (ref 0–0.2)
BASOPHILS NFR BLD AUTO: 0.6 % (ref 0–1.5)
BLD GP AB SCN SERPL QL: NEGATIVE
BUN SERPL-MCNC: 70 MG/DL (ref 8–23)
BUN/CREAT SERPL: 50 (ref 7–25)
CALCIUM SPEC-SCNC: 7.5 MG/DL (ref 8.6–10.5)
CHLORIDE SERPL-SCNC: 101 MMOL/L (ref 98–107)
CO2 SERPL-SCNC: 18 MMOL/L (ref 22–29)
CORTIS SERPL-MCNC: 9.53 MCG/DL
CREAT SERPL-MCNC: 1.4 MG/DL (ref 0.57–1)
DEPRECATED RDW RBC AUTO: 45.5 FL (ref 37–54)
EGFRCR SERPLBLD CKD-EPI 2021: 39.6 ML/MIN/1.73
EOSINOPHIL # BLD AUTO: 0.4 10*3/MM3 (ref 0–0.4)
EOSINOPHIL NFR BLD AUTO: 5 % (ref 0.3–6.2)
ERYTHROCYTE [DISTWIDTH] IN BLOOD BY AUTOMATED COUNT: 13.9 % (ref 12.3–15.4)
FERRITIN SERPL-MCNC: 481 NG/ML (ref 13–150)
GLUCOSE BLDC GLUCOMTR-MCNC: 142 MG/DL (ref 70–130)
GLUCOSE BLDC GLUCOMTR-MCNC: 190 MG/DL (ref 70–130)
GLUCOSE BLDC GLUCOMTR-MCNC: 193 MG/DL (ref 70–130)
GLUCOSE SERPL-MCNC: 112 MG/DL (ref 65–99)
HCT VFR BLD AUTO: 21.2 % (ref 34–46.6)
HCT VFR BLD AUTO: 23.8 % (ref 34–46.6)
HGB BLD-MCNC: 7 G/DL (ref 12–15.9)
HGB BLD-MCNC: 8 G/DL (ref 12–15.9)
IMM GRANULOCYTES # BLD AUTO: 0.04 10*3/MM3 (ref 0–0.05)
IMM GRANULOCYTES NFR BLD AUTO: 0.5 % (ref 0–0.5)
IRON 24H UR-MRATE: 28 MCG/DL (ref 37–145)
IRON SATN MFR SERPL: 13 % (ref 20–50)
LYMPHOCYTES # BLD AUTO: 0.74 10*3/MM3 (ref 0.7–3.1)
LYMPHOCYTES NFR BLD AUTO: 9.3 % (ref 19.6–45.3)
MCH RBC QN AUTO: 29.3 PG (ref 26.6–33)
MCHC RBC AUTO-ENTMCNC: 33 G/DL (ref 31.5–35.7)
MCV RBC AUTO: 88.7 FL (ref 79–97)
MONOCYTES # BLD AUTO: 0.6 10*3/MM3 (ref 0.1–0.9)
MONOCYTES NFR BLD AUTO: 7.6 % (ref 5–12)
NEUTROPHILS NFR BLD AUTO: 6.1 10*3/MM3 (ref 1.7–7)
NEUTROPHILS NFR BLD AUTO: 77 % (ref 42.7–76)
NRBC BLD AUTO-RTO: 0 /100 WBC (ref 0–0.2)
PHOSPHATE SERPL-MCNC: 3.7 MG/DL (ref 2.5–4.5)
PLATELET # BLD AUTO: 301 10*3/MM3 (ref 140–450)
PMV BLD AUTO: 8.8 FL (ref 6–12)
POTASSIUM SERPL-SCNC: 5.5 MMOL/L (ref 3.5–5.2)
RBC # BLD AUTO: 2.39 10*6/MM3 (ref 3.77–5.28)
RH BLD: POSITIVE
SODIUM SERPL-SCNC: 125 MMOL/L (ref 136–145)
T&S EXPIRATION DATE: NORMAL
TIBC SERPL-MCNC: 221 MCG/DL (ref 298–536)
TRANSFERRIN SERPL-MCNC: 148 MG/DL (ref 200–360)
VIT B12 BLD-MCNC: 893 PG/ML (ref 211–946)
WBC NRBC COR # BLD AUTO: 7.93 10*3/MM3 (ref 3.4–10.8)

## 2024-04-02 PROCEDURE — 85018 HEMOGLOBIN: CPT | Performed by: STUDENT IN AN ORGANIZED HEALTH CARE EDUCATION/TRAINING PROGRAM

## 2024-04-02 PROCEDURE — 80069 RENAL FUNCTION PANEL: CPT | Performed by: INTERNAL MEDICINE

## 2024-04-02 PROCEDURE — 82533 TOTAL CORTISOL: CPT | Performed by: INTERNAL MEDICINE

## 2024-04-02 PROCEDURE — 82728 ASSAY OF FERRITIN: CPT | Performed by: INTERNAL MEDICINE

## 2024-04-02 PROCEDURE — P9016 RBC LEUKOCYTES REDUCED: HCPCS

## 2024-04-02 PROCEDURE — 73502 X-RAY EXAM HIP UNI 2-3 VIEWS: CPT

## 2024-04-02 PROCEDURE — 36430 TRANSFUSION BLD/BLD COMPNT: CPT

## 2024-04-02 PROCEDURE — 99231 SBSQ HOSP IP/OBS SF/LOW 25: CPT | Performed by: INTERNAL MEDICINE

## 2024-04-02 PROCEDURE — 82747 ASSAY OF FOLIC ACID RBC: CPT | Performed by: INTERNAL MEDICINE

## 2024-04-02 PROCEDURE — 82607 VITAMIN B-12: CPT | Performed by: INTERNAL MEDICINE

## 2024-04-02 PROCEDURE — 86900 BLOOD TYPING SEROLOGIC ABO: CPT

## 2024-04-02 PROCEDURE — 85014 HEMATOCRIT: CPT | Performed by: STUDENT IN AN ORGANIZED HEALTH CARE EDUCATION/TRAINING PROGRAM

## 2024-04-02 PROCEDURE — 63710000001 INSULIN LISPRO (HUMAN) PER 5 UNITS: Performed by: HOSPITALIST

## 2024-04-02 PROCEDURE — 84466 ASSAY OF TRANSFERRIN: CPT | Performed by: INTERNAL MEDICINE

## 2024-04-02 PROCEDURE — 86900 BLOOD TYPING SEROLOGIC ABO: CPT | Performed by: STUDENT IN AN ORGANIZED HEALTH CARE EDUCATION/TRAINING PROGRAM

## 2024-04-02 PROCEDURE — 86901 BLOOD TYPING SEROLOGIC RH(D): CPT | Performed by: STUDENT IN AN ORGANIZED HEALTH CARE EDUCATION/TRAINING PROGRAM

## 2024-04-02 PROCEDURE — 83540 ASSAY OF IRON: CPT | Performed by: INTERNAL MEDICINE

## 2024-04-02 PROCEDURE — 25010000002 ONDANSETRON PER 1 MG: Performed by: INTERNAL MEDICINE

## 2024-04-02 PROCEDURE — 86850 RBC ANTIBODY SCREEN: CPT | Performed by: STUDENT IN AN ORGANIZED HEALTH CARE EDUCATION/TRAINING PROGRAM

## 2024-04-02 PROCEDURE — 85025 COMPLETE CBC W/AUTO DIFF WBC: CPT | Performed by: INTERNAL MEDICINE

## 2024-04-02 PROCEDURE — 86923 COMPATIBILITY TEST ELECTRIC: CPT

## 2024-04-02 PROCEDURE — 85014 HEMATOCRIT: CPT | Performed by: INTERNAL MEDICINE

## 2024-04-02 PROCEDURE — 82948 REAGENT STRIP/BLOOD GLUCOSE: CPT

## 2024-04-02 RX ADMIN — AMLODIPINE BESYLATE 5 MG: 5 TABLET ORAL at 09:07

## 2024-04-02 RX ADMIN — HYDROCODONE BITARTRATE AND ACETAMINOPHEN 1 TABLET: 7.5; 325 TABLET ORAL at 02:07

## 2024-04-02 RX ADMIN — ONDANSETRON 4 MG: 2 INJECTION INTRAMUSCULAR; INTRAVENOUS at 02:30

## 2024-04-02 RX ADMIN — SENNOSIDES AND DOCUSATE SODIUM 2 TABLET: 50; 8.6 TABLET ORAL at 09:08

## 2024-04-02 RX ADMIN — SODIUM CHLORIDE TAB 1 GM 1 G: 1 TAB at 09:08

## 2024-04-02 RX ADMIN — Medication 1 APPLICATION: at 20:51

## 2024-04-02 RX ADMIN — SODIUM CHLORIDE TAB 1 GM 1 G: 1 TAB at 17:53

## 2024-04-02 RX ADMIN — PANTOPRAZOLE SODIUM 40 MG: 40 TABLET, DELAYED RELEASE ORAL at 06:37

## 2024-04-02 RX ADMIN — SUCRALFATE 1 G: 1 TABLET ORAL at 20:51

## 2024-04-02 RX ADMIN — SUCRALFATE 1 G: 1 TABLET ORAL at 12:47

## 2024-04-02 RX ADMIN — SODIUM CHLORIDE TAB 1 GM 1 G: 1 TAB at 12:46

## 2024-04-02 RX ADMIN — HYDROCODONE BITARTRATE AND ACETAMINOPHEN 1 TABLET: 7.5; 325 TABLET ORAL at 06:37

## 2024-04-02 RX ADMIN — PANTOPRAZOLE SODIUM 40 MG: 40 TABLET, DELAYED RELEASE ORAL at 17:53

## 2024-04-02 RX ADMIN — ONDANSETRON 4 MG: 2 INJECTION INTRAMUSCULAR; INTRAVENOUS at 12:51

## 2024-04-02 RX ADMIN — SUCRALFATE 1 G: 1 TABLET ORAL at 17:53

## 2024-04-02 RX ADMIN — Medication 1 APPLICATION: at 09:08

## 2024-04-02 RX ADMIN — SUCRALFATE 1 G: 1 TABLET ORAL at 06:37

## 2024-04-02 RX ADMIN — SENNOSIDES AND DOCUSATE SODIUM 2 TABLET: 50; 8.6 TABLET ORAL at 20:51

## 2024-04-02 RX ADMIN — INSULIN LISPRO 2 UNITS: 100 INJECTION, SOLUTION INTRAVENOUS; SUBCUTANEOUS at 20:51

## 2024-04-02 RX ADMIN — INSULIN LISPRO 2 UNITS: 100 INJECTION, SOLUTION INTRAVENOUS; SUBCUTANEOUS at 12:46

## 2024-04-02 NOTE — PROGRESS NOTES
Name: Arline Landaverde ADMIT: 3/21/2024   : 1950  PCP: Lanie Gomez APRN    MRN: [id number] LOS: 12 days   AGE/SEX: 73 y.o. female  ROOM: [facility]     Subjective   Subjective   Patient seen this morning, no acute events overnight.  Hemoglobin continues to trend down, 7.0, no signs of active bleeding.  Patient/family discussed  goals of care discussion with palliative care this morning, family does not want anything invasive aggressive management/interventions.  Plan is to be discharged home with outpatient hospice, [facility] consulted.  Okay to blood transfusion for symptomatic control primarily, ordered 1 unit of PRBC.          Review of Systems   As above  Objective   Objective   Vital Signs  Temp:  [97.9 °F (36.6 °C)-100.7 °F (38.2 °C)] 100.7 °F (38.2 °C)  Heart Rate:  [73-90] 90  Resp:  [14-19] 18  BP: (133-160)/(71-91) 153/89  SpO2:  [94 %-98 %] 96 %  on   ;   Device (Oxygen Therapy): room air  Body mass index is 21.82 kg/m².  Physical Exam    General: Alert, sitting up in the bed, not in distress, ill-appearing  HEENT: Normocephalic, atraumatic  CV: Regular rate and rhythm, no murmurs  Lungs: Clear to auscultation bilaterally, no crackles or wheezes  Abdomen: Soft, nontender, nondistended  Extremities: No significant peripheral edema , no cyanosis     Results Review     I reviewed the patient's new clinical results.  Results from last 7 days   Lab Units 24  0551 24  0540 24  0555 24  0032 24  1613 24  0717   WBC 10*3/mm3 7.93 6.89 6.75  --   --  8.02   HEMOGLOBIN g/dL 7.0* 7.3* 7.6* 7.0*   < > 7.5*   PLATELETS 10*3/mm3 301 330 314  --   --  304    < > = values in this interval not displayed.     Results from last 7 days   Lab Units 24  0550 24  0540 24  0555 24  0607   SODIUM mmol/L 125* 127* 127* 127*   POTASSIUM mmol/L 5.5* 4.9 4.5 4.5   CHLORIDE mmol/L 101 101 102 103   CO2 mmol/L 18.0* 19.0* 18.0* 18.0*   BUN mg/dL 70* 62* 66* 66*    CREATININE mg/dL 1.40* 1.31* 1.26* 1.40*   GLUCOSE mg/dL 112* 100* 122* 114*   Estimated Creatinine Clearance: 32.1 mL/min (A) (by C-G formula based on SCr of 1.4 mg/dL (H)).  Results from last 7 days   Lab Units 04/02/24  0550 04/01/24  0540 03/31/24  0555 03/30/24  0607   ALBUMIN g/dL 2.4* 2.2* 2.3* 2.1*     Results from last 7 days   Lab Units 04/02/24  0550 04/01/24  0540 03/31/24  0555 03/31/24  0032 03/30/24  0607   CALCIUM mg/dL 7.5* 7.4* 7.2*  --  7.1*   ALBUMIN g/dL 2.4* 2.2* 2.3*  --  2.1*   MAGNESIUM mg/dL  --   --   --  2.5* 1.4*   PHOSPHORUS mg/dL 3.7 3.4 2.9  --  3.1       COVID19   Date Value Ref Range Status   12/05/2023 Not Detected Not Detected - Ref. Range Final   07/19/2023 Not Detected Not Detected - Ref. Range Final     Glucose   Date/Time Value Ref Range Status   04/02/2024 1635 142 (H) 70 - 130 mg/dL Final   04/02/2024 1215 190 (H) 70 - 130 mg/dL Final   04/01/2024 2139 200 (H) 70 - 130 mg/dL Final   04/01/2024 1554 153 (H) 70 - 130 mg/dL Final   04/01/2024 1136 213 (H) 70 - 130 mg/dL Final   04/01/2024 0729 117 70 - 130 mg/dL Final   04/01/2024 0605 123 70 - 130 mg/dL Final           XR Hip With or Without Pelvis 2 - 3 View Right  Narrative: XR HIP W OR WO PELVIS 2-3 VIEW RIGHT-     INDICATIONS: Increased deformity     TECHNIQUE: FRONTAL VIEW OF THE PELVIS, 2 VIEWS OF THE RIGHT HIP     COMPARISON:  image from CT from 3/23/2024     FINDINGS:     Previously noted comminuted periprosthetic right acetabular fracture  appears grossly similar to the prior exam. No other fractures are  identified. Right hip arthroplasty hardware appears stable. No  dislocation is noted. If further evaluation is indicated,  cross-sectional imaging could be obtained for direct comparison with the  prior CT exam. Moderate colonic fecal retention is apparent, and gaseous  distention of bowel is partly included.     Impression:    As described.           This report was finalized on 4/2/2024 1:47 PM by Dr. De Paz  EARNESTINE Blair M.D on Workstation: [id number]       Scheduled Medications  amLODIPine, 5 mg, Oral, Q24H  insulin lispro, 2-7 Units, Subcutaneous, 4x Daily AC & at Bedtime  Menthol-Zinc Oxide, 1 Application, Topical, Q12H  pantoprazole, 40 mg, Oral, BID AC  senna-docusate sodium, 2 tablet, Oral, BID  sodium chloride, 1 g, Oral, TID With Meals  sucralfate, 1 g, Oral, 4x Daily AC & at Bedtime    Infusions   Diet  Diet: Regular/House, Diabetic, Gastrointestinal; Consistent Carbohydrate; Fiber-Restricted; Fluid Consistency: Thin (IDDSI 0)    I have personally reviewed     []  Laboratory   []  Microbiology   []  Radiology   []  EKG/Telemetry  []  Cardiology/Vascular   []  Pathology    []  Records       Assessment/Plan     Active Hospital Problems    Diagnosis  POA    **Severe anemia [D64.9]  Yes    Moderate malnutrition [E44.0]  Yes    Duodenal ulcer [K26.9]  Unknown    Periprosthetic fracture around internal prosthetic right hip joint [M97.01XA]  Not Applicable    MARNIE (acute kidney injury) [N17.9]  Unknown    CAD (coronary artery disease) [I25.10]  Unknown    DM (diabetes mellitus), type 2 [E11.9]  Unknown    Melena [K92.1]  Unknown    Hyponatremia [E87.1]  Yes    Heart failure with preserved ejection fraction, borderline, class II [I50.30]  Yes      Resolved Hospital Problems   No resolved problems to display.       73 y.o. female transferred from [facility] for anemia, hyperkalemia, hyponatremia. Presented there with altered mental status/weakness, hematuria, dark stools.      04/01/24    Severe anemia  Grade D erosive esophagitis  Duodenal ulcer with active bleeding  Acute kidney injury  Hyponatremia  Hyperkalemia  Urinary retention  Hydronephrosis  Right hip periprosthetic acetabular fracture  DM2  CAD  History of chronic diastolic heart failure  History of stroke   DVT prophylaxis    CODE STATUS changed to DNR/DNI per goals of care discussion.  Family/patient wants to be  discharged home with outpatient hospice,  consulted to [facility] placed.  Hemoglobin 7.0, ordered 1  units of  PRBC primarily for  symptoms.  Continue pantoprazole, sucralfate.  Will check CBC in AM.    Discussed with palliative care  Discussed with patient/family  Discussed with RN  Discussed with nephrology    Expected discharge home with outpatient hospice once arranged, likely 1-2 days    Copied text in this note has been reviewed and is accurate as of 04/02/24.         Dictated utilizing Dragon dictation        Darek aSlmon MD  [facility]  04/02/24  18:10 EDT

## 2024-04-02 NOTE — CASE MANAGEMENT/SOCIAL WORK
Continued Stay Note  [facility]     Patient Name: Arline Landaverde  MRN: [id number]  Today's Date: 4/2/2024    Admit Date: 3/21/2024    Plan: Home vs ??   Discharge Plan       Row Name 04/02/24 0944       Plan    Plan Home vs ??    Plan Comments Pt daughters and S.O. plan for goals of care conversation  CCP following                   Discharge Codes    No documentation.                       Kyung Ornelas RN

## 2024-04-02 NOTE — PLAN OF CARE
No BM overnight. Unable to send stool sample. Supplies in room.     Goal Outcome Evaluation:           Progress: no change  Outcome Evaluation: Q2H turn. Pain medication per MAR. Emesis 1x overnight; zofran per MAR. SCDs on. Patient intermittently forgetful.

## 2024-04-02 NOTE — TELEPHONE ENCOUNTER
Sachin Hernández called on behalf of Arline Landaverde to let us know she hurt her hip again and is currently admitted at [facility] in [place]. He stated that she has anemia and that they told him that surgery is too risky. He also [facility] Patient has an appointment with you on 4/9/2024. Please advise.

## 2024-04-02 NOTE — PROGRESS NOTES
[facility] Progress Note      Patient Name: Arline Landaverde  : 1950  MRN: [id number]  Primary Care Physician:  Lanie Gomez APRN  Date of admission: 3/21/2024    Subjective     Interval History:   Follow-up acute kidney injury, hyponatremia.  Patient and family in discussion with palliative care this morning.   Review of Systems:   As noted above    Objective     Vitals:   Temp:  [98.4 °F (36.9 °C)-98.9 °F (37.2 °C)] 98.4 °F (36.9 °C)  Heart Rate:  [70-91] 77  Resp:  [14-20] 14  BP: (131-156)/(69-89) 133/73    Intake/Output Summary (Last 24 hours) at 2024 0857  Last data filed at 2024 0503  Gross per 24 hour   Intake 1520 ml   Output 2625 ml   Net -1105 ml            Scheduled Meds:     amLODIPine, 5 mg, Oral, Q24H  insulin lispro, 2-7 Units, Subcutaneous, 4x Daily AC & at Bedtime  Menthol-Zinc Oxide, 1 Application, Topical, Q12H  pantoprazole, 40 mg, Oral, BID AC  senna-docusate sodium, 2 tablet, Oral, BID  sodium chloride, 1 g, Oral, TID With Meals  sucralfate, 1 g, Oral, 4x Daily AC & at Bedtime      IV Meds:        Results Reviewed:   I have personally reviewed the results from the time of this admission to 2024 08:57 EDT     Results from last 7 days   Lab Units 24  0550 24  0540 24  0555   SODIUM mmol/L 125* 127* 127*   POTASSIUM mmol/L 5.5* 4.9 4.5   CHLORIDE mmol/L 101 101 102   CO2 mmol/L 18.0* 19.0* 18.0*   BUN mg/dL 70* 62* 66*   CREATININE mg/dL 1.40* 1.31* 1.26*   CALCIUM mg/dL 7.5* 7.4* 7.2*   GLUCOSE mg/dL 112* 100* 122*       Estimated Creatinine Clearance: 32.1 mL/min (A) (by C-G formula based on SCr of 1.4 mg/dL (H)).    Results from last 7 days   Lab Units 24  0550 24  0540 24  0555 24  0032 24  0607   MAGNESIUM mg/dL  --   --   --  2.5* 1.4*   PHOSPHORUS mg/dL 3.7 3.4 2.9  --  3.1       Results from last 7 days   Lab Units 24  0607 24  0549   URIC ACID mg/dL 5.2 4.9       Results from  last 7 days   Lab Units 04/02/24  0551 04/01/24  0540 03/31/24  0555 03/31/24  0032 03/30/24  1613 03/30/24  0717 03/29/24  1553 03/29/24  0639   WBC 10*3/mm3 7.93 6.89 6.75  --   --  8.02  --  9.44   HEMOGLOBIN g/dL 7.0* 7.3* 7.6* 7.0* 7.9* 7.5*   < > 8.0*  8.0*   PLATELETS 10*3/mm3 301 330 314  --   --  304  --  278    < > = values in this interval not displayed.             Assessment / Plan     ASSESSMENT:  Acute kidney injury, nonoliguric.  Creatinine at plateau 1.2-1.4.  Initial urinary retention with bilateral hydroureteronephrosis, hypovolemia and hypotension with acute blood loss also contributory.  Gamez in place.  Blood pressure now more stable.  Potassium up today.  Nonanion gap metabolic acidosis.         Her hyponatremia is worse today.  Urine studies initially consistent with SIADH.   On salt tablets .   2.  Right hip periprosthetic acetabular fracture.  Dr. Shine feels that she is not a surgical candidate at this point due to her comorbidities..  3.  Anemia with ongoing decrease in hemoglobin.  Grade D erosive esophagitis and duodenal ulcer that had been treated with epi and cauterized 3/22/2024.  Her iron sats are low but her ferritin is almost 500.  4.  Diabetes mellitus type 2  PLAN:  Noted palliative care eval with  Plan for hospice noted.   Antonio Salmon .  Will sign off. Please call with questions .  Thank you for involving us in the care of Arline Landaverde.  Please feel free to call with any questions.    Tanya Ortiz MD  04/02/24  08:57 EDT    [facility]  [phone]    Please note that portions of this note were completed with a voice recognition program.

## 2024-04-02 NOTE — PROGRESS NOTES
[facility], operated by [facility]  Inpatient Progress Note    Reason for Follow Up: Acute blood loss anemia, upper GI bleed secondary to duodenal ulcer    Subjective     Interval History:   Hemoglobin 7.0 hematocrit 21.2, showing very gradual trend down, BUN 70, creatinine 1.4.  Discussed with RN, no evidence of melena or bright red blood per rectum, she did have episode of emesis which was bilious in appearance.  Family at bedside inquiring as to what her overall status is.  I discussed issues as they relate to her GI tract but deferred them to the hospitalist/intensivist.  I presume she will warrant transfusion with a hemoglobin of 7 but again will defer to the primary care service.    Current Facility-Administered Medications:     acetaminophen (TYLENOL) tablet 650 mg, 650 mg, Oral, Q4H PRN, 650 mg at 03/30/24 1640 **OR** acetaminophen (TYLENOL) suppository 650 mg, 650 mg, Rectal, Q4H PRN, Fouzia Desai MD    aluminum-magnesium hydroxide-simethicone (MAALOX MAX) 400-400-40 MG/5ML suspension 15 mL, 15 mL, Oral, Q6H PRN, Fouzia Desai MD    amLODIPine (NORVASC) tablet 5 mg, 5 mg, Oral, Q24H, Fouzia Desai MD, 5 mg at 04/01/24 0826    sennosides-docusate (PERICOLACE) 8.6-50 MG per tablet 2 tablet, 2 tablet, Oral, BID, 2 tablet at 04/01/24 2151 **AND** polyethylene glycol (MIRALAX) packet 17 g, 17 g, Oral, Daily PRN **AND** bisacodyl (DULCOLAX) EC tablet 5 mg, 5 mg, Oral, Daily PRN **AND** bisacodyl (DULCOLAX) suppository 10 mg, 10 mg, Rectal, Daily PRN, Fouzia Desai MD    Calcium Replacement - Follow Nurse / BPA Driven Protocol, , Does not apply, PRN, Fouzia Desai MD    dextrose (D50W) (25 g/50 mL) IV injection 25 g, 25 g, Intravenous, Q15 Min PRN, Harman Albarran MD    dextrose (GLUTOSE) oral gel 15 g, 15 g, Oral, Q15 Min PRN, Harman Albraran MD    glucagon (GLUCAGEN) injection 1 mg, 1 mg, Intramuscular, Q15 Min PRN, Harman Albarran MD    HYDROcodone-acetaminophen (NORCO) 7.5-325 MG per tablet 1  tablet, 1 tablet, Oral, Q4H PRN, Harman Albarran MD, 1 tablet at 04/02/24 0637    insulin lispro (HUMALOG/ADMELOG) injection 2-7 Units, 2-7 Units, Subcutaneous, 4x Daily AC & at Bedtime, Harman Albarran MD, 3 Units at 04/01/24 2151    Magnesium Standard Dose Replacement - Follow Nurse / BPA Driven Protocol, , Does not apply, PRN, Fouzia Desai MD    Menthol-Zinc Oxide 1 Application, 1 Application, Topical, Q12H, Fouzia Desai MD, 1 Application at 04/01/24 2212    nitroglycerin (NITROSTAT) SL tablet 0.4 mg, 0.4 mg, Sublingual, Q5 Min PRN, Fouzia Desai MD    ondansetron ODT (ZOFRAN-ODT) disintegrating tablet 4 mg, 4 mg, Oral, Q4H PRN, 4 mg at 03/22/24 0032 **OR** ondansetron (ZOFRAN) injection 4 mg, 4 mg, Intravenous, Q4H PRN, Fouzia Desai MD, 4 mg at 04/02/24 0230    pantoprazole (PROTONIX) EC tablet 40 mg, 40 mg, Oral, BID AC, Gissell Miranda MD, 40 mg at 04/02/24 0637    phenol (CHLORASEPTIC) 1.4 % liquid 1 spray, 1 spray, Mouth/Throat, Q2H PRN, Judi Bray APRN, 1 spray at 03/27/24 0227    Phosphorus Replacement - Follow Nurse / BPA Driven Protocol, , Does not apply, PRN, Fouzia Desai MD    Potassium Replacement - Follow Nurse / BPA Driven Protocol, , Does not apply, PRN, Fouzia Desai MD    prochlorperazine (COMPAZINE) injection 2.5 mg, 2.5 mg, Intravenous, Q6H PRN, Fouzia Desai MD, 2.5 mg at 03/22/24 1235    sodium chloride tablet 1 g, 1 g, Oral, TID With Meals, Avelino Benson MD, 1 g at 04/01/24 1828    sucralfate (CARAFATE) tablet 1 g, 1 g, Oral, 4x Daily AC & at Bedtime, Fouzia Desai MD, 1 g at 04/02/24 0637  Review of Systems:    All systems were reviewed and negative except for:  Gastrointestinal: positive for  See HPI    Objective     Vital Signs  Temp:  [98.4 °F (36.9 °C)-98.9 °F (37.2 °C)] 98.4 °F (36.9 °C)  Heart Rate:  [70-91] 77  Resp:  [14-20] 14  BP: (131-156)/(69-89) 133/73  Body mass index is 21.82 kg/m².    Intake/Output Summary (Last 24 hours) at  4/2/2024 0831  Last data filed at 4/2/2024 0503  Gross per 24 hour   Intake 1520 ml   Output 2625 ml   Net -1105 ml     No intake/output data recorded.     Physical Exam:   General: patient awake, alert and cooperative   Eyes: Normal lids and lashes, no scleral icterus   Neck: supple, normal ROM   Skin: warm and dry, not jaundiced   Cardiovascular: regular rhythm and rate, no murmurs auscultated   Pulm: clear to auscultation bilaterally, regular and unlabored   Abdomen: soft, nontender, nondistended; normal bowel sounds   Extremities: no rash or edema   Psychiatric: Normal mood and behavior; memory intact     Results Review:     I reviewed the patient's new clinical results.    Results from last 7 days   Lab Units 04/02/24  0551 04/01/24  0540 03/31/24  0555   WBC 10*3/mm3 7.93 6.89 6.75   HEMOGLOBIN g/dL 7.0* 7.3* 7.6*   HEMATOCRIT % 21.2* 21.8* 22.2*   PLATELETS 10*3/mm3 301 330 314     Results from last 7 days   Lab Units 04/02/24  0550 04/01/24  0540 03/31/24  0555   SODIUM mmol/L 125* 127* 127*   POTASSIUM mmol/L 5.5* 4.9 4.5   CHLORIDE mmol/L 101 101 102   CO2 mmol/L 18.0* 19.0* 18.0*   BUN mg/dL 70* 62* 66*   CREATININE mg/dL 1.40* 1.31* 1.26*   CALCIUM mg/dL 7.5* 7.4* 7.2*   GLUCOSE mg/dL 112* 100* 122*         Lab Results   Lab Value Date/Time    LIPASE 19 03/25/2024 0431       Radiology:  US Renal Bilateral   Final Result      CT Abdomen Pelvis Without Contrast   Final Result       1.  Acute displaced comminuted right hip periprosthetic acetabular   fracture, as described in detail above. Corresponding asymmetric   enlargement of the right iliacus muscle is most consistent with a   corresponding intramuscular hematoma.   2.  Moderate bilateral hydroureteronephrosis with diffuse high   attenuation throughout the renal collecting systems, which could be due   to residual intravenous contrast from recent contrast-enhanced   examination a few days ago in the setting of poor renal function.   However, given  reported gross hematuria, blood products are not   excluded.    3.  Marked bladder distention despite presence of a Gamez catheter.   Correlate with Gamez catheter function.    4.  Diffuse bladder wall thickening and adjacent fat stranding raise   concern for possible cystitis. Correlate with urinalysis. Additionally,   given the presence of adjacent osseous trauma, bladder injury cannot be   entirely excluded.   5.  Relatively diffuse mesenteric fat stranding, most pronounced in the   upper abdomen. Findings could reflect the patient's fluid status.   However, correlate with serum lipase given the more focal stranding   within the upper abdomen to exclude possible pancreatitis.       This report was finalized on 3/23/2024 5:23 PM by Dr. Myra Hernandez M.D   on Workstation: [id number]              Assessment & Plan     Active Hospital Problems    Diagnosis     **Severe anemia     Moderate malnutrition     Duodenal ulcer     Periprosthetic fracture around internal prosthetic right hip joint     MARNIE (acute kidney injury)     CAD (coronary artery disease)     DM (diabetes mellitus), type 2     Melena     Hyponatremia     Heart failure with preserved ejection fraction, borderline, class II        Assessment:  Anemia: Persists  Evidence of grade D erosive esophagitis  Duodenal ulcer that was treated with epinephrine injection and cauterization on March 22  MARNIE      Plan:  Continue to monitor H&H, transfuse as needed, may need iron infusion as well.  Adjust diet, according to family patient cannot chew  Maintain PPI and Carafate  I discussed the patients findings and my recommendations with patient, family, and nursing staff.    Avelino Pereira MD

## 2024-04-02 NOTE — PLAN OF CARE
Goal Outcome Evaluation:  Plan of Care Reviewed With: patient              Care plans still in progress. Pt is now DNR and has moved to hospice care. Suspected discharge to home is 04/03/2024. 1 pack of RBC was transfused. Xray was done with no changes. S/O is now power of . Paper work is on file.

## 2024-04-02 NOTE — CONSULTS
Purpose of the visit was to evaluate for: goals of care/advanced care planning, support for patient/family, hospice referral/discussion, and pain/symptom management. Spoke with MD and RN as well as patient and family and discussed palliative care, goals of care, care options, resuscitation status, and [facility].      Assessment:  Patient is palliative care appropriate for community based services with [facility] (list reasons): severe anemia, malnutrition, right hip fracture, MARNIE, CAD, DM, CHF, melena. Patient awake and alert. She really doesn't respond to direct questioning regarding her symptoms and minimally participates in conversation, but family confident she fully understands situation. Her mobility and nutrition are significantly limited by acute illness. PPS 20%    Recommendations/Plan: Change code status to NO CPR/NO INTUBATION per patient and family request. Consult [facility] for outpatient services, patient would like to go home. They declined opportunity to transfer to  as a bridge.     Other Comments: Spoke with patient, her significant other Sachin and her two daughters at bedside. Patient states she does have a living will and that Sachin is her healthcare surrogate. Copy not available for review but requested. We discussed goals of care, treatment options and code status in detail along with MINESH Lau. Family has good understanding of situation and patient is adamant she does not want to go to rehab, she just wants to go home. She agrees she does not want to be brought back to the hospital and understands without continued medical intervention she will likely decline and pass away at home. She also agrees she does not want to be resuscitated or put on a ventilator, code status adjusted to reflect her wishes. Answered all questions and provided support, advised of ongoing availability. Discussed with Dr. Salmon and Dr. Ortiz.

## 2024-04-03 LAB
BASOPHILS # BLD AUTO: 0.04 10*3/MM3 (ref 0–0.2)
BASOPHILS NFR BLD AUTO: 0.6 % (ref 0–1.5)
BH BB BLOOD EXPIRATION DATE: NORMAL
BH BB BLOOD TYPE BARCODE: 6200
BH BB DISPENSE STATUS: NORMAL
BH BB PRODUCT CODE: NORMAL
BH BB UNIT NUMBER: NORMAL
CROSSMATCH INTERPRETATION: NORMAL
DEPRECATED RDW RBC AUTO: 44.4 FL (ref 37–54)
EOSINOPHIL # BLD AUTO: 0.32 10*3/MM3 (ref 0–0.4)
EOSINOPHIL NFR BLD AUTO: 4.8 % (ref 0.3–6.2)
ERYTHROCYTE [DISTWIDTH] IN BLOOD BY AUTOMATED COUNT: 14 % (ref 12.3–15.4)
FOLATE BLD-MCNC: 559 NG/ML
FOLATE RBC-MCNC: 2662 NG/ML
GLUCOSE BLDC GLUCOMTR-MCNC: 113 MG/DL (ref 70–130)
GLUCOSE BLDC GLUCOMTR-MCNC: 115 MG/DL (ref 70–130)
GLUCOSE BLDC GLUCOMTR-MCNC: 136 MG/DL (ref 70–130)
GLUCOSE BLDC GLUCOMTR-MCNC: 198 MG/DL (ref 70–130)
GLUCOSE BLDC GLUCOMTR-MCNC: 284 MG/DL (ref 70–130)
HCT VFR BLD AUTO: 21 % (ref 34–46.6)
HCT VFR BLD AUTO: 22.6 % (ref 34–46.6)
HGB BLD-MCNC: 7.8 G/DL (ref 12–15.9)
IMM GRANULOCYTES # BLD AUTO: 0.03 10*3/MM3 (ref 0–0.05)
IMM GRANULOCYTES NFR BLD AUTO: 0.5 % (ref 0–0.5)
LYMPHOCYTES # BLD AUTO: 1.17 10*3/MM3 (ref 0.7–3.1)
LYMPHOCYTES NFR BLD AUTO: 17.6 % (ref 19.6–45.3)
MCH RBC QN AUTO: 30.2 PG (ref 26.6–33)
MCHC RBC AUTO-ENTMCNC: 34.5 G/DL (ref 31.5–35.7)
MCV RBC AUTO: 87.6 FL (ref 79–97)
MONOCYTES # BLD AUTO: 0.72 10*3/MM3 (ref 0.1–0.9)
MONOCYTES NFR BLD AUTO: 10.8 % (ref 5–12)
NEUTROPHILS NFR BLD AUTO: 4.38 10*3/MM3 (ref 1.7–7)
NEUTROPHILS NFR BLD AUTO: 65.7 % (ref 42.7–76)
NRBC BLD AUTO-RTO: 0 /100 WBC (ref 0–0.2)
PLATELET # BLD AUTO: 278 10*3/MM3 (ref 140–450)
PMV BLD AUTO: 8.8 FL (ref 6–12)
RBC # BLD AUTO: 2.58 10*6/MM3 (ref 3.77–5.28)
UNIT  ABO: NORMAL
UNIT  RH: NORMAL
WBC NRBC COR # BLD AUTO: 6.66 10*3/MM3 (ref 3.4–10.8)

## 2024-04-03 PROCEDURE — 85025 COMPLETE CBC W/AUTO DIFF WBC: CPT | Performed by: INTERNAL MEDICINE

## 2024-04-03 PROCEDURE — 99232 SBSQ HOSP IP/OBS MODERATE 35: CPT | Performed by: INTERNAL MEDICINE

## 2024-04-03 PROCEDURE — 63710000001 INSULIN LISPRO (HUMAN) PER 5 UNITS: Performed by: HOSPITALIST

## 2024-04-03 PROCEDURE — 82948 REAGENT STRIP/BLOOD GLUCOSE: CPT

## 2024-04-03 RX ORDER — FERROUS SULFATE 325(65) MG
325 TABLET ORAL
Status: DISCONTINUED | OUTPATIENT
Start: 2024-04-03 | End: 2024-04-04 | Stop reason: HOSPADM

## 2024-04-03 RX ADMIN — FERROUS SULFATE TAB 325 MG (65 MG ELEMENTAL FE) 325 MG: 325 (65 FE) TAB at 11:19

## 2024-04-03 RX ADMIN — HYDROCODONE BITARTRATE AND ACETAMINOPHEN 1 TABLET: 7.5; 325 TABLET ORAL at 22:35

## 2024-04-03 RX ADMIN — Medication 1 APPLICATION: at 20:54

## 2024-04-03 RX ADMIN — AMLODIPINE BESYLATE 5 MG: 5 TABLET ORAL at 08:35

## 2024-04-03 RX ADMIN — SODIUM CHLORIDE TAB 1 GM 1 G: 1 TAB at 17:41

## 2024-04-03 RX ADMIN — SENNOSIDES AND DOCUSATE SODIUM 2 TABLET: 50; 8.6 TABLET ORAL at 08:35

## 2024-04-03 RX ADMIN — SODIUM CHLORIDE TAB 1 GM 1 G: 1 TAB at 11:20

## 2024-04-03 RX ADMIN — INSULIN LISPRO 2 UNITS: 100 INJECTION, SOLUTION INTRAVENOUS; SUBCUTANEOUS at 11:19

## 2024-04-03 RX ADMIN — INSULIN LISPRO 4 UNITS: 100 INJECTION, SOLUTION INTRAVENOUS; SUBCUTANEOUS at 20:54

## 2024-04-03 RX ADMIN — Medication 1 APPLICATION: at 08:35

## 2024-04-03 RX ADMIN — PANTOPRAZOLE SODIUM 40 MG: 40 TABLET, DELAYED RELEASE ORAL at 17:41

## 2024-04-03 RX ADMIN — SUCRALFATE 1 G: 1 TABLET ORAL at 11:20

## 2024-04-03 RX ADMIN — PANTOPRAZOLE SODIUM 40 MG: 40 TABLET, DELAYED RELEASE ORAL at 08:35

## 2024-04-03 RX ADMIN — SUCRALFATE 1 G: 1 TABLET ORAL at 20:54

## 2024-04-03 RX ADMIN — SUCRALFATE 1 G: 1 TABLET ORAL at 08:35

## 2024-04-03 RX ADMIN — SODIUM CHLORIDE TAB 1 GM 1 G: 1 TAB at 08:35

## 2024-04-03 RX ADMIN — SUCRALFATE 1 G: 1 TABLET ORAL at 17:41

## 2024-04-03 NOTE — PLAN OF CARE
Goal Outcome Evaluation:  Plan of Care Reviewed With: patient        Progress: no change  Pt had 3 BM. Hospice reevaluated and approved pt for discharge to home with hospice care. Expected discharge to be 04/04/24 at 12:00.

## 2024-04-03 NOTE — CASE MANAGEMENT/SOCIAL WORK
Discharge Planning Assessment  [facility]     Patient Name: Arline Landaverde  MRN: [id number]  Today's Date: 4/3/2024    Admit Date: 3/21/2024    Plan: Home with [facility]. Transport 4/4 at noon via [facility]. EMS DNR in chart.   Discharge Needs Assessment    No documentation.                  Discharge Plan       Row Name 04/03/24 1143       Plan    Plan Home with [facility]. Transport 4/4 at noon via [facility]. EMS DNR in chart.    Plan Comments [facility] was made aware that patients transport was cancelled at 10:00 this morning due to [facility] declining patient. Patient was reevaluated by [facility], and she was approved. CCP scheduled transport vis [facility] for tomorrow 4/4 @ noon. CCP made the family aware, and Lisa with Palliative is aware of transport time as well. EMS DNR is in patients' chart for transport tomorrow. CCP following.                  Continued Care and Services - Admitted Since 3/21/2024       Home Medical Care Coordination complete.      Service Provider Request Status Selected Services Address Phone Fax Patient Preferred    [facility]  Selected Home Health Services ,  Home Rehabilitation ,  Home Nursing [address] [place] [id number] [phone] --    [facility] Declined  current with another agency N[facility]A [id number] [address] [facility] [address] [phone] --                     Demographic Summary    No documentation.                  Functional Status    No documentation.                  Psychosocial    No documentation.                  Abuse/Neglect    No documentation.                  Legal    No documentation.                  Substance Abuse    No documentation.                  Patient Forms    No documentation.

## 2024-04-03 NOTE — PROGRESS NOTES
Name: Arline Landaverde ADMIT: 3/21/2024   : 1950  PCP: Lanie Gomez APRN    MRN: [id number] LOS: 13 days   AGE/SEX: 73 y.o. female  ROOM: [facility]     Subjective   Subjective   Complaining of constipation.  No abdominal pain.  No nausea or vomiting.  Poor p.o. intake.  No fever or chills    Review of Systems  cardioVascular/respiratory.  No chest pain.  No shortness of breath.  No cough  .  No dysuria.  No hematuria.     Objective   Objective   Vital Signs  Temp:  [97.8 °F (36.6 °C)-100.7 °F (38.2 °C)] 98.7 °F (37.1 °C)  Heart Rate:  [81-90] 85  Resp:  [10-18] 10  BP: (141-155)/() 141/69  SpO2:  [87 %-100 %] 100 %  on   ;   Device (Oxygen Therapy): room air    Intake/Output Summary (Last 24 hours) at 4/3/2024 1700  Last data filed at 4/3/2024 1518  Gross per 24 hour   Intake 793.75 ml   Output 2275 ml   Net -1481.25 ml     Body mass index is 21.71 kg/m².      24  0600 24  0212 24  0514   Weight: 56.9 kg (125 lb 7.1 oz) 57.7 kg (127 lb 3.3 oz) (unable to remove all pillows for patient comfort) 57.4 kg (126 lb 8.7 oz)     Physical Exam  General.  Elderly female.  She is alert and oriented times 3 out of 4.  In no apparent pain/distress/diaphoresis.  Appears chronically ill  Eyes.  Pupils equal round and reactive.  Intact extraocular musculature.  No pallor or jaundice.  Oral cavity.  Moist mucous membranes  Neck.  Supple.  No JVD.  No lymphadenopathy  Cardiovascular.  Regular rate and rhythm and grade 2 systolic murmur  Chest.  Poor bilateral air entry with no added sounds  Abdomen.  Soft lax.  No tenderness.  No organomegaly.  No guarding or rebound  Extremities.  No clubbing/cyanosis/edema.  CNS.  No acute focal neurological deficits    Results Review:      Results from last 7 days   Lab Units 24  0550 24  0540 24  0555 24  0607 24  0639 24  0618   SODIUM mmol/L 125* 127* 127* 127* 128* 127*   POTASSIUM mmol/L 5.5* 4.9 4.5 4.5 4.1 4.2   CHLORIDE  "mmol/L 101 101 102 103 100 102   CO2 mmol/L 18.0* 19.0* 18.0* 18.0* 18.7* 17.2*   BUN mg/dL 70* 62* 66* 66* 69* 64*   CREATININE mg/dL 1.40* 1.31* 1.26* 1.40* 1.32* 1.24*   GLUCOSE mg/dL 112* 100* 122* 114* 105* 140*   CALCIUM mg/dL 7.5* 7.4* 7.2* 7.1* 7.6* 7.3*     Estimated Creatinine Clearance: 31.9 mL/min (A) (by C-G formula based on SCr of 1.4 mg/dL (H)).      Results from last 7 days   Lab Units 04/03/24  1535 04/03/24  1044 04/03/24  0718 04/03/24  0621 04/02/24  2046 04/02/24  1635 04/02/24  1215 04/01/24  2139   GLUCOSE mg/dL 136* 198* 115 113 193* 142* 190* 200*                 Results from last 7 days   Lab Units 04/02/24  0550 03/31/24  0555 03/31/24  0032 03/30/24  0607   MAGNESIUM mg/dL  --   --  2.5* 1.4*   PHOSPHORUS mg/dL 3.7   < >  --  3.1    < > = values in this interval not displayed.           Invalid input(s): \"LDLCALC\"  Results from last 7 days   Lab Units 04/03/24  0550 04/02/24  1955 04/02/24  0551 04/01/24  0540 03/31/24  0555 03/31/24  0032 03/30/24  1613 03/30/24  0717 03/29/24  1553 03/29/24  0639 03/28/24  1647 03/28/24  0618   WBC 10*3/mm3 6.66  --  7.93 6.89 6.75  --   --  8.02  --  9.44  --  10.98*   HEMOGLOBIN g/dL 7.8* 8.0* 7.0* 7.3* 7.6* 7.0* 7.9* 7.5*   < > 8.0*  8.0*   < > 7.6*   HEMATOCRIT % 22.6* 23.8* 21.2*  21.0* 21.8* 22.2* 20.7* 23.3* 21.8*   < > 23.0*  23.0*   < > 22.7*   PLATELETS 10*3/mm3 278  --  301 330 314  --   --  304  --  278  --  271   MCV fL 87.6  --  88.7 89.3 89.9  --   --  90.1  --  88.1  --  87.0   MCH pg 30.2  --  29.3 29.9 30.8  --   --  31.0  --  30.7  --  29.1   MCHC g/dL 34.5  --  33.0 33.5 34.2  --   --  34.4  --  34.8  --  33.5   RDW % 14.0  --  13.9 13.9 14.4  --   --  14.3  --  14.4  --  14.2   RDW-SD fl 44.4  --  45.5 45.0 46.0  --   --  46.3  --  45.9  --  43.8   MPV fL 8.8  --  8.8 9.0 8.6  --   --  8.8  --  8.8  --  8.6   NEUTROPHIL % % 65.7  --  77.0* 63.5 63.8  --   --  68.1  --  64.6  --  72.2   LYMPHOCYTE % % 17.6*  --  9.3* 17.1* 15.9*  " --   --  13.1*  --  15.5*  --  10.5*   MONOCYTES % % 10.8  --  7.6 11.2 12.1*  --   --  11.3  --  10.1  --  9.2   EOSINOPHIL % % 4.8  --  5.0 6.4* 7.3*  --   --  6.7*  --  8.5*  --  7.1*   BASOPHIL % % 0.6  --  0.6 1.2 0.6  --   --  0.4  --  0.6  --  0.4   IMM GRAN % % 0.5  --  0.5 0.6* 0.3  --   --  0.4  --  0.7*  --  0.6*   NEUTROS ABS 10*3/mm3 4.38  --  6.10 4.38 4.31  --   --  5.46  --  6.10  --  7.93*   LYMPHS ABS 10*3/mm3 1.17  --  0.74 1.18 1.07  --   --  1.05  --  1.46  --  1.15   MONOS ABS 10*3/mm3 0.72  --  0.60 0.77 0.82  --   --  0.91*  --  0.95*  --  1.01*   EOS ABS 10*3/mm3 0.32  --  0.40 0.44* 0.49*  --   --  0.54*  --  0.80*  --  0.78*   BASOS ABS 10*3/mm3 0.04  --  0.05 0.08 0.04  --   --  0.03  --  0.06  --  0.04   IMMATURE GRANS (ABS) 10*3/mm3 0.03  --  0.04 0.04 0.02  --   --  0.03  --  0.07*  --  0.07*   NRBC /100 WBC 0.0  --  0.0 0.0 0.0  --   --  0.0  --  0.0  --  0.0    < > = values in this interval not displayed.                                     Results from last 7 days   Lab Units 03/30/24  0607   URIC ACID mg/dL 5.2       Imaging:  Imaging Results (Last 24 Hours)       ** No results found for the last 24 hours. **               I reviewed the patient's new clinical results / labs / tests / procedures      Assessment/Plan     Active Hospital Problems    Diagnosis  POA    **Severe anemia [D64.9]  Yes    Moderate malnutrition [E44.0]  Yes    Duodenal ulcer [K26.9]  Unknown    Periprosthetic fracture around internal prosthetic right hip joint [M97.01XA]  Not Applicable    MARNIE (acute kidney injury) [N17.9]  Unknown    CAD (coronary artery disease) [I25.10]  Unknown    DM (diabetes mellitus), type 2 [E11.9]  Unknown    Melena [K92.1]  Unknown    Hyponatremia [E87.1]  Yes    Heart failure with preserved ejection fraction, borderline, class II [I50.30]  Yes      Resolved Hospital Problems   No resolved problems to display.           Severe secondary to grade D erosive esophagitis and duodenal  ulcer with active bleed.  Status post EGD and epinephrine cauterization on 3/22/2024.  Currently on proton pump inhibitor and Carafate.  Hemoglobin appears stable.  No recurrence of bleed.  Continue p.o. iron.  Acute kidney failure/hyponatremia/hyperkalemia/urinary retention and hydronephrosis.  Nephrology is following..  Patient appears euvolemic.  Creatinine plateaued between 1.2 and 1.4.  Gamez catheter in place.  Right hip periprosthetic acetabular fracture.  Orthopedic saw the patient and she is not a surgical candidate secondary to her comorbid diseases.  History of coronary artery disease/hypertension.  Good blood pressure control with no evidence of angina on Norvasc.  History of type 2 diabetes.  Continue sliding scale.  DNR/palliative care.  Patient is DNR for palliative care.  Being discharged home with hospice tomorrow..        Robby Gonzalez MD  [facility]  04/03/24  17:00 EDT

## 2024-04-03 NOTE — NURSING NOTE
Coordinated with [facility], CCP, RN, and patient and her family to arrange new discharge plan. Patient reports no needs at this time. Plan is back on track for home with hospice services as soon as ambulance can be arranged.

## 2024-04-03 NOTE — PROGRESS NOTES
[facility]  Inpatient Progress Note    Reason for Follow Up:  Acute blood loss anemia, upper GI bleed secondary to duodenal ulcer     Subjective     Interval History: She has no complaints.  No abdominal pain.  No nausea or vomiting.  She has not had a bowel movement in a few days.  Her hemoglobin is 7.8 today after transfusion of 1 unit of packed red blood cells yesterday.      Current Facility-Administered Medications:     acetaminophen (TYLENOL) tablet 650 mg, 650 mg, Oral, Q4H PRN, 650 mg at 03/30/24 1640 **OR** acetaminophen (TYLENOL) suppository 650 mg, 650 mg, Rectal, Q4H PRN, Fouzia Desai MD    aluminum-magnesium hydroxide-simethicone (MAALOX MAX) 400-400-40 MG/5ML suspension 15 mL, 15 mL, Oral, Q6H PRN, Fouzia Desai MD    amLODIPine (NORVASC) tablet 5 mg, 5 mg, Oral, Q24H, Fouzia Desai MD, 5 mg at 04/03/24 0835    sennosides-docusate (PERICOLACE) 8.6-50 MG per tablet 2 tablet, 2 tablet, Oral, BID, 2 tablet at 04/03/24 0835 **AND** polyethylene glycol (MIRALAX) packet 17 g, 17 g, Oral, Daily PRN **AND** bisacodyl (DULCOLAX) EC tablet 5 mg, 5 mg, Oral, Daily PRN **AND** bisacodyl (DULCOLAX) suppository 10 mg, 10 mg, Rectal, Daily PRN, Fouzia Desai MD    Calcium Replacement - Follow Nurse / BPA Driven Protocol, , Does not apply, PRN, Fouzia Desai MD    dextrose (D50W) (25 g/50 mL) IV injection 25 g, 25 g, Intravenous, Q15 Min PRN, Harman Albarran MD    dextrose (GLUTOSE) oral gel 15 g, 15 g, Oral, Q15 Min PRN, Harman Albarran MD    glucagon (GLUCAGEN) injection 1 mg, 1 mg, Intramuscular, Q15 Min PRN, Harman Albarran MD    HYDROcodone-acetaminophen (NORCO) 7.5-325 MG per tablet 1 tablet, 1 tablet, Oral, Q4H PRN, Harman Albarran MD, 1 tablet at 04/02/24 0637    insulin lispro (HUMALOG/ADMELOG) injection 2-7 Units, 2-7 Units, Subcutaneous, 4x Daily AC & at Bedtime, Harman Albarran MD, 2 Units at 04/02/24 2051    Menthol-Zinc Oxide 1 Application, 1 Application,  Topical, Q12H, Fouzia Desai MD, 1 Application at 04/03/24 0835    nitroglycerin (NITROSTAT) SL tablet 0.4 mg, 0.4 mg, Sublingual, Q5 Min PRN, Fouzia Desai MD    ondansetron ODT (ZOFRAN-ODT) disintegrating tablet 4 mg, 4 mg, Oral, Q4H PRN, 4 mg at 03/22/24 0032 **OR** ondansetron (ZOFRAN) injection 4 mg, 4 mg, Intravenous, Q4H PRN, Fouzia Desai MD, 4 mg at 04/02/24 1251    pantoprazole (PROTONIX) EC tablet 40 mg, 40 mg, Oral, BID AC, RuthGissell croft MD, 40 mg at 04/03/24 0835    phenol (CHLORASEPTIC) 1.4 % liquid 1 spray, 1 spray, Mouth/Throat, Q2H PRN, Judi Bray APRN, 1 spray at 03/27/24 0227    prochlorperazine (COMPAZINE) injection 2.5 mg, 2.5 mg, Intravenous, Q6H PRN, Fouzia Desai MD, 2.5 mg at 03/22/24 1235    sodium chloride tablet 1 g, 1 g, Oral, TID With Meals, Avelino Benson MD, 1 g at 04/03/24 0835    sucralfate (CARAFATE) tablet 1 g, 1 g, Oral, 4x Daily AC & at Bedtime, Fouzia Desai MD, 1 g at 04/03/24 0835  Review of Systems:    The following systems were reviewed and negative;  respiratory, cardiovascular, musculoskeletal, and neurological    Objective     Vital Signs  Temp:  [97.8 °F (36.6 °C)-100.7 °F (38.2 °C)] 98.7 °F (37.1 °C)  Heart Rate:  [73-90] 84  Resp:  [16-19] 17  BP: (145-160)/() 151/72  Body mass index is 21.71 kg/m².    Intake/Output Summary (Last 24 hours) at 4/3/2024 0907  Last data filed at 4/3/2024 0000  Gross per 24 hour   Intake 693.75 ml   Output 1225 ml   Net -531.25 ml     No intake/output data recorded.     Physical Exam:   General: patient awake, alert and cooperative   Eyes: Normal lids and lashes, no scleral icterus   Neck: supple, normal ROM   Skin: warm and dry, not jaundiced   Cardiovascular: regular rhythm and rate, no murmurs auscultated   Pulm: clear to auscultation bilaterally, regular and unlabored   Abdomen: soft, nontender, nondistended; normal bowel sounds   Extremities: no rash or edema   Psychiatric: Normal mood and  behavior; memory intact     Results Review:     I reviewed the patient's new clinical results.    Results from last 7 days   Lab Units 04/03/24  0550 04/02/24 1955 04/02/24  0551 04/01/24  0540   WBC 10*3/mm3 6.66  --  7.93 6.89   HEMOGLOBIN g/dL 7.8* 8.0* 7.0* 7.3*   HEMATOCRIT % 22.6* 23.8* 21.2* 21.8*   PLATELETS 10*3/mm3 278  --  301 330     Results from last 7 days   Lab Units 04/02/24  0550 04/01/24  0540 03/31/24  0555   SODIUM mmol/L 125* 127* 127*   POTASSIUM mmol/L 5.5* 4.9 4.5   CHLORIDE mmol/L 101 101 102   CO2 mmol/L 18.0* 19.0* 18.0*   BUN mg/dL 70* 62* 66*   CREATININE mg/dL 1.40* 1.31* 1.26*   CALCIUM mg/dL 7.5* 7.4* 7.2*   GLUCOSE mg/dL 112* 100* 122*         Lab Results   Lab Value Date/Time    LIPASE 19 03/25/2024 0431       Radiology:  XR Hip With or Without Pelvis 2 - 3 View Right   Final Result       As described.               This report was finalized on 4/2/2024 1:47 PM by Dr. Baldev Blair M.D on Workstation: [id number]          US Renal Bilateral   Final Result      CT Abdomen Pelvis Without Contrast   Final Result       1.  Acute displaced comminuted right hip periprosthetic acetabular   fracture, as described in detail above. Corresponding asymmetric   enlargement of the right iliacus muscle is most consistent with a   corresponding intramuscular hematoma.   2.  Moderate bilateral hydroureteronephrosis with diffuse high   attenuation throughout the renal collecting systems, which could be due   to residual intravenous contrast from recent contrast-enhanced   examination a few days ago in the setting of poor renal function.   However, given reported gross hematuria, blood products are not   excluded.    3.  Marked bladder distention despite presence of a Gamez catheter.   Correlate with Gamez catheter function.    4.  Diffuse bladder wall thickening and adjacent fat stranding raise   concern for possible cystitis. Correlate with urinalysis. Additionally,   given the presence of  adjacent osseous trauma, bladder injury cannot be   entirely excluded.   5.  Relatively diffuse mesenteric fat stranding, most pronounced in the   upper abdomen. Findings could reflect the patient's fluid status.   However, correlate with serum lipase given the more focal stranding   within the upper abdomen to exclude possible pancreatitis.       This report was finalized on 3/23/2024 5:23 PM by Dr. Myra Hernandez M.D   on Workstation: [id number]              Assessment & Plan     Active Hospital Problems    Diagnosis     **Severe anemia     Moderate malnutrition     Duodenal ulcer     Periprosthetic fracture around internal prosthetic right hip joint     MARNIE (acute kidney injury)     CAD (coronary artery disease)     DM (diabetes mellitus), type 2     Melena     Hyponatremia     Heart failure with preserved ejection fraction, borderline, class II        Assessment/Recommendations:    Assessment:  Anemia: Persists, possibly iron deficiency.  Evidence of grade D erosive esophagitis  Duodenal ulcer that was treated with epinephrine injection and cauterization on March 22  MARNIE     Plan:  Continue to monitor H&H, transfuse as needed, may need iron infusion as well. I did start an iron pill.   Adjust diet, according to family patient cannot chew  Maintain PPI and Carafate    I discussed the patients findings and my recommendations with patient, family, and nursing staff.    Akshat Coe MD

## 2024-04-03 NOTE — PLAN OF CARE
Goal Outcome Evaluation:  Plan of Care Reviewed With: patient        Progress: no change  Outcome Evaluation: VSS, room air.  No complaint of pain from patient this shift.  Refusing Q2 turning d/t pain/discomfort.  Hospice consulted yesterday, this RN received phone call approximitly 2200 stating that patient didn't qualify for hospice and that paliative would be consulted and evaluate patient in the morning.  Plan for transfer home tomorrow, EMS scheduled for 10am.  Excellent urine output to huff.  No other changes to plan of care.

## 2024-04-03 NOTE — CONSULTS
Assessed patient at bedside. Patient lying in bed, awake and alert.    Met with Significant other (Sachin) and Daughter (July) at bedside. Family seems to have a good understanding of patient's clinical condition. Goal is home with hospice services.     Explanation of services provided with focus on home [facility] services. Answered all questions, discussed medications, symptom management needs, DME needs, and goals of care. [facility] services elected. [facility] consent forms completed and signed. [facility] admission folder provided and encouraged to call [facility] once patient arrives home and with any questions/needs.    DME ordered for delivery: Hospital bed, upper rails, bedside table    Medications sent to pharmacy: Senna, Norco, and Ativan    Patient may discharge home once medications are received. [facility] will see patient upon arrival home. Updated patient's nurse, Zakiya. EMS DNR already completed per palliative note.    Please call with any questions, concerns, or change in patient status. Thank you for allowing us the opportunity to participate in the care of this patient/family.      Hanh Sharma RN  Referral & Admissions Coordinator  [facility]  [phone]

## 2024-04-04 VITALS
RESPIRATION RATE: 22 BRPM | WEIGHT: 120.15 LBS | HEIGHT: 64 IN | DIASTOLIC BLOOD PRESSURE: 68 MMHG | OXYGEN SATURATION: 94 % | HEART RATE: 85 BPM | BODY MASS INDEX: 20.51 KG/M2 | SYSTOLIC BLOOD PRESSURE: 144 MMHG | TEMPERATURE: 100 F

## 2024-04-04 PROBLEM — K92.1 MELENA: Status: RESOLVED | Noted: 2024-03-21 | Resolved: 2024-04-04

## 2024-04-04 LAB
ALBUMIN SERPL-MCNC: 2.1 G/DL (ref 3.5–5.2)
ALBUMIN/GLOB SERPL: 0.8 G/DL
ALP SERPL-CCNC: 97 U/L (ref 39–117)
ALT SERPL W P-5'-P-CCNC: 13 U/L (ref 1–33)
ANION GAP SERPL CALCULATED.3IONS-SCNC: 9.2 MMOL/L (ref 5–15)
AST SERPL-CCNC: 12 U/L (ref 1–32)
BASOPHILS # BLD AUTO: 0.06 10*3/MM3 (ref 0–0.2)
BASOPHILS NFR BLD AUTO: 0.7 % (ref 0–1.5)
BILIRUB SERPL-MCNC: <0.2 MG/DL (ref 0–1.2)
BUN SERPL-MCNC: 65 MG/DL (ref 8–23)
BUN/CREAT SERPL: 53.7 (ref 7–25)
CALCIUM SPEC-SCNC: 7.6 MG/DL (ref 8.6–10.5)
CHLORIDE SERPL-SCNC: 99 MMOL/L (ref 98–107)
CO2 SERPL-SCNC: 15.8 MMOL/L (ref 22–29)
CREAT SERPL-MCNC: 1.21 MG/DL (ref 0.57–1)
DEPRECATED RDW RBC AUTO: 45.8 FL (ref 37–54)
EGFRCR SERPLBLD CKD-EPI 2021: 47.1 ML/MIN/1.73
EOSINOPHIL # BLD AUTO: 0.34 10*3/MM3 (ref 0–0.4)
EOSINOPHIL NFR BLD AUTO: 4.1 % (ref 0.3–6.2)
ERYTHROCYTE [DISTWIDTH] IN BLOOD BY AUTOMATED COUNT: 14.3 % (ref 12.3–15.4)
GLOBULIN UR ELPH-MCNC: 2.7 GM/DL
GLUCOSE BLDC GLUCOMTR-MCNC: 114 MG/DL (ref 70–130)
GLUCOSE BLDC GLUCOMTR-MCNC: 224 MG/DL (ref 70–130)
GLUCOSE SERPL-MCNC: 105 MG/DL (ref 65–99)
HCT VFR BLD AUTO: 25.9 % (ref 34–46.6)
HGB BLD-MCNC: 8.8 G/DL (ref 12–15.9)
IMM GRANULOCYTES # BLD AUTO: 0.03 10*3/MM3 (ref 0–0.05)
IMM GRANULOCYTES NFR BLD AUTO: 0.4 % (ref 0–0.5)
LYMPHOCYTES # BLD AUTO: 0.75 10*3/MM3 (ref 0.7–3.1)
LYMPHOCYTES NFR BLD AUTO: 9 % (ref 19.6–45.3)
MCH RBC QN AUTO: 30 PG (ref 26.6–33)
MCHC RBC AUTO-ENTMCNC: 34 G/DL (ref 31.5–35.7)
MCV RBC AUTO: 88.4 FL (ref 79–97)
MONOCYTES # BLD AUTO: 0.86 10*3/MM3 (ref 0.1–0.9)
MONOCYTES NFR BLD AUTO: 10.3 % (ref 5–12)
NEUTROPHILS NFR BLD AUTO: 6.27 10*3/MM3 (ref 1.7–7)
NEUTROPHILS NFR BLD AUTO: 75.5 % (ref 42.7–76)
NRBC BLD AUTO-RTO: 0 /100 WBC (ref 0–0.2)
PLATELET # BLD AUTO: 338 10*3/MM3 (ref 140–450)
PMV BLD AUTO: 8.7 FL (ref 6–12)
POTASSIUM SERPL-SCNC: 4.6 MMOL/L (ref 3.5–5.2)
PROT SERPL-MCNC: 4.8 G/DL (ref 6–8.5)
RBC # BLD AUTO: 2.93 10*6/MM3 (ref 3.77–5.28)
SODIUM SERPL-SCNC: 124 MMOL/L (ref 136–145)
WBC NRBC COR # BLD AUTO: 8.31 10*3/MM3 (ref 3.4–10.8)

## 2024-04-04 PROCEDURE — 82948 REAGENT STRIP/BLOOD GLUCOSE: CPT

## 2024-04-04 PROCEDURE — 85025 COMPLETE CBC W/AUTO DIFF WBC: CPT | Performed by: INTERNAL MEDICINE

## 2024-04-04 PROCEDURE — 63710000001 INSULIN LISPRO (HUMAN) PER 5 UNITS: Performed by: HOSPITALIST

## 2024-04-04 PROCEDURE — 80053 COMPREHEN METABOLIC PANEL: CPT | Performed by: INTERNAL MEDICINE

## 2024-04-04 PROCEDURE — 99231 SBSQ HOSP IP/OBS SF/LOW 25: CPT | Performed by: INTERNAL MEDICINE

## 2024-04-04 RX ORDER — SUCRALFATE 1 G/1
1 TABLET ORAL
Qty: 120 TABLET | Refills: 3 | Status: SHIPPED | OUTPATIENT
Start: 2024-04-04

## 2024-04-04 RX ORDER — PANTOPRAZOLE SODIUM 40 MG/1
40 TABLET, DELAYED RELEASE ORAL
Qty: 60 TABLET | Refills: 3 | Status: SHIPPED | OUTPATIENT
Start: 2024-04-04

## 2024-04-04 RX ORDER — FERROUS SULFATE 325(65) MG
325 TABLET ORAL
Qty: 30 TABLET | Refills: 3 | Status: SHIPPED | OUTPATIENT
Start: 2024-04-05

## 2024-04-04 RX ADMIN — INSULIN LISPRO 3 UNITS: 100 INJECTION, SOLUTION INTRAVENOUS; SUBCUTANEOUS at 12:03

## 2024-04-04 RX ADMIN — PANTOPRAZOLE SODIUM 40 MG: 40 TABLET, DELAYED RELEASE ORAL at 08:49

## 2024-04-04 RX ADMIN — FERROUS SULFATE TAB 325 MG (65 MG ELEMENTAL FE) 325 MG: 325 (65 FE) TAB at 08:50

## 2024-04-04 RX ADMIN — Medication 1 APPLICATION: at 08:52

## 2024-04-04 RX ADMIN — SENNOSIDES AND DOCUSATE SODIUM 2 TABLET: 50; 8.6 TABLET ORAL at 08:48

## 2024-04-04 RX ADMIN — AMLODIPINE BESYLATE 5 MG: 5 TABLET ORAL at 08:50

## 2024-04-04 RX ADMIN — SUCRALFATE 1 G: 1 TABLET ORAL at 08:49

## 2024-04-04 RX ADMIN — SODIUM CHLORIDE TAB 1 GM 1 G: 1 TAB at 08:49

## 2024-04-04 RX ADMIN — HYDROCODONE BITARTRATE AND ACETAMINOPHEN 1 TABLET: 7.5; 325 TABLET ORAL at 09:23

## 2024-04-04 NOTE — PROGRESS NOTES
[facility]  Inpatient Progress Note    Reason for Follow Up: Acute blood loss anemia secondary to duodenal ulcer    Subjective     Interval History:   Hemoglobin 8.8 hematocrit 25.9.  Patient had brown stool today, no current GI related complaints.    Current Facility-Administered Medications:     acetaminophen (TYLENOL) tablet 650 mg, 650 mg, Oral, Q4H PRN, 650 mg at 03/30/24 1640 **OR** acetaminophen (TYLENOL) suppository 650 mg, 650 mg, Rectal, Q4H PRN, Fouzia Desai MD    aluminum-magnesium hydroxide-simethicone (MAALOX MAX) 400-400-40 MG/5ML suspension 15 mL, 15 mL, Oral, Q6H PRN, Fouzia Desai MD    amLODIPine (NORVASC) tablet 5 mg, 5 mg, Oral, Q24H, Fouzia Desai MD, 5 mg at 04/04/24 0850    sennosides-docusate (PERICOLACE) 8.6-50 MG per tablet 2 tablet, 2 tablet, Oral, BID, 2 tablet at 04/04/24 0848 **AND** polyethylene glycol (MIRALAX) packet 17 g, 17 g, Oral, Daily PRN **AND** bisacodyl (DULCOLAX) EC tablet 5 mg, 5 mg, Oral, Daily PRN **AND** bisacodyl (DULCOLAX) suppository 10 mg, 10 mg, Rectal, Daily PRN, Fouzia Desai MD    Calcium Replacement - Follow Nurse / BPA Driven Protocol, , Does not apply, PRN, Fouzia Desai MD    dextrose (D50W) (25 g/50 mL) IV injection 25 g, 25 g, Intravenous, Q15 Min PRN, Harman Albarran MD    dextrose (GLUTOSE) oral gel 15 g, 15 g, Oral, Q15 Min PRN, Harman Albarran MD    ferrous sulfate tablet 325 mg, 325 mg, Oral, Daily With Breakfast, Akshat Coe MD, 325 mg at 04/04/24 0850    glucagon (GLUCAGEN) injection 1 mg, 1 mg, Intramuscular, Q15 Min PRN, Harman Albarran MD    HYDROcodone-acetaminophen (NORCO) 7.5-325 MG per tablet 1 tablet, 1 tablet, Oral, Q4H PRN, Harman Albarran MD, 1 tablet at 04/03/24 2235    insulin lispro (HUMALOG/ADMELOG) injection 2-7 Units, 2-7 Units, Subcutaneous, 4x Daily AC & at Bedtime, Harman Albarran MD, 4 Units at 04/03/24 2054    magnesium hydroxide (MILK OF MAGNESIA) 400 MG/5ML suspension 15 mL, 15  mL, Oral, Daily PRN, Robby Gonzalez MD    Menthol-Zinc Oxide 1 Application, 1 Application, Topical, Q12H, Fouzia Desai MD, 1 Application at 04/04/24 0852    nitroglycerin (NITROSTAT) SL tablet 0.4 mg, 0.4 mg, Sublingual, Q5 Min PRN, Fouzia Desai MD    ondansetron ODT (ZOFRAN-ODT) disintegrating tablet 4 mg, 4 mg, Oral, Q4H PRN, 4 mg at 03/22/24 0032 **OR** ondansetron (ZOFRAN) injection 4 mg, 4 mg, Intravenous, Q4H PRN, Fouzia Desai MD, 4 mg at 04/02/24 1251    pantoprazole (PROTONIX) EC tablet 40 mg, 40 mg, Oral, BID AC, Gissell Miranda MD, 40 mg at 04/04/24 0849    phenol (CHLORASEPTIC) 1.4 % liquid 1 spray, 1 spray, Mouth/Throat, Q2H PRN, Judi Bray APRN, 1 spray at 03/27/24 0227    prochlorperazine (COMPAZINE) injection 2.5 mg, 2.5 mg, Intravenous, Q6H PRN, Fouzia Desai MD, 2.5 mg at 03/22/24 1235    sodium chloride tablet 1 g, 1 g, Oral, TID With Meals, Avelino Benson MD, 1 g at 04/04/24 0849    sucralfate (CARAFATE) tablet 1 g, 1 g, Oral, 4x Daily AC & at Bedtime, Fouzia Desai MD, 1 g at 04/04/24 0849  Review of Systems:    All systems were reviewed and negative except for:  Gastrointestinal: positive for  See HPI    Objective     Vital Signs  Temp:  [97.8 °F (36.6 °C)-100 °F (37.8 °C)] 100 °F (37.8 °C)  Heart Rate:  [81-86] 85  Resp:  [10-16] 16  BP: (141-169)/(69-96) 149/80  Body mass index is 20.61 kg/m².    Intake/Output Summary (Last 24 hours) at 4/4/2024 0902  Last data filed at 4/4/2024 0831  Gross per 24 hour   Intake 1143 ml   Output 1875 ml   Net -732 ml     I/O this shift:  In: 273 [P.O.:273]  Out: -      Physical Exam:   General: patient awake, alert and cooperative   Eyes: Normal lids and lashes, no scleral icterus   Neck: supple, normal ROM   Skin: warm and dry, not jaundiced   Cardiovascular: regular rhythm and rate, no murmurs auscultated   Pulm: clear to auscultation bilaterally, regular and unlabored   Abdomen: soft, nontender, nondistended; normal bowel  sounds   Extremities: no rash or edema   Psychiatric: Normal mood and behavior; memory intact     Results Review:     I reviewed the patient's new clinical results.    Results from last 7 days   Lab Units 04/04/24  0633 04/03/24  0550 04/02/24 1955 04/02/24  0551   WBC 10*3/mm3 8.31 6.66  --  7.93   HEMOGLOBIN g/dL 8.8* 7.8* 8.0* 7.0*   HEMATOCRIT % 25.9* 22.6* 23.8* 21.2*  21.0*   PLATELETS 10*3/mm3 338 278  --  301     Results from last 7 days   Lab Units 04/04/24  0633 04/02/24  0550 04/01/24  0540   SODIUM mmol/L 124* 125* 127*   POTASSIUM mmol/L 4.6 5.5* 4.9   CHLORIDE mmol/L 99 101 101   CO2 mmol/L 15.8* 18.0* 19.0*   BUN mg/dL 65* 70* 62*   CREATININE mg/dL 1.21* 1.40* 1.31*   CALCIUM mg/dL 7.6* 7.5* 7.4*   BILIRUBIN mg/dL <0.2  --   --    ALK PHOS U/L 97  --   --    ALT (SGPT) U/L 13  --   --    AST (SGOT) U/L 12  --   --    GLUCOSE mg/dL 105* 112* 100*         Lab Results   Lab Value Date/Time    LIPASE 19 03/25/2024 0431       Radiology:  XR Hip With or Without Pelvis 2 - 3 View Right   Final Result       As described.               This report was finalized on 4/2/2024 1:47 PM by Dr. Baldev Blair M.D on Workstation: [id number]          US Renal Bilateral   Final Result      CT Abdomen Pelvis Without Contrast   Final Result       1.  Acute displaced comminuted right hip periprosthetic acetabular   fracture, as described in detail above. Corresponding asymmetric   enlargement of the right iliacus muscle is most consistent with a   corresponding intramuscular hematoma.   2.  Moderate bilateral hydroureteronephrosis with diffuse high   attenuation throughout the renal collecting systems, which could be due   to residual intravenous contrast from recent contrast-enhanced   examination a few days ago in the setting of poor renal function.   However, given reported gross hematuria, blood products are not   excluded.    3.  Marked bladder distention despite presence of a Gamez catheter.   Correlate with  Gamez catheter function.    4.  Diffuse bladder wall thickening and adjacent fat stranding raise   concern for possible cystitis. Correlate with urinalysis. Additionally,   given the presence of adjacent osseous trauma, bladder injury cannot be   entirely excluded.   5.  Relatively diffuse mesenteric fat stranding, most pronounced in the   upper abdomen. Findings could reflect the patient's fluid status.   However, correlate with serum lipase given the more focal stranding   within the upper abdomen to exclude possible pancreatitis.       This report was finalized on 3/23/2024 5:23 PM by Dr. Myra Hernandez M.D   on Workstation: [id number]              Assessment & Plan     Active Hospital Problems    Diagnosis     **Severe anemia     Moderate malnutrition     Duodenal ulcer     Periprosthetic fracture around internal prosthetic right hip joint     MARNIE (acute kidney injury)     CAD (coronary artery disease)     DM (diabetes mellitus), type 2     Hyponatremia     Heart failure with preserved ejection fraction, borderline, class II        Assessment:  Anemia: Improved posttransfusion  Endoscopic evidence of grade D esophagitis  Duodenal ulcer  MARNIE      Plan:  Continue PPI and Carafate  Monitor H&H  Adjust diet as tolerated  I discussed the patients findings and my recommendations with patient and nursing staff.    Avelino Pereira MD

## 2024-04-04 NOTE — CASE MANAGEMENT/SOCIAL WORK
Case Management Discharge Note      Final Note: Home with [facility]. Janeth KIRBY    Provided Post Acute Provider List?: N/A  Provided Post Acute Provider Quality & Resource List?: N/A    Selected Continued Care - Discharged on 4/4/2024 Admission date: 3/21/2024 - Discharge disposition: Hospice/Home      Destination    No services have been selected for the patient.                Durable Medical Equipment    No services have been selected for the patient.                Dialysis/Infusion    No services have been selected for the patient.                Home Medical Care Coordination complete.      Service Provider Selected Services Address Phone Fax Patient Preferred    [facility] ,  Home Rehabilitation ,  Home Nursing [address] [place] [id number] [phone] --              Therapy    No services have been selected for the patient.                Community Resources    No services have been selected for the patient.                Community & DME    No services have been selected for the patient.                         Final Discharge Disposition Code: 50 - home with hospice

## 2024-04-04 NOTE — PLAN OF CARE
Problem: Skin Injury Risk Increased  Goal: Skin Health and Integrity  Outcome: Ongoing, Progressing  Intervention: Optimize Skin Protection  Recent Flowsheet Documentation  Taken 4/4/2024 0800 by Racheal Roberts RN  Pressure Reduction Techniques:   heels elevated off bed   frequent weight shift encouraged  Head of Bed (HOB) Positioning: HOB elevated  Pressure Reduction Devices: specialty bed utilized  Skin Protection: adhesive use limited     Problem: Bleeding (Gastrointestinal Bleeding)  Goal: Hemostasis  4/4/2024 1344 by Racheal Roberts RN  Outcome: Ongoing, Progressing  4/4/2024 1107 by Racheal Roberts RN  Outcome: Ongoing, Not Progressing     Problem: Anemia  Goal: Anemia Symptom Improvement  Outcome: Ongoing, Progressing  Intervention: Monitor and Manage Anemia  Recent Flowsheet Documentation  Taken 4/4/2024 0800 by Racheal Roberts RN  Safety Promotion/Fall Prevention: activity supervised     Problem: Pain (Orthopaedic Fracture)  Goal: Acceptable Pain Control  Outcome: Ongoing, Progressing  Intervention: Manage Acute Orthopaedic-Related Pain  Recent Flowsheet Documentation  Taken 4/4/2024 0800 by Racheal Roberts RN  Pain Management Interventions: see MAR   Goal Outcome Evaluation:  Plan of Care Reviewed With: patient           Outcome Evaluation: VSS resting quietly. Had large BM today. plan to dc home. PIV jonathan'gunjan. Gamez remains in. Son at bedside dc instructions reviewed.     Transport to take pt home.                                Problem: Neurovascular Compromise (Orthopaedic Fracture)  Goal: Effective Tissue Perfusion  Outcome: Unable to Meet, Plan Revised      recheck F/S 186   Pt  lethargic ,No signs of hypoglycemia or hyperglycemia.

## 2024-04-04 NOTE — DISCHARGE SUMMARY
Patient Name: Arline Landaverde  : 1950  MRN: [id number]    Date of Admission: 3/21/2024  Date of Discharge:  2024  Primary Care Physician: Lanie Gomez APRN      Discharge Diagnoses     Active Hospital Problems    Diagnosis  POA    **Severe anemia [D64.9]  Yes    Moderate malnutrition [E44.0]  Yes    Duodenal ulcer [K26.9]  Yes    Periprosthetic fracture around internal prosthetic right hip joint [M97.01XA]  Not Applicable    MARNIE (acute kidney injury) [N17.9]  Yes    CAD (coronary artery disease) [I25.10]  Yes    DM (diabetes mellitus), type 2 [E11.9]  Yes    Hyponatremia [E87.1]  Yes    Heart failure with preserved ejection fraction, borderline, class II [I50.30]  Yes      Resolved Hospital Problems    Diagnosis Date Resolved POA    Melena [K92.1] 2024 Yes        Hospital Course     Brief admission history and physical.  Please refer to the H&P for full details.  A pleasant 73 years old female with a past history of CVA/migraine/chronic hyponatremia/type 2 diabetes/expressive aphasia/diastolic dysfunction/SIADH/coronary artery disease who presents to the emergency department with weakness.  Dark stool.  Physical examination on admission included an afebrile patient with stable vital signs/appears chronically ill and older than stated age/spasticity of the left upper extremity.  Hospital course.  Initial ER evaluation included a CBC that was normal except a white count of 13.05, hemoglobin of 4, platelets 465.  Troponin elevated at 107.  PTT normal.  INR mildly elevated at 1.3.  CMP normal except a random blood sugar of 171, , creatinine 2.93, sodium 114, potassium 6.1, chloride 84, CO2 18.6, calcium 7.8, total protein 5.5, albumin 2.8, GFR of 16.3.  Repeat troponin was elevated but with negative delta.  CT scan of the brain without contrast revealed atrophy and periventricular ischemic changes and chronic areas of infarction in the left cerebral hemisphere and both thalami.  Chest x-ray  with no acute cardiopulmonary process.  CT angio of the head and neck was unremarkable for acute disease with mild to moderate atherosclerosis of the carotid bulbs bilaterally with 30% stenosis in the origin of the left ICA.  CT scan of the abdomen pelvis without contrast revealed a displaced right hip acetabular fracture with moderate bilateral hydronephrosis and marked urinary bladder distention and diffuse wall bladder thickening and stranding.  Patient was admitted initially to the ICU and she was overall normal all of on 2024.  She was placed on IV proton pump inhibitor and sucralfate and n.p.o. diet.  Subsequently her diet was advanced and she tolerated that fairly well but she had very poor p.o. intake.  Her proton pump inhibitor was switched to p.o.  Her hemoglobin has relatively stabilized and the GI bleed has stopped.  She was placed on p.o. iron.  She was counseled against nonsteroidal anti-inflammatory medications.  She was noted to have an acute kidney failure and acute on top of chronic hyponatremia/hyperkalemia/urinary retention with hydronephrosis.  Gamez catheter was placed and a repeat ultrasound of the kidneys shows resolution of the hydronephrosis.  Nephrology was consulted she was thought to have an acute kidney failure and hyperkalemia because of possible acute tubular necrosis because of hypovolemia in addition to the hydronephrosis.  Salt tablets were introduced by nephrology.  She also have a right hip.  Prostatic acetabular fracture and orthopedic decided for conservative treatment given her comorbidities.  She has received transfusions during this admission and hemoglobin has stabilized.  She was placed on sliding scale insulin for her diabetes.  She has a history of coronary artery disease and hypertension and her blood pressure remained under good control with no evidence of angina or congestive heart failure on Norvasc.  Discussion with the patient and her son about her condition  prompted them for palliative care and hospice was consulted.  Hospice will follow-up with the patient at home.  At the time of discharge she was hemodynamically stable.  Consultants     Consult Orders (all) (From admission, onward)       Start     Ordered    04/02/24 0931  Inpatient Hospice / [facility] Consult  Once        Specialty:  Hospice and Palliative Medicine  Provider:  (Not yet assigned)    04/02/24 0930    04/01/24 1324  Inpatient Palliative Care Team Consult  Once        Provider:  (Not yet assigned)    04/01/24 1323    03/31/24 1247  Inpatient Palliative Care Team Consult  Once,   Status:  Canceled        Provider:  (Not yet assigned)    03/31/24 1246    03/26/24 1008  Inpatient Internal Medicine Consult  Once        Specialty:  Internal Medicine  Provider:  Danial Arias MD    03/26/24 1007    03/25/24 2224  Inpatient Internal Medicine Consult  Once        Specialty:  Internal Medicine  Provider:  Danial Arias MD    03/25/24 2223    03/24/24 0839  Inpatient Orthopedic Surgery Consult  Once        Specialty:  Orthopedic Surgery  Provider:  Iban Medley MD    03/24/24 0841    03/23/24 0852  Inpatient Urology Consult  Once        Specialty:  Urology  Provider:  James Salomon Jr., MD    03/23/24 0851    03/22/24 1028  Inpatient Case Management  Consult  Once        Provider:  (Not yet assigned)    03/22/24 1028    03/21/24 1721  Inpatient Nephrology Consult  Once        Specialty:  Nephrology  Provider:  Thad Roberts MD    03/21/24 1720    03/21/24 1720  Inpatient Gastroenterology Consult  Once        Specialty:  Gastroenterology  Provider:  Gissell Miranda MD    03/21/24 1720    03/21/24 1719  Inpatient Consult to Case Management   Once        Provider:  (Not yet assigned)    03/21/24 1720                  Procedures     Imaging Results (All)       Procedure Component Value Units Date/Time    XR Hip With or Without Pelvis 2 - 3 View Right  [[id number]] Collected: 04/02/24 1342     Updated: 04/02/24 1350    Narrative:      XR HIP W OR WO PELVIS 2-3 VIEW RIGHT-     INDICATIONS: Increased deformity     TECHNIQUE: FRONTAL VIEW OF THE PELVIS, 2 VIEWS OF THE RIGHT HIP     COMPARISON:  image from CT from 3/23/2024     FINDINGS:     Previously noted comminuted periprosthetic right acetabular fracture  appears grossly similar to the prior exam. No other fractures are  identified. Right hip arthroplasty hardware appears stable. No  dislocation is noted. If further evaluation is indicated,  cross-sectional imaging could be obtained for direct comparison with the  prior CT exam. Moderate colonic fecal retention is apparent, and gaseous  distention of bowel is partly included.       Impression:         As described.           This report was finalized on 4/2/2024 1:47 PM by Dr. Baldev Blair M.D on Workstation: [id number]       US Renal Bilateral [[id number]] Collected: 03/26/24 0558     Updated: 03/26/24 0605    Narrative:      US RENAL BILATERAL-     Clinical: Hydronephrosis follow-up     COMPARISON 3/23/2024 CT abdomen     FINDINGS: Right-sided hydronephrosis has resolved. The right kidney is 9  cm in length. The cortical echotexture is within normal limits, no  calculus or renal mass.     The left kidney was difficult to visualize. No hydronephrosis. No renal  calculus seen. Cortical echotexture is within normal limits.     Gamez catheter within the urinary bladder which is collapsed. Cannot  evaluate for either ureteral jet. The remainder is unremarkable.     This report was finalized on 3/26/2024 6:02 AM by Dr. Ryley Wise M.D  on Workstation: [id number]       CT Abdomen Pelvis Without Contrast [[id number]] Collected: 03/23/24 1709     Updated: 03/23/24 1726    Narrative:      CT ABDOMEN PELVIS WO CONTRAST-     HISTORY: Severe anemia, gross hematuria.     TECHNIQUE:  CT of the abdomen and pelvis was performed without  intravenous contrast. Reformatted  images were reviewed.  Radiation dose  reduction techniques were utilized, including automated exposure control  and exposure modulation based on body size.     COMPARISON: None. There is a hypodense cardiac blood pool, which can be  seen with anemia. Lung bases and pleural spaces are clear.     FINDINGS:     Lung bases and pleural spaces are clear.  The liver is normal in size. The gallbladder is surgically absent. The  spleen is normal in size. There are no pancreatic calcifications. The  adrenal glands are within normal limits. There are no renal stones.  There is diffuse high attenuation throughout the bilateral renal  collecting systems, which are moderately diffusely dilated.  No pathologically enlarged abdominal or pelvic lymph nodes are  identified within the limitations of this noncontrast enhanced  examination. There is at least moderate calcific atherosclerosis. There  is a 1.2 cm peripherally calcified distal splenic artery aneurysm.  There is relatively diffuse mesenteric fat stranding, along with more  prominent fat stranding in the upper abdomen, centered within the  gastrohepatic/peripancreatic region.  There is a tiny hiatal hernia. There is no bowel obstruction. The  appendix is visualized. The bladder is markedly distended, despite the  presence of a Gamez catheter. The bladder is diffusely thick-walled with  adjacent fat stranding. Air within the bladder lumen likely reflects  recent instrumentation. The uterus is present. There is multilevel  degenerative disc disease. There is osseous demineralization. There is a  partially imaged right hip arthroplasty. Metallic streak artifact limits  evaluation of adjacent structures. There is an acute displaced  comminuted periprosthetic acetabular fracture, which extends into the  right iliac bone and right pubic bone, including the superior pubic  ramus. There is asymmetric enlargement of the right iliacus muscle.  There is mild right-sided presacral fat  stranding.          Impression:         1.  Acute displaced comminuted right hip periprosthetic acetabular  fracture, as described in detail above. Corresponding asymmetric  enlargement of the right iliacus muscle is most consistent with a  corresponding intramuscular hematoma.  2.  Moderate bilateral hydroureteronephrosis with diffuse high  attenuation throughout the renal collecting systems, which could be due  to residual intravenous contrast from recent contrast-enhanced  examination a few days ago in the setting of poor renal function.  However, given reported gross hematuria, blood products are not  excluded.   3.  Marked bladder distention despite presence of a Gamez catheter.  Correlate with Gamez catheter function.   4.  Diffuse bladder wall thickening and adjacent fat stranding raise  concern for possible cystitis. Correlate with urinalysis. Additionally,  given the presence of adjacent osseous trauma, bladder injury cannot be  entirely excluded.  5.  Relatively diffuse mesenteric fat stranding, most pronounced in the  upper abdomen. Findings could reflect the patient's fluid status.  However, correlate with serum lipase given the more focal stranding  within the upper abdomen to exclude possible pancreatitis.     This report was finalized on 3/23/2024 5:23 PM by Dr. Myra Hernandez M.D  on Workstation: [id number]               Pertinent Labs     Results from last 7 days   Lab Units 04/04/24  0633 04/03/24  0550 04/02/24 1955 04/02/24  0551 04/01/24  0540   WBC 10*3/mm3 8.31 6.66  --  7.93 6.89   HEMOGLOBIN g/dL 8.8* 7.8* 8.0* 7.0* 7.3*   PLATELETS 10*3/mm3 338 278  --  301 330     Results from last 7 days   Lab Units 04/04/24  0633 04/02/24  0550 04/01/24  0540 03/31/24  0555   SODIUM mmol/L 124* 125* 127* 127*   POTASSIUM mmol/L 4.6 5.5* 4.9 4.5   CHLORIDE mmol/L 99 101 101 102   CO2 mmol/L 15.8* 18.0* 19.0* 18.0*   BUN mg/dL 65* 70* 62* 66*   CREATININE mg/dL 1.21* 1.40* 1.31* 1.26*   GLUCOSE mg/dL  "105* 112* 100* 122*   Estimated Creatinine Clearance: 35.1 mL/min (A) (by C-G formula based on SCr of 1.21 mg/dL (H)).  Results from last 7 days   Lab Units 04/04/24  0633 04/02/24  0550 04/01/24  0540 03/31/24  0555   ALBUMIN g/dL 2.1* 2.4* 2.2* 2.3*   BILIRUBIN mg/dL <0.2  --   --   --    ALK PHOS U/L 97  --   --   --    AST (SGOT) U/L 12  --   --   --    ALT (SGPT) U/L 13  --   --   --      Results from last 7 days   Lab Units 04/04/24  0633 04/02/24  0550 04/01/24  0540 03/31/24  0555 03/31/24  0032 03/30/24  0607   CALCIUM mg/dL 7.6* 7.5* 7.4* 7.2*  --  7.1*   ALBUMIN g/dL 2.1* 2.4* 2.2* 2.3*  --  2.1*   MAGNESIUM mg/dL  --   --   --   --  2.5* 1.4*   PHOSPHORUS mg/dL  --  3.7 3.4 2.9  --  3.1         Results from last 7 days   Lab Units 03/30/24  0607   URIC ACID mg/dL 5.2         Invalid input(s): \"LDLCALC\"      Imaging Results (Last 24 Hours)       ** No results found for the last 24 hours. **            Test Results Pending at Discharge         Discharge Exam   Physical Exam  Vitals.  Temperature 100 a pulse of 85 respiratory rate of 16 blood pressure 149/80 and O2 sats of 97% on room air  General.  Elderly female.  She is alert and oriented times 3 out of 4.  In no apparent pain/distress/diaphoresis.  Appears chronically ill and older than her stated age.  Eyes.  Pupils equal round and reactive.  Intact extraocular musculature.  No pallor or jaundice.  Oral cavity.  Moist mucous membranes  Neck.  Supple.  No JVD.  No lymphadenopathy  Cardiovascular.  Regular rate and rhythm and grade 2 systolic murmur  Chest.  Poor bilateral air entry with no added sounds  Abdomen.  Soft lax.  No tenderness.  No organomegaly.  No guarding or rebound  Extremities.  No clubbing/cyanosis/edema.  CNS.  No acute focal neurological deficits     Discharge Details        Discharge Medications        New Medications        Instructions Start Date   ferrous sulfate 325 (65 FE) MG tablet   325 mg, Oral, Daily With Breakfast   Start " Date: April 5, 2024     HYDROcodone-acetaminophen 7.5-325 MG per tablet  Commonly known as: NORCO   1 tablet, Oral, Every 4 Hours PRN      LORazepam 0.5 MG tablet  Commonly known as: Ativan   Take 1 tablet by mouth Every 6 (Six) Hours As Needed for anxiety and restlessness      pantoprazole 40 MG EC tablet  Commonly known as: PROTONIX   40 mg, Oral, 2 Times Daily Before Meals      senna 8.6 MG tablet  Commonly known as: SENOKOT   2 tablets, Oral, Daily      sucralfate 1 g tablet  Commonly known as: CARAFATE   1 g, Oral, 4 Times Daily Before Meals & Nightly             Continue These Medications        Instructions Start Date   acetaminophen 325 MG tablet  Commonly known as: TYLENOL   650 mg, Oral, Every 6 Hours PRN      amLODIPine 5 MG tablet  Commonly known as: NORVASC   1 tablet, Oral, Daily      atorvastatin 20 MG tablet  Commonly known as: LIPITOR   20 mg, Oral, Daily             Stop These Medications      aspirin 81 MG EC tablet     losartan 50 MG tablet  Commonly known as: COZAAR     magnesium oxide 400 tablet tablet  Commonly known as: MAG-OX     torsemide 20 MG tablet  Commonly known as: DEMADEX              Allergies   Allergen Reactions    Penicillins Hives    Furosemide Hives         Discharge Disposition:  Condition: Stable with poor prognosis    Diet:   Diet Order   Procedures    Diet: Regular/House, Diabetic, Gastrointestinal; Consistent Carbohydrate; Fiber-Restricted; Fluid Consistency: Thin (IDDSI 0)       Activity:   Activity Instructions       Pelvic Rest              Counseling : No nonsteroidal anti-inflammatory medication    CODE STATUS:    Code Status and Medical Interventions:   Ordered at: 04/02/24 0931     Medical Intervention Limits:    NO intubation (DNI)     Level Of Support Discussed With:    Patient     Code Status (Patient has no pulse and is not breathing):    No CPR (Do Not Attempt to Resuscitate)     Medical Interventions (Patient has pulse or is breathing):    Limited Support        Future Appointments   Date Time Provider Department Center   4/9/2024  8:00 AM Kumar Bonilla MD [facility]     Additional Instructions for the Follow-ups that You Need to Schedule       Ambulatory Referral to Home Hospice   As directed      Right hip acetabulum fracture/acute kidney failure/bleeding peptic ulcer.  Comfort measures and palliative care    Order Comments: Right hip acetabulum fracture/acute kidney failure/bleeding peptic ulcer.  Comfort measures and palliative care         Call MD With Problems / Concerns   As directed      Instructions: Call hospice or MD increasing pain/shortness of breath/nausea or vomiting/fever or chills    Order Comments: Instructions: Call hospice or MD increasing pain/shortness of breath/nausea or vomiting/fever or chills         Discharge Follow-up with PCP   As directed       Currently Documented PCP:    Lanie Gomez APRN    PCP Phone Number:    [phone]     Follow Up Details: Primary hospice MD.  1 week.  Grade D esophagitis/bleeding duodenal ulcer/acute kidney failure/hyponatremia/urinary retention/right hip acetabular fracture/coronary artery disease/hypertension//palliative care               Contact information for follow-up providers       Lanie Gomez APRN .    Specialty: Family Medicine  Why: Primary hospice MD.  1 week.  Grade D esophagitis/bleeding duodenal ulcer/acute kidney failure/hyponatremia/urinary retention/right hip acetabular fracture/coronary artery disease/hypertension//palliative care  Contact information:  [address] [place] [id number] [phone]                       Contact information for after-discharge care       Home Medical Care       [facility] .    Services: Home Health Services, Home Rehabilitation, Home Nursing  Contact information:  [address] [place] [id number] [phone]                                     Time Spent on Discharge:  Greater than 30  minutes      Robby Gonzalez MD  [facility]  04/04/24  09:09 EDT

## 2024-04-04 NOTE — CASE MANAGEMENT/SOCIAL WORK
Continued Stay Note  [facility]     Patient Name: Arline Landaverde  MRN: [id number]  Today's Date: 4/4/2024    Admit Date: 3/21/2024    Plan: Home with [facility]; EMS for 12:00   Discharge Plan       Row Name 04/04/24 1209       Plan    Plan Home with [facility]; EMS for 12:00    Plan Comments CCP met with patient and patient's significant other at bedside. Confirmed plan is home with [facility]. Significant other states they did deliver equipment for patient at the home. EMS DNR form and EMS transport form on patient's chart. Janeth KIRBY                   Discharge Codes    No documentation.                 Expected Discharge Date and Time       Expected Discharge Date Expected Discharge Time    Apr 4, 2024               MIRTA Mac

## 2024-04-04 NOTE — PLAN OF CARE
Goal Outcome Evaluation:  Plan of Care Reviewed With: patient        Progress: no change  Outcome Evaluation: VSS, room air.  Pain control with norco, good results.  Refusing most turns overnight due to discomfort.  Hospice reconsulted yesterday, plan to go home today with hospice care, indwelling cath to remain in place.  No other changes to plan of care.

## 2024-04-10 ENCOUNTER — TELEPHONE (OUTPATIENT)
Dept: ORTHOPEDIC SURGERY | Facility: CLINIC | Age: 74
End: 2024-04-10
Payer: MEDICARE

## 2024-04-10 NOTE — TELEPHONE ENCOUNTER
Attempted to call patient at all 3 numbers in the chart.  All numbers went right to voicemail.  I left a voicemail with a call back number on the patients cellphone. Will continue to try to reach patient to help expedite care.  Patient will likely need referral to orthopedic trauma once stable if they still would like to proceed with surgical intervention.

## 2024-10-31 NOTE — TELEPHONE ENCOUNTER
I tried to call Arline Landaverde but there was no answer.  Left a voicemail asking patient to call back.  Will continue to try to reach pt.    HUB- if pt calls back, please transfer through to triage.    Thank you,    Shaunna BHAKTA RN  [place] [facility] [place]  12[place]26/23 09:38 EST     NUTRITION SERVICES: BMI - Pt with BMI >40 (=Body mass index is 40.35 kg/m².), Class III obesity. Weight is taken via bed scale and pt is +1L fluids per I/O. Weight loss counseling not appropriate in acute care setting. RECOMMEND - If appropriate at DC please refer to outpatient nutrition services for weight management.

## (undated) DEVICE — BOWL PLSTC LG 32OZ BLU STRL

## (undated) DEVICE — BLCK/BITE BLOX W/DENTL/RIM W/STRAP 54F

## (undated) DEVICE — CEMENT MIXING SYSTEM WITH FEMORAL BREAKWAY NOZZLE: Brand: REVOLUTION

## (undated) DEVICE — DRP SURG U/DRP INVISISHIELD PA 48X52IN

## (undated) DEVICE — COAXIAL FEMORAL CANAL TIP

## (undated) DEVICE — NDL SPINE 18G 31/2IN PNK

## (undated) DEVICE — SOL IRR H2O BTL 1000ML STRL

## (undated) DEVICE — GOWN,PREVENTION PLUS,XLNG/XXLARGE,STRL: Brand: MEDLINE

## (undated) DEVICE — ADAPT CLN BIOGUARD AIR/H2O DISP

## (undated) DEVICE — PCH SURG INVISISHIELD FLD/COL W/DRN/PRT 20X6IN

## (undated) DEVICE — COUNT NDL MAG FM/BLCK 40COUNT

## (undated) DEVICE — WEREWOLF FASTSEAL 6.0 HEMOSTASIS WAND: Brand: FASTSEAL 6.0 HEMOSTASIS WAND

## (undated) DEVICE — SUT MNCRYL 2/0 CT1 36IN

## (undated) DEVICE — SUT ETHIB NO 5 MB46G

## (undated) DEVICE — DECANT BG O JET

## (undated) DEVICE — BIPOLAR ELECTROHEMOSTASIS CATHETER: Brand: INJECTION GOLD PROBE

## (undated) DEVICE — INTENDED FOR TISSUE SEPARATION, AND OTHER PROCEDURES THAT REQUIRE A SHARP SURGICAL BLADE TO PUNCTURE OR CUT.: Brand: BARD-PARKER ® STAINLESS STEEL BLADES

## (undated) DEVICE — DISPOSABLE DRAPE, STERILE, FOR A CDS-3072 6 FOOT TABLE: Brand: PEDIGO PRODUCTS, INC.

## (undated) DEVICE — THE CARR-LOCKE INJECTION NEEDLE IS A SINGLE USE, DISPOSABLE, FLEXIBLE SHEATH INJECTION NEEDLE USED FOR THE INJECTION OF VARIOUS TYPES OF MEDIA THROUGH FLEXIBLE ENDOSCOPES.

## (undated) DEVICE — TBG PENCL TELESCP MEGADYNE SMOKE EVAC 10FT

## (undated) DEVICE — SENSR O2 OXIMAX FNGR A/ 18IN NONSTR

## (undated) DEVICE — STRIP,CLOSURE,WOUND,MEDI-STRIP,1/2X4: Brand: MEDLINE

## (undated) DEVICE — IRRIGATOR BULB ASEPTO 60CC STRL

## (undated) DEVICE — HOOD: Brand: FLYTE

## (undated) DEVICE — APPL CHLORAPREP HI/LITE 26ML ORNG

## (undated) DEVICE — LN SMPL CO2 SHTRM SD STREAM W/M LUER

## (undated) DEVICE — KT CMT PREP FEM BONE

## (undated) DEVICE — ELECTRD BLD EZ CLN STD 4IN

## (undated) DEVICE — DECANTER: Brand: UNBRANDED

## (undated) DEVICE — COVER,MAYO STAND,XL,STERILE: Brand: MEDLINE

## (undated) DEVICE — PICO 7 15CM X 15CM: Brand: PICO™ 7

## (undated) DEVICE — SHEET, ORTHO, SPLIT, STERILE: Brand: MEDLINE

## (undated) DEVICE — HOOD, PEEL-AWAY: Brand: FLYTE

## (undated) DEVICE — SOL ISO/ALC RUB 70PCT 4OZ

## (undated) DEVICE — SPNG GZ WOVN 4X4IN 12PLY 10/BX STRL

## (undated) DEVICE — GOWN,PREVENTION PLUS,XLARGE,STERILE: Brand: MEDLINE

## (undated) DEVICE — TUBING, SUCTION, 1/4" X 10', STRAIGHT: Brand: MEDLINE

## (undated) DEVICE — FEMORAL CANAL PRESSURIZER WITHOUT HUB, MEDIUM

## (undated) DEVICE — CVR HNDL LT SURG ACCSSRY BLU STRL

## (undated) DEVICE — TOWEL,OR,DSP,ST,BLUE,STD,4/PK,20PK/CS: Brand: MEDLINE

## (undated) DEVICE — GLV SURG SENSICARE PI LF PF 7.5

## (undated) DEVICE — DRAPE,U/ SHT,SPLIT,PLAS,STERIL: Brand: MEDLINE

## (undated) DEVICE — SPNG LAP 18X18IN LF STRL PK/5

## (undated) DEVICE — 3M™ IOBAN™ 2 ANTIMICROBIAL INCISE DRAPE 6651EZ: Brand: IOBAN™ 2

## (undated) DEVICE — MAT FLR ABSORBENT LG 4FT 10 2.5FT

## (undated) DEVICE — KT ORCA ORCAPOD DISP STRL

## (undated) DEVICE — DUAL CUT SAGITTAL BLADE

## (undated) DEVICE — PK ANT HIP 40

## (undated) DEVICE — TRAP FLD MINIVAC MEGADYNE 100ML

## (undated) DEVICE — GLV SURG SENSICARE PI LF PF 8 GRN STRL

## (undated) DEVICE — CANN O2 ETCO2 FITS ALL CONN CO2 SMPL A/ 7IN DISP LF

## (undated) DEVICE — PREP SOL POVIDONE/IODINE BT 4OZ